# Patient Record
Sex: MALE | Race: WHITE | Employment: UNEMPLOYED | URBAN - METROPOLITAN AREA
[De-identification: names, ages, dates, MRNs, and addresses within clinical notes are randomized per-mention and may not be internally consistent; named-entity substitution may affect disease eponyms.]

---

## 2017-02-07 ENCOUNTER — OFFICE VISIT (OUTPATIENT)
Dept: HEMATOLOGY | Age: 57
End: 2017-02-07

## 2017-02-07 ENCOUNTER — HOSPITAL ENCOUNTER (OUTPATIENT)
Dept: ULTRASOUND IMAGING | Age: 57
Discharge: HOME | End: 2017-02-07
Attending: PHYSICIAN ASSISTANT
Payer: MEDICAID

## 2017-02-07 VITALS
SYSTOLIC BLOOD PRESSURE: 104 MMHG | HEIGHT: 70 IN | BODY MASS INDEX: 36.73 KG/M2 | HEART RATE: 68 BPM | OXYGEN SATURATION: 94 % | RESPIRATION RATE: 19 BRPM | WEIGHT: 256.6 LBS | TEMPERATURE: 97.2 F | DIASTOLIC BLOOD PRESSURE: 58 MMHG

## 2017-02-07 DIAGNOSIS — K74.69 OTHER CIRRHOSIS OF LIVER (HCC): ICD-10-CM

## 2017-02-07 DIAGNOSIS — K74.69 OTHER CIRRHOSIS OF LIVER (HCC): Primary | ICD-10-CM

## 2017-02-07 PROCEDURE — 76700 US EXAM ABDOM COMPLETE: CPT

## 2017-02-07 NOTE — PROGRESS NOTES
[address], MD, FACP, [facility]     April ADDIE Redd PA-C Laymon Evangelist, MD, MD Aydee Bagley NP Ebony Rape, NP [facility]     [address] [facility], [address] [address]     [phone]     FAX: [address] [place] [address], [address] [place] [address], [address]     [phone]     FAX: [phone]       Patient Care Team:  Margaretmary Saint, MD as PCP - General (Internal Medicine)  Rolan Marcos MD (Neurology)  Ron Allred MD (Gastroenterology)      Problem List  Date Reviewed: 11/11/2016          Codes Class Noted    Cirrhosis [facility]) ICD-10-CM: K74.60  ICD-9-CM: 571.5  2/1/2016        Essential hypertension ICD-10-CM: I10  ICD-9-CM: 401.9  11/10/2015        Migraine ICD-10-CM: I35.001  ICD-9-CM: 346.90  11/10/2015    Overview Signed 11/10/2015  9:24 AM by Margaretmary Saint, MD Dr cletus aralu               Chronic back pain ICD-10-CM: M54.9, G89.29  ICD-9-CM: 724.5, 338.29  11/10/2015    Overview Signed 11/10/2015  9:24 AM by Margaretmary Saint, MD Dr cletus aralu               Brain aneurysm ICD-10-CM: I67.1  ICD-9-CM: 437.3  11/10/2015    Overview Signed 11/10/2015  9:29 AM by Margaretmary Saint, MD     Mri 6/2015 do siu              Gastroesophageal reflux disease without esophagitis ICD-10-CM: K21.9  ICD-9-CM: 530.81  11/10/2015        Anxiety and depression ICD-10-CM: F41.9, F32.9  ICD-9-CM: 300.00, 311  11/10/2015        Chronic hepatitis C without hepatic coma [facility]) ICD-10-CM: B18.2  ICD-9-CM: 070.54  11/10/2015    Overview Signed 11/10/2015  9:34 AM by Margaretmary Saint, MD     States from tatoo, not treated, liver bx at [facility], 5 years ago.               Obesity ICD-10-CM: E66.9  ICD-9-CM: 278.00  11/10/2015              Francisca Mims. 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 The active problem list, all pertinent past medical history, medications, and laboratory findings related to the liver disorder were reviewed with the patient. The patient is a 61 y.o.  male who was noted to have abnormalities in liver chemistries and subsequently tested positive for chronic HCV in about 2006. Risk factors for acquiring HCV are IV drug use tattoos in 1970s. He notes several friends that got tattoos from the same person have HCV. There was no history of acute icteric hepatitis at the time of these risk factors. The patient was referred to [facility] in 2006. A liver biopsy was performed in 2006. The results are not available. The patient belives this demonstrated mild fibrosis. His past fibroscan in this office demonstrated a score of EkPa 63.9. Suggested fibrosis level is F4. Amy Paige presents to the office for follow-up of chronic hepatitis C therapy. He completed a full 12 weeks of Harvoni as of almost 6 months ago and presents for determination of sustained response. He states that he feels well and has no new or concerning complaints at this time. The patient has no other new complaints but continues to note fatigue, and generalized joint pain. He has had a lessening of pain in the right side over the liver. The patient has not experienced problems concentrating, swelling of the abdomen, swelling of the lower extremities, hematemesis, hematochezia. The patient moderate limitations in functional activities which can be attributed to other medical problems that are not related to the liver disease.     ALLERGIES  Allergies   Allergen Reactions    Pcn [Penicillins] Swelling       MEDICATIONS  Current Outpatient Prescriptions   Medication Sig    hydroCHLOROthiazide (HYDRODIURIL) 25 mg tablet TAKE ONE TABLET BY MOUTH DAILY    irbesartan (AVAPRO) 150 mg tablet TAKE ONE TABLET BY MOUTH EVERY NIGHT AT BEDTIME    predniSONE (DELTASONE) 10 mg tablet Take 10 mg by mouth daily.  Omeprazole delayed release (PRILOSEC D/R) 20 mg tablet Take 1 Tab by mouth daily.  diazepam (VALIUM) 10 mg tablet Take 10 mg by mouth two (2) times daily as needed.  oxyCODONE-acetaminophen (PERCOCET)  mg per tablet Take 1 Tab by mouth every eight (8) hours as needed for Pain.  morphine CR (MS CONTIN) 60 mg CR tablet Take 60 mg by mouth two (2) times daily as needed.  OTHER Uses an inhaler as needed.  carisoprodol (SOMA) 350 mg tablet Take 350 mg by mouth daily as needed.  AFLURIA [id number], PF, syrg injection      No current facility-administered medications for this visit. SYSTEM REVIEW NOT RELATED TO LIVER DISEASE OR REVIEWED ABOVE:  Constitution systems: Negative for fever, chills, weight gain, weight loss. Eyes: Negative for visual changes. ENT: Negative for sore throat, painful swallowing. Respiratory: Negative for cough, hemoptysis, SOB. Cardiology: Negative for chest pain, palpitations. GI:  Negative for constipation or diarrhea. : Negative for urinary frequency, dysuria, hematuria, nocturia. Skin: Negative for rash. Hematology: Negative for easy bruising, blood clots. Musculo-skeletal: Back pain, foot pain. Neurologic: Severe headaches. Cerebral AVM being evaluated. Psychology: Negative for anxiety, depression. FAMILY HISTORY:  The father  of cancer. The mother  of cancer. There is no family history of liver disease. SOCIAL HISTORY:  The patient is . The patient has 5 children, and 6 grandchildren and 3 great grandchildren. The patient currently smokes <1/2 pack of tobacco daily. The patient has previously consumed alcohol in excess. The patient has been abstinent from alcohol since  when he found out he had HCV.     The patient has not had IVDU exposure since the late 1970's. The patient used to work as drywall installation. The patient is currently receiving disability. PHYSICAL EXAMINATION:  Visit Vitals    /58 (BP 1 Location: Left arm, BP Patient Position: Sitting)    Pulse 68    Temp 97.2 °F (36.2 °C) (Oral)    Resp 19    Ht 5' 10\" (1.778 m)    Wt 256 lb 9.6 oz (116.4 kg)    SpO2 94%    BMI 36.82 kg/m2     General: No acute distress. Eyes: Sclera anicteric. ENT: No oral lesions. Thyroid normal.  Nodes: No adenopathy. Skin: No spider angiomata. No jaundice. No palmar erythema. Respiratory: Lungs clear to auscultation. Cardiovascular: Regular heart rate. No murmurs. No JVD. Abdomen: Obese abdomen. Ventral hernia. Soft non-tender. Liver size normal to percussion/palpation. Spleen not palpable. No obvious ascites. Extremities: No edema. No muscle wasting. No gross arthritic changes. Neurologic: Alert and oriented. Cranial nerves grossly intact. No asterixis.     LABORATORY STUDIES:  [facility] [place] of [address] [place] [address] & Units 10/5/2016 8/4/2016 5/4/2016   WBC 3.4 - 10.8 x10E3/uL 6.5 5.5 10.5   HGB 12.6 - 17.7 g/dL 15.5 15.5 17.4    - 379 x10E3/uL 104 (L) 133 (L) 126 (L)   INR 0.8 - 1.2 1.0 1.1 1.1   AST 0 - 40 IU/L 34 38 54 (H)   ALT 0 - 44 IU/L 35 30 91 (H)   Alk Phos 39 - 117 IU/L 129 (H) 97 83   Bili, Total 0.0 - 1.2 mg/dL 0.5 1.4 (H) 1.0   Bili, Direct 0.00 - 0.40 mg/dL 0.24 0.55 (H) 0.34   Albumin 3.5 - 5.5 g/dL 4.0 3.8 4.3   BUN 6 - 24 mg/dL 13 12 17   Creat 0.76 - 1.27 mg/dL 0.81 0.65 (L) 0.77   Na 134 - 144 mmol/L 141 138 141   K 3.5 - 5.2 mmol/L 4.0 3.2 (L) 3.8   Cl 97 - 108 mmol/L 98 91 (L) 97   CO2 18 - 29 mmol/L 26 31 (H) 28   Glucose 65 - 99 mg/dL 115 (H) 178 (H) 107 (H)     Cancer Screening Latest Ref Rng & Units 10/5/2016 8/4/2016 5/4/2016   AFP, Serum 0.0 - 8.0 ng/mL 6.1 6.5 10.7 (H)   AFP-L3% 0.0 - 9.9 % 7.0 6.6 6.4     Virology Latest Ref Rng & Units 10/5/2016 8/4/2016 5/4/2016   HCV RNA, SARAH, QL Negative Negative Negative Positive (A)   Additional lab values drawn at today's office visit are pending at the time of documentation. SEROLOGIES:  11/2015. HCV genotype 1A. Serologies Latest Ref Rng 11/27/2015 11/10/2015   Hep A Ab, Total Negative Positive (A)    Hep B Surface Ag Negative Negative    Hep B Core Ab, Total Negative Negative    Hep B Surface AB QL  Non Reactive    Hep C Genotype   1a   HCV RT-PCR, Quant  [id number]    Ferritin 30 - 400 ng/mL 440 (H)    Iron % Saturation 15 - 55 % 37    11/2015. Hep B Surface Ab is negative. LIVER HISTOLOGY:  2/2016. FibroScan performed at [address]. EkPa was 63.9. Suggested fibrosis level is F4. ENDOSCOPIC PROCEDURES:  4/2016. EGD performed by Dr. Petra Jacobs. No esophageal varices. No gastric varices. Mild portal gastropathy. Repeat in 4/2018. RADIOLOGY:  2/2016. Ultrasound of liver. Echogenic consistent with cirrhosis. No liver mass lesions. No dilated bile ducts. No ascites. 8/2016. Ultrasound of liver. Echogenic consistent with cirrhosis. No liver mass lesions. No dilated bile ducts. No ascites. 2/2017. Ultrasound of liver. Pending at the time of documentation. OTHER TESTING:  Not available or performed    ASSESSMENT AND PLAN:  Chronic hepatitis C, genotype 1a, in the setting of cirrhosis. Alfonso Augustine. tolerated medication for treatment of HCV well and completed a full course of 12 weeks Harvoni as of almost 6 months ago. I have reviewed with him our plan to document his viral status at this time. If he remains HCV negative, he will have satisfied conditions for meeting a sustained response. He is in agreement with this plan. He understands that he will continue to need regular screening for complications of cirrhosis. Lab values today for monitoring purposes will include basic metabolic panel, hepatic function panel, CBC, AFP, INR, and HCV RNA.      Alfonso Augustine. has cirrhosis secondary to HCV. He has been screened for complications of cirrhosis and is currently up to date. Patient has had recent EGD to evaluate for esophageal varices in 4/2016. There was mild portal hypertensive gastropathy and no varices. This will be repeated in 4/2018. US of the liver was done earlier today and I will follow-up on these findings as soon as available. This will be repeated every 6 months. The patient was directed to continue all current medications at the current dosages. There are no contraindications for the patient to take any medications that are necessary for treatment of other medical issues. The patient was counseled regarding alcohol consumption. He has been a nondrinker for the past 11 years. Vaccination for viral hepatitis B is recommended since the patient has no serologic evidence of previous exposure or vaccination with immunity. Vaccination for viral hepatitis A is not needed. The patient has serologic evidence of prior exposure or vaccination with immunity. All of the above issues were discussed with the patient. All questions were answered. The patient expressed a clear understanding of the above. [address] [address] in 3-4 months.     Hilda Murillo PA-C  [facility] [place] [address], [address] [address]  [phone]

## 2017-02-08 LAB
AFP L3 MFR SERPL: 7.1 % (ref 0–9.9)
AFP SERPL-MCNC: 4.3 NG/ML (ref 0–8)
ALBUMIN SERPL-MCNC: 3.9 G/DL (ref 3.5–5.5)
ALP SERPL-CCNC: 93 IU/L (ref 39–117)
ALT SERPL-CCNC: 23 IU/L (ref 0–44)
AST SERPL-CCNC: 23 IU/L (ref 0–40)
BILIRUB DIRECT SERPL-MCNC: 0.15 MG/DL (ref 0–0.4)
BILIRUB SERPL-MCNC: 0.4 MG/DL (ref 0–1.2)
BUN SERPL-MCNC: 6 MG/DL (ref 6–24)
BUN/CREAT SERPL: 10 (ref 9–20)
CALCIUM SERPL-MCNC: 8.9 MG/DL (ref 8.7–10.2)
CHLORIDE SERPL-SCNC: 101 MMOL/L (ref 96–106)
CO2 SERPL-SCNC: 29 MMOL/L (ref 18–29)
CREAT SERPL-MCNC: 0.62 MG/DL (ref 0.76–1.27)
ERYTHROCYTE [DISTWIDTH] IN BLOOD BY AUTOMATED COUNT: 13.6 % (ref 12.3–15.4)
GLUCOSE SERPL-MCNC: 91 MG/DL (ref 65–99)
HCT VFR BLD AUTO: 41.8 % (ref 37.5–51)
HGB BLD-MCNC: 14.3 G/DL (ref 12.6–17.7)
INR PPP: 1.1 (ref 0.8–1.2)
MCH RBC QN AUTO: 30.3 PG (ref 26.6–33)
MCHC RBC AUTO-ENTMCNC: 34.2 G/DL (ref 31.5–35.7)
MCV RBC AUTO: 89 FL (ref 79–97)
PLATELET # BLD AUTO: 106 X10E3/UL (ref 150–379)
POTASSIUM SERPL-SCNC: 4.1 MMOL/L (ref 3.5–5.2)
PROTHROMBIN TIME: 11.1 SEC (ref 9.1–12)
RBC # BLD AUTO: 4.72 X10E6/UL (ref 4.14–5.8)
SODIUM SERPL-SCNC: 143 MMOL/L (ref 134–144)
WBC # BLD AUTO: 6.3 X10E3/UL (ref 3.4–10.8)

## 2017-02-09 LAB — HCV RNA SERPL QL NAA+PROBE: NEGATIVE

## 2017-02-10 NOTE — PROGRESS NOTES
Pt notified of continued negative HCV RNA indicating he has achieved a sustained viral response to therapy. Follow-up as scheduled.

## 2017-02-10 NOTE — PROGRESS NOTES
Pt advised of gallstone. He is entirely without symptoms. Will continue to monitor and he will contact me prior to next appointment if symptoms change.

## 2017-03-13 RX ORDER — HYDROCHLOROTHIAZIDE 25 MG/1
TABLET ORAL
Qty: 90 TAB | Refills: 1 | Status: SHIPPED | OUTPATIENT
Start: 2017-03-13 | End: 2017-05-10 | Stop reason: SDUPTHER

## 2017-03-14 ENCOUNTER — OFFICE VISIT (OUTPATIENT)
Dept: NEUROLOGY | Age: 57
End: 2017-03-14

## 2017-03-14 ENCOUNTER — TELEPHONE (OUTPATIENT)
Dept: INTERNAL MEDICINE CLINIC | Age: 57
End: 2017-03-14

## 2017-03-14 VITALS
SYSTOLIC BLOOD PRESSURE: 106 MMHG | DIASTOLIC BLOOD PRESSURE: 62 MMHG | BODY MASS INDEX: 37.08 KG/M2 | WEIGHT: 259 LBS | OXYGEN SATURATION: 96 % | HEIGHT: 70 IN | HEART RATE: 67 BPM

## 2017-03-14 DIAGNOSIS — R06.83 SNORING: ICD-10-CM

## 2017-03-14 DIAGNOSIS — G43.719 INTRACTABLE CHRONIC MIGRAINE WITHOUT AURA AND WITHOUT STATUS MIGRAINOSUS: Primary | ICD-10-CM

## 2017-03-14 DIAGNOSIS — G44.40 MEDICATION OVERUSE HEADACHE: ICD-10-CM

## 2017-03-14 DIAGNOSIS — Z72.0 TOBACCO USE: ICD-10-CM
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
## 2017-03-14 NOTE — PATIENT INSTRUCTIONS

## 2017-03-14 NOTE — TELEPHONE ENCOUNTER
Did you get a fax from neurology in [facility] ask? It should have come over and discussed a medication. Please call Stevo Robert about this.
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
## 2017-03-16 ENCOUNTER — TELEPHONE (OUTPATIENT)
Dept: INTERNAL MEDICINE CLINIC | Age: 57
End: 2017-03-16

## 2017-03-16 NOTE — TELEPHONE ENCOUNTER
Jolena Ganser, MD Harl Carrel, FREDY        Caller: Unspecified (2 days ago,  3:12 PM)                     I got the message from neurology     the patient is currently on high dose narcotics, these are something that I DO NOT  Prescribe at all, I did email Dr Daysi Stacy to let him know that I will not be taking over prescribing these medications.      Please be sure the patient is aware that I will not be prescribing these medications.  You can give him pain clinic information if needed

## 2017-03-17 NOTE — TELEPHONE ENCOUNTER
Called, spoke to pt. Two pt identifiers confirmed. Pt informed Dr. Rommel Lazaro will NOT give MS Contin. Pt neuro switching over to [facility] and waiting to be accredited. Pt advised to keep checking with that neuro and when he ready to be seen. Pt advised that if he does not get into neuro soon, then Pain clinic info will be provided. Pt verbalized understanding of information discussed w/ no further questions at this time.

## 2017-04-11 ENCOUNTER — OFFICE VISIT (OUTPATIENT)
Dept: NEUROLOGY | Age: 57
End: 2017-04-11

## 2017-04-11 VITALS
HEART RATE: 74 BPM | BODY MASS INDEX: 37.62 KG/M2 | DIASTOLIC BLOOD PRESSURE: 88 MMHG | HEIGHT: 70 IN | TEMPERATURE: 97.1 F | WEIGHT: 262.8 LBS | OXYGEN SATURATION: 95 % | RESPIRATION RATE: 14 BRPM | SYSTOLIC BLOOD PRESSURE: 144 MMHG

## 2017-04-11 DIAGNOSIS — M47.819 VERTEBRAL ARTHROPATHY: ICD-10-CM

## 2017-04-11 DIAGNOSIS — M62.838 MUSCLE SPASM: ICD-10-CM

## 2017-04-11 DIAGNOSIS — R20.2 PARESTHESIA: ICD-10-CM

## 2017-04-11 DIAGNOSIS — M54.12 CERVICAL RADICULOPATHY: ICD-10-CM

## 2017-04-11 DIAGNOSIS — M79.2 NEURITIS: ICD-10-CM

## 2017-04-11 DIAGNOSIS — G62.9 POLYNEUROPATHY: Primary | ICD-10-CM

## 2017-04-11 DIAGNOSIS — G56.22 ULNAR NEUROPATHY OF LEFT UPPER EXTREMITY: ICD-10-CM

## 2017-04-11 RX ORDER — DIAZEPAM 10 MG/1
10 TABLET ORAL
Qty: 60 TAB | Refills: 2 | Status: SHIPPED | OUTPATIENT
Start: 2017-04-11 | End: 2017-08-15 | Stop reason: SDUPTHER

## 2017-04-11 RX ORDER — MORPHINE SULFATE 60 MG/1
60 TABLET, FILM COATED, EXTENDED RELEASE ORAL
Qty: 60 TAB | Refills: 0 | Status: SHIPPED | OUTPATIENT
Start: 2017-04-11 | End: 2017-06-12 | Stop reason: SDUPTHER

## 2017-04-11 RX ORDER — PREDNISONE 10 MG/1
10 TABLET ORAL DAILY
Qty: 30 TAB | Refills: 0 | Status: SHIPPED | OUTPATIENT
Start: 2017-04-11 | End: 2017-08-15 | Stop reason: SDUPTHER

## 2017-04-11 RX ORDER — OXYCODONE AND ACETAMINOPHEN 10; 325 MG/1; MG/1
1 TABLET ORAL
Qty: 60 TAB | Refills: 0 | Status: SHIPPED | OUTPATIENT
Start: 2017-04-11 | End: 2017-04-17

## 2017-04-11 RX ORDER — MORPHINE SULFATE 60 MG/1
60 TABLET, FILM COATED, EXTENDED RELEASE ORAL EVERY 12 HOURS
Qty: 60 TAB | Refills: 0 | Status: SHIPPED | OUTPATIENT
Start: 2017-05-11 | End: 2017-04-17

## 2017-04-11 RX ORDER — OXYCODONE AND ACETAMINOPHEN 10; 325 MG/1; MG/1
1 TABLET ORAL
Qty: 60 TAB | Refills: 0 | Status: SHIPPED | OUTPATIENT
Start: 2017-05-11 | End: 2017-06-12 | Stop reason: SDUPTHER

## 2017-04-11 NOTE — PROGRESS NOTES
Neurology Progress Note    NAME:  Junior Velasco :   1960   MRN:   [id number]     Date/Time:  2017  Subjective:      Junior Velasco is a 61 y.o. male here today for  Follow up . Continues to have pain. Choi Gissell and hit left elbow. Have been having pain and tingling left fore arm. Left last two fingers numb. Review of Systems:  Per HPI - Otherwise 12 point ROS was negative     []Unable to obtain  ROS due to  []mental status change  []sedated   []intubated    Medications reviewed:  Current Outpatient Prescriptions   Medication Sig Dispense Refill    oxyCODONE-acetaminophen (PERCOCET)  mg per tablet Take 1 Tab by mouth every eight (8) hours as needed for Pain. Max Daily Amount: 3 Tabs. 60 Tab 0    diazePAM (VALIUM) 10 mg tablet Take 1 Tab by mouth two (2) times daily as needed. Max Daily Amount: 20 mg. 60 Tab 2    predniSONE (DELTASONE) 10 mg tablet Take 1 Tab by mouth daily. TAKES VERY RARELY. ON AS NEEDED 30 Tab 0    morphine CR (MS CONTIN) 60 mg CR tablet Take 1 Tab by mouth two (2) times daily as needed. Max Daily Amount: 120 mg. 60 Tab 0    [START ON 2017] oxyCODONE-acetaminophen (PERCOCET 10)  mg per tablet Take 1 Tab by mouth every eight (8) hours as needed for Pain. Max Daily Amount: 3 Tabs. 60 Tab 0    [START ON 2017] morphine CR (MS CONTIN) 60 mg CR tablet Take 1 Tab by mouth every twelve (12) hours. Max Daily Amount: 120 mg. 60 Tab 0    hydroCHLOROthiazide (HYDRODIURIL) 25 mg tablet TAKE ONE TABLET BY MOUTH DAILY 90 Tab 1    irbesartan (AVAPRO) 150 mg tablet TAKE ONE TABLET BY MOUTH EVERY NIGHT AT BEDTIME 90 Tab 0    OTHER Uses an inhaler as needed.  Omeprazole delayed release (PRILOSEC D/R) 20 mg tablet Take 1 Tab by mouth daily.  90 Tab 1        Objective:   Vitals:  Vitals:    17 1039   BP: 144/88   Pulse: 74   Resp: 14   Temp: 97.1 °F (36.2 °C)   TempSrc: Oral   SpO2: 95%   Weight: 262 lb 12.8 oz (119.2 kg)   Height: 5' 10\" (1.778 m)   PainSc: 3   PainLoc: Generalized       PHYSICAL EXAM:  General:    Alert, cooperative, no distress, appears stated age. Head:   Normocephalic, without obvious abnormality, atraumatic. Eyes:   Conjunctivae/corneas clear. PERRLA  Nose:  Nares normal. No drainage or sinus tenderness. Throat:    Lips, mucosa, and tongue normal.  No Thrush  Neck:  Supple, symmetrical,  no adenopathy, thyroid: non tender    no carotid bruit and no JVD. Back:    Symmetric,  No CVA tenderness. Lungs:   Clear to auscultation bilaterally. No Wheezing or Rhonchi. No rales. Chest wall:  No tenderness or deformity. No Accessory muscle use. Heart:   Regular rate and rhythm,  no murmur, rub or gallop. Abdomen:   Soft, non-tender. Not distended. Bowel sounds normal. No masses  Extremities: Extremities normal, atraumatic, No cyanosis. No edema. No clubbing  Skin:     Texture, turgor normal. No rashes or lesions. Not Jaundiced  Lymph nodes: Cervical, supraclavicular normal.  Psych:  Good insight. Not depressed. Not anxious or agitated. NEUROLOGICAL EXAM:  Appearance: The patient is well developed, well nourished, provides a coherent history and is in no acute distress. Mental Status: Oriented to time, place and person. Mood and affect appropriate. Cranial Nerves:   Intact visual fields. Fundi are benign. JOSIE, EOM's full, no nystagmus, no ptosis. Facial sensation is normal. Corneal reflexes are intact. Facial movement is symmetric. Hearing is normal bilaterally. Palate is midline with normal sternocleidomastoid and trapezius muscles are normal. Tongue is midline. Motor:  5-/5 strength in upper and lower proximal and distal muscles. Normal bulk and tone. No fasciculations. Reflexes:   Deep tendon reflexes 2+/4 and symmetrical.   Sensory:   Decreased sensation to touch, pinprick and vibration. Gait:  Normal gait. Tremor:   Tremor noted. Cerebellar:  No cerebellar signs present.    Neurovascular:  Normal heart sounds and regular rhythm, peripheral pulses intact, and no carotid bruits. Lab Data Reviewed:    Office Visit on 02/07/2017   Component Date Value Ref Range Status    AFP, serum 02/07/2017 4.3  0.0 - 8.0 ng/mL Final    Comment: AFP and AFP-L3% measured [facility] liquid phase binding  methodology. Results for this test should not be used as absolute evidence of  presence or absence of malignant disease without confirmation of  the diagnosis by another medically established diagnostic product  or procedure. Values obtained with different assay methods or  kits cannot be used interchangeably.  AFP-L3%, Serum 02/07/2017 7.1  0.0 - 9.9 % Final    WBC 02/07/2017 6.3  3.4 - 10.8 x10E3/uL Final    RBC 02/07/2017 4.72  4.14 - 5.80 x10E6/uL Final    HGB 02/07/2017 14.3  12.6 - 17.7 g/dL Final    HCT 02/07/2017 41.8  37.5 - 51.0 % Final    MCV 02/07/2017 89  79 - 97 fL Final    MCH 02/07/2017 30.3  26.6 - 33.0 pg Final    MCHC 02/07/2017 34.2  31.5 - 35.7 g/dL Final    RDW 02/07/2017 13.6  12.3 - 15.4 % Final    PLATELET 04/20/6781 737* 150 - 379 x10E3/uL Final    Albumin 02/07/2017 3.9  3.5 - 5.5 g/dL Final    Bilirubin, total 02/07/2017 0.4  0.0 - 1.2 mg/dL Final    Bilirubin, direct 02/07/2017 0.15  0.00 - 0.40 mg/dL Final                  **Please note reference interval change**    Alk.  phosphatase 02/07/2017 93  39 - 117 IU/L Final    AST (SGOT) 02/07/2017 23  0 - 40 IU/L Final    ALT (SGPT) 02/07/2017 23  0 - 44 IU/L Final    Glucose 02/07/2017 91  65 - 99 mg/dL Final    BUN 02/07/2017 6  6 - 24 mg/dL Final    Creatinine 02/07/2017 0.62* 0.76 - 1.27 mg/dL Final    GFR est non-AA 02/07/2017 110  >59 mL/min/1.73 Final    GFR est AA 02/07/2017 127  >59 mL/min/1.73 Final    BUN/Creatinine ratio 02/07/2017 10  9 - 20 Final    Sodium 02/07/2017 143  134 - 144 mmol/L Final    Potassium 02/07/2017 4.1  3.5 - 5.2 mmol/L Final    Chloride 02/07/2017 101  96 - 106 mmol/L Final    CO2 02/07/2017 29  18 - 29 mmol/L Final    Calcium 02/07/2017 8.9  8.7 - 10.2 mg/dL Final    INR 02/07/2017 1.1  0.8 - 1.2 Final    Comment: Reference interval is for non-anticoagulated patients. Suggested INR therapeutic range for Vitamin K  antagonist therapy:     Standard Dose (moderate intensity                    therapeutic range):       2.0 - 3.0     Higher intensity therapeutic range       2.5 - 3.5      Prothrombin time 02/07/2017 11.1  9.1 - 12.0 sec Final    HCV RNA by SARAH, QL 02/07/2017 Negative  Negative Final    Negative: HCV RNA Not Detected       CT Results (recent):  No results found for this or any previous visit. MRI Results (recent):    Results from [place] encounter on 06/01/15   MRI BRAIN W WO CONT   Narrative **Final Report**      ICD Codes / Adm. Diagnosis: 434.90  784.0 / Unspecified cerebral artery oc    Headache(784.0)  Examination:  MR BRAIN W AND WO CON  - [id number] - Jun 1 2015 12:09PM  Accession No:  [id number]  Reason:  cva, intractable ha, dizziness      REPORT:  INDICATION: cva, intractable ha, dizziness 434.9, 784.0, 780.4    COMPARISON: None    EXAM: Sagittal T1-weighted spin-echo, axial FLAIR, coronal and axial   T2-weighted fast spin-echo, axial diffusion weighted echo planar, axial   T1-weighted spin-echo, axial gradient echo, and post IV contrast-enhanced   triplanar T1-weighted spin-echo MR images of the brain are obtained. A total   of 10 cc intravenous Gadavist was administered for the study. FINDINGS: Intra-axial morphology, signal and enhancement are normal. There   is no extra-axial collection, mass or enhancement abnormality demonstrated. Assessment of the vascular flow voids at the base the brain shows either   tortuosity or a 4 mm aneurysm of the right M1 segment. Sella, optic chiasm,   posterior fossa, orbits and paranasal sinuses are normal.         IMPRESSION: Aneurysm versus tortuosity of right M1 segment.  Followup with MR   arteriography of the [place] is recommended. Otherwise, normal   brain MRI. 23X           Signing/Reading Doctor: SOLANGE Wagner ([id number])    Approved: SOLANGE Wagner ([id number])  Jun 1 2015  2:16PM                                    IR Results (recent):  No results found for this or any previous visit. VAS/US Results (recent):  No results found for this or any previous visit. Assesment  Patient Active Problem List   Diagnosis Code    Essential hypertension I10    Migraine G43.909    Chronic back pain M54.9, G89.29    Brain aneurysm I67.1    Gastroesophageal reflux disease without esophagitis K21.9    Anxiety and depression F41.9, F32.9    Chronic hepatitis C without hepatic coma (HCC) B18.2    Obesity E66.9    Cirrhosis ([address] K74.60      ___________________________________________________  PLAN:      ICD-10-CM ICD-9-CM    1. Polyneuropathy G62.9 356.9    2. Paresthesia R20.2 782.0    3. Muscle spasm M62.838 728.85    4. Cervical radiculopathy M54.12 723.4    5. Vertebral arthropathy M95.8 721.90    6. Ulnar neuropathy of left upper extremity G56.22 354.2    7. Neuritis M79.2 729.2      Follow-up Disposition:  Return in about 2 months (around 6/11/2017).            ___________________________________________________    Total time spent with patient:  []15   []25   []35   [] __ minutes    Care Plan discussed with:    []Patient   []Family    []Care Manager   []Consultant/Specialist :    ___________________________________________________    Attending Physician: Deny Collier MD

## 2017-04-11 NOTE — MR AVS SNAPSHOT
Visit Information Date & Time Provider Department Dept. Phone Encounter #  
 4/11/2017 10:00 AM [facility] [phone] [id number] Follow-up Instructions Return in about 2 months (around 6/11/2017). Your Appointments 5/4/2017  9:00 AM  
Follow Up with DOM Patterson [address] ([address]) Appt Note: 3 month Follow up [address] [facility] [place] [phone] [facility]ly [facility] [address]  
[phone]  
  
   
 [address] [facility] [place] [address] [facility] [address]  
  
    
 5/10/2017 12:00 PM  
ROUTINE CARE with Edilia Ospina, [address] [place] [address],[address]) Appt Note: 6 mon f/u  
 [facility] [address] [id number]  
[address] [place] [address] Upcoming Health Maintenance Date Due Pneumococcal 19-64 Medium Risk (1 of 1 - PPSV23) 5/26/1978 DTaP/Tdap/Td series (1 - Tdap) 5/26/1980 COLONOSCOPY 10/19/2021 Allergies as of 4/11/2017  Review Complete On: 4/11/2017 By: Juliaette Nyhan, MD  
  
 Severity Noted Reaction Type Reactions Pcn [Penicillins] Medium 11/27/2015    Swelling Current Immunizations  Never Reviewed No immunizations on file. Not reviewed this visit You Were Diagnosed With   
  
 Codes Comments Polyneuropathy    -  Primary ICD-10-CM: G62.9 ICD-9-CM: 356.9 Paresthesia     ICD-10-CM: R20.2 ICD-9-CM: 782.0 Muscle spasm     ICD-10-CM: S16.361 ICD-9-CM: 728.85 Cervical radiculopathy     ICD-10-CM: M54.12 
ICD-9-CM: 723.4 Vertebral arthropathy     ICD-10-CM: M95.8 ICD-9-CM: 721.90 Ulnar neuropathy of left upper extremity     ICD-10-CM: G56.22 
ICD-9-CM: 354.2 Neuritis     ICD-10-CM: M79.2 ICD-9-CM: 729.2 Vitals BP Pulse Temp Resp Height(growth percentile) Weight(growth percentile) 144/88 (BP 1 Location: Right arm, BP Patient Position: Sitting) 74 97.1 °F (36.2 °C) (Oral) 14 5' 10\" (1.778 m) 262 lb 12.8 oz (119.2 kg) SpO2 BMI Smoking Status 95% 37.71 kg/m2 Current Every Day Smoker BMI and BSA Data Body Mass Index Body Surface Area  
 37.71 kg/m 2 2.43 m 2 Preferred Pharmacy Pharmacy Name Phone [id number] [id number] [facility] [phone] Your Updated Medication List  
  
   
This list is accurate as of: 4/11/17 11:09 AM.  Always use your most recent med list.  
  
  
  
  
 diazePAM 10 mg tablet Commonly known as:  VALIUM Take 1 Tab by mouth two (2) times daily as needed. Max Daily Amount: 20 mg.  
  
 hydroCHLOROthiazide 25 mg tablet Commonly known as:  HYDRODIURIL  
TAKE ONE TABLET BY MOUTH DAILY  
  
 irbesartan 150 mg tablet Commonly known as:  AVAPRO TAKE ONE TABLET BY MOUTH EVERY NIGHT AT BEDTIME  
  
 * morphine CR 60 mg CR tablet Commonly known as:  MS CONTIN Take 1 Tab by mouth two (2) times daily as needed. Max Daily Amount: 120 mg.  
  
 * morphine CR 60 mg CR tablet Commonly known as:  MS CONTIN Take 1 Tab by mouth every twelve (12) hours. Max Daily Amount: 120 mg.  
Start taking on:  5/11/2017 Omeprazole delayed release 20 mg tablet Commonly known as:  PRILOSEC D/R Take 1 Tab by mouth daily. OTHER Uses an inhaler as needed. * oxyCODONE-acetaminophen  mg per tablet Commonly known as:  PERCOCET Take 1 Tab by mouth every eight (8) hours as needed for Pain. Max Daily Amount: 3 Tabs. * oxyCODONE-acetaminophen  mg per tablet Commonly known as:  PERCOCET 10 Take 1 Tab by mouth every eight (8) hours as needed for Pain. Max Daily Amount: 3 Tabs. Start taking on:  5/11/2017  
  
 predniSONE 10 mg tablet Commonly known as:  Sonam Sake Take 1 Tab by mouth daily. TAKES VERY RARELY.  ON AS NEEDED  
  
 * Notice: This list has 4 medication(s) that are the same as other medications prescribed for you. Read the directions carefully, and ask your doctor or other care provider to review them with you. Prescriptions Printed Refills  
 oxyCODONE-acetaminophen (PERCOCET)  mg per tablet 0 Sig: Take 1 Tab by mouth every eight (8) hours as needed for Pain. Max Daily Amount: 3 Tabs. Class: Print Route: Oral  
 diazePAM (VALIUM) 10 mg tablet 2 Sig: Take 1 Tab by mouth two (2) times daily as needed. Max Daily Amount: 20 mg.  
 Class: Print Route: Oral  
 morphine CR (MS CONTIN) 60 mg CR tablet 0 Sig: Take 1 Tab by mouth two (2) times daily as needed. Max Daily Amount: 120 mg.  
 Class: Print Route: Oral  
 oxyCODONE-acetaminophen (PERCOCET 10)  mg per tablet 0 Starting on: 5/11/2017 Sig: Take 1 Tab by mouth every eight (8) hours as needed for Pain. Max Daily Amount: 3 Tabs. Class: Print Route: Oral  
 morphine CR (MS CONTIN) 60 mg CR tablet 0 Starting on: 5/11/2017 Sig: Take 1 Tab by mouth every twelve (12) hours. Max Daily Amount: 120 mg.  
 Class: Print Route: Oral  
  
Prescriptions Sent to Pharmacy Refills  
 predniSONE (DELTASONE) 10 mg tablet 0 Sig: Take 1 Tab by mouth daily. TAKES VERY RARELY. ON AS NEEDED Class: Normal  
 Pharmacy: [address] #: [phone] Route: Oral  
  
Follow-up Instructions Return in about 2 months (around 6/11/2017). Introducing [facility]! [employer] introduces [employer] patient portal. Now you can access parts of your medical record, email your doctor's office, and request medication refills online. 1. In your internet browser, go to [email or web address] 2. Click on the First Time User? Click Here link in the Sign In box. You will see the New Member Sign Up page. 3. Enter your [employer] Access Code exactly as it appears below. You will not need to use this code after youve completed the sign-up process. If you do not sign up before the expiration date, you must request a new code. · [employer] Access Code: [email or web address] Expires: 6/12/2017 12:24 PM 
 
4. Enter the last four digits of your Social Security Number (xxxx) and Date of Birth (mm/dd/yyyy) as indicated and click Submit. You will be taken to the next sign-up page. 5. Create a [employer] ID. This will be your [employer] login ID and cannot be changed, so think of one that is secure and easy to remember. 6. Create a [employer] password. You can change your password at any time. 7. Enter your Password Reset Question and Answer. This can be used at a later time if you forget your password. 8. Enter your e-mail address. You will receive e-mail notification when new information is available in [address] 9. Click Sign Up. You can now view and download portions of your medical record. 10. Click the Download Summary menu link to download a portable copy of your medical information. If you have questions, please visit the Frequently Asked Questions section of the [employer] website. Remember, [employer] is NOT to be used for urgent needs. For medical emergencies, dial 911. Now available from your iPhone and Android! Please provide this summary of care documentation to your next provider. Your primary care clinician is listed as Vivian Alonso. If you have any questions after today's visit, please call [phone].

## 2017-04-18 ENCOUNTER — ANESTHESIA (OUTPATIENT)
Dept: ENDOSCOPY | Age: 57
End: 2017-04-18
Payer: MEDICAID

## 2017-04-18 ENCOUNTER — ANESTHESIA EVENT (OUTPATIENT)
Dept: ENDOSCOPY | Age: 57
End: 2017-04-18
Payer: MEDICAID

## 2017-04-18 PROCEDURE — 74011250636 HC RX REV CODE- 250/636

## 2017-04-18 PROCEDURE — 74011000250 HC RX REV CODE- 250

## 2017-04-18 RX ORDER — LIDOCAINE HYDROCHLORIDE 20 MG/ML
INJECTION, SOLUTION EPIDURAL; INFILTRATION; INTRACAUDAL; PERINEURAL AS NEEDED
Status: DISCONTINUED | OUTPATIENT
Start: 2017-04-18 | End: 2017-04-18 | Stop reason: HOSPADM

## 2017-04-18 RX ORDER — PROPOFOL 10 MG/ML
INJECTION, EMULSION INTRAVENOUS AS NEEDED
Status: DISCONTINUED | OUTPATIENT
Start: 2017-04-18 | End: 2017-04-18 | Stop reason: HOSPADM

## 2017-04-18 RX ADMIN — PROPOFOL 200 MG: 10 INJECTION, EMULSION INTRAVENOUS at 08:15

## 2017-04-18 RX ADMIN — PROPOFOL 100 MG: 10 INJECTION, EMULSION INTRAVENOUS at 08:24

## 2017-04-18 RX ADMIN — LIDOCAINE HYDROCHLORIDE 60 MG: 20 INJECTION, SOLUTION EPIDURAL; INFILTRATION; INTRACAUDAL; PERINEURAL at 08:15

## 2017-04-18 NOTE — ANESTHESIA PREPROCEDURE EVALUATION
Anesthetic History   No history of anesthetic complications            Review of Systems / Medical History  Patient summary reviewed, nursing notes reviewed and pertinent labs reviewed    Pulmonary          Smoker (20 pk yr)  Asthma        Neuro/Psych         Headaches (Migraine) and psychiatric history (anxiety and dpression)     Cardiovascular    Hypertension              Exercise tolerance: >4 METS  Comments:  EKG: SR   GI/Hepatic/Renal     GERD  Hepatitis (treated 2016): type C    Liver disease (cirrhosis)    Comments: Hx of polyps Endo/Other        Obesity, morbid obesity and arthritis     Other Findings   Comments: Hearing loss in left ear  Hx of sciatica/back pain    Hx of brain aneurysm           Physical Exam    Airway  Mallampati: III  TM Distance: 4 - 6 cm  Neck ROM: normal range of motion   Mouth opening: Diminished (comment)     Cardiovascular    Rhythm: regular  Rate: normal         Dental    Dentition: Edentulous     Pulmonary  Breath sounds clear to auscultation               Abdominal  GI exam deferred       Other Findings            Anesthetic Plan    ASA: 3  Anesthesia type: total IV anesthesia and general          Induction: Intravenous  Anesthetic plan and risks discussed with: Patient

## 2017-04-18 NOTE — ANESTHESIA POSTPROCEDURE EVALUATION
Post-Anesthesia Evaluation and Assessment    Patient: Clarence Quigley. MRN: [id number]  SSN: [id number]    YOB: 1960  Age: 61 y.o. Sex: male       Cardiovascular Function/Vital Signs  Visit Vitals    /64    Pulse 70    Temp 36.6 °C (97.8 °F)    Resp 16    Ht 5' 10\" (1.778 m)    Wt 119.9 kg (264 lb 5 oz)    SpO2 93%    BMI 37.92 kg/m2       Patient is status post total IV anesthesia, general anesthesia for Procedure(s):  COLONOSCOPY  COLON BIOPSY. Nausea/Vomiting: None    Postoperative hydration reviewed and adequate. Pain:  Pain Scale 1: Numeric (0 - 10) (04/18/17 0850)  Pain Intensity 1: 0 (04/18/17 0850)   Managed    Neurological Status: At baseline    Mental Status and Level of Consciousness: Arousable    Pulmonary Status:   O2 Device: Room air (04/18/17 0850)   Adequate oxygenation and airway patent    Complications related to anesthesia: None    Post-anesthesia assessment completed.  No concerns    Signed By: Chrissy Bermeo MD     April 18, 2017

## 2017-05-04 ENCOUNTER — OFFICE VISIT (OUTPATIENT)
Dept: HEMATOLOGY | Age: 57
End: 2017-05-04

## 2017-05-04 VITALS
DIASTOLIC BLOOD PRESSURE: 81 MMHG | OXYGEN SATURATION: 94 % | SYSTOLIC BLOOD PRESSURE: 154 MMHG | WEIGHT: 260.8 LBS | TEMPERATURE: 99 F | BODY MASS INDEX: 37.42 KG/M2 | HEART RATE: 85 BPM

## 2017-05-04 DIAGNOSIS — K74.69 OTHER CIRRHOSIS OF LIVER (HCC): Primary | ICD-10-CM

## 2017-05-04 NOTE — PROGRESS NOTES
[address] [facility] MD Johnathan, FACP, [facility]     April Genaro Fernandes, NP     Vince Aguilar, PAColtonC      Farrel Nageotte, MD, Kate Pickard MD     [address] [place] [address], ADDIE Fraser, ADDIE Andre [facility]     [address] [facility], [address] [address]     [phone]     FAX: [address], [address] [facility], [address] [phone]     FAX: [phone]       Patient Care Team:  Gilmar Owusu MD as PCP - General (Internal Medicine)  Victor Manuel Sanchez MD (Neurology)  Suresh Olsen MD (Gastroenterology)      Problem List  Date Reviewed: 4/13/2017          Codes Class Noted    Cirrhosis [facility]) ICD-10-CM: K74.60  ICD-9-CM: 571.5  2/1/2016        Essential hypertension ICD-10-CM: I10  ICD-9-CM: 401.9  11/10/2015        Migraine ICD-10-CM: S69.999  ICD-9-CM: 346.90  11/10/2015    Overview Signed 11/10/2015  9:24 AM by MD Dr damaris Crowe               Chronic back pain ICD-10-CM: M54.9, G89.29  ICD-9-CM: 724.5, 338.29  11/10/2015    Overview Signed 11/10/2015  9:24 AM by MD Dr damaris Crowe               Brain aneurysm ICD-10-CM: I67.1  ICD-9-CM: 437.3  11/10/2015    Overview Signed 11/10/2015  9:29 AM by Gilmar Owusu MD     Mri 6/2015 do siu              Gastroesophageal reflux disease without esophagitis ICD-10-CM: K21.9  ICD-9-CM: 530.81  11/10/2015        Anxiety and depression ICD-10-CM: F41.9, F32.9  ICD-9-CM: 300.00, 311  11/10/2015        Chronic hepatitis C without hepatic coma [facility]) ICD-10-CM: B18.2  ICD-9-CM: 070.54  11/10/2015    Overview Signed 11/10/2015  9:34 AM by Gilmar Owusu MD     States from tatoo, not treated, liver bx at [facility], 5 years ago.               Obesity ICD-10-CM: E66.9  ICD-9-CM: 278.00  11/10/2015              Radha Hilliard. returns to the [address] for management of cirrhosis secondary to chronic HCV, he recently completed a course of medical therapy and has been shown previously to have achieved a sustained viral response to therapy. The active problem list, all pertinent past medical history, medications, and laboratory findings related to the liver disorder were reviewed with the patient. The patient is a 61 y.o.  male who was noted to have abnormalities in liver chemistries and subsequently tested positive for chronic HCV in about 2006. Risk factors for acquiring HCV are IV drug use tattoos in 1970s. He notes several friends that got tattoos from the same person have HCV. There was no history of acute icteric hepatitis at the time of these risk factors. The patient was referred to [facility] in 2006. A liver biopsy was performed in 2006. The results are not available. The patient belives this demonstrated mild fibrosis. His past fibroscan in this office demonstrated a score of EkPa 63.9. Suggested fibrosis level is F4. Clarence Zamudio presents to the office for follow-up of chronic hepatitis C therapy. He completed a full 12 weeks of Odessa Betters as of ~9 months ago and has been shown at his last offive visit to have achieved a sustained response. The patient has no other new complaints but continues to note fatigue, and generalized joint pain. He has occasional shortness of breath and reports that he had expectations of improving energy by this time post-treatment. He is somewhat frustrated that his fatigue and arthritic complaints persist.  The patient has not experienced problems concentrating, swelling of the abdomen, swelling of the lower extremities, hematemesis, hematochezia. The patient moderate limitations in functional activities which can be attributed to other medical problems that are not related to the liver disease.     ALLERGIES  Allergies   Allergen Reactions    Pcn [Penicillins] Swelling       MEDICATIONS  Current Outpatient Prescriptions   Medication Sig    diazePAM (VALIUM) 10 mg tablet Take 1 Tab by mouth two (2) times daily as needed. Max Daily Amount: 20 mg.  predniSONE (DELTASONE) 10 mg tablet Take 1 Tab by mouth daily. TAKES VERY RARELY. ON AS NEEDED    morphine CR (MS CONTIN) 60 mg CR tablet Take 1 Tab by mouth two (2) times daily as needed. Max Daily Amount: 120 mg.    [START ON 2017] oxyCODONE-acetaminophen (PERCOCET 10)  mg per tablet Take 1 Tab by mouth every eight (8) hours as needed for Pain. Max Daily Amount: 3 Tabs.  hydroCHLOROthiazide (HYDRODIURIL) 25 mg tablet TAKE ONE TABLET BY MOUTH DAILY    irbesartan (AVAPRO) 150 mg tablet TAKE ONE TABLET BY MOUTH EVERY NIGHT AT BEDTIME    OTHER Uses an inhaler as needed.  Omeprazole delayed release (PRILOSEC D/R) 20 mg tablet Take 1 Tab by mouth daily. No current facility-administered medications for this visit. SYSTEM REVIEW NOT RELATED TO LIVER DISEASE OR REVIEWED ABOVE:  Constitution systems: Negative for fever, chills, weight gain, weight loss. Eyes: Negative for visual changes. ENT: Negative for sore throat, painful swallowing. Respiratory: Negative for cough, hemoptysis, SOB. Cardiology: Negative for chest pain, palpitations. GI:  Negative for constipation or diarrhea. : Negative for urinary frequency, dysuria, hematuria, nocturia. Skin: Negative for rash. Hematology: Negative for easy bruising, blood clots. Musculo-skeletal: Back pain, foot pain. Neurologic: Severe headaches. Cerebral AVM being evaluated. Psychology: Negative for anxiety, depression. FAMILY HISTORY:  The father  of cancer. The mother  of cancer. There is no family history of liver disease. SOCIAL HISTORY:  The patient is . The patient has 5 children, and 6 grandchildren and 3 great grandchildren. The patient currently smokes <1/2 pack of tobacco daily.     The patient has previously consumed alcohol in excess. The patient has been abstinent from alcohol since 2006 when he found out he had HCV. The patient has not had IVDU exposure since the late 1970's. The patient used to work as drywall installation. The patient is currently receiving disability. PHYSICAL EXAMINATION:  Visit Vitals    /81    Pulse 85    Temp 99 °F (37.2 °C) (Tympanic)    Wt 260 lb 12.8 oz (118.3 kg)    SpO2 94%    BMI 37.42 kg/m2     General: No acute distress. Eyes: Sclera anicteric. ENT: No oral lesions. Thyroid normal.  Nodes: No adenopathy. Skin: No spider angiomata. No jaundice. No palmar erythema. Respiratory: Lungs clear to auscultation. Cardiovascular: Regular heart rate. No murmurs. No JVD. Abdomen: Obese abdomen. Ventral hernia, reducible. Soft non-tender. Liver size normal to percussion/palpation. Spleen not palpable. No obvious ascites. Extremities: No edema. No muscle wasting. No gross arthritic changes. Neurologic: Alert and oriented. Cranial nerves grossly intact. No asterixis.     LABORATORY STUDIES:  [facility] of [address] [place] [address] & Units 2/7/2017 10/5/2016 8/4/2016   WBC 3.4 - 10.8 x10E3/uL 6.3 6.5 5.5   HGB 12.6 - 17.7 g/dL 14.3 15.5 15.5    - 379 x10E3/uL 106 (L) 104 (L) 133 (L)   INR 0.8 - 1.2 1.1 1.0 1.1   AST 0 - 40 IU/L 23 34 38   ALT 0 - 44 IU/L 23 35 30   Alk Phos 39 - 117 IU/L 93 129 (H) 97   Bili, Total 0.0 - 1.2 mg/dL 0.4 0.5 1.4 (H)   Bili, Direct 0.00 - 0.40 mg/dL 0.15 0.24 0.55 (H)   Albumin 3.5 - 5.5 g/dL 3.9 4.0 3.8   BUN 6 - 24 mg/dL 6 13 12   Creat 0.76 - 1.27 mg/dL 0.62 (L) 0.81 0.65 (L)   Na 134 - 144 mmol/L 143 141 138   K 3.5 - 5.2 mmol/L 4.1 4.0 3.2 (L)   Cl 96 - 106 mmol/L 101 98 91 (L)   CO2 18 - 29 mmol/L 29 26 31 (H)   Glucose 65 - 99 mg/dL 91 115 (H) 178 (H)     Cancer Screening Latest Ref Rng & Units 2/7/2017 10/5/2016 8/4/2016   AFP, Serum 0.0 - 8.0 ng/mL 4.3 6.1 6.5   AFP-L3% 0.0 - 9.9 % 7.1 7.0 6.6     Virology Latest Ref Rng & Units 2/7/2017 10/5/2016   HCV RNA, SARAH, QL Negative Negative Negative   Additional lab values drawn at today's office visit are pending at the time of documentation. SEROLOGIES:  11/2015. HCV genotype 1A. Serologies Latest Ref Rng 11/27/2015 11/10/2015   Hep A Ab, Total Negative Positive (A)    Hep B Surface Ag Negative Negative    Hep B Core Ab, Total Negative Negative    Hep B Surface AB QL  Non Reactive    Hep C Genotype   1a   HCV RT-PCR, Quant  [id number]    Ferritin 30 - 400 ng/mL 440 (H)    Iron % Saturation 15 - 55 % 37    11/2015. Hep B Surface Ab is negative. LIVER HISTOLOGY:  2/2016. FibroScan performed at [address]. EkPa was 63.9. Suggested fibrosis level is F4. ENDOSCOPIC PROCEDURES:  4/2016. EGD performed by Dr. Ellie Stover. No esophageal varices. No gastric varices. Mild portal gastropathy. Repeat in 4/2018.  4/2017. Colonoscopy performed by Dr. Aime Stoner. Hyperplastic polyp. Repeat in 5 years. RADIOLOGY:  2/2016. Ultrasound of liver. Echogenic consistent with cirrhosis. No liver mass lesions. No dilated bile ducts. No ascites. 8/2016. Ultrasound of liver. Echogenic consistent with cirrhosis. No liver mass lesions. No dilated bile ducts. No ascites. 2/2017. Ultrasound of liver. Echogenic consistent with cirrhosis. No liver mass lesions. No dilated bile ducts. No ascites. Portal vein thrombosis, not previously noted. OTHER TESTING:  Not available or performed    ASSESSMENT AND PLAN:  Chronic hepatitis C, genotype 1a, in the setting of cirrhosis. Mike Graham. tolerated medication for treatment of HCV well and completed a full course of 12 weeks Harvoni as of almost 9 months ago. He is now considered a sustained responder to therapy and HCV is cured. We will only check the viral status on an intermittent basis at this point. He understands that he will continue to need regular screening for complications of cirrhosis.   He is in agreement with this plan. Lab values today for monitoring purposes will include basic metabolic panel, hepatic function panel, CBC, AFP, INR. Rayo Ceballos has cirrhosis secondary to HCV. He has been screened for complications of cirrhosis and is currently up to date. Patient has had recent EGD to evaluate for esophageal varices in 4/2016. There was mild portal hypertensive gastropathy and no varices. This will be repeated in 4/2018. US of the liver was performed at the time of the last office visit in 2/2017, showing no new mass/lesion of the liver. He was shown to have a large gallstone, but remains without symptoms of biliary disease or obstruction. There was new evidence on the last scan of portal vein thrombosis. I have drawn AFP value and will consider MRI or CT scan at this time to rule out infiltrative liver tumor to account for this new finding. I will inform patient of additional work-up as indicated. Large gallstone. Patient denies evidence of pain or irritation. We have discussed indications for surgery or other additional evaluation based on symptoms. No indication for need of surgical referral at this time. The patient was directed to continue all current medications at the current dosages. There are no contraindications for the patient to take any medications that are necessary for treatment of other medical issues. The patient was counseled regarding alcohol consumption. He has been a nondrinker for the past 11 years. I have encouraged his nonsmoking as he is having ongoing fatigue and shortness of breath. Vaccination for viral hepatitis B is recommended since the patient has no serologic evidence of previous exposure or vaccination with immunity. Vaccination for viral hepatitis A is not needed. The patient has serologic evidence of prior exposure or vaccination with immunity. All of the above issues were discussed with the patient.   All questions were answered. The patient expressed a clear understanding of the above. 1901 Merged with [facility] in 3-4 months, with possible repeat imaging study in the meantime.     Stuart Meek PA-C  [address], [address] [address]  [phone]

## 2017-05-04 NOTE — MR AVS SNAPSHOT
Visit Information Date & Time Provider Department Dept. Phone Encounter #  
 5/4/2017  9:00 AM [facility] [id number] Follow-up Instructions Return in about 3 months (around 8/4/2017) for Kenneth Shade. Your Appointments 5/10/2017 12:00 PM  
ROUTINE CARE with Ester Chilel, [address]) Appt Note: 6 mon f/u  
 [facility] [address] [facility]  
[phone]  
  
   
 [address] [id number]  
  
    
 6/12/2017 10:40 AM  
Follow Up with MD Basim Tapia [facility]) Appt Note: 2mos follow up Susanna [address] [facility] Upcoming Health Maintenance Date Due Pneumococcal 19-64 Medium Risk (1 of 1 - PPSV23) 5/26/1978 DTaP/Tdap/Td series (1 - Tdap) 5/26/1980 INFLUENZA AGE 9 TO ADULT 8/1/2017 COLONOSCOPY 4/18/2022 Allergies as of 5/4/2017  Review Complete On: 5/4/2017 By: Renny Rodriguez Severity Noted Reaction Type Reactions Pcn [Penicillins] Medium 11/27/2015    Swelling Current Immunizations  Never Reviewed No immunizations on file. Not reviewed this visit You Were Diagnosed With   
  
 Codes Comments Other cirrhosis of liver ([facility] [address]    -  Primary ICD-10-CM: S51.34 ICD-9-CM: 571.5 Vitals BP Pulse Temp Weight(growth percentile) SpO2 BMI  
 154/81 85 99 °F (37.2 °C) (Tympanic) 260 lb 12.8 oz (118.3 kg) 94% 37.42 kg/m2 Smoking Status Current Every Day Smoker BMI and BSA Data Body Mass Index Body Surface Area  
 37.42 kg/m 2 2.42 m 2 Preferred Pharmacy Pharmacy Name Phone  [id number] [address] AT [facility] [phone] Your Updated Medication List  
  
   
This list is accurate as of: 5/4/17  9:46 AM.  Always use your most recent med list.  
  
  
  
  
 diazePAM 10 mg tablet Commonly known as:  VALIUM Take 1 Tab by mouth two (2) times daily as needed. Max Daily Amount: 20 mg.  
  
 hydroCHLOROthiazide 25 mg tablet Commonly known as:  HYDRODIURIL  
TAKE ONE TABLET BY MOUTH DAILY  
  
 irbesartan 150 mg tablet Commonly known as:  AVAPRO TAKE ONE TABLET BY MOUTH EVERY NIGHT AT BEDTIME  
  
 morphine CR 60 mg CR tablet Commonly known as:  MS CONTIN Take 1 Tab by mouth two (2) times daily as needed. Max Daily Amount: 120 mg. Omeprazole delayed release 20 mg tablet Commonly known as:  PRILOSEC D/R Take 1 Tab by mouth daily. OTHER Uses an inhaler as needed. oxyCODONE-acetaminophen  mg per tablet Commonly known as:  PERCOCET 10 Take 1 Tab by mouth every eight (8) hours as needed for Pain. Max Daily Amount: 3 Tabs. Start taking on:  5/11/2017  
  
 predniSONE 10 mg tablet Commonly known as:  Puja Lee Take 1 Tab by mouth daily. TAKES VERY RARELY. ON AS NEEDED We Performed the Following AFP WITH AFP-L3% [[id number] Custom] CBC W/O DIFF [58938 CPT(R)] HEPATIC FUNCTION PANEL (6) [[id number] Custom] METABOLIC PANEL, BASIC [24628 CPT(R)] PROTHROMBIN TIME + INR [77789 CPT(R)] Follow-up Instructions Return in about 3 months (around 8/4/2017) for Robson Ward. To-Do List   
 08/04/2017 Imaging:  US ABD COMP Referral Information Referral ID Referred By Referred To  
  
 [id number] Mortimer Cancel B Not Available Visits Status Start Date End Date 1 New Request 5/4/17 5/4/18 If your referral has a status of pending review or denied, additional information will be sent to support the outcome of this decision. Introducing [facility] & HEALTH SERVICES! Abel Lind introduces [employer] patient portal. Now you can access parts of your medical record, email your doctor's office, and request medication refills online. 1. In your internet browser, go to [email or web address] 2. Click on the First Time User? Click Here link in the Sign In box. You will see the New Member Sign Up page. 3. Enter your [employer] Access Code exactly as it appears below. You will not need to use this code after youve completed the sign-up process. If you do not sign up before the expiration date, you must request a new code. · [employer] Access Code: [email or web address] Expires: 6/12/2017 12:24 PM 
 
4. Enter the last four digits of your Social Security Number (xxxx) and Date of Birth (mm/dd/yyyy) as indicated and click Submit. You will be taken to the next sign-up page. 5. Create a [employer] ID. This will be your [employer] login ID and cannot be changed, so think of one that is secure and easy to remember. 6. Create a [employer] password. You can change your password at any time. 7. Enter your Password Reset Question and Answer. This can be used at a later time if you forget your password. 8. Enter your e-mail address. You will receive e-mail notification when new information is available in [address] 9. Click Sign Up. You can now view and download portions of your medical record. 10. Click the Download Summary menu link to download a portable copy of your medical information. If you have questions, please visit the Frequently Asked Questions section of the [employer] website. Remember, [employer] is NOT to be used for urgent needs. For medical emergencies, dial 911. Now available from your iPhone and Android! Please provide this summary of care documentation to your next provider. Your primary care clinician is listed as Tera Davis. If you have any questions after today's visit, please call [phone].

## 2017-05-05 LAB
AFP L3 MFR SERPL: 7.2 % (ref 0–9.9)
AFP SERPL-MCNC: 6.1 NG/ML (ref 0–8)
ALBUMIN SERPL-MCNC: 4.4 G/DL (ref 3.5–5.5)
ALP SERPL-CCNC: 100 IU/L (ref 39–117)
ALT SERPL-CCNC: 30 IU/L (ref 0–44)
AST SERPL-CCNC: 25 IU/L (ref 0–40)
BILIRUB DIRECT SERPL-MCNC: 0.18 MG/DL (ref 0–0.4)
BILIRUB SERPL-MCNC: 0.5 MG/DL (ref 0–1.2)
BUN SERPL-MCNC: 13 MG/DL (ref 6–24)
BUN/CREAT SERPL: 25 (ref 9–20)
CALCIUM SERPL-MCNC: 9.5 MG/DL (ref 8.7–10.2)
CHLORIDE SERPL-SCNC: 99 MMOL/L (ref 96–106)
CO2 SERPL-SCNC: 28 MMOL/L (ref 18–29)
CREAT SERPL-MCNC: 0.52 MG/DL (ref 0.76–1.27)
ERYTHROCYTE [DISTWIDTH] IN BLOOD BY AUTOMATED COUNT: 14 % (ref 12.3–15.4)
GLUCOSE SERPL-MCNC: 119 MG/DL (ref 65–99)
HCT VFR BLD AUTO: 44.5 % (ref 37.5–51)
HGB BLD-MCNC: 15.8 G/DL (ref 12.6–17.7)
INR PPP: 1.1 (ref 0.8–1.2)
MCH RBC QN AUTO: 30.6 PG (ref 26.6–33)
MCHC RBC AUTO-ENTMCNC: 35.5 G/DL (ref 31.5–35.7)
MCV RBC AUTO: 86 FL (ref 79–97)
PLATELET # BLD AUTO: 134 X10E3/UL (ref 150–379)
POTASSIUM SERPL-SCNC: 3.8 MMOL/L (ref 3.5–5.2)
PROTHROMBIN TIME: 11.1 SEC (ref 9.1–12)
RBC # BLD AUTO: 5.16 X10E6/UL (ref 4.14–5.8)
SODIUM SERPL-SCNC: 142 MMOL/L (ref 134–144)
WBC # BLD AUTO: 8 X10E3/UL (ref 3.4–10.8)

## 2017-05-10 ENCOUNTER — OFFICE VISIT (OUTPATIENT)
Dept: INTERNAL MEDICINE CLINIC | Age: 57
End: 2017-05-10

## 2017-05-10 VITALS
SYSTOLIC BLOOD PRESSURE: 134 MMHG | WEIGHT: 265 LBS | BODY MASS INDEX: 38.02 KG/M2 | HEART RATE: 77 BPM | TEMPERATURE: 97.5 F | RESPIRATION RATE: 18 BRPM | DIASTOLIC BLOOD PRESSURE: 77 MMHG | OXYGEN SATURATION: 95 %

## 2017-05-10 DIAGNOSIS — K74.69 OTHER CIRRHOSIS OF LIVER (HCC): ICD-10-CM

## 2017-05-10 DIAGNOSIS — I10 ESSENTIAL HYPERTENSION: ICD-10-CM

## 2017-05-10 DIAGNOSIS — G43.009 NONINTRACTABLE MIGRAINE, UNSPECIFIED MIGRAINE TYPE: ICD-10-CM

## 2017-05-10 DIAGNOSIS — Z23 ENCOUNTER FOR IMMUNIZATION: ICD-10-CM

## 2017-05-10 DIAGNOSIS — R23.8 PAPULE: ICD-10-CM

## 2017-05-10 DIAGNOSIS — Z00.00 PHYSICAL EXAM, ANNUAL: Primary | ICD-10-CM

## 2017-05-10 DIAGNOSIS — R73.01 IFG (IMPAIRED FASTING GLUCOSE): ICD-10-CM

## 2017-05-10 DIAGNOSIS — K21.9 GASTROESOPHAGEAL REFLUX DISEASE WITHOUT ESOPHAGITIS: ICD-10-CM

## 2017-05-10 DIAGNOSIS — B18.2 CHRONIC HEPATITIS C WITHOUT HEPATIC COMA (HCC): ICD-10-CM

## 2017-05-10 LAB — HBA1C MFR BLD HPLC: 5.2 %

## 2017-05-10 RX ORDER — HYDROCHLOROTHIAZIDE 25 MG/1
TABLET ORAL
Qty: 90 TAB | Refills: 1 | Status: SHIPPED | OUTPATIENT
Start: 2017-05-10 | End: 2018-03-18

## 2017-05-10 RX ORDER — PHENOL/SODIUM PHENOLATE
20 AEROSOL, SPRAY (ML) MUCOUS MEMBRANE DAILY
Qty: 90 TAB | Refills: 1 | Status: SHIPPED | OUTPATIENT
Start: 2017-05-10

## 2017-05-10 RX ORDER — IRBESARTAN 150 MG/1
150 TABLET ORAL
Qty: 90 TAB | Refills: 1 | Status: SHIPPED | OUTPATIENT
Start: 2017-05-10 | End: 2018-03-14

## 2017-05-10 NOTE — PROGRESS NOTES
HISTORY OF PRESENT ILLNESS  Mj Bates is a 61 y.o. male. HPI   Last here 11/11/16. Pt is here to f/u on chronic conditions    BP today is elevated- 155/89, will repeat this   Continues HCTZ 25mg and avapro 150mg daily  Reviewed ECHO 11/18/16: EF nl 55-60%   Pt has element of [facility]    Wt is stable since last visit   Pt has not been as active in his yard recently   Pt drinks sodas frequently   Discussed diet and weight loss   Advised focusing in his diet   Advised cutting out sodas and reducing portions    Reviewed last labs per hept 5/17  Liver nl, BS elevated at 119     Continues prilosec daily for reflux, works well, no breakthrough       Pt follows with Dr. Hien Curry (neuro)   Continues ms contin and percocet per neuro  Takes both of these medications BID   No changes to medications  He has f/u pending 6/12/17      Reviewed last notes from DOM James (hept) 5/04/17  ASSESSMENT AND PLAN:  Chronic hepatitis C, genotype 1a, in the setting of cirrhosis. Mj Bates tolerated medication for treatment of HCV well and completed a full course of 12 weeks Harvoni as of almost 9 months ago. He is now considered a sustained responder to therapy and HCV is cured. Mj Bates has cirrhosis secondary to HCV. He has been screened for complications of cirrhosis and is currently up to date. Patient has had recent EGD to evaluate for esophageal varices in 4/2016. There was mild portal hypertensive gastropathy and no varices. This will be repeated in 4/2018. Vaccination for viral hepatitis B is recommended since the patient has no serologic evidence of previous exposure or vaccination with immunity. Vaccination for viral hepatitis A is not needed. The patient has serologic evidence of prior exposure or vaccination with immunity. [facility] in 3-4 months, with possible repeat imaging study in the meantime.   Discussed we can start Hepatitis B vaccination series today Counseled on smoking cessation      Referred to dermatology for skin check       PREVENTIVE:  Colonoscopy: 4/18/17, Dr. Rosales Menjivar, repeat 5 years, fmhx colon cancer-father   EGD: 4/7/16 Dr. Reno Connors, repeat in 2 years  PSA: 11/16 0.2 nl  AAA screen: will need at age 71  Tdap: around 2010?    Pneumovax: given today, 5/10/2017  Awilda Cushing: not yet needed  Zostavax: not yet needed  Flu shot: declines   Hep B series: start at f/u   A1c: 11/16 5.4-nl, 5/17 5.2-nl in clinic   Eye exam: around fall 2016? He will check records  Hep C: in remission, Angelica follows   Lipids: 11/16 LDL 90       Patient Active Problem List    Diagnosis Date Noted    [facility]2016    Essential hypertension 11/10/2015    Migraine 11/10/2015    Chronic back pain 11/10/2015    Brain aneurysm 11/10/2015    Gastroesophageal reflux disease without esophagitis 11/10/2015    Anxiety and depression 11/10/2015    Chronic hepatitis C without hepatic coma ([facility] [address] 11/10/2015    Obesity 11/10/2015     Current Outpatient Prescriptions   Medication Sig Dispense Refill    diazePAM (VALIUM) 10 mg tablet Take 1 Tab by mouth two (2) times daily as needed. Max Daily Amount: 20 mg. 60 Tab 2    predniSONE (DELTASONE) 10 mg tablet Take 1 Tab by mouth daily. TAKES VERY RARELY. ON AS NEEDED 30 Tab 0    morphine CR (MS CONTIN) 60 mg CR tablet Take 1 Tab by mouth two (2) times daily as needed. Max Daily Amount: 120 mg. 60 Tab 0    [START ON 5/11/2017] oxyCODONE-acetaminophen (PERCOCET 10)  mg per tablet Take 1 Tab by mouth every eight (8) hours as needed for Pain. Max Daily Amount: 3 Tabs. 60 Tab 0    hydroCHLOROthiazide (HYDRODIURIL) 25 mg tablet TAKE ONE TABLET BY MOUTH DAILY 90 Tab 1    irbesartan (AVAPRO) 150 mg tablet TAKE ONE TABLET BY MOUTH EVERY NIGHT AT BEDTIME 90 Tab 0    Omeprazole delayed release (PRILOSEC D/R) 20 mg tablet Take 1 Tab by mouth daily. 90 Tab 1    OTHER Uses an inhaler as needed.        Past Surgical History: Procedure Laterality Date    ABDOMEN SURGERY PROC UNLISTED      hernia    COLONOSCOPY N/A 10/19/2016    COLONOSCOPY performed by Lavone Ganser, [facility] at [facility] ENDOSCOPY    COLONOSCOPY N/A 4/18/2017    COLONOSCOPY performed by Lavone Ganser, MD at [facility] ENDOSCOPY    HX COLONOSCOPY  2016    HX ENDOSCOPY  2016    HX HEENT      mastoid - surgery eardrum perforation    HX OTHER SURGICAL      colonoscopy - Mar 2016 - multiple polyps      Lab Results  Component Value Date/Time   WBC 8.0 05/04/2017 10:12 AM   HGB 15.8 05/04/2017 10:12 AM   HCT 44.5 05/04/2017 10:12 AM   PLATELET 505 80/96/4045 10:12 AM   MCV 86 05/04/2017 10:12 AM       Lab Results  Component Value Date/Time   Cholesterol, total 172 11/11/2016 01:44 PM   HDL Cholesterol 58 11/11/2016 01:44 PM   LDL, calculated 90 11/11/2016 01:44 PM   Triglyceride 118 11/11/2016 01:44 PM       Lab Results  Component Value Date/Time   GFR est  05/04/2017 10:12 AM   GFR est non- 05/04/2017 10:12 AM   Creatinine 0.52 05/04/2017 10:12 AM   BUN 13 05/04/2017 10:12 AM   Sodium 142 05/04/2017 10:12 AM   Potassium 3.8 05/04/2017 10:12 AM   Chloride 99 05/04/2017 10:12 AM   CO2 28 05/04/2017 10:12 AM         Review of Systems   Respiratory: Negative for shortness of breath. Cardiovascular: Negative for chest pain. Neurological: Positive for headaches. Physical Exam   Constitutional: He is oriented to person, place, and time. He appears well-developed and well-nourished. No distress. HENT:   Head: Normocephalic and atraumatic. Right Ear: External ear normal.   Left Ear: External ear normal.   Mouth/Throat: Oropharynx is clear and moist. No oropharyngeal exudate. Eyes: Conjunctivae and EOM are normal. Pupils are equal, round, and reactive to light. Right eye exhibits no discharge. Left eye exhibits no discharge. No scleral icterus. Neck: Normal range of motion. Neck supple.    No carotid bruits     Cardiovascular: Normal rate, regular rhythm, normal heart sounds and intact distal pulses. Exam reveals no gallop and no friction rub. No murmur heard. Pulmonary/Chest: Effort normal and breath sounds normal. No respiratory distress. He has no wheezes. He has no rales. He exhibits no tenderness. Abdominal: Soft. He exhibits mass. He exhibits no distension. There is no tenderness. There is no rebound and no guarding. Small periumbilical hernia   Musculoskeletal: Normal range of motion. He exhibits edema (mild BLE). He exhibits no tenderness or deformity. Lymphadenopathy:     He has no cervical adenopathy. Neurological: He is alert and oriented to person, place, and time. He has normal reflexes. Coordination normal.   Skin: Skin is warm and dry. No rash noted. He is not diaphoretic. No erythema. No pallor. Chronic venous stasis color changes BL  Red papule 1 cm in diameter R scalp   Psychiatric: He has a normal mood and affect. His behavior is normal.       ASSESSMENT and PLAN    ICD-10-CM ICD-9-CM    1. Physical exam, annual    Discussed need for weight loss, initial BP elevated, repeat BP okay. Labs just completed and are UTD. Reviewed with him. EKG completed in 2015. No new sx, do not need to repeat today. Eye exam completed in the last year, discussed annual eye exam. Colonoscopy just completed, good for 5 years. Pneumovax given today, will start hep B vaccination series next visit, we are out today    Z00.00 V70.0 REFERRAL TO DERMATOLOGY   2. Chronic hepatitis C without hepatic coma (HCC)    In remission post harvoni tx, has cirrhotic liver from this, chronic low platelets from this, follows with Dr. [facility]    B18.2 070.54    3. Other cirrhosis of liver ([facility] [address]    See above   K74.69 571.5    4. Essential hypertension    BP initially elevated, repeat BP improved and at goal. Continue HCTZ 25mg and avapro 150mg daily    I10 401.9    5.  Nonintractable migraine, unspecified migraine type    Follows with Dr. Bello Sparks, gives him chronic narcotics for this. Narcotics are not dispensed per this clinic. G43.009 346.10    6. Gastroesophageal reflux disease without esophagitis    Controlled with prilosec daily    K21.9 530.81    7. Papule    Concern for basal cell, refer to dermatology    R23.8 709.8 REFERRAL TO DERMATOLOGY   8. IFG (impaired fasting glucose)    a1c normal here today at 5.2 R73.01 790.21 AMB POC HEMOGLOBIN A1C             Written by Brett Sanches, as dictated by Hector Brar MD.    Current diagnosis and concerns discussed with pt at length. Understands risks and benefits or current treatment plan and medications and accepts the treatment and medication with any possible risks.   Pt asks appropriate questions which were answered.   Pt instructed to call with any concerns or problems. This note will not be viewable in [address]

## 2017-05-10 NOTE — MR AVS SNAPSHOT
Visit Information Date & Time Provider Department Dept. Phone Encounter #  
 5/10/2017 12:00 PM Kelvin Boast, [address] [id number] Follow-up Instructions Return in about 6 months (around 11/10/2017). Your Appointments 6/12/2017 10:40 AM  
Follow Up with MD Rochelle Cantu [facility]) Appt Note: 2mos follow up [place] [address] [facility]  
  
    
 8/7/2017 11:00 AM  
Follow Up with Ashley Snyder NP [facility] [place] ([facility]) Appt Note: Follow up [address] [facility] [phone] [facility] [address]  
[address] [facility] [place] [address] Upcoming Health Maintenance Date Due Pneumococcal 19-64 Medium Risk (1 of 1 - PPSV23) 5/26/1978 DTaP/Tdap/Td series (1 - Tdap) 5/26/1980 INFLUENZA AGE 9 TO ADULT 8/1/2017 COLONOSCOPY 4/18/2022 Allergies as of 5/10/2017  Review Complete On: 5/4/2017 By: DOM Rojas Severity Noted Reaction Type Reactions Pcn [Penicillins] Medium 11/27/2015    Swelling Current Immunizations  Reviewed on 5/10/2017 No immunizations on file. Reviewed by Kelvin Boast, MD on 5/10/2017 at 11:57 AM  
You Were Diagnosed With   
  
 Codes Comments Physical exam, annual    -  Primary ICD-10-CM: Z00.00 ICD-9-CM: V70.0 Chronic hepatitis C without hepatic coma (HCC)     ICD-10-CM: B18.2 ICD-9-CM: 070.54 Other cirrhosis of liver ([facility] [address]     ICD-10-CM: T00.32 ICD-9-CM: 571.5 Essential hypertension     ICD-10-CM: I10 
ICD-9-CM: 401.9 Nonintractable migraine, unspecified migraine type     ICD-10-CM: G43.009 ICD-9-CM: 346.10 Gastroesophageal reflux disease without esophagitis     ICD-10-CM: K21.9 ICD-9-CM: 530.81 Papule     ICD-10-CM: R23.8 ICD-9-CM: 709.8 Vitals BP Pulse Temp Resp Weight(growth percentile) SpO2  
 155/89 (BP 1 Location: Left arm, BP Patient Position: Sitting) 77 97.5 °F (36.4 °C) (Oral) 18 265 lb (120.2 kg) 95% BMI Smoking Status 38.02 kg/m2 Current Every Day Smoker BMI and BSA Data Body Mass Index Body Surface Area 38.02 kg/m 2 2.44 m 2 Preferred Pharmacy Pharmacy Name Phone [id number] [address] [facility] [phone] Your Updated Medication List  
  
   
This list is accurate as of: 5/10/17 12:02 PM.  Always use your most recent med list.  
  
  
  
  
 diazePAM 10 mg tablet Commonly known as:  VALIUM Take 1 Tab by mouth two (2) times daily as needed. Max Daily Amount: 20 mg.  
  
 hydroCHLOROthiazide 25 mg tablet Commonly known as:  HYDRODIURIL  
TAKE ONE TABLET BY MOUTH DAILY  
  
 irbesartan 150 mg tablet Commonly known as:  AVAPRO TAKE ONE TABLET BY MOUTH EVERY NIGHT AT BEDTIME  
  
 morphine CR 60 mg CR tablet Commonly known as:  MS CONTIN Take 1 Tab by mouth two (2) times daily as needed. Max Daily Amount: 120 mg. Omeprazole delayed release 20 mg tablet Commonly known as:  PRILOSEC D/R Take 1 Tab by mouth daily. OTHER Uses an inhaler as needed. oxyCODONE-acetaminophen  mg per tablet Commonly known as:  PERCOCET 10 Take 1 Tab by mouth every eight (8) hours as needed for Pain. Max Daily Amount: 3 Tabs. Start taking on:  5/11/2017  
  
 predniSONE 10 mg tablet Commonly known as:  Job Bassem Take 1 Tab by mouth daily. TAKES VERY RARELY. ON AS NEEDED We Performed the Following REFERRAL TO DERMATOLOGY [REF19 Custom] Comments:  
 Please evaluate patient for papule Follow-up Instructions Return in about 6 months (around 11/10/2017). To-Do List   
 08/07/2017 10:00 AM  
 Appointment with [address] [facility] [address] [facility] [place] ([phone]) General  NPO DIET RESTICTIONS Please be NPO (nothing by mouth) for 6- 8 hours prior to procedure. GENERAL INSTRUCTIONS 1. Bring any non [facility] facility films/reports pertaining to the area being studied with you on the day of appointment. 2. A written order with a valid diagnosis and Physicians signature is required for all scheduled tests. 3. Check in at registration 30 minutes before your appointment time unless you were instructed to do otherwise. Referral Information Referral ID Referred By Referred To  
  
 [id number] [facility] Josep Rodriguez [address] [facility], [address] Phone: [phone] Visits Status Start Date End Date 1 New Request 5/10/17 5/10/18 If your referral has a status of pending review or denied, additional information will be sent to support the outcome of this decision. Introducing [facility] & HEALTH SERVICES! Matthew Roque introduces [employer] patient portal. Now you can access parts of your medical record, email your doctor's office, and request medication refills online. 1. In your internet browser, go to [email or web address] 2. Click on the First Time User? Click Here link in the Sign In box. You will see the New Member Sign Up page. 3. Enter your [employer] Access Code exactly as it appears below. You will not need to use this code after youve completed the sign-up process. If you do not sign up before the expiration date, you must request a new code. · [employer] Access Code: [email or web address] Expires: 6/12/2017 12:24 PM 
 
4. Enter the last four digits of your Social Security Number (xxxx) and Date of Birth (mm/dd/yyyy) as indicated and click Submit. You will be taken to the next sign-up page. 5. Create a [employer] ID.  This will be your [employer] login ID and cannot be changed, so think of one that is secure and easy to remember. 6. Create a [employer] password. You can change your password at any time. 7. Enter your Password Reset Question and Answer. This can be used at a later time if you forget your password. 8. Enter your e-mail address. You will receive e-mail notification when new information is available in [address] 9. Click Sign Up. You can now view and download portions of your medical record. 10. Click the Download Summary menu link to download a portable copy of your medical information. If you have questions, please visit the Frequently Asked Questions section of the [employer] website. Remember, [employer] is NOT to be used for urgent needs. For medical emergencies, dial 911. Now available from your iPhone and Android! Please provide this summary of care documentation to your next provider. Your primary care clinician is listed as Beck Dukes. If you have any questions after today's visit, please call [phone].

## 2017-05-16 ENCOUNTER — TELEPHONE (OUTPATIENT)
Dept: INTERNAL MEDICINE CLINIC | Age: 57
End: 2017-05-16

## 2017-05-16 NOTE — TELEPHONE ENCOUNTER
5/16/17 @ 10:54 AM Called  PA attempted for Omeprazole DR 20 mg capsules,PA rep Zoë state no PA is needed if pharmacy run claim for Omeprazole ER 20 mg tablets,claim will go thru without need for PA. Refrence # for call,PA [id number]. [facility] pharmacy called,spoke with Al Schwartz, claim went thru,she will call patient when ready for pickup.

## 2017-06-12 ENCOUNTER — OFFICE VISIT (OUTPATIENT)
Dept: NEUROLOGY | Age: 57
End: 2017-06-12

## 2017-06-12 VITALS
HEIGHT: 70 IN | OXYGEN SATURATION: 93 % | HEART RATE: 85 BPM | SYSTOLIC BLOOD PRESSURE: 131 MMHG | RESPIRATION RATE: 20 BRPM | TEMPERATURE: 98.1 F | DIASTOLIC BLOOD PRESSURE: 91 MMHG | WEIGHT: 262.4 LBS | BODY MASS INDEX: 37.56 KG/M2

## 2017-06-12 DIAGNOSIS — I67.1 CEREBRAL ANEURYSM: ICD-10-CM

## 2017-06-12 DIAGNOSIS — G89.4 CHRONIC PAIN DISORDER: ICD-10-CM

## 2017-06-12 DIAGNOSIS — G62.9 POLYNEUROPATHY: ICD-10-CM

## 2017-06-12 DIAGNOSIS — G89.29 CHRONIC BILATERAL LOW BACK PAIN WITHOUT SCIATICA: ICD-10-CM

## 2017-06-12 DIAGNOSIS — F07.81 POST-TRAUMATIC BRAIN SYNDROME: ICD-10-CM

## 2017-06-12 DIAGNOSIS — M54.50 CHRONIC BILATERAL LOW BACK PAIN WITHOUT SCIATICA: ICD-10-CM

## 2017-06-12 DIAGNOSIS — F11.90 CHRONIC, CONTINUOUS USE OF OPIOIDS: ICD-10-CM

## 2017-06-12 DIAGNOSIS — R26.9 GAIT DISORDER: ICD-10-CM

## 2017-06-12 DIAGNOSIS — G43.909 MIGRAINE WITHOUT STATUS MIGRAINOSUS, NOT INTRACTABLE, UNSPECIFIED MIGRAINE TYPE: Primary | ICD-10-CM

## 2017-06-12 RX ORDER — OXYCODONE AND ACETAMINOPHEN 10; 325 MG/1; MG/1
1 TABLET ORAL
Qty: 60 TAB | Refills: 0 | Status: SHIPPED | OUTPATIENT
Start: 2017-06-12 | End: 2017-06-12 | Stop reason: SDUPTHER

## 2017-06-12 RX ORDER — MORPHINE SULFATE 60 MG/1
60 TABLET, FILM COATED, EXTENDED RELEASE ORAL
Qty: 60 TAB | Refills: 0 | Status: SHIPPED | OUTPATIENT
Start: 2017-07-12 | End: 2017-08-15 | Stop reason: SDUPTHER

## 2017-06-12 RX ORDER — MORPHINE SULFATE 60 MG/1
60 TABLET, FILM COATED, EXTENDED RELEASE ORAL
Qty: 60 TAB | Refills: 0 | Status: SHIPPED | OUTPATIENT
Start: 2017-06-12 | End: 2017-06-12 | Stop reason: SDUPTHER

## 2017-06-12 RX ORDER — TADALAFIL 20 MG/1
20 TABLET ORAL AS NEEDED
Qty: 9 TAB | Refills: 3 | Status: SHIPPED | OUTPATIENT
Start: 2017-06-12 | End: 2018-03-14

## 2017-06-12 RX ORDER — OXYCODONE AND ACETAMINOPHEN 10; 325 MG/1; MG/1
1 TABLET ORAL
Qty: 60 TAB | Refills: 0 | Status: SHIPPED | OUTPATIENT
Start: 2017-07-12 | End: 2017-08-15 | Stop reason: SDUPTHER

## 2017-06-12 NOTE — MR AVS SNAPSHOT
Visit Information Date & Time Provider Department Dept. Phone Encounter #  
 6/12/2017 10:40 AM Yinka Rey MD [facility] at [facility] [phone] [id number] Your Appointments 8/7/2017 11:00 AM  
Follow Up with Ashley Ace NP [facility] ([address]) Appt Note: Follow up [address] [facility] [phone] [facility] [address]  
[phone]  
  
   
 [address] [facility] [address] [facility] [address]  
  
    
 11/15/2017 12:00 PM  
ROUTINE CARE with [facility], [address] [place] [address],[address]) Appt Note: 6 month follow up  
 [facility] [address] [phone]  
[address] 235 [facility] [address] [place] [address] Upcoming Health Maintenance Date Due DTaP/Tdap/Td series (1 - Tdap) 5/26/1980 INFLUENZA AGE 9 TO ADULT 8/1/2017 COLONOSCOPY 4/18/2022 Allergies as of 6/12/2017  Review Complete On: 6/12/2017 By: [place] LPN Severity Noted Reaction Type Reactions Pcn [Penicillins] Medium 11/27/2015    Swelling Current Immunizations  Reviewed on 5/10/2017 Name Date Pneumococcal Polysaccharide (PPSV-23) 5/10/2017 Not reviewed this visit You Were Diagnosed With   
  
 Codes Comments Migraine without status migrainosus, not intractable, unspecified migraine type    -  Primary ICD-10-CM: O87.849 ICD-9-CM: 346.90 Chronic, continuous use of opioids     ICD-10-CM: F11.90 ICD-9-CM: 305.51 Chronic pain disorder     ICD-10-CM: G89.4 ICD-9-CM: 338.4 Polyneuropathy     ICD-10-CM: G62.9 ICD-9-CM: 356.9 Cerebral aneurysm     ICD-10-CM: I67.1 ICD-9-CM: 437.3 Post-traumatic brain syndrome     ICD-10-CM: F07.81 ICD-9-CM: 310.2 Gait disorder     ICD-10-CM: R26.9 ICD-9-CM: 506. 2 Chronic bilateral low back pain without sciatica     ICD-10-CM: M54.5, G89.29 ICD-9-CM: 724.2, 338.29   
 Vitals BP Pulse Temp Resp Height(growth percentile) Weight(growth percentile) (!) 131/91 (BP 1 Location: Left arm, BP Patient Position: Sitting) 85 98.1 °F (36.7 °C) (Oral) 20 5' 10\" (1.778 m) 262 lb 6.4 oz (119 kg) SpO2 BMI Smoking Status 93% 37.65 kg/m2 Current Every Day Smoker BMI and BSA Data Body Mass Index Body Surface Area  
 37.65 kg/m 2 2.42 m 2 Preferred Pharmacy Pharmacy Name Phone [facility][id number] [facility] [phone] Your Updated Medication List  
  
   
This list is accurate as of: 6/12/17 11:32 AM.  Always use your most recent med list.  
  
  
  
  
 diazePAM 10 mg tablet Commonly known as:  VALIUM Take 1 Tab by mouth two (2) times daily as needed. Max Daily Amount: 20 mg.  
  
 hydroCHLOROthiazide 25 mg tablet Commonly known as:  HYDRODIURIL  
TAKE ONE TABLET BY MOUTH DAILY  
  
 irbesartan 150 mg tablet Commonly known as:  AVAPRO Take 1 Tab by mouth nightly. morphine CR 60 mg CR tablet Commonly known as:  MS CONTIN Take 1 Tab by mouth two (2) times daily as needed. Max Daily Amount: 120 mg.  
Start taking on:  7/12/2017 Omeprazole delayed release 20 mg tablet Commonly known as:  PRILOSEC D/R Take 1 Tab by mouth daily. OTHER Uses an inhaler as needed. oxyCODONE-acetaminophen  mg per tablet Commonly known as:  PERCOCET 10 Take 1 Tab by mouth every eight (8) hours as needed for Pain. Max Daily Amount: 3 Tabs. Start taking on:  7/12/2017  
  
 predniSONE 10 mg tablet Commonly known as:  [place] Pian Take 1 Tab by mouth daily. TAKES VERY RARELY. ON AS NEEDED  
  
 tadalafil 20 mg tablet Commonly known as:  CIALIS Take 1 Tab by mouth as needed. Prescriptions Printed Refills  
 oxyCODONE-acetaminophen (PERCOCET 10)  mg per tablet 0 Starting on: 7/12/2017 Sig: Take 1 Tab by mouth every eight (8) hours as needed for Pain. Max Daily Amount: 3 Tabs. Class: Print Route: Oral  
 morphine CR (MS CONTIN) 60 mg CR tablet 0 Starting on: 7/12/2017 Sig: Take 1 Tab by mouth two (2) times daily as needed. Max Daily Amount: 120 mg.  
 Class: Print Route: Oral  
  
Prescriptions Sent to Pharmacy Refills  
 tadalafil (CIALIS) 20 mg tablet 3 Sig: Take 1 Tab by mouth as needed. Class: Normal  
 Pharmacy: [facility] [address] [address] [place], [place] [address]  #: [phone] Route: Oral  
  
We Performed the Following [address] MONITORING [[id number] Custom] REFERRAL TO PAIN MANAGEMENT [[id number] Custom] To-Do List   
 08/07/2017 10:00 AM  
  Appointment with [address] [place] [address] [address] at [facility] ([phone]) General  NPO DIET RESTICTIONS Please be NPO (nothing by mouth) for 6- 8 hours prior to procedure. GENERAL INSTRUCTIONS 1. Bring any non [facility] facility films/reports pertaining to the area being studied with you on the day of appointment. 2. A written order with a valid diagnosis and Physicians signature is required for all scheduled tests. 3. Check in at registration 30 minutes before your appointment time unless you were instructed to do otherwise. Referral Information Referral ID Referred By Referred To  
  
 [id number] Ainsley ROBERTS Not Available Visits Status Start Date End Date 1 New Request 6/12/17 6/12/18 If your referral has a status of pending review or denied, additional information will be sent to support the outcome of this decision. Introducing [facility] HE[facility]! [facility] introduces [employer] patient portal. Now you can access parts of your medical record, email your doctor's office, and request medication refills online. 1. In your internet browser, go to [email or web address] 2. Click on the First Time User? Click Here link in the Sign In box. You will see the New Member Sign Up page. 3. Enter your [email or web address] Access Code exactly as it appears below. You will not need to use this code after youve completed the sign-up process. If you do not sign up before the expiration date, you must request a new code. · [email or web address] Access Code: [email or web address] Expires: 6/12/2017 12:24 PM 
 
4. Enter the last four digits of your Social Security Number (xxxx) and Date of Birth (mm/dd/yyyy) as indicated and click Submit. You will be taken to the next sign-up page. 5. Create a [email or web address] ID. This will be your [email or web address] login ID and cannot be changed, so think of one that is secure and easy to remember. 6. Create a [employer] password. You can change your password at any time. 7. Enter your Password Reset Question and Answer. This can be used at a later time if you forget your password. 8. Enter your e-mail address. You will receive e-mail notification when new information is available in [address] 9. Click Sign Up. You can now view and download portions of your medical record. 10. Click the Download Summary menu link to download a portable copy of your medical information. If you have questions, please visit the Frequently Asked Questions section of the [email or web address] website. Remember, [email or web address] is NOT to be used for urgent needs. For medical emergencies, dial 911. Now available from your iPhone and Android! Please provide this summary of care documentation to your next provider. Your primary care clinician is listed as Timoteo Wu. If you have any questions after today's visit, please call [phone].

## 2017-06-12 NOTE — PROGRESS NOTES
Neurology Progress Note    NAME:  Coralee Hodgkin. :   1960   MRN:   [id number]     Date/Time:  2017  Subjective:      Coralee Hodgkin. is a 61 y.o. male here today for follow up. Says he has been having a lot pain. Headache nearly daily. Headache is throbbing in nature, no aggravating factor, associated with nausea  Pain is sharp, radiating , activity tend to make it worse,Says medication helps with the headache and pain. Noated that at times pain would so severe that he would stay up all night. .  Admits neck pain, back pain,, joint pain, numbNesS and tingling sensation, fatigue. Denies dysphagia, odynophagia, constipation, diarrhea, hematuria, hematochezia, LOC. Review of Systems:   Neurological ROS: positive for - dizziness, gait disturbance, headaches, numbness/tingling and weakness            Medications reviewed:  Current Outpatient Prescriptions   Medication Sig Dispense Refill    tadalafil (CIALIS) 20 mg tablet Take 1 Tab by mouth as needed. 9 Tab 3    [START ON 2017] oxyCODONE-acetaminophen (PERCOCET 10)  mg per tablet Take 1 Tab by mouth every eight (8) hours as needed for Pain. Max Daily Amount: 3 Tabs. 60 Tab 0    [START ON 2017] morphine CR (MS CONTIN) 60 mg CR tablet Take 1 Tab by mouth two (2) times daily as needed. Max Daily Amount: 120 mg. 60 Tab 0    Omeprazole delayed release (PRILOSEC D/R) 20 mg tablet Take 1 Tab by mouth daily. 90 Tab 1    hydroCHLOROthiazide (HYDRODIURIL) 25 mg tablet TAKE ONE TABLET BY MOUTH DAILY 90 Tab 1    irbesartan (AVAPRO) 150 mg tablet Take 1 Tab by mouth nightly. 90 Tab 1    diazePAM (VALIUM) 10 mg tablet Take 1 Tab by mouth two (2) times daily as needed. Max Daily Amount: 20 mg. 60 Tab 2    predniSONE (DELTASONE) 10 mg tablet Take 1 Tab by mouth daily. TAKES VERY RARELY. ON AS NEEDED 30 Tab 0    OTHER Uses an inhaler as needed.           Objective:   Vitals:  Vitals:    17 1038   BP: (!) 131/91   Pulse: 85   Resp: 20 Temp: 98.1 °F (36.7 °C)   TempSrc: Oral   SpO2: 93%   Weight: 262 lb 6.4 oz (119 kg)   Height: 5' 10\" (1.778 m)   PainSc:   4   PainLoc: Generalized               Lab Data Reviewed:  Lab Results   Component Value Date/Time    WBC 8.0 05/04/2017 10:12 AM    HCT 44.5 05/04/2017 10:12 AM    HGB 15.8 05/04/2017 10:12 AM    PLATELET 186 92/00/2101 10:12 AM       Lab Results   Component Value Date/Time    Sodium 142 05/04/2017 10:12 AM    Potassium 3.8 05/04/2017 10:12 AM    Chloride 99 05/04/2017 10:12 AM    CO2 28 05/04/2017 10:12 AM    Glucose 119 05/04/2017 10:12 AM    BUN 13 05/04/2017 10:12 AM    Creatinine 0.52 05/04/2017 10:12 AM    Calcium 9.5 05/04/2017 10:12 AM       No components found for: TROPQUANT    No results found for: YAMILETH      Lab Results   Component Value Date/Time    Hemoglobin A1c 5.4 11/11/2016 01:44 PM    Hemoglobin A1c (POC) 5.2 05/10/2017 12:26 PM        No results found for: B12LT, FOL, RBCF    No results found for: YAMILETH, [place], XBANA    Lab Results   Component Value Date/Time    Cholesterol, total 172 11/11/2016 01:44 PM    HDL Cholesterol 58 11/11/2016 01:44 PM    LDL, calculated 90 11/11/2016 01:44 PM    VLDL, calculated 24 11/11/2016 01:44 PM    Triglyceride 118 11/11/2016 01:44 PM         CT Results (recent):  No results found for this or any previous visit. MRI Results (recent):    Results from [place] encounter on 06/01/15   MRI BRAIN W WO CONT   Narrative **Final Report**      ICD Codes / Adm. Diagnosis: 434.90  784.0 / Unspecified cerebral artery oc    Headache(784.0)  Examination:  MR BRAIN W AND WO CON  - [id number] - Jun 1 2015 12:09PM  Accession No:  [id number]  Reason:  cva, intractable ha, dizziness      REPORT:  INDICATION: cva, intractable ha, dizziness 434.9, 784.0, 780.4    COMPARISON: None    EXAM: Sagittal T1-weighted spin-echo, axial FLAIR, coronal and axial   T2-weighted fast spin-echo, axial diffusion weighted echo planar, axial   T1-weighted spin-echo, axial gradient echo, and post IV contrast-enhanced   triplanar T1-weighted spin-echo MR images of the brain are obtained. A total   of 10 cc intravenous Gadavist was administered for the study. FINDINGS: Intra-axial morphology, signal and enhancement are normal. There   is no extra-axial collection, mass or enhancement abnormality demonstrated. Assessment of the vascular flow voids at the base the brain shows either   tortuosity or a 4 mm aneurysm of the right M1 segment. Sella, optic chiasm,   posterior fossa, orbits and paranasal sinuses are normal.         IMPRESSION: Aneurysm versus tortuosity of right M1 segment. Followup with MR   arteriography of the [place] [facility] [place] is recommended. Otherwise, normal   brain MRI. 23X           Signing/Reading Doctor: SOLANGE Cruz ([id number])    Approved: SOLANGE Cruz ([id number])  Jun 1 2015  2:16PM                                    IR Results (recent):  No results found for this or any previous visit. VAS/US Results (recent):  No results found for this or any previous visit. PHYSICAL EXAM:  General:    Alert, cooperative, moderate painful distress, appears stated age. Head:   Normocephalic, without obvious abnormality, atraumatic. Eyes:   Conjunctivae/corneas clear. PERRLA  Nose:  Nares normal. No drainage or sinus tenderness. Throat:    Lips, mucosa, and tongue normal.  No Thrush  Neck:  Supple, symmetrical,  no adenopathy, thyroid: non tender    no carotid bruit and no JVD. Paraspinal tenderness  Back:    Symmetric,  No CVA tenderness. Lungs:   Clear to auscultation bilaterally. No Wheezing or Rhonchi. No rales. Chest wall:  No tenderness or deformity. No Accessory muscle use. Heart:   Regular rate and rhythm,  no murmur, rub or gallop. Abdomen:   Soft, non-tender. Not distended. Bowel sounds normal. No masses  Extremities: Extremities normal, atraumatic, No cyanosis. No edema. No clubbing  Skin:     Texture, turgor normal. No rashes or lesions. Not Jaundiced  Lymph nodes: Cervical, supraclavicular normal.  Psych:  Good insight. Not depressed. Not anxious or agitated. NEUROLOGICAL EXAM:  Appearance: The patient is well developed, well nourished, provides a coherent history and is in moderate painful distress. Mental Status: Oriented to time, place and person. Mood and affect appropriate. Cranial Nerves:   Intact visual fields. Fundi are benign. JOSIE, EOM's full, no nystagmus, no ptosis. Facial sensation is normal. Corneal reflexes are intact. Facial movement is symmetric. Hearing is normal bilaterally. Palate is midline with normal sternocleidomastoid and trapezius muscles are normal. Tongue is midline. Motor:  5-/5 strength in upper and lower proximal and distal muscles. Normal bulk and tone. No fasciculations. Reflexes:   Deep tendon reflexes 2+/4 and symmetrical.   Sensory:   Dysesthesia to touch, pinprick and vibration. Gait:  Slightly unsteady gait. Tremor:   Mild tremor noted. Cerebellar:  No cerebellar signs present. Neurovascular:  Normal heart sounds and regular rhythm, peripheral pulses intact, and no carotid bruits. Assesment  1. Migraine without status migrainosus, not intractable, unspecified migraine type    - oxyCODONE-acetaminophen (PERCOCET 10)  mg per tablet; Take 1 Tab by mouth every eight (8) hours as needed for Pain. Max Daily Amount: 3 Tabs. Dispense: 60 Tab; Refill: 0  - morphine CR (MS CONTIN) 60 mg CR tablet; Take 1 Tab by mouth two (2) times daily as needed. Max Daily Amount: 120 mg. Dispense: 60 Tab; Refill: 0    2. Chronic, continuous use of opioids    - COMPLIANCE DRUG SCREEN/PRESCRIPTION MONITORING    ___________________________________________________  PLAN:Medication reviewed with patient. Notified that I would no longer prescribe narcotics and choice of weaning and referral to pain management specialist given. ICD-10-CM ICD-9-CM    1.  Migraine without status migrainosus, not intractable, unspecified migraine type G43.909 346.90 oxyCODONE-acetaminophen (PERCOCET 10)  mg per tablet      morphine CR (MS CONTIN) 60 mg CR tablet      DISCONTINUED: oxyCODONE-acetaminophen (PERCOCET 10)  mg per tablet      DISCONTINUED: morphine CR (MS CONTIN) 60 mg CR tablet   2. Chronic, continuous use of opioids F11.90 305.51 COMPLIANCE DRUG SCREEN/PRESCRIPTION MONITORING   3. Chronic pain disorder G89.4 338.4 REFERRAL TO PAIN MANAGEMENT   4. Polyneuropathy G62.9 356.9    5. Cerebral aneurysm I67.1 437.3    6. Post-traumatic brain syndrome F07.81 310.2    7. Gait disorder R26.9 781.2    8.  Chronic bilateral low back pain without sciatica M54.5 724.2 REFERRAL TO PAIN MANAGEMENT    G89.29 338.29      Follow-up Disposition: Not on File         ___________________________________________________    Total time spent with patient:  []15   []25   []35   [] __ minutes    Care Plan discussed with:    [x]Patient   []Family    []Care Manager   []Consultant/Specialist :    ___________________________________________________    Attending Physician: Juliaette Nyhan, MD

## 2017-06-12 NOTE — PROGRESS NOTES
Chief Complaint   Patient presents with    Migraine    Medication Refill     1. Have you been to the ER, urgent care clinic since your last visit? Hospitalized since your last visit? No    2. Have you seen or consulted any other health care providers outside of the [address] [place] [address] since your last visit? Include any pap smears or colon screening.  No

## 2017-06-21 LAB — DRUGS UR: NORMAL

## 2017-08-07 ENCOUNTER — HOSPITAL ENCOUNTER (OUTPATIENT)
Dept: ULTRASOUND IMAGING | Age: 57
Discharge: HOME | End: 2017-08-07
Attending: PHYSICIAN ASSISTANT
Payer: MEDICAID

## 2017-08-07 ENCOUNTER — OFFICE VISIT (OUTPATIENT)
Dept: HEMATOLOGY | Age: 57
End: 2017-08-07

## 2017-08-07 VITALS
DIASTOLIC BLOOD PRESSURE: 89 MMHG | HEART RATE: 77 BPM | OXYGEN SATURATION: 94 % | TEMPERATURE: 97.9 F | SYSTOLIC BLOOD PRESSURE: 133 MMHG | BODY MASS INDEX: 37.54 KG/M2 | WEIGHT: 261.6 LBS

## 2017-08-07 DIAGNOSIS — B18.2 CHRONIC HEPATITIS C WITHOUT HEPATIC COMA (HCC): Primary | ICD-10-CM

## 2017-08-07 DIAGNOSIS — K76.6 PORTAL HYPERTENSION (HCC): ICD-10-CM

## 2017-08-07 DIAGNOSIS — K74.69 OTHER CIRRHOSIS OF LIVER (HCC): ICD-10-CM

## 2017-08-07 PROCEDURE — 76700 US EXAM ABDOM COMPLETE: CPT

## 2017-08-07 NOTE — MR AVS SNAPSHOT
Visit Information Date & Time Provider Department Dept. Phone Encounter #  
 8/7/2017 11:00 AM [facility] NP [facility] [address] [place] [address] [id number] Follow-up Instructions Return in about 6 months (around 2/7/2018) for Fibroscan with DOM Addison. Your Appointments 8/15/2017  8:40 AM  
Follow Up with Yinka Rodriguez MD  
[facility]) Appt Note: 2 months; f/u, appt rscd from 8/14 as MD out of office, new appt mailed 7/26/17 tls; F/U  
 [address] [facility] [address] [facility]  
  
    
 11/15/2017 12:00 PM  
ROUTINE CARE with Sonu Ny, [address] [place] [address],[address] [facility]) Appt Note: 6 month follow up  
 [address] [facility] [address] [id number]  
[address] Upcoming Health Maintenance Date Due DTaP/Tdap/Td series (1 - Tdap) 5/26/1980 INFLUENZA AGE 9 TO ADULT 8/1/2017 COLONOSCOPY 4/18/2022 Allergies as of 8/7/2017  Review Complete On: 8/7/2017 By: Ashley Limon NP Severity Noted Reaction Type Reactions Pcn [Penicillins] Medium 11/27/2015    Swelling Current Immunizations  Reviewed on 5/10/2017 Name Date Pneumococcal Polysaccharide (PPSV-23) 5/10/2017 Not reviewed this visit You Were Diagnosed With   
  
 Codes Comments Chronic hepatitis C without hepatic coma (HCC)    -  Primary ICD-10-CM: B18.2 ICD-9-CM: 070.54 Other cirrhosis of liver ([facility] [address]     ICD-10-CM: J40.60 ICD-9-CM: 571.5 Portal hypertension (HCC)     ICD-10-CM: K76.6 ICD-9-CM: 572.3 Vitals BP Pulse Temp Weight(growth percentile) SpO2 BMI  
 133/89 77 97.9 °F (36.6 °C) (Tympanic) 261 lb 9.6 oz (118.7 kg) 94% 37.54 kg/m2 Smoking Status Current Every Day Smoker BMI and BSA Data Body Mass Index Body Surface Area  
 37.54 kg/m 2 2.42 m 2 Preferred Pharmacy Pharmacy Name Phone [id number] [phone] [facility] [phone] Your Updated Medication List  
  
   
This list is accurate as of: 8/7/17 11:07 AM.  Always use your most recent med list.  
  
  
  
  
 diazePAM 10 mg tablet Commonly known as:  VALIUM Take 1 Tab by mouth two (2) times daily as needed. Max Daily Amount: 20 mg.  
  
 hydroCHLOROthiazide 25 mg tablet Commonly known as:  HYDRODIURIL  
TAKE ONE TABLET BY MOUTH DAILY  
  
 irbesartan 150 mg tablet Commonly known as:  AVAPRO Take 1 Tab by mouth nightly. morphine CR 60 mg CR tablet Commonly known as:  MS CONTIN Take 1 Tab by mouth two (2) times daily as needed. Max Daily Amount: 120 mg. Omeprazole delayed release 20 mg tablet Commonly known as:  PRILOSEC D/R Take 1 Tab by mouth daily. OTHER Uses an inhaler as needed. oxyCODONE-acetaminophen  mg per tablet Commonly known as:  PERCOCET 10 Take 1 Tab by mouth every eight (8) hours as needed for Pain. Max Daily Amount: 3 Tabs. predniSONE 10 mg tablet Commonly known as:  Alexa Smoker Take 1 Tab by mouth daily. TAKES VERY RARELY. ON AS NEEDED  
  
 tadalafil 20 mg tablet Commonly known as:  CIALIS Take 1 Tab by mouth as needed. We Performed the Following CBC W/O DIFF [87838 CPT(R)] HCV RNA BY SARAH QL,RFLX TO QT [14547 CPT(R)] METABOLIC PANEL, COMPREHENSIVE [89779 CPT(R)] Follow-up Instructions Return in about 6 months (around 2/7/2018) for Fibroscan with DOM Chinchilla. To-Do List   
 02/07/2018 Imaging:  US ABD LTD Referral Information Referral ID Referred By Referred To  
  
 [id number] Emily Lange Not Available Visits Status Start Date End Date 1 New Request 8/7/17 8/7/18 If your referral has a status of pending review or denied, additional information will be sent to support the outcome of this decision. Patient Instructions FIBROSCAN PATIENT INFORMATION What is Fibroscan:? 
 
Fibroscan is an ultrasound device that measures liver stiffness by sending a pulse of vibrations through the liver. This translated into an immediate result that can help your healthcare team determine the level of damage to the liver as well as monitor the condition of various liver diseases over time. Fibroscan is helpful in the evaluation of the following conditions: 
 
Chronic Hepatitis C Chronic Hepatitis B Fatty Liver Disease Alcohol Liver Disease Chronic Cholestatic Liver Diseases What happens During the Scan? Patients receiving this exam lie flat on an examination table and raise the right arm above the head. The skin over the right lower rib cage is exposed and the examiner locates the correct area to be scanned. The prove of the scanner is placed directly on the patient and triggered to start. This fells like a gentle flick against the skin and should not be uncomfortable. At least ten (10) readings are taken and the average is calculated to score the amount of liver stiffness or scar tissue. The exam should take 10-20 minutes. What do I need to do to prepare for the scan? Please do not eat or drink anything 2-4 hours  Before your Fibroscan. You should continue taking any prescribed medication and can take small sips of water or clear fluid to do so,  But avoid drinking large amounts of fluid. Please dress comfortably in clothes that will allow for easy access to the right side of the abdomen. Women are discouraged from wearing a dress on the day of the exam. 
 
Are there any special precautions?  
 
Patients who are pregnant or have an implantable device (for example, pacemaker or defibrillator) should not have this exam performed. Patients with a significant amount of fat tissue in the area the probe is pressed may be unable to have test performed. Introducing [facility]! Daquan Valadez introduces [employer] patient portal. Now you can access parts of your medical record, email your doctor's office, and request medication refills online. 1. In your internet browser, go to [email or web address]. [employer] 2. Click on the First Time User? Click Here link in the Sign In box. You will see the New Member Sign Up page. 3. Enter your [employer] Access Code exactly as it appears below. You will not need to use this code after youve completed the sign-up process. If you do not sign up before the expiration date, you must request a new code. · [employer] Access Code: [email or web address] Expires: 9/10/2017  6:57 PM 
 
4. Enter the last four digits of your Social Security Number (xxxx) and Date of Birth (mm/dd/yyyy) as indicated and click Submit. You will be taken to the next sign-up page. 5. Create a [employer] ID. This will be your [employer] login ID and cannot be changed, so think of one that is secure and easy to remember. 6. Create a [employer] password. You can change your password at any time. 7. Enter your Password Reset Question and Answer. This can be used at a later time if you forget your password. 8. Enter your e-mail address. You will receive e-mail notification when new information is available in [address] 9. Click Sign Up. You can now view and download portions of your medical record. 10. Click the Download Summary menu link to download a portable copy of your medical information. If you have questions, please visit the Frequently Asked Questions section of the [employer] website. Remember, [employer] is NOT to be used for urgent needs. For medical emergencies, dial 911. Now available from your iPhone and Android! Please provide this summary of care documentation to your next provider. Your primary care clinician is listed as Berenice Sykes. If you have any questions after today's visit, please call [phone].

## 2017-08-08 ENCOUNTER — TELEPHONE (OUTPATIENT)
Dept: INTERNAL MEDICINE CLINIC | Age: 57
End: 2017-08-08

## 2017-08-08 DIAGNOSIS — M79.672 PAIN IN BOTH FEET: Primary | ICD-10-CM

## 2017-08-08 DIAGNOSIS — M79.671 PAIN IN BOTH FEET: Primary | ICD-10-CM

## 2017-08-08 LAB
ALBUMIN SERPL-MCNC: 4.5 G/DL (ref 3.5–5.5)
ALBUMIN/GLOB SERPL: 1.4 {RATIO} (ref 1.2–2.2)
ALP SERPL-CCNC: 105 IU/L (ref 39–117)
ALT SERPL-CCNC: 32 IU/L (ref 0–44)
AST SERPL-CCNC: 30 IU/L (ref 0–40)
BILIRUB SERPL-MCNC: 1.2 MG/DL (ref 0–1.2)
BUN SERPL-MCNC: 10 MG/DL (ref 6–24)
BUN/CREAT SERPL: 14 (ref 9–20)
CALCIUM SERPL-MCNC: 9.5 MG/DL (ref 8.7–10.2)
CHLORIDE SERPL-SCNC: 97 MMOL/L (ref 96–106)
CO2 SERPL-SCNC: 25 MMOL/L (ref 18–29)
CREAT SERPL-MCNC: 0.73 MG/DL (ref 0.76–1.27)
ERYTHROCYTE [DISTWIDTH] IN BLOOD BY AUTOMATED COUNT: 13.4 % (ref 12.3–15.4)
GLOBULIN SER CALC-MCNC: 3.3 G/DL (ref 1.5–4.5)
GLUCOSE SERPL-MCNC: 94 MG/DL (ref 65–99)
HCT VFR BLD AUTO: 46.4 % (ref 37.5–51)
HGB BLD-MCNC: 16.1 G/DL (ref 12.6–17.7)
MCH RBC QN AUTO: 30.1 PG (ref 26.6–33)
MCHC RBC AUTO-ENTMCNC: 34.7 G/DL (ref 31.5–35.7)
MCV RBC AUTO: 87 FL (ref 79–97)
PLATELET # BLD AUTO: 101 X10E3/UL (ref 150–379)
POTASSIUM SERPL-SCNC: 3.8 MMOL/L (ref 3.5–5.2)
PROT SERPL-MCNC: 7.8 G/DL (ref 6–8.5)
RBC # BLD AUTO: 5.35 X10E6/UL (ref 4.14–5.8)
SODIUM SERPL-SCNC: 141 MMOL/L (ref 134–144)
WBC # BLD AUTO: 7.5 X10E3/UL (ref 3.4–10.8)

## 2017-08-08 NOTE — TELEPHONE ENCOUNTER
----- Message from Remberto Bill sent at 8/7/2017  9:49 PM EDT -----  Regarding: Dr. Bee Barclay,    Pt states he needs a referral to see Dr. Emely Engel or Dr. Yifan Ash at the [facility], Ph. [phone]. Pt states he's in a lot of pain and can barely walk and need to see someone about his foot. Best contact number is [phone] or [phone].     Copied/pasted from [facility]

## 2017-08-08 NOTE — PROGRESS NOTES
[address] Dud[address] MD, [place], [facility], [place]       ADDIE Davis PA-C Smiley Duhamel, NP Haig Mantel, NP        at [address]     [address] [place] [address], [address] [facility]     [address] [facility]  [facility]     [phone]     FAX: [phone]    at [facility], [address] [place] [address], [address]     [phone]     FAX: [phone]     Patient Care Team:  Ophelia Vazquez MD as PCP - General (Internal Medicine)  Germain Zendejas MD (Neurology)  Sydnie Mohamud MD (Gastroenterology)    Patient Active Problem List   Diagnosis Code    Essential hypertension I10    Migraine G43.909    Chronic back pain M54.9, G89.29    Brain aneurysm I67.1    Gastroesophageal reflux disease without esophagitis K21.9    Anxiety and depression F41.9, F32.9    Chronic hepatitis C without hepatic coma ([facility] [address] B18.2    Obesity E66.9    Cirrhosis ([facility] [address] K74.60       Janiya Calderon returns to the [address] for management of 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 The active problem list, all pertinent past medical history, medications, and laboratory findings related to the liver disorder were reviewed with the patient. The patient is a 61 y.o.  male who was noted to have abnormalities in liver chemistries and subsequently tested positive for chronic HCV in about 2006. Risk factors for acquiring HCV are IV drug use tattoos in 1970s. He notes several friends that got tattoos from the same person have HCV. The patient was referred to [facility] in 2006. A liver biopsy was performed in 2006. The results are not available. The patient believes this demonstrated mild fibrosis. His past fibroscan in this office demonstrated a score of EkPa 63.9, suggesting fibrosis level is F4. Janiya Calderon presents to the office for follow-up of chronic hepatitis C therapy.  He completed a full 12 weeks of Harvoni (5/2016-8/2016). He is a sustained virologic responder to this treatment. He continues regular follow up to monitor his advanced liver disease. The patient has no other new complaints but continues to note fatigue and generalized joint pain. He is somewhat frustrated that his fatigue and arthritic complaints persist post HCV treatment. The patient has not experienced problems concentrating, swelling of the abdomen, swelling of the lower extremities, hematemesis, hematochezia. The patient moderate limitations in functional activities which can be attributed to other medical problems that are not related to the liver disease. ALLERGIES  Allergies   Allergen Reactions    Pcn [Penicillins] Swelling     MEDICATIONS  Current Outpatient Prescriptions   Medication Sig    tadalafil (CIALIS) 20 mg tablet Take 1 Tab by mouth as needed.  oxyCODONE-acetaminophen (PERCOCET 10)  mg per tablet Take 1 Tab by mouth every eight (8) hours as needed for Pain. Max Daily Amount: 3 Tabs.  morphine CR (MS CONTIN) 60 mg CR tablet Take 1 Tab by mouth two (2) times daily as needed. Max Daily Amount: 120 mg.    Omeprazole delayed release (PRILOSEC D/R) 20 mg tablet Take 1 Tab by mouth daily.  hydroCHLOROthiazide (HYDRODIURIL) 25 mg tablet TAKE ONE TABLET BY MOUTH DAILY    irbesartan (AVAPRO) 150 mg tablet Take 1 Tab by mouth nightly.  diazePAM (VALIUM) 10 mg tablet Take 1 Tab by mouth two (2) times daily as needed. Max Daily Amount: 20 mg.  predniSONE (DELTASONE) 10 mg tablet Take 1 Tab by mouth daily. TAKES VERY RARELY. ON AS NEEDED    OTHER Uses an inhaler as needed. No current facility-administered medications for this visit. SYSTEM REVIEW NOT RELATED TO LIVER DISEASE OR REVIEWED ABOVE:  Constitution systems: Negative for fever, chills, weight gain, weight loss. Eyes: Negative for visual changes. ENT: Negative for sore throat, painful swallowing.    Respiratory: Negative for cough, hemoptysis, SOB. Cardiology: Negative for chest pain, palpitations. GI:  Negative for constipation or diarrhea. : Negative for urinary frequency, dysuria, hematuria, nocturia. Skin: Negative for rash. Hematology: Negative for easy bruising, blood clots. Musculo-skeletal: Back pain, foot pain. Neurologic: Severe headaches. Cerebral AVM being evaluated. Psychology: Negative for anxiety, depression. FAMILY HISTORY:  The father  of cancer. The mother  of cancer. There is no family history of liver disease. SOCIAL HISTORY:  The patient is . The patient has 5 children, 6 grandchildren and 3 great grandchildren. The patient currently smokes <1/2 pack of tobacco daily. The patient has previously consumed alcohol in excess. The patient has been abstinent from alcohol since , when he found out he had HCV. The patient has not had IVDU exposure since the late . The patient used to work in [employer]. The patient is currently receiving disability. PHYSICAL EXAMINATION:  Visit Vitals    /89    Pulse 77    Temp 97.9 °F (36.6 °C) (Tympanic)    Wt 261 lb 9.6 oz (118.7 kg)    SpO2 94%    BMI 37.54 kg/m2     General: No acute distress. Eyes: Sclera anicteric. ENT: No oral lesions. Thyroid normal.  Nodes: No adenopathy. Skin: No spider angiomata. No jaundice. No palmar erythema. Respiratory: Lungs clear to auscultation. Cardiovascular: Regular heart rate. No murmurs. No JVD. Abdomen: Obese abdomen. Ventral hernia, reducible. Soft non-tender. Liver size normal to percussion/palpation. Spleen not palpable. No obvious ascites. Extremities: No edema. No muscle wasting. No gross arthritic changes. Neurologic: Alert and oriented. Cranial nerves grossly intact. No asterixis.     LABORATORY STUDIES:  [facility]iver [place] of [address] [place] [address] & Units 2017   WBC 3.4 - 10.8 x10E3/uL 7.5 8.0 6. 3   HGB 12.6 - 17.7 g/dL 16.1 15.8 14.3    - 379 x10E3/uL 101 (L) 134 (L) 106 (L)   INR 0.8 - 1.2  1.1 1.1   AST 0 - 40 IU/L 30 25 23   ALT 0 - 44 IU/L 32 30 23   Alk Phos 39 - 117 IU/L 105 100 93   Bili, Total 0.0 - 1.2 mg/dL 1.2 0.5 0.4   Bili, Direct 0.00 - 0.40 mg/dL  0.18 0.15   Albumin 3.5 - 5.5 g/dL 4.5 4.4 3.9   BUN 6 - 24 mg/dL 10 13 6   Creat 0.76 - 1.27 mg/dL 0.73 (L) 0.52 (L) 0.62 (L)   Na 134 - 144 mmol/L 141 142 143   K 3.5 - 5.2 mmol/L 3.8 3.8 4.1   Cl 96 - 106 mmol/L 97 99 101   CO2 18 - 29 mmol/L 25 28 29   Glucose 65 - 99 mg/dL 94 119 (H) 91   Virology Latest Ref Rng & Units   2/7/2017   HCV RNA, SARAH, QL Negative   Negative     A CMP, CBC and HCV RNA were ordered today. Will review results when available. Cancer Screening Latest Ref Rng & Units 2/7/2017 10/5/2016 8/4/2016   AFP, Serum 0.0 - 8.0 ng/mL 4.3 6.1 6.5   AFP-L3% 0.0 - 9.9 % 7.1 7.0 6.6     Virology Latest Ref Rng & Units 2/7/2017 10/5/2016   HCV RNA, SARAH, QL Negative Negative Negative   Additional lab values drawn at today's office visit are pending at the time of documentation. SEROLOGIES:  11/2015. HCV genotype 1A. Serologies Latest Ref Rng 11/27/2015 11/10/2015   Hep A Ab, Total Negative Positive (A)    Hep B Surface Ag Negative Negative    Hep B Core Ab, Total Negative Negative    Hep B Surface AB QL  Non Reactive    Hep C Genotype   1a   HCV RT-PCR, Quant  [id number]    Ferritin 30 - 400 ng/mL 440 (H)    Iron % Saturation 15 - 55 % 37      11/2015. Hep B Surface Ab is negative. LIVER HISTOLOGY:  2/2016. FibroScan performed at [address]. EkPa was 63.9. Suggested fibrosis level is F4. ENDOSCOPIC PROCEDURES:  4/2016. EGD performed by Dr. Akhil Castillo. No esophageal varices. No gastric varices. Mild portal gastropathy. Repeat in 4/2018.  4/2017. Colonoscopy performed by Dr. Matt Franklin. Hyperplastic polyp. Repeat in 5 years. RADIOLOGY:  2/2016. Ultrasound of liver.  Echogenic consistent with cirrhosis. No liver mass lesions. No dilated bile ducts. No ascites. 8/2016. Ultrasound of liver. Echogenic consistent with cirrhosis. No liver mass lesions. No dilated bile ducts. No ascites. 2/2017. Ultrasound of liver. Echogenic consistent with cirrhosis. No liver mass lesions. No dilated bile ducts. No ascites. Portal vein thrombosis, not previously noted. 8/2017. Ultrasound of liver. Echogenic consistent with cirrhosis. No liver mass lesions. No dilated bile ducts. No ascites. No sonographic evidence for the portal vein thrombosis or collateral vessels in the johnson hepatis demonstrated on the previous examination. OTHER TESTING:  Not available or performed    ASSESSMENT AND PLAN:  Chronic hepatitis C, genotype 1a, in the setting of cirrhosis. HCV is cured with 12 weeks of Harvoni in August 2016. HCV RNA was rechecked today to confirm this. Gene Galindo. has cirrhosis secondary to HCV. He has been screened for complications of cirrhosis and is currently up to date. Patient has had recent EGD to evaluate for esophageal varices in 4/2016. There was mild portal hypertensive gastropathy and no varices. This will be repeated in 4/2018. US of the liver was performed at the time of the last office visit in 2/2017, showing no new mass/lesion of the liver. He was shown to have a large gallstone, but remains without symptoms of biliary disease or obstruction. There was new evidence on the last scan (February 2017) of portal vein thrombosis. This was not seen on today's imaging study. Large gallstone. Patient denies evidence of pain or irritation. We have discussed indications for surgery or other additional evaluation based on symptoms. No indication for need of surgical referral at this time. The patient was directed to continue all current medications at the current dosages.  There are no contraindications for the patient to take any medications that are necessary for treatment of other medical issues. The patient was counseled regarding alcohol consumption. He has been a nondrinker for the past 11 years. Vaccination for viral hepatitis B is recommended since the patient has no serologic evidence of previous exposure or vaccination with immunity. Vaccination for viral hepatitis A is not needed. The patient has serologic evidence of prior exposure or vaccination with immunity. All of the above issues were discussed with the patient. All questions were answered. The patient expressed a clear understanding of the above. Follow-up [address] in 6 months with imaging.     Gail Gerardo [place]  [address] [address]  Ph: [phone]  Fax: [phone]

## 2017-08-08 NOTE — TELEPHONE ENCOUNTER
Pt has appt 08/16/17 with Dr. Sharad Barger. Please fax referral to [phone]. Best contact number [phone].        Message received & copied from [facility]

## 2017-08-08 NOTE — TELEPHONE ENCOUNTER
Called and spoke with patient, he has an appointment with Dr. Bernie Ruff on 8/18 and requires a referral requisition placed in the system and faxed over to Dr. Zeenat Enriquez office.

## 2017-08-09 LAB — HCV RNA SERPL QL NAA+PROBE: NEGATIVE

## 2017-08-15 ENCOUNTER — OFFICE VISIT (OUTPATIENT)
Dept: NEUROLOGY | Age: 57
End: 2017-08-15

## 2017-08-15 VITALS
DIASTOLIC BLOOD PRESSURE: 92 MMHG | BODY MASS INDEX: 37.94 KG/M2 | HEART RATE: 92 BPM | OXYGEN SATURATION: 97 % | RESPIRATION RATE: 20 BRPM | TEMPERATURE: 98.5 F | HEIGHT: 70 IN | WEIGHT: 265 LBS | SYSTOLIC BLOOD PRESSURE: 140 MMHG

## 2017-08-15 DIAGNOSIS — G62.9 POLYNEUROPATHY: ICD-10-CM

## 2017-08-15 DIAGNOSIS — M47.22 OSTEOARTHRITIS OF SPINE WITH RADICULOPATHY, CERVICAL REGION: ICD-10-CM

## 2017-08-15 DIAGNOSIS — R26.9 GAIT DISORDER: ICD-10-CM

## 2017-08-15 DIAGNOSIS — G44.321 INTRACTABLE CHRONIC POST-TRAUMATIC HEADACHE: ICD-10-CM

## 2017-08-15 DIAGNOSIS — I67.1 CEREBRAL ANEURYSM: ICD-10-CM

## 2017-08-15 DIAGNOSIS — G89.4 CHRONIC PAIN SYNDROME: ICD-10-CM

## 2017-08-15 DIAGNOSIS — F11.90 CHRONIC, CONTINUOUS USE OF OPIOIDS: Primary | ICD-10-CM

## 2017-08-15 DIAGNOSIS — G43.909 MIGRAINE WITHOUT STATUS MIGRAINOSUS, NOT INTRACTABLE, UNSPECIFIED MIGRAINE TYPE: ICD-10-CM

## 2017-08-15 DIAGNOSIS — M79.7 FIBROMYALGIA: ICD-10-CM

## 2017-08-15 DIAGNOSIS — F07.81 POST-TRAUMATIC BRAIN SYNDROME: ICD-10-CM

## 2017-08-15 DIAGNOSIS — M47.819 VERTEBRAL ARTHROPATHY: ICD-10-CM

## 2017-08-15 RX ORDER — DULOXETIN HYDROCHLORIDE 30 MG/1
30 CAPSULE, DELAYED RELEASE ORAL DAILY
Qty: 30 CAP | Refills: 2 | Status: SHIPPED | OUTPATIENT
Start: 2017-08-15 | End: 2018-03-14

## 2017-08-15 RX ORDER — OXYCODONE AND ACETAMINOPHEN 10; 325 MG/1; MG/1
1 TABLET ORAL
Qty: 60 TAB | Refills: 0 | Status: SHIPPED | OUTPATIENT
Start: 2017-09-15 | End: 2017-11-15 | Stop reason: SDUPTHER

## 2017-08-15 RX ORDER — MORPHINE SULFATE 60 MG/1
60 TABLET, FILM COATED, EXTENDED RELEASE ORAL
Qty: 60 TAB | Refills: 0 | Status: SHIPPED | OUTPATIENT
Start: 2017-08-15 | End: 2017-08-15 | Stop reason: SDUPTHER

## 2017-08-15 RX ORDER — PREDNISONE 10 MG/1
10 TABLET ORAL DAILY
Qty: 30 TAB | Refills: 3 | Status: SHIPPED | OUTPATIENT
Start: 2017-08-15 | End: 2017-11-15 | Stop reason: SDUPTHER

## 2017-08-15 RX ORDER — MORPHINE SULFATE 60 MG/1
60 TABLET, FILM COATED, EXTENDED RELEASE ORAL
Qty: 60 TAB | Refills: 0 | Status: SHIPPED | OUTPATIENT
Start: 2017-09-15 | End: 2017-11-15 | Stop reason: SDUPTHER

## 2017-08-15 RX ORDER — OXYCODONE AND ACETAMINOPHEN 10; 325 MG/1; MG/1
1 TABLET ORAL
Qty: 60 TAB | Refills: 0 | Status: SHIPPED | OUTPATIENT
Start: 2017-08-15 | End: 2017-08-15 | Stop reason: SDUPTHER

## 2017-08-15 RX ORDER — DIAZEPAM 10 MG/1
10 TABLET ORAL
Qty: 60 TAB | Refills: 2 | Status: SHIPPED | OUTPATIENT
Start: 2017-08-15 | End: 2017-11-15 | Stop reason: SDUPTHER

## 2017-08-15 NOTE — MR AVS SNAPSHOT
Visit Information Date & Time Provider Department Dept. Phone Encounter #  
 8/15/2017  8:40 AM MD Cash [facility] at [facility] [phone] [id number] Follow-up Instructions Return in about 3 months (around 11/15/2017). Your Appointments 11/15/2017 12:00 PM  
ROUTINE CARE with Madelaine Chaudhry, [address] [place] [address],[address]) Appt Note: 6 month follow up  
 [address] [facility]  
[phone]  
  
   
 [address] [facility] [address] [id number]  
  
    
 2/7/2018 11:30 AM  
Follow Up with DOM [facility] ([address]) Appt Note: Fibroscan/Follow up; Follow up; Follow up [address] [facility] [phone] [facility] [id number]  
[address] [facility] [address] Upcoming Health Maintenance Date Due DTaP/Tdap/Td series (1 - Tdap) 5/26/1980 INFLUENZA AGE 9 TO ADULT 8/1/2017 COLONOSCOPY 4/18/2022 Allergies as of 8/15/2017  Review Complete On: 8/15/2017 By: Arpita Garcia MD  
  
 Severity Noted Reaction Type Reactions Pcn [Penicillins] Medium 11/27/2015    Swelling Current Immunizations  Reviewed on 5/10/2017 Name Date Pneumococcal Polysaccharide (PPSV-23) 5/10/2017 Not reviewed this visit You Were Diagnosed With   
  
 Codes Comments Chronic, continuous use of opioids    -  Primary ICD-10-CM: F11.90 ICD-9-CM: 305.51 Migraine without status migrainosus, not intractable, unspecified migraine type     ICD-10-CM: G43.909 ICD-9-CM: 346.90 Cerebral aneurysm     ICD-10-CM: I67.1 ICD-9-CM: 437.3 Polyneuropathy ([address]     ICD-10-CM: G62.9 ICD-9-CM: 356.9 Osteoarthritis of spine with radiculopathy, cervical region     ICD-10-CM: M47.22 
ICD-9-CM: 721.0 Vertebral arthropathy     ICD-10-CM: M95.8 ICD-9-CM: 721.90   
 Intractable chronic post-traumatic headache     ICD-10-CM: U69.838 ICD-9-CM: 339.22 Gait disorder     ICD-10-CM: R26.9 ICD-9-CM: 553. 2 Chronic pain syndrome     ICD-10-CM: G89.4 ICD-9-CM: 338.4 Fibromyalgia     ICD-10-CM: M79.7 ICD-9-CM: 729.1 Post-traumatic brain syndrome     ICD-10-CM: F07.81 ICD-9-CM: 310.2 Vitals BP Pulse Temp Resp Height(growth percentile) Weight(growth percentile) (!) 140/92 (BP 1 Location: Left arm, BP Patient Position: Sitting) 92 98.5 °F (36.9 °C) (Oral) 20 5' 10\" (1.778 m) 265 lb (120.2 kg) SpO2 BMI Smoking Status 97% 38.02 kg/m2 Current Every Day Smoker Vitals History BMI and BSA Data Body Mass Index Body Surface Area 38.02 kg/m 2 2.44 m 2 Preferred Pharmacy Pharmacy Name Phone [id number] [phone] [facility] [phone] Your Updated Medication List  
  
   
This list is accurate as of: 8/15/17  9:15 AM.  Always use your most recent med list.  
  
  
  
  
 diazePAM 10 mg tablet Commonly known as:  VALIUM Take 1 Tab by mouth two (2) times daily as needed. Max Daily Amount: 20 mg. DULoxetine 30 mg capsule Commonly known as:  CYMBALTA Take 1 Cap by mouth daily. hydroCHLOROthiazide 25 mg tablet Commonly known as:  HYDRODIURIL  
TAKE ONE TABLET BY MOUTH DAILY  
  
 irbesartan 150 mg tablet Commonly known as:  AVAPRO Take 1 Tab by mouth nightly. morphine CR 60 mg CR tablet Commonly known as:  MS CONTIN Take 1 Tab by mouth two (2) times daily as needed. Max Daily Amount: 120 mg.  
Start taking on:  9/15/2017 Omeprazole delayed release 20 mg tablet Commonly known as:  PRILOSEC D/R Take 1 Tab by mouth daily. OTHER Uses an inhaler as needed. oxyCODONE-acetaminophen  mg per tablet Commonly known as:  PERCOCET 10  
 Take 1 Tab by mouth every eight (8) hours as needed for Pain. Max Daily Amount: 3 Tabs. Start taking on:  9/15/2017  
  
 predniSONE 10 mg tablet Commonly known as:  Rosanne Reymundo Take 1 Tab by mouth daily. tadalafil 20 mg tablet Commonly known as:  CIALIS Take 1 Tab by mouth as needed. Prescriptions Printed Refills  
 diazePAM (VALIUM) 10 mg tablet 2 Sig: Take 1 Tab by mouth two (2) times daily as needed. Max Daily Amount: 20 mg.  
 Class: Print Route: Oral  
 morphine CR (MS CONTIN) 60 mg CR tablet 0 Starting on: 9/15/2017 Sig: Take 1 Tab by mouth two (2) times daily as needed. Max Daily Amount: 120 mg.  
 Class: Print Route: Oral  
 oxyCODONE-acetaminophen (PERCOCET 10)  mg per tablet 0 Starting on: 9/15/2017 Sig: Take 1 Tab by mouth every eight (8) hours as needed for Pain. Max Daily Amount: 3 Tabs. Class: Print Route: Oral  
  
Prescriptions Sent to Pharmacy Refills  
 predniSONE (DELTASONE) 10 mg tablet 3 Sig: Take 1 Tab by mouth daily. Class: Normal  
 Pharmacy: [address] Ph #: [phone] Route: Oral  
 DULoxetine (CYMBALTA) 30 mg capsule 2 Sig: Take 1 Cap by mouth daily. Class: Normal  
 Pharmacy: [address] Ph #: [phone] Route: Oral  
  
We Performed the Following [address] MONITORING [[id number] Custom] Follow-up Instructions Return in about 3 months (around 11/15/2017). To-Do List   
 02/07/2018 9:00 AM  
  Appointment with [address] [place] [address] at [facility] ([phone]) General  NPO DIET RESTICTIONS Please be NPO (nothing by mouth) for 6- 8 hours prior to procedure. GENERAL INSTRUCTIONS 1.  Bring any non [facility] facility films/reports pertaining to the area being studied with you on the day of appointment. 2. A written order with a valid diagnosis and Physicians signature is required for all scheduled tests. 3. Check in at registration 30 minutes before your appointment time unless you were instructed to do otherwise. Introducing [facility] SERVICES! Myrna Kern introduces [employer] patient portal. Now you can access parts of your medical record, email your doctor's office, and request medication refills online. 1. In your internet browser, go to [email or web address] 2. Click on the First Time User? Click Here link in the Sign In box. You will see the New Member Sign Up page. 3. Enter your [employer] Access Code exactly as it appears below. You will not need to use this code after youve completed the sign-up process. If you do not sign up before the expiration date, you must request a new code. · [employer] Access Code: [email or web address] Expires: 9/10/2017  6:57 PM 
 
4. Enter the last four digits of your Social Security Number (xxxx) and Date of Birth (mm/dd/yyyy) as indicated and click Submit. You will be taken to the next sign-up page. 5. Create a [employer] ID. This will be your [employer] login ID and cannot be changed, so think of one that is secure and easy to remember. 6. Create a [employer] password. You can change your password at any time. 7. Enter your Password Reset Question and Answer. This can be used at a later time if you forget your password. 8. Enter your e-mail address. You will receive e-mail notification when new information is available in [address] 9. Click Sign Up. You can now view and download portions of your medical record. 10. Click the Download Summary menu link to download a portable copy of your medical information. If you have questions, please visit the Frequently Asked Questions section of the [employer] website. Remember, [employer] is NOT to be used for urgent needs. For medical emergencies, dial 911. Now available from your iPhone and Android! Please provide this summary of care documentation to your next provider. Your primary care clinician is listed as Augustina Samson. If you have any questions after today's visit, please call [phone].

## 2017-08-15 NOTE — PROGRESS NOTES
Chief Complaint   Patient presents with    Migraine    Medication Refill     1. Have you been to the ER, urgent care clinic since your last visit? Hospitalized since your last visit? No    2. Have you seen or consulted any other health care providers outside of the [address] [place] [address] since your last visit? Include any pap smears or colon screening. No       Pill count not performed, patient did not bring medications      Pill count not verified with patient  Patient reports taking medication    UDS obtained and sent  Pain contract on file   made available for Dr. Doralee Duverney to review  Script given for Morphine CR 60 mg, Percocet 10/325 mg, and Valium 10 mg

## 2017-08-15 NOTE — PROGRESS NOTES
Neurology Progress Note    NAME:  Sathya Puga :   1960   MRN:   [id number]     Date/Time:  8/15/2017  Subjective:      Sathya Puga is a 61 y.o. male here today for  follow up  Pain is barometric ,worse with weather, currently in a lot of pain,all over the body  Neck pain is sharp and radiate down to the arms and aggravated by activity, medication helps some. Back pain pain is also sharp and radiate down the legs and make the legs very weak  Headaches is throbbing in nature and sometimes sharp pain run through the head. Admits joint and muscle pains,numbness and tingling sensation  Denies dysphagia, odynophagia,constipation,hematochezia,dysuria, hematuria. Review of Systems:   Neurological ROS: positive for - confusion, dizziness, gait disturbance, headaches, impaired coordination/balance, numbness/tingling, tremors and weakness  Constitutional: positive for sweats, fatigue and weight loss  Eyes: positive for redness  Ears, nose, mouth, throat, and face: positive for hearing loss, tinnitus, earaches, snoring and hoarseness  Respiratory: positive for cough, dyspnea on exertion or emphysema  Cardiovascular: positive for dyspnea, fatigue  Gastrointestinal: positive for dyspepsia, reflux symptoms, nausea and vomiting        Medications reviewed:  Current Outpatient Prescriptions   Medication Sig Dispense Refill    diazePAM (VALIUM) 10 mg tablet Take 1 Tab by mouth two (2) times daily as needed. Max Daily Amount: 20 mg. 60 Tab 2    predniSONE (DELTASONE) 10 mg tablet Take 1 Tab by mouth daily. 30 Tab 3    DULoxetine (CYMBALTA) 30 mg capsule Take 1 Cap by mouth daily. 30 Cap 2    [START ON 9/15/2017] morphine CR (MS CONTIN) 60 mg CR tablet Take 1 Tab by mouth two (2) times daily as needed. Max Daily Amount: 120 mg. 60 Tab 0    [START ON 9/15/2017] oxyCODONE-acetaminophen (PERCOCET 10)  mg per tablet Take 1 Tab by mouth every eight (8) hours as needed for Pain.  Max Daily Amount: 3 Tabs. 60 Tab 0    tadalafil (CIALIS) 20 mg tablet Take 1 Tab by mouth as needed. 9 Tab 3    Omeprazole delayed release (PRILOSEC D/R) 20 mg tablet Take 1 Tab by mouth daily. 90 Tab 1    hydroCHLOROthiazide (HYDRODIURIL) 25 mg tablet TAKE ONE TABLET BY MOUTH DAILY 90 Tab 1    irbesartan (AVAPRO) 150 mg tablet Take 1 Tab by mouth nightly. 90 Tab 1    OTHER Uses an inhaler as needed. Objective:   Vitals:  Vitals:    08/15/17 0839   BP: (!) 140/92   Pulse: 92   Resp: 20   Temp: 98.5 °F (36.9 °C)   TempSrc: Oral   SpO2: 97%   Weight: 265 lb (120.2 kg)   Height: 5' 10\" (1.778 m)   PainSc:   6   PainLoc: Generalized               Lab Data Reviewed:  Lab Results   Component Value Date/Time    WBC 7.5 08/07/2017 11:12 AM    HCT 46.4 08/07/2017 11:12 AM    HGB 16.1 08/07/2017 11:12 AM    PLATELET 484 59/70/9455 11:12 AM       Lab Results   Component Value Date/Time    Sodium 141 08/07/2017 11:12 AM    Potassium 3.8 08/07/2017 11:12 AM    Chloride 97 08/07/2017 11:12 AM    CO2 25 08/07/2017 11:12 AM    Glucose 94 08/07/2017 11:12 AM    BUN 10 08/07/2017 11:12 AM    Creatinine 0.73 08/07/2017 11:12 AM    Calcium 9.5 08/07/2017 11:12 AM       No components found for: TROPQUANT    No results found for: YAMILETH      Lab Results   Component Value Date/Time    Hemoglobin A1c 5.4 11/11/2016 01:44 PM    Hemoglobin A1c (POC) 5.2 05/10/2017 12:26 PM        No results found for: B12LT, FOL, RBCF    No results found for: YAMILETH, PAT Castellano    Lab Results   Component Value Date/Time    Cholesterol, total 172 11/11/2016 01:44 PM    HDL Cholesterol 58 11/11/2016 01:44 PM    LDL, calculated 90 11/11/2016 01:44 PM    VLDL, calculated 24 11/11/2016 01:44 PM    Triglyceride 118 11/11/2016 01:44 PM         CT Results (recent):  No results found for this or any previous visit.     MRI Results (recent):    Results from [place] encounter on 06/01/15   MRI BRAIN W WO CONT   Narrative **Final Report**      ICD Codes / Adm.Diagnosis: 434.90  784.0 / Unspecified cerebral artery oc    Headache(784.0)  Examination:  MR BRAIN W AND WO CON  - [id number] - Jun 1 2015 12:09PM  Accession No:  [id number]  Reason:  cva, intractable ha, dizziness      REPORT:  INDICATION: cva, intractable ha, dizziness 434.9, 784.0, 780.4    COMPARISON: None    EXAM: Sagittal T1-weighted spin-echo, axial FLAIR, coronal and axial   T2-weighted fast spin-echo, axial diffusion weighted echo planar, axial   T1-weighted spin-echo, axial gradient echo, and post IV contrast-enhanced   triplanar T1-weighted spin-echo MR images of the brain are obtained. A total   of 10 cc intravenous Gadavist was administered for the study. FINDINGS: Intra-axial morphology, signal and enhancement are normal. There   is no extra-axial collection, mass or enhancement abnormality demonstrated. Assessment of the vascular flow voids at the base the brain shows either   tortuosity or a 4 mm aneurysm of the right M1 segment. Sella, optic chiasm,   posterior fossa, orbits and paranasal sinuses are normal.         IMPRESSION: Aneurysm versus tortuosity of right M1 segment. Followup with MR   arteriography of the [place] is recommended. Otherwise, normal   brain MRI. 23X           Signing/Reading Doctor: SOLANGE Darby ([id number])    Approved: SOLANGE Darby ([id number])  Jun 1 2015  2:16PM                                    IR Results (recent):  No results found for this or any previous visit. VAS/US Results (recent):  No results found for this or any previous visit. PHYSICAL EXAM:  General:    Alert, cooperative, painful distress, appears stated age. Head:   Normocephalic, without obvious abnormality, atraumatic. Eyes:   Conjunctivae/corneas clear. PERRLA  Nose:  Nares normal. No drainage or sinus tenderness. Throat:    Lips, mucosa, and tongue normal.  No Thrush  Neck:  Supple, symmetrical,  no adenopathy, thyroid: non tender    no carotid bruit and no JVD.   Back: Symmetric,  Diffuse tenderness. Lungs:   Clear to auscultation bilaterally. No Wheezing or Rhonchi. No rales. Chest wall:  No tenderness or deformity. No Accessory muscle use. Heart:   Regular rate and rhythm,  no murmur, rub or gallop. Abdomen:   Soft, non-tender. Not distended. Bowel sounds normal. No masses  Extremities: Extremities normal, atraumatic, No cyanosis. No edema. No clubbing  Skin:     Texture, turgor normal. No rashes or lesions. Not Jaundiced  Lymph nodes: Cervical, supraclavicular normal.  Psych:  Good insight. Not depressed. Not anxious or agitated. NEUROLOGICAL EXAM:  Appearance: The patient is well developed, well nourished, provides a coherent history and is in painful distress. Mental Status: Oriented to time, place and person. Mood and affect appropriate. Cranial Nerves:   Intact visual fields. Fundi are benign. JOSIE, EOM's full, no nystagmus, no ptosis. Facial sensation is normal. Corneal reflexes are intact. Facial movement is symmetric. Hearing is normal bilaterally. Palate is midline with normal sternocleidomastoid and trapezius muscles are normal. Tongue is midline. Motor:  5-/5 strength in upper and lower proximal and distal muscles. Normal bulk and tone. No fasciculations. Reflexes:   Deep tendon reflexes 2+/4 and symmetrical.   Sensory:   Hyperanalgesia to touch, pinprick and vibration. Gait:  Unsteady gait. Tremor:   Mild tremor noted. Cerebellar:  No cerebellar signs present. Neurovascular:  Normal heart sounds and regular rhythm, peripheral pulses intact, and no carotid bruits. Assesment  1. Migraine without status migrainosus, not intractable, unspecified migraine type    - COMPLIANCE DRUG SCREEN/PRESCRIPTION MONITORING  - oxyCODONE-acetaminophen (PERCOCET 10)  mg per tablet; Take 1 Tab by mouth every eight (8) hours as needed for Pain. Max Daily Amount: 3 Tabs. Dispense: 60 Tab;  Refill: 0  - morphine CR (MS CONTIN) 60 mg CR tablet; Take 1 Tab by mouth two (2) times daily as needed. Max Daily Amount: 120 mg. Dispense: 60 Tab; Refill: 0  - diazePAM (VALIUM) 10 mg tablet; Take 1 Tab by mouth two (2) times daily as needed. Max Daily Amount: 20 mg. Dispense: 60 Tab; Refill: 2  - predniSONE (DELTASONE) 10 mg tablet; Take 1 Tab by mouth daily. TAKES VERY RARELY. ON AS NEEDED  Dispense: 30 Tab; Refill: 0    2. Chronic, continuous use of opioids    - COMPLIANCE DRUG SCREEN/PRESCRIPTION MONITORING    ___________________________________________________  PLAN:      ICD-10-CM ICD-9-CM    1. Chronic, continuous use of opioids F11.90 305.51 COMPLIANCE DRUG SCREEN/PRESCRIPTION MONITORING   2. Migraine without status migrainosus, not intractable, unspecified migraine type G43.909 346.90 COMPLIANCE DRUG SCREEN/PRESCRIPTION MONITORING      diazePAM (VALIUM) 10 mg tablet      predniSONE (DELTASONE) 10 mg tablet      morphine CR (MS CONTIN) 60 mg CR tablet      oxyCODONE-acetaminophen (PERCOCET 10)  mg per tablet      DISCONTINUED: oxyCODONE-acetaminophen (PERCOCET 10)  mg per tablet      DISCONTINUED: morphine CR (MS CONTIN) 60 mg CR tablet   3. Cerebral aneurysm I67.1 437.3    4. Polyneuropathy (HCC) G62.9 356.9    5. Osteoarthritis of spine with radiculopathy, cervical region M47.22 721.0    6. Vertebral arthropathy M95.8 721.90    7. Intractable chronic post-traumatic headache G44.321 339.22    8. Gait disorder R26.9 781.2    9. Chronic pain syndrome G89.4 338.4    10. Fibromyalgia M79.7 729.1    11. Post-traumatic brain syndrome F07.81 310.2      Follow-up Disposition:  Return in about 3 months (around 11/15/2017).          ___________________________________________________    Total time spent with patient:  []15   []25   []35   [] __ minutes    Care Plan discussed with:    []Patient   []Family    []Care Manager   []Consultant/Specialist :    ___________________________________________________    Attending Physician: Dianna Allen MD

## 2017-08-23 LAB — DRUGS UR: NORMAL

## 2017-09-20 ENCOUNTER — TELEPHONE (OUTPATIENT)
Dept: NEUROLOGY | Age: 57
End: 2017-09-20

## 2017-09-20 NOTE — TELEPHONE ENCOUNTER
Patient needs last 3 office visits faxed attn:  Caitlin, Fax:  [phone]. Still looking for a pain management,  Send imaging with it also. Breana Madrigal will like a call once completed.   She is on the HIPAA form

## 2017-09-22 ENCOUNTER — TELEPHONE (OUTPATIENT)
Dept: NEUROLOGY | Age: 57
End: 2017-09-22

## 2017-09-22 NOTE — TELEPHONE ENCOUNTER
Jerome Double calling.   Told her you had left a vm for her to call, she gave another number for nurse to call her back at

## 2017-09-22 NOTE — TELEPHONE ENCOUNTER
Patient called Dr. Jillian Estes office regarding pain management referral.  Need notes and referral sent to Dr. Tyshawn Poole, fax:  [phone], include imaging , demographics, and 6 months of ov's if possible.   Girlfriend would like a call once done so she can follow up with

## 2017-09-22 NOTE — TELEPHONE ENCOUNTER
Patient's emergency contact  Ning given the number to Dr. Pmaela Girard.  Ning verbalized understanding

## 2017-09-25 NOTE — TELEPHONE ENCOUNTER
Called patient, ID verified times 2. Patient made aware Dr. Kieran Choudhary does not accept patient's insurance. Patient made aware to contact insurance company to see what providers can see him for pain management. Patient verbalized understanding.

## 2017-11-15 ENCOUNTER — OFFICE VISIT (OUTPATIENT)
Dept: NEUROLOGY | Age: 57
End: 2017-11-15

## 2017-11-15 VITALS
HEART RATE: 79 BPM | BODY MASS INDEX: 38.28 KG/M2 | DIASTOLIC BLOOD PRESSURE: 95 MMHG | WEIGHT: 267.4 LBS | RESPIRATION RATE: 18 BRPM | HEIGHT: 70 IN | OXYGEN SATURATION: 97 % | SYSTOLIC BLOOD PRESSURE: 144 MMHG | TEMPERATURE: 97.8 F

## 2017-11-15 DIAGNOSIS — M54.9 OTHER CHRONIC BACK PAIN: ICD-10-CM

## 2017-11-15 DIAGNOSIS — G43.909 MIGRAINE WITHOUT STATUS MIGRAINOSUS, NOT INTRACTABLE, UNSPECIFIED MIGRAINE TYPE: Primary | ICD-10-CM

## 2017-11-15 DIAGNOSIS — Z79.891 LONG TERM PRESCRIPTION OPIATE USE: ICD-10-CM

## 2017-11-15 DIAGNOSIS — G89.29 OTHER CHRONIC BACK PAIN: ICD-10-CM

## 2017-11-15 DIAGNOSIS — G89.4 CHRONIC PAIN SYNDROME: ICD-10-CM

## 2017-11-15 DIAGNOSIS — I67.1 BRAIN ANEURYSM: ICD-10-CM

## 2017-11-15 RX ORDER — DIAZEPAM 10 MG/1
10 TABLET ORAL
Qty: 60 TAB | Refills: 2 | Status: SHIPPED | OUTPATIENT
Start: 2017-11-15 | End: 2018-04-12 | Stop reason: SDUPTHER

## 2017-11-15 RX ORDER — OXYCODONE AND ACETAMINOPHEN 10; 325 MG/1; MG/1
1 TABLET ORAL EVERY 12 HOURS
Qty: 60 TAB | Refills: 0 | Status: SHIPPED | OUTPATIENT
Start: 2017-12-15 | End: 2018-02-15 | Stop reason: SDUPTHER

## 2017-11-15 RX ORDER — OXYCODONE AND ACETAMINOPHEN 10; 325 MG/1; MG/1
1 TABLET ORAL
Qty: 60 TAB | Refills: 0 | Status: SHIPPED | OUTPATIENT
Start: 2017-11-15 | End: 2017-11-15 | Stop reason: SDUPTHER

## 2017-11-15 RX ORDER — DICLOFENAC SODIUM 75 MG/1
TABLET, DELAYED RELEASE ORAL DAILY
COMMUNITY
Start: 2017-09-13 | End: 2018-02-15 | Stop reason: SDUPTHER

## 2017-11-15 RX ORDER — ALBUTEROL SULFATE 90 UG/1
2-3 AEROSOL, METERED RESPIRATORY (INHALATION)
Status: ON HOLD | COMMUNITY
Start: 2017-10-17 | End: 2018-03-17

## 2017-11-15 RX ORDER — MORPHINE SULFATE 60 MG/1
60 TABLET, FILM COATED, EXTENDED RELEASE ORAL
Qty: 60 TAB | Refills: 0 | Status: SHIPPED | OUTPATIENT
Start: 2017-12-15 | End: 2018-02-15 | Stop reason: SDUPTHER

## 2017-11-15 RX ORDER — PREDNISONE 10 MG/1
10 TABLET ORAL DAILY
Qty: 30 TAB | Refills: 3 | Status: SHIPPED | OUTPATIENT
Start: 2017-11-15 | End: 2018-03-14 | Stop reason: DRUGHIGH

## 2017-11-15 RX ORDER — MORPHINE SULFATE 60 MG/1
60 TABLET, FILM COATED, EXTENDED RELEASE ORAL
Qty: 60 TAB | Refills: 0 | Status: SHIPPED | OUTPATIENT
Start: 2017-11-15 | End: 2017-11-15 | Stop reason: SDUPTHER

## 2017-11-15 NOTE — MR AVS SNAPSHOT
Visit Information Date & Time Provider Department Dept. Phone Encounter #  
 11/15/2017 10:40 AM Yinka Valle MD [facility] at [facility] [phone] [id number] Follow-up Instructions Return in about 3 months (around 2/15/2018). Your Appointments 11/15/2017 12:00 PM  
ROUTINE CARE with Kang Patino, [address] [place] [address],[address] [place] [address] Road) Appt Note: 6 month follow up  
 Lis Subramanian [address] [facility]  
[phone]  
  
   
 Lis Subramanian [address] [facility] [address] [address]  
  
    
 2/7/2018 11:30 AM  
Follow Up with Hazle Seip, [place] [address] ([address]) Appt Note: Fibroscan/Follow up; Follow up; Follow up [address] [facility] [phone] [place] [id number]  
[address] [facility] [address] [address] Upcoming Health Maintenance Date Due DTaP/Tdap/Td series (1 - Tdap) 5/26/1980 Influenza Age 5 to Adult 8/1/2017 COLONOSCOPY 4/18/2022 Allergies as of 11/15/2017  Review Complete On: 11/15/2017 By: Crystal Salmon MD  
  
 Severity Noted Reaction Type Reactions Pcn [Penicillins] Medium 11/27/2015    Swelling Cymbalta [Duloxetine]  11/15/2017    Other (comments) Blood in urine Current Immunizations  Reviewed on 5/10/2017 Name Date Pneumococcal Polysaccharide (PPSV-23) 5/10/2017 Not reviewed this visit You Were Diagnosed With   
  
 Codes Comments Migraine without status migrainosus, not intractable, unspecified migraine type    -  Primary ICD-10-CM: F29.955 ICD-9-CM: 346.90 Other chronic back pain     ICD-10-CM: M54.9, G89.29 ICD-9-CM: 724.5, 338.29 Long term prescription opiate use     ICD-10-CM: J03.651 ICD-9-CM: V58.69 Chronic pain syndrome     ICD-10-CM: G89.4 ICD-9-CM: 338.4 Brain aneurysm     ICD-10-CM: I67.1 ICD-9-CM: 437.3 Vitals BP Pulse Temp Resp Height(growth percentile) (!) 144/95 (BP 1 Location: Left arm, BP Patient Position: Sitting) 79 97.8 °F (36.6 °C) (Temporal) 18 5' 10\" (1.778 m) Weight(growth percentile) SpO2 BMI Smoking Status 267 lb 6.4 oz (121.3 kg) 97% 38.37 kg/m2 Current Every Day Smoker BMI and BSA Data Body Mass Index Body Surface Area  
 38.37 kg/m 2 2.45 m 2 Preferred Pharmacy Pharmacy Name Phone [facility]/PHARMACY #[id number] [phone] [facility] [phone] Your Updated Medication List  
  
   
This list is accurate as of: 11/15/17 11:02 AM.  Always use your most recent med list.  
  
  
  
  
 diazePAM 10 mg tablet Commonly known as:  VALIUM Take 1 Tab by mouth two (2) times daily as needed. Max Daily Amount: 20 mg.  
  
 diclofenac EC 75 mg EC tablet Commonly known as:  VOLTAREN  
daily. DULoxetine 30 mg capsule Commonly known as:  CYMBALTA Take 1 Cap by mouth daily. hydroCHLOROthiazide 25 mg tablet Commonly known as:  HYDRODIURIL  
TAKE ONE TABLET BY MOUTH DAILY  
  
 irbesartan 150 mg tablet Commonly known as:  AVAPRO Take 1 Tab by mouth nightly. morphine CR 60 mg CR tablet Commonly known as:  MS CONTIN Take 1 Tab by mouth two (2) times daily as needed. Max Daily Amount: 120 mg.  
Start taking on:  12/15/2017 Omeprazole delayed release 20 mg tablet Commonly known as:  PRILOSEC D/R Take 1 Tab by mouth daily. OTHER Uses an inhaler as needed. oxyCODONE-acetaminophen  mg per tablet Commonly known as:  PERCOCET 10 Take 1 Tab by mouth every twelve (12) hours. Max Daily Amount: 2 Tabs. Start taking on:  12/15/2017  
  
 predniSONE 10 mg tablet Commonly known as:  Dorene Roers Take 1 Tab by mouth daily. tadalafil 20 mg tablet Commonly known as:  CIALIS Take 1 Tab by mouth as needed. VENTOLIN HFA 90 mcg/actuation inhaler Generic drug:  albuterol Prescriptions Printed Refills  
 diazePAM (VALIUM) 10 mg tablet 2 Sig: Take 1 Tab by mouth two (2) times daily as needed. Max Daily Amount: 20 mg.  
 Class: Print Route: Oral  
 oxyCODONE-acetaminophen (PERCOCET 10)  mg per tablet 0 Starting on: 12/15/2017 Sig: Take 1 Tab by mouth every twelve (12) hours. Max Daily Amount: 2 Tabs. Class: Print Route: Oral  
 morphine CR (MS CONTIN) 60 mg CR tablet 0 Starting on: 12/15/2017 Sig: Take 1 Tab by mouth two (2) times daily as needed. Max Daily Amount: 120 mg.  
 Class: Print Route: Oral  
  
Prescriptions Sent to Pharmacy Refills  
 predniSONE (DELTASONE) 10 mg tablet 3 Sig: Take 1 Tab by mouth daily. Class: Normal  
 Pharmacy: [address] #: [phone] Route: Oral  
  
We Performed the Following [facility] MONITORING [[id number] Custom] Follow-up Instructions Return in about 3 months (around 2/15/2018). To-Do List   
 02/07/2018 9:00 AM  
  Appointment with [address] [facility] [address] at [facility] ([phone]) General  NPO DIET RESTICTIONS Please be NPO (nothing by mouth) for 6- 8 hours prior to procedure. GENERAL INSTRUCTIONS 1. Bring any non [facility] facility films/reports pertaining to the area being studied with you on the day of appointment. 2. A written order with a valid diagnosis and Physicians signature is required for all scheduled tests. 3. Check in at registration 30 minutes before your appointment time unless you were instructed to do otherwise. Introducing [facility]! Mary Mendez introduces [employer] patient portal. Now you can access parts of your medical record, email your doctor's office, and request medication refills online. 1. In your internet browser, go to [email or web address] "[email or web address]/P[employer] 2. Click on the First Time User? Click Here link in the Sign In box. You will see the New Member Sign Up page. 3. Enter your Treatful Access Code exactly as it appears below. You will not need to use this code after youve completed the sign-up process. If you do not sign up before the expiration date, you must request a new code. · [employer] Access Code: [email or web address] Expires: 2/13/2018 10:35 AM 
 
4. Enter the last four digits of your Social Security Number (xxxx) and Date of Birth (mm/dd/yyyy) as indicated and click Submit. You will be taken to the next sign-up page. 5. Create a Treatful ID. This will be your Treatful login ID and cannot be changed, so think of one that is secure and easy to remember. 6. Create a Treatful password. You can change your password at any time. 7. Enter your Password Reset Question and Answer. This can be used at a later time if you forget your password. 8. Enter your e-mail address. You will receive e-mail notification when new information is available in [address] 9. Click Sign Up. You can now view and download portions of your medical record. 10. Click the Download Summary menu link to download a portable copy of your medical information. If you have questions, please visit the Frequently Asked Questions section of the Treatful website. Remember, Treatful is NOT to be used for urgent needs. For medical emergencies, dial 911. Now available from your iPhone and Android! Please provide this summary of care documentation to your next provider. Your primary care clinician is listed as Goran Paul. If you have any questions after today's visit, please call [phone].

## 2017-11-15 NOTE — PROGRESS NOTES
Chief Complaint   Patient presents with    Migraine    Medication Refill     1. Have you been to the ER, urgent care clinic since your last visit? Hospitalized since your last visit? no    2. Have you seen or consulted any other health care providers outside of the [address] [place] [address] since your last visit? Include any pap smears or colon screening.  Dr. Pacheco Thapa    Pill count not performed patient did not bring medications       Pill count verified with patient  Patient reports taking medication    UDS obtained and sent   Pain contract on file  [facility] made available   Script given for

## 2017-11-15 NOTE — PROGRESS NOTES
Neurology Progress Note    NAME:  Azeb Tijerina. :   1960   MRN:   [id number]     Date/Time:  11/15/2017  Subjective:      Azeb Elliott is a 61 y.o. male here today for follow up for headache and pain. Pain sharp in nature radiate to the arms and aggravated by activity, patient rated pain 9 on a scale of 1-10  Headaches persistent , throbbing in nature associated with dizziness, nausea, and occasional vomiting. He noted that he experiences Spasm mostly in the in the back ,this is is sometimes diffused , activity also worsens the symptoms. Patient said that he has been looking for pain management specialist but has not been able to find one. Review of Systems - General ROS: positive for  - fatigue, night sweats and sleep disturbance  Psychological ROS: positive for - anxiety, depression and sleep disturbances  Ophthalmic ROS: positive for - blurry vision, decreased vision and photophobia  ENT ROS: positive for - headaches, vertigo and visual changes  Allergy and Immunology ROS: negative  Hematological and Lymphatic ROS: negative  Endocrine ROS: negative  Respiratory ROS: no cough, shortness of breath, or wheezing  Cardiovascular ROS: no chest pain or dyspnea on exertion  Gastrointestinal ROS: no abdominal pain, change in bowel habits, or black or bloody stools  Genito-Urinary ROS: no dysuria, trouble voiding, or hematuria  Musculoskeletal ROS: positive for - gait disturbance, joint pain, joint stiffness, joint swelling, muscle pain and muscular weakness  Neurological ROS: positive for - dizziness, headaches, impaired coordination/balance, numbness/tingling, tremors, visual changes and weakness  Dermatological ROS: negative  Medications reviewed:  Current Outpatient Prescriptions   Medication Sig Dispense Refill    VENTOLIN HFA 90 mcg/actuation inhaler       diclofenac EC (VOLTAREN) 75 mg EC tablet daily.       diazePAM (VALIUM) 10 mg tablet Take 1 Tab by mouth two (2) times daily as needed. Max Daily Amount: 20 mg. 60 Tab 2    predniSONE (DELTASONE) 10 mg tablet Take 1 Tab by mouth daily. 30 Tab 3    morphine CR (MS CONTIN) 60 mg CR tablet Take 1 Tab by mouth two (2) times daily as needed. Max Daily Amount: 120 mg. 60 Tab 0    oxyCODONE-acetaminophen (PERCOCET 10)  mg per tablet Take 1 Tab by mouth every eight (8) hours as needed for Pain. Max Daily Amount: 3 Tabs. 60 Tab 0    tadalafil (CIALIS) 20 mg tablet Take 1 Tab by mouth as needed. 9 Tab 3    Omeprazole delayed release (PRILOSEC D/R) 20 mg tablet Take 1 Tab by mouth daily. 90 Tab 1    hydroCHLOROthiazide (HYDRODIURIL) 25 mg tablet TAKE ONE TABLET BY MOUTH DAILY 90 Tab 1    irbesartan (AVAPRO) 150 mg tablet Take 1 Tab by mouth nightly. 90 Tab 1    DULoxetine (CYMBALTA) 30 mg capsule Take 1 Cap by mouth daily. 30 Cap 2    OTHER Uses an inhaler as needed.           Objective:   Vitals:  Vitals:    11/15/17 1044   BP: (!) 144/95   Pulse: 79   Resp: 18   Temp: 97.8 °F (36.6 °C)   TempSrc: Temporal   SpO2: 97%   Weight: 267 lb 6.4 oz (121.3 kg)   Height: 5' 10\" (1.778 m)   PainSc:   6   PainLoc: Generalized               Lab Data Reviewed:  Lab Results   Component Value Date/Time    WBC 7.5 08/07/2017 11:12 AM    HCT 46.4 08/07/2017 11:12 AM    HGB 16.1 08/07/2017 11:12 AM    PLATELET 459 54/19/7208 11:12 AM       Lab Results   Component Value Date/Time    Sodium 141 08/07/2017 11:12 AM    Potassium 3.8 08/07/2017 11:12 AM    Chloride 97 08/07/2017 11:12 AM    CO2 25 08/07/2017 11:12 AM    Glucose 94 08/07/2017 11:12 AM    BUN 10 08/07/2017 11:12 AM    Creatinine 0.73 08/07/2017 11:12 AM    Calcium 9.5 08/07/2017 11:12 AM       No components found for: TROPQUANT    No results found for: YAMILETH      Lab Results   Component Value Date/Time    Hemoglobin A1c 5.4 11/11/2016 01:44 PM    Hemoglobin A1c (POC) 5.2 05/10/2017 12:26 PM        No results found for: B12LT, FOL, RBCF    No results found for: Jovanni CARSON    Lab Results   Component Value Date/Time    Cholesterol, total 172 11/11/2016 01:44 PM    HDL Cholesterol 58 11/11/2016 01:44 PM    LDL, calculated 90 11/11/2016 01:44 PM    VLDL, calculated 24 11/11/2016 01:44 PM    Triglyceride 118 11/11/2016 01:44 PM         CT Results (recent):  No results found for this or any previous visit. MRI Results (recent):    Results from [place] encounter on 06/01/15   MRI BRAIN W WO CONT   Narrative **Final Report**      ICD Codes / Adm. Diagnosis: 434.90  784.0 / Unspecified cerebral artery oc    Headache(784.0)  Examination:  MR BRAIN W AND WO CON  - [id number] - Jun 1 2015 12:09PM  Accession No:  [id number]  Reason:  cva, intractable ha, dizziness      REPORT:  INDICATION: cva, intractable ha, dizziness 434.9, 784.0, 780.4    COMPARISON: None    EXAM: Sagittal T1-weighted spin-echo, axial FLAIR, coronal and axial   T2-weighted fast spin-echo, axial diffusion weighted echo planar, axial   T1-weighted spin-echo, axial gradient echo, and post IV contrast-enhanced   triplanar T1-weighted spin-echo MR images of the brain are obtained. A total   of 10 cc intravenous Gadavist was administered for the study. FINDINGS: Intra-axial morphology, signal and enhancement are normal. There   is no extra-axial collection, mass or enhancement abnormality demonstrated. Assessment of the vascular flow voids at the base the brain shows either   tortuosity or a 4 mm aneurysm of the right M1 segment. Sella, optic chiasm,   posterior fossa, orbits and paranasal sinuses are normal.         IMPRESSION: Aneurysm versus tortuosity of right M1 segment. Followup with MR   arteriography of the [place] is recommended. Otherwise, normal   brain MRI. 23X           Signing/Reading Doctor: SOLANGE Bernard ([id number])    Approved: SOLANGE Bernard ([id number])  Jun 1 2015  2:16PM                                    IR Results (recent):  No results found for this or any previous visit.     VAS/US Results (recent):  No results found for this or any previous visit. PHYSICAL EXAM:  General:    Alert, cooperative, no distress, appears stated age. Head:   Normocephalic, without obvious abnormality, atraumatic. Eyes:   Conjunctivae/corneas clear. PERRLA  Nose:  Nares normal. No drainage or sinus tenderness. Throat:    Lips, mucosa, and tongue normal.  No Thrush  Neck:  Supple, symmetrical,  no adenopathy, thyroid: non tender    no carotid bruit and no JVD. Paraspinal tenderness  Back:    Symmetric,  Diffuse tenderness. Lungs:   Clear to auscultation bilaterally. No Wheezing or Rhonchi. No rales. Chest wall:  No tenderness or deformity. No Accessory muscle use. Heart:   Regular rate and rhythm,  no murmur, rub or gallop. Abdomen:   Soft, non-tender. Not distended. Bowel sounds normal. No masses  Extremities: Extremities normal, atraumatic, No cyanosis. No edema. No clubbing  Skin:     Texture, turgor normal. No rashes or lesions. Not Jaundiced  Lymph nodes: Cervical, supraclavicular normal.  Psych:  Good insight. Not depressed. Not anxious or agitated. NEUROLOGICAL EXAM:  Appearance: The patient is well developed, well nourished, provides a coherent history and is in no acute distress. Mental Status: Oriented to time, place and person. Mood and affect appropriate. Cranial Nerves:   Intact visual fields. Fundi are benign. JOSIE, EOM's full, no nystagmus, no ptosis. Facial sensation is normal. Corneal reflexes are intact. Facial movement is symmetric. Hearing is normal bilaterally. Palate is midline with normal sternocleidomastoid and trapezius muscles are normal. Tongue is midline. Motor:  5-/5 strength in upper and lower proximal and distal muscles. Normal bulk and tone. No fasciculations. Reflexes:   Deep tendon reflexes 2+/4 and symmetrical.   Sensory:   Dysesthesia to touch, pinprick and vibration. Gait:  Unsteady  gait. Tremor:   Mild tremor noted.    Cerebellar:  No cerebellar signs present. Neurovascular:  Normal heart sounds and regular rhythm, peripheral pulses intact, and no carotid bruits. Assesment  1. Migraine without status migrainosus, not intractable, unspecified migraine type    - COMPLIANCE DRUG SCREEN/PRESCRIPTION MONITORING  - oxyCODONE-acetaminophen (PERCOCET 10)  mg per tablet; Take 1 Tab by mouth every eight (8) hours as needed for Pain. Max Daily Amount: 3 Tabs. Dispense: 60 Tab; Refill: 0  - morphine CR (MS CONTIN) 60 mg CR tablet; Take 1 Tab by mouth two (2) times daily as needed. Max Daily Amount: 120 mg. Dispense: 60 Tab; Refill: 0  - predniSONE (DELTASONE) 10 mg tablet; Take 1 Tab by mouth daily. Dispense: 30 Tab; Refill: 3  - diazePAM (VALIUM) 10 mg tablet; Take 1 Tab by mouth two (2) times daily as needed. Max Daily Amount: 20 mg. Dispense: 60 Tab; Refill: 2    2. Other chronic back pain   Continue management  - COMPLIANCE DRUG SCREEN/PRESCRIPTION MONITORING    3. Long term prescription opiate use  - COMPLIANCE DRUG SCREEN/PRESCRIPTION MONITORING    4. Chronic pain syndrome  Needs a pain management specialist  - COMPLIANCE DRUG SCREEN/PRESCRIPTION MONITORING    5. Brain aneurysm    Stable  ___________________________________________________  PLAN:Medication reviewed with patient      ICD-10-CM ICD-9-CM    1. Migraine without status migrainosus, not intractable, unspecified migraine type G43.909 346.90 COMPLIANCE DRUG SCREEN/PRESCRIPTION MONITORING      oxyCODONE-acetaminophen (PERCOCET 10)  mg per tablet      morphine CR (MS CONTIN) 60 mg CR tablet      predniSONE (DELTASONE) 10 mg tablet      diazePAM (VALIUM) 10 mg tablet   2. Other chronic back pain M54.9 724.5 COMPLIANCE DRUG SCREEN/PRESCRIPTION MONITORING    G89.29 338.29    3. Long term prescription opiate use Z79.891 V58.69 COMPLIANCE DRUG SCREEN/PRESCRIPTION MONITORING   4. Chronic pain syndrome G89.4 338.4 COMPLIANCE DRUG SCREEN/PRESCRIPTION MONITORING   5.  Brain aneurysm I67.1 437.3      Follow-up Disposition:  Return in about 3 months (around 2/15/2018).            ___________________________________________________    Total time spent with patient:  []15   []25   []35   [] __ minutes    Care Plan discussed with:    [x]Patient   []Family    []Care Manager   []Consultant/Specialist :    ___________________________________________________    Attending Physician: Lan Escobar MD

## 2017-11-22 LAB — DRUGS UR: NORMAL

## 2018-02-05 ENCOUNTER — TELEPHONE (OUTPATIENT)
Dept: HEMATOLOGY | Age: 58
End: 2018-02-05

## 2018-02-15 ENCOUNTER — OFFICE VISIT (OUTPATIENT)
Dept: NEUROLOGY | Age: 58
End: 2018-02-15

## 2018-02-15 VITALS
OXYGEN SATURATION: 96 % | HEART RATE: 87 BPM | TEMPERATURE: 98.2 F | WEIGHT: 256.2 LBS | SYSTOLIC BLOOD PRESSURE: 150 MMHG | HEIGHT: 70 IN | BODY MASS INDEX: 36.68 KG/M2 | RESPIRATION RATE: 19 BRPM | DIASTOLIC BLOOD PRESSURE: 100 MMHG

## 2018-02-15 DIAGNOSIS — G43.909 MIGRAINE WITHOUT STATUS MIGRAINOSUS, NOT INTRACTABLE, UNSPECIFIED MIGRAINE TYPE: ICD-10-CM

## 2018-02-15 DIAGNOSIS — G89.4 CHRONIC PAIN SYNDROME: Primary | ICD-10-CM

## 2018-02-15 DIAGNOSIS — Z79.891 USE OF OPIATES FOR THERAPEUTIC PURPOSES: ICD-10-CM

## 2018-02-15 DIAGNOSIS — M79.18 MYOFASCIAL MUSCLE PAIN: ICD-10-CM

## 2018-02-15 DIAGNOSIS — F07.81 POST-TRAUMATIC BRAIN SYNDROME: ICD-10-CM

## 2018-02-15 RX ORDER — DICLOFENAC SODIUM 75 MG/1
75 TABLET, DELAYED RELEASE ORAL DAILY
Qty: 30 TAB | Refills: 3 | Status: SHIPPED | OUTPATIENT
Start: 2018-02-15 | End: 2018-03-14

## 2018-02-15 RX ORDER — MORPHINE SULFATE 60 MG/1
60 TABLET, FILM COATED, EXTENDED RELEASE ORAL
Qty: 60 TAB | Refills: 0 | Status: SHIPPED | OUTPATIENT
Start: 2018-02-15 | End: 2018-02-15 | Stop reason: SDUPTHER

## 2018-02-15 RX ORDER — OXYCODONE AND ACETAMINOPHEN 10; 325 MG/1; MG/1
1 TABLET ORAL EVERY 12 HOURS
Qty: 60 TAB | Refills: 0 | Status: SHIPPED | OUTPATIENT
Start: 2018-03-15 | End: 2018-03-14 | Stop reason: DRUGHIGH

## 2018-02-15 RX ORDER — OXYCODONE AND ACETAMINOPHEN 10; 325 MG/1; MG/1
1 TABLET ORAL EVERY 12 HOURS
Qty: 60 TAB | Refills: 0 | Status: SHIPPED | OUTPATIENT
Start: 2018-02-15 | End: 2018-02-15 | Stop reason: SDUPTHER

## 2018-02-15 RX ORDER — MORPHINE SULFATE 60 MG/1
60 TABLET, FILM COATED, EXTENDED RELEASE ORAL
Qty: 60 TAB | Refills: 0 | Status: SHIPPED | OUTPATIENT
Start: 2018-02-15 | End: 2018-03-14 | Stop reason: DRUGHIGH

## 2018-02-15 NOTE — MR AVS SNAPSHOT
[address] [place] [address] 
[phone] Patient: Hussein Zepeda. MRN: [id number] GYE:[birth year] Visit Information Date & Time Provider Department Dept. Phone Encounter #  
 2/15/2018 10:40 AM [facility] [phone] [id number] Follow-up Instructions Return in about 4 months (around 6/15/2018). Upcoming Health Maintenance Date Due DTaP/Tdap/Td series (1 - Tdap) 5/26/1980 Influenza Age 5 to Adult 8/1/2017 COLONOSCOPY 4/18/2022 Allergies as of 2/15/2018  Review Complete On: 2/15/2018 By: Jairon Almonte LPN Severity Noted Reaction Type Reactions Pcn [Penicillins] Medium 11/27/2015    Swelling Cymbalta [Duloxetine]  11/15/2017    Other (comments) Blood in urine Current Immunizations  Reviewed on 5/10/2017 Name Date Pneumococcal Polysaccharide (PPSV-23) 5/10/2017 Not reviewed this visit You Were Diagnosed With   
  
 Codes Comments Chronic pain syndrome    -  Primary ICD-10-CM: G89.4 ICD-9-CM: 338.4 Migraine without status migrainosus, not intractable, unspecified migraine type     ICD-10-CM: G43.909 ICD-9-CM: 346.90 Use of opiates for therapeutic purposes     ICD-10-CM: Z79.891 ICD-9-CM: V58.69 Post-traumatic brain syndrome     ICD-10-CM: F07.81 ICD-9-CM: 310.2 Myofascial muscle pain     ICD-10-CM: M79.1 ICD-9-CM: 729.1 Vitals BP Pulse Temp Resp Height(growth percentile) Weight(growth percentile) (!) 150/100 (BP 1 Location: Left arm, BP Patient Position: Sitting) 87 98.2 °F (36.8 °C) (Oral) 19 5' 10\" (1.778 m) 256 lb 3.2 oz (116.2 kg) SpO2 BMI Smoking Status 96% 36.76 kg/m2 Current Every Day Smoker BMI and BSA Data Body Mass Index Body Surface Area  
 36.76 kg/m 2 2.4 m 2 Preferred Pharmacy Pharmacy Name Phone [facility]/PHARMACY #[id number] [phone] [facility] [phone] Your Updated Medication List  
  
   
This list is accurate as of: 2/15/18 11:00 AM.  Always use your most recent med list.  
  
  
  
  
 diazePAM 10 mg tablet Commonly known as:  VALIUM Take 1 Tab by mouth two (2) times daily as needed. Max Daily Amount: 20 mg.  
  
 diclofenac EC 75 mg EC tablet Commonly known as:  VOLTAREN Take 1 Tab by mouth daily. Take with food DULoxetine 30 mg capsule Commonly known as:  CYMBALTA Take 1 Cap by mouth daily. hydroCHLOROthiazide 25 mg tablet Commonly known as:  HYDRODIURIL  
TAKE ONE TABLET BY MOUTH DAILY  
  
 irbesartan 150 mg tablet Commonly known as:  AVAPRO Take 1 Tab by mouth nightly. morphine CR 60 mg CR tablet Commonly known as:  MS CONTIN Take 1 Tab by mouth two (2) times daily as needed. Max Daily Amount: 120 mg. Omeprazole delayed release 20 mg tablet Commonly known as:  PRILOSEC D/R Take 1 Tab by mouth daily. OTHER Uses an inhaler as needed. oxyCODONE-acetaminophen  mg per tablet Commonly known as:  PERCOCET 10 Take 1 Tab by mouth every twelve (12) hours. Max Daily Amount: 2 Tabs. Start taking on:  3/15/2018  
  
 predniSONE 10 mg tablet Commonly known as:  Raynette Mara Take 1 Tab by mouth daily. tadalafil 20 mg tablet Commonly known as:  CIALIS Take 1 Tab by mouth as needed. VENTOLIN HFA 90 mcg/actuation inhaler Generic drug:  albuterol  
as needed. Prescriptions Printed Refills  
 oxyCODONE-acetaminophen (PERCOCET 10)  mg per tablet 0 Starting on: 3/15/2018 Sig: Take 1 Tab by mouth every twelve (12) hours. Max Daily Amount: 2 Tabs. Class: Print Route: Oral  
 morphine CR (MS CONTIN) 60 mg CR tablet 0 Sig: Take 1 Tab by mouth two (2) times daily as needed. Max Daily Amount: 120 mg.  
 Class: Print  Route: Oral  
  
 Prescriptions Sent to Pharmacy Refills  
 diclofenac EC (VOLTAREN) 75 mg EC tablet 3 Sig: Take 1 Tab by mouth daily. Take with food Class: Normal  
 Pharmacy: [place] [address], [address]  #: [phone] Route: Oral  
  
We Performed the Following [facility] MONITORING [[id number] Custom] Follow-up Instructions Return in about 4 months (around 6/15/2018). Introducing [facility] & HEALTH SERVICES! Armando Glasgow introduces [employer] patient portal. Now you can access parts of your medical record, email your doctor's office, and request medication refills online. 1. In your internet browser, go to [email or web address] 2. Click on the First Time User? Click Here link in the Sign In box. You will see the New Member Sign Up page. 3. Enter your [employer] Access Code exactly as it appears below. You will not need to use this code after youve completed the sign-up process. If you do not sign up before the expiration date, you must request a new code. · [employer] Access Code: [email or web address] Expires: 5/16/2018 10:40 AM 
 
4. Enter the last four digits of your Social Security Number (xxxx) and Date of Birth (mm/dd/yyyy) as indicated and click Submit. You will be taken to the next sign-up page. 5. Create a [employer] ID. This will be your [employer] login ID and cannot be changed, so think of one that is secure and easy to remember. 6. Create a [employer] password. You can change your password at any time. 7. Enter your Password Reset Question and Answer. This can be used at a later time if you forget your password. 8. Enter your e-mail address. You will receive e-mail notification when new information is available in [address] 9. Click Sign Up. You can now view and download portions of your medical record.  
10. Click the Download Summary menu link to download a portable copy of your medical information. If you have questions, please visit the Frequently Asked Questions section of the [employer] website. Remember, [employer]) is NOT to be used for urgent needs. For medical emergencies, dial 911. Now available from your iPhone and Android! Please provide this summary of care documentation to your next provider. Your primary care clinician is listed as Oliverio Zaragoza. If you have any questions after today's visit, please call [phone].

## 2018-02-15 NOTE — PROGRESS NOTES
Neurology Progress Note    NAME:  Nano Caecres :   1960   MRN:   [id number]     Date/Time:  2/15/2018  Subjective:      Nano Caceres is a 61 y.o. male here today for  Follow up for TBI, Headache, pain. Says pain is persistent, sharp in nature ,made worse by activity, neck pain goes down the arms,back pain goes down the legs, on a scale of 1-10, patient rated the pain to be about 6, pain wakes patient up at night at times, medication helps some, patient still looking for pain management. He is also experiencing the spasm of the the muscles mostly of the back, spasm is  Aggravated by activity. Headache waxes and wanes, throbbing in nature mostly frontal , sharp pain when it is coming from the back of the head, headache is aggravated by stress, pain, associated with dizziness,blurry vision, ,medication helps some. He noted that he experiences numbness and tingling sensation of the extremities.   Review of Systems - General ROS: positive for  - fatigue, night sweats and sleep disturbance  Psychological ROS: positive for - anxiety and sleep disturbances  Ophthalmic ROS: positive for - blurry vision, decreased vision and photophobia  ENT ROS: positive for - headaches, tinnitus and visual changes  Allergy and Immunology ROS: negative  Hematological and Lymphatic ROS: negative  Endocrine ROS: negative  Respiratory ROS: no cough, shortness of breath, or wheezing  Cardiovascular ROS: no chest pain or dyspnea on exertion  Gastrointestinal ROS: no abdominal pain, change in bowel habits, or black or bloody stools  Genito-Urinary ROS: no dysuria, trouble voiding, or hematuria  Musculoskeletal ROS: positive for - gait disturbance, joint pain, joint stiffness, muscle pain and muscular weakness  Neurological ROS: positive for - dizziness, headaches, impaired coordination/balance, numbness/tingling, tremors, visual changes and weakness  Dermatological ROS: negative      Medications reviewed:  Current Outpatient Prescriptions   Medication Sig Dispense Refill    VENTOLIN HFA 90 mcg/actuation inhaler as needed.  diclofenac EC (VOLTAREN) 75 mg EC tablet daily.  diazePAM (VALIUM) 10 mg tablet Take 1 Tab by mouth two (2) times daily as needed. Max Daily Amount: 20 mg. 60 Tab 2    oxyCODONE-acetaminophen (PERCOCET 10)  mg per tablet Take 1 Tab by mouth every twelve (12) hours. Max Daily Amount: 2 Tabs. 60 Tab 0    morphine CR (MS CONTIN) 60 mg CR tablet Take 1 Tab by mouth two (2) times daily as needed. Max Daily Amount: 120 mg. 60 Tab 0    tadalafil (CIALIS) 20 mg tablet Take 1 Tab by mouth as needed. 9 Tab 3    Omeprazole delayed release (PRILOSEC D/R) 20 mg tablet Take 1 Tab by mouth daily. 90 Tab 1    hydroCHLOROthiazide (HYDRODIURIL) 25 mg tablet TAKE ONE TABLET BY MOUTH DAILY 90 Tab 1    predniSONE (DELTASONE) 10 mg tablet Take 1 Tab by mouth daily. 30 Tab 3    DULoxetine (CYMBALTA) 30 mg capsule Take 1 Cap by mouth daily. 30 Cap 2    irbesartan (AVAPRO) 150 mg tablet Take 1 Tab by mouth nightly. 90 Tab 1    OTHER Uses an inhaler as needed.           Objective:   Vitals:  Vitals:    02/15/18 1041   BP: (!) 150/100   Pulse: 87   Resp: 19   Temp: 98.2 °F (36.8 °C)   TempSrc: Oral   SpO2: 96%   Weight: 256 lb 3.2 oz (116.2 kg)   Height: 5' 10\" (1.778 m)   PainSc:   6   PainLoc: Generalized       Lab Data Reviewed:  Lab Results   Component Value Date/Time    WBC 7.5 08/07/2017 11:12 AM    HCT 46.4 08/07/2017 11:12 AM    HGB 16.1 08/07/2017 11:12 AM    PLATELET 010 (L) 69/73/1705 11:12 AM       Lab Results   Component Value Date/Time    Sodium 141 08/07/2017 11:12 AM    Potassium 3.8 08/07/2017 11:12 AM    Chloride 97 08/07/2017 11:12 AM    CO2 25 08/07/2017 11:12 AM    Glucose 94 08/07/2017 11:12 AM    BUN 10 08/07/2017 11:12 AM    Creatinine 0.73 (L) 08/07/2017 11:12 AM    Calcium 9.5 08/07/2017 11:12 AM       No components found for: TROPQUANT    No results found for: YAMILETH      Lab Results Component Value Date/Time    Hemoglobin A1c 5.4 11/11/2016 01:44 PM    Hemoglobin A1c (POC) 5.2 05/10/2017 12:26 PM        No results found for: B12LT, FOL, RBCF    No results found for: Christina CARSON, PAT    Lab Results   Component Value Date/Time    Cholesterol, total 172 11/11/2016 01:44 PM    HDL Cholesterol 58 11/11/2016 01:44 PM    LDL, calculated 90 11/11/2016 01:44 PM    VLDL, calculated 24 11/11/2016 01:44 PM    Triglyceride 118 11/11/2016 01:44 PM         CT Results (recent):  No results found for this or any previous visit. MRI Results (recent):    Results from [place] encounter on 06/01/15   MRI BRAIN W WO CONT   Narrative **Final Report**      ICD Codes / Adm. Diagnosis: 434.90  784.0 / Unspecified cerebral artery oc    Headache(784.0)  Examination:  MR BRAIN W AND WO CON  - [id number] - Jun 1 2015 12:09PM  Accession No:  [id number]  Reason:  cva, intractable ha, dizziness      REPORT:  INDICATION: cva, intractable ha, dizziness 434.9, 784.0, 780.4    COMPARISON: None    EXAM: Sagittal T1-weighted spin-echo, axial FLAIR, coronal and axial   T2-weighted fast spin-echo, axial diffusion weighted echo planar, axial   T1-weighted spin-echo, axial gradient echo, and post IV contrast-enhanced   triplanar T1-weighted spin-echo MR images of the brain are obtained. A total   of 10 cc intravenous Gadavist was administered for the study. FINDINGS: Intra-axial morphology, signal and enhancement are normal. There   is no extra-axial collection, mass or enhancement abnormality demonstrated. Assessment of the vascular flow voids at the base the brain shows either   tortuosity or a 4 mm aneurysm of the right M1 segment. Sella, optic chiasm,   posterior fossa, orbits and paranasal sinuses are normal.         IMPRESSION: Aneurysm versus tortuosity of right M1 segment. Followup with MR   arteriography of the [place] [facility] [place] is recommended. Otherwise, normal   brain MRI.     23X Signing/Reading Doctor: Avani Jamil. Jayson Chávez ([id number])    Approved: SOLANGE Chávez ([id number])  Jun 1 2015  2:16PM                                    IR Results (recent):  No results found for this or any previous visit. VAS/US Results (recent):  No results found for this or any previous visit. PHYSICAL EXAM:  General:    Alert, cooperative, no distress, appears stated age. Head:   Normocephalic, without obvious abnormality, atraumatic. Eyes:   Conjunctivae/corneas clear. PERRLA  Nose:  Nares normal. No drainage or sinus tenderness. Throat:    Lips, mucosa, and tongue normal.  No Thrush  Neck:  Supple, symmetrical,  no adenopathy, thyroid: non tender    no carotid bruit and no JVD. Paraspinal tenerness  Back:    Symmetric,  Diffuse tenderness. Lungs:   Clear to auscultation bilaterally. No Wheezing or Rhonchi. No rales. Chest wall:  No tenderness or deformity. No Accessory muscle use. Heart:   Regular rate and rhythm,  no murmur, rub or gallop. Abdomen:   Soft, non-tender. Not distended. Bowel sounds normal. No masses  Extremities: Extremities normal, atraumatic, No cyanosis. No edema. No clubbing  Skin:     Texture, turgor normal. No rashes or lesions. Not Jaundiced  Lymph nodes: Cervical, supraclavicular normal.  Psych:  Good insight. Not depressed. Not anxious or agitated. NEUROLOGICAL EXAM:  Appearance: The patient is well developed, well nourished, provides a coherent history and is in no acute distress. Mental Status: Oriented to time, place and person. Mood and affect appropriate. Cranial Nerves:   Intact visual fields. Fundi are benign. JOSIE, EOM's full, no nystagmus, no ptosis. Facial sensation is normal. Corneal reflexes are intact. Facial movement is symmetric. Hearing is normal bilaterally. Palate is midline with normal sternocleidomastoid and trapezius muscles are normal. Tongue is midline. Motor:  5/5 strength in upper and lower proximal and distal muscles.  Normal bulk and tone. No fasciculations. Reflexes:   Deep tendon reflexes 2+/4 and symmetrical.   Sensory:   Dysesthesia to touch, pinprick and vibration. Gait:  Normal gait. Tremor:   Mild tremor noted. Cerebellar:  No cerebellar signs present. Neurovascular:  Normal heart sounds and regular rhythm, peripheral pulses intact, and no carotid bruits. Assesment  1. Migraine without status migrainosus, not intractable, unspecified migraine type    - COMPLIANCE DRUG SCREEN/PRESCRIPTION MONITORING  - oxyCODONE-acetaminophen (PERCOCET 10)  mg per tablet; Take 1 Tab by mouth every twelve (12) hours. Max Daily Amount: 2 Tabs. Dispense: 60 Tab; Refill: 0  - morphine CR (MS CONTIN) 60 mg CR tablet; Take 1 Tab by mouth two (2) times daily as needed. Max Daily Amount: 120 mg. Dispense: 60 Tab; Refill: 0    2. Chronic pain syndrome    - COMPLIANCE DRUG SCREEN/PRESCRIPTION MONITORING    3. Use of opiates for therapeutic purposes    - COMPLIANCE DRUG SCREEN/PRESCRIPTION MONITORING    4. Post-traumatic brain syndrome    Continue management  5. Myofascial muscle pain  Physical therapy    ___________________________________________________  PLAN:Medication reviewed with patient      ICD-10-CM ICD-9-CM    1. Chronic pain syndrome G89.4 338.4 COMPLIANCE DRUG SCREEN/PRESCRIPTION MONITORING   2. Migraine without status migrainosus, not intractable, unspecified migraine type G43.909 346.90 COMPLIANCE DRUG SCREEN/PRESCRIPTION MONITORING      oxyCODONE-acetaminophen (PERCOCET 10)  mg per tablet      morphine CR (MS CONTIN) 60 mg CR tablet   3. Use of opiates for therapeutic purposes Z79.891 V58.69 COMPLIANCE DRUG SCREEN/PRESCRIPTION MONITORING   4. Post-traumatic brain syndrome F07.81 310.2    5. Myofascial muscle pain M79.1 729.1      Follow-up Disposition:  Return in about 4 months (around 6/15/2018).          ___________________________________________________    Total time spent with patient:  []15   []25   []35 [] __ minutes    Care Plan discussed with:    []Patient   []Family    []Care Manager   []Consultant/Specialist :    ___________________________________________________    Attending Physician: Keo Quezada MD

## 2018-02-15 NOTE — PROGRESS NOTES
Chief Complaint   Patient presents with    Migraine    Medication Refill     1. Have you been to the ER, urgent care clinic since your last visit? Hospitalized since your last visit? no    2. Have you seen or consulted any other health care providers outside of the [address] [place] [address] since your last visit? Include any pap smears or colon screening. No    Patient stated he cannot find a pain management that accepts his insurance.     Pill count not performed patient did not bring medications        Pill count not verified with patient  Patient reports taking medication     UDS obtained and sent   Pain contract on file   made available for  Dr. Farrar Orn given for Morphine and Percocet

## 2018-02-21 LAB — DRUGS UR: NORMAL

## 2018-03-14 ENCOUNTER — APPOINTMENT (OUTPATIENT)
Dept: ULTRASOUND IMAGING | Age: 58
DRG: 133 | End: 2018-03-14
Attending: INTERNAL MEDICINE
Payer: MEDICAID

## 2018-03-14 ENCOUNTER — APPOINTMENT (OUTPATIENT)
Dept: GENERAL RADIOLOGY | Age: 58
DRG: 133 | End: 2018-03-14
Attending: EMERGENCY MEDICINE
Payer: MEDICAID

## 2018-03-14 ENCOUNTER — APPOINTMENT (OUTPATIENT)
Dept: CT IMAGING | Age: 58
DRG: 133 | End: 2018-03-14
Attending: INTERNAL MEDICINE
Payer: MEDICAID

## 2018-03-14 ENCOUNTER — HOSPITAL ENCOUNTER (INPATIENT)
Age: 58
LOS: 4 days | Discharge: HOME | DRG: 133 | End: 2018-03-18
Attending: EMERGENCY MEDICINE | Admitting: INTERNAL MEDICINE
Payer: MEDICAID

## 2018-03-14 ENCOUNTER — APPOINTMENT (OUTPATIENT)
Dept: CT IMAGING | Age: 58
DRG: 133 | End: 2018-03-14
Attending: EMERGENCY MEDICINE
Payer: MEDICAID

## 2018-03-14 DIAGNOSIS — E87.6 HYPOKALEMIA: ICD-10-CM

## 2018-03-14 DIAGNOSIS — K76.82 HEPATIC ENCEPHALOPATHY: ICD-10-CM

## 2018-03-14 DIAGNOSIS — R60.9 EDEMA, UNSPECIFIED TYPE: ICD-10-CM

## 2018-03-14 DIAGNOSIS — J96.01 ACUTE RESPIRATORY FAILURE WITH HYPOXIA (HCC): Primary | ICD-10-CM

## 2018-03-14 DIAGNOSIS — K74.69 OTHER CIRRHOSIS OF LIVER (HCC): ICD-10-CM

## 2018-03-14 DIAGNOSIS — I67.1 BRAIN ANEURYSM: ICD-10-CM

## 2018-03-14 PROBLEM — J96.91 RESPIRATORY FAILURE WITH HYPOXIA (HCC): Status: ACTIVE | Noted: 2018-03-14

## 2018-03-14 LAB
ALBUMIN SERPL-MCNC: 3.7 G/DL (ref 3.5–5)
ALBUMIN/GLOB SERPL: 1 {RATIO} (ref 1.1–2.2)
ALP SERPL-CCNC: 96 U/L (ref 45–117)
ALT SERPL-CCNC: 38 U/L (ref 12–78)
AMMONIA PLAS-SCNC: 34 UMOL/L
AMPHET UR QL SCN: NEGATIVE
ANION GAP SERPL CALC-SCNC: 6 MMOL/L (ref 5–15)
APPEARANCE UR: ABNORMAL
ARTERIAL PATENCY WRIST A: ABNORMAL
AST SERPL-CCNC: 32 U/L (ref 15–37)
BACTERIA URNS QL MICRO: NEGATIVE /HPF
BARBITURATES UR QL SCN: NEGATIVE
BASE EXCESS BLDA CALC-SCNC: 4.5 MMOL/L
BASOPHILS # BLD: 0 K/UL (ref 0–0.1)
BASOPHILS NFR BLD: 0 % (ref 0–1)
BDY SITE: ABNORMAL
BENZODIAZ UR QL: POSITIVE
BILIRUB SERPL-MCNC: 1.2 MG/DL (ref 0.2–1)
BILIRUB UR QL CFM: POSITIVE
BNP SERPL-MCNC: 269 PG/ML (ref 0–125)
BREATHS.SPONTANEOUS ON VENT: 16
BUN SERPL-MCNC: 12 MG/DL (ref 6–20)
BUN/CREAT SERPL: 14 (ref 12–20)
CALCIUM SERPL-MCNC: 8 MG/DL (ref 8.5–10.1)
CANNABINOIDS UR QL SCN: NEGATIVE
CHLORIDE SERPL-SCNC: 98 MMOL/L (ref 97–108)
CO2 SERPL-SCNC: 32 MMOL/L (ref 21–32)
COCAINE UR QL SCN: NEGATIVE
COLOR UR: ABNORMAL
CREAT SERPL-MCNC: 0.83 MG/DL (ref 0.7–1.3)
DEPRECATED S PYO AG THROAT QL EIA: NEGATIVE
DIFFERENTIAL METHOD BLD: ABNORMAL
DRUG SCRN COMMENT,DRGCM: ABNORMAL
EOSINOPHIL # BLD: 0 K/UL (ref 0–0.4)
EOSINOPHIL NFR BLD: 0 % (ref 0–7)
EPITH CASTS URNS QL MICRO: ABNORMAL /LPF
ERYTHROCYTE [DISTWIDTH] IN BLOOD BY AUTOMATED COUNT: 14.3 % (ref 11.5–14.5)
ETHANOL SERPL-MCNC: <10 MG/DL
GAS FLOW.O2 O2 DELIVERY SYS: 2 L/MIN
GLOBULIN SER CALC-MCNC: 3.8 G/DL (ref 2–4)
GLUCOSE SERPL-MCNC: 112 MG/DL (ref 65–100)
GLUCOSE UR STRIP.AUTO-MCNC: NEGATIVE MG/DL
HCO3 BLDA-SCNC: 30 MMOL/L (ref 22–26)
HCT VFR BLD AUTO: 44.1 % (ref 36.6–50.3)
HGB BLD-MCNC: 15 G/DL (ref 12.1–17)
HGB UR QL STRIP: ABNORMAL
IMM GRANULOCYTES # BLD: 0 K/UL (ref 0–0.04)
IMM GRANULOCYTES NFR BLD AUTO: 0 % (ref 0–0.5)
KETONES UR QL STRIP.AUTO: NEGATIVE MG/DL
LEUKOCYTE ESTERASE UR QL STRIP.AUTO: NEGATIVE
LYMPHOCYTES # BLD: 1.3 K/UL (ref 0.8–3.5)
LYMPHOCYTES NFR BLD: 22 % (ref 12–49)
MCH RBC QN AUTO: 29.7 PG (ref 26–34)
MCHC RBC AUTO-ENTMCNC: 34 G/DL (ref 30–36.5)
MCV RBC AUTO: 87.3 FL (ref 80–99)
METHADONE UR QL: NEGATIVE
MONOCYTES # BLD: 0.7 K/UL (ref 0–1)
MONOCYTES NFR BLD: 12 % (ref 5–13)
MUCOUS THREADS URNS QL MICRO: ABNORMAL /LPF
NEUTS SEG # BLD: 4.1 K/UL (ref 1.8–8)
NEUTS SEG NFR BLD: 66 % (ref 32–75)
NITRITE UR QL STRIP.AUTO: NEGATIVE
NRBC # BLD: 0 K/UL (ref 0–0.01)
NRBC BLD-RTO: 0 PER 100 WBC
OPIATES UR QL: POSITIVE
PCO2 BLDA: 46 MMHG (ref 35–45)
PCP UR QL: NEGATIVE
PH BLDA: 7.43 [PH] (ref 7.35–7.45)
PH UR STRIP: 5.5 [PH] (ref 5–8)
PLATELET # BLD AUTO: 80 K/UL (ref 150–400)
PMV BLD AUTO: 10.3 FL (ref 8.9–12.9)
PO2 BLDA: 64 MMHG (ref 80–100)
POTASSIUM SERPL-SCNC: 2.9 MMOL/L (ref 3.5–5.1)
PROT SERPL-MCNC: 7.5 G/DL (ref 6.4–8.2)
PROT UR STRIP-MCNC: 30 MG/DL
RBC # BLD AUTO: 5.05 M/UL (ref 4.1–5.7)
RBC #/AREA URNS HPF: ABNORMAL /HPF (ref 0–5)
SAO2 % BLD: 93 % (ref 92–97)
SAO2% DEVICE SAO2% SENSOR NAME: ABNORMAL
SODIUM SERPL-SCNC: 136 MMOL/L (ref 136–145)
SP GR UR REFRACTOMETRY: 1.03 (ref 1–1.03)
SPECIMEN SITE: ABNORMAL
TROPONIN I SERPL-MCNC: <0.04 NG/ML
UA: UC IF INDICATED,UAUC: ABNORMAL
UROBILINOGEN UR QL STRIP.AUTO: 4 EU/DL (ref 0.2–1)
WBC # BLD AUTO: 6.1 K/UL (ref 4.1–11.1)
WBC URNS QL MICRO: ABNORMAL /HPF (ref 0–4)

## 2018-03-14 PROCEDURE — 80307 DRUG TEST PRSMV CHEM ANLYZR: CPT | Performed by: EMERGENCY MEDICINE

## 2018-03-14 PROCEDURE — 77010033678 HC OXYGEN DAILY

## 2018-03-14 PROCEDURE — 65270000015 HC RM PRIVATE ONCOLOGY

## 2018-03-14 PROCEDURE — 80053 COMPREHEN METABOLIC PANEL: CPT | Performed by: EMERGENCY MEDICINE

## 2018-03-14 PROCEDURE — 71275 CT ANGIOGRAPHY CHEST: CPT

## 2018-03-14 PROCEDURE — 82140 ASSAY OF AMMONIA: CPT | Performed by: EMERGENCY MEDICINE

## 2018-03-14 PROCEDURE — 70450 CT HEAD/BRAIN W/O DYE: CPT

## 2018-03-14 PROCEDURE — 85025 COMPLETE CBC W/AUTO DIFF WBC: CPT | Performed by: EMERGENCY MEDICINE

## 2018-03-14 PROCEDURE — 84484 ASSAY OF TROPONIN QUANT: CPT | Performed by: EMERGENCY MEDICINE

## 2018-03-14 PROCEDURE — 71045 X-RAY EXAM CHEST 1 VIEW: CPT

## 2018-03-14 PROCEDURE — 94761 N-INVAS EAR/PLS OXIMETRY MLT: CPT

## 2018-03-14 PROCEDURE — 99285 EMERGENCY DEPT VISIT HI MDM: CPT

## 2018-03-14 PROCEDURE — 87070 CULTURE OTHR SPECIMN AEROBIC: CPT | Performed by: EMERGENCY MEDICINE

## 2018-03-14 PROCEDURE — 74011250636 HC RX REV CODE- 250/636: Performed by: EMERGENCY MEDICINE

## 2018-03-14 PROCEDURE — 74011000258 HC RX REV CODE- 258: Performed by: EMERGENCY MEDICINE

## 2018-03-14 PROCEDURE — 76705 ECHO EXAM OF ABDOMEN: CPT

## 2018-03-14 PROCEDURE — 96374 THER/PROPH/DIAG INJ IV PUSH: CPT

## 2018-03-14 PROCEDURE — 36415 COLL VENOUS BLD VENIPUNCTURE: CPT | Performed by: EMERGENCY MEDICINE

## 2018-03-14 PROCEDURE — 82803 BLOOD GASES ANY COMBINATION: CPT | Performed by: EMERGENCY MEDICINE

## 2018-03-14 PROCEDURE — 74011250636 HC RX REV CODE- 250/636: Performed by: INTERNAL MEDICINE

## 2018-03-14 PROCEDURE — 74011250637 HC RX REV CODE- 250/637: Performed by: EMERGENCY MEDICINE

## 2018-03-14 PROCEDURE — 83880 ASSAY OF NATRIURETIC PEPTIDE: CPT | Performed by: EMERGENCY MEDICINE

## 2018-03-14 PROCEDURE — 81001 URINALYSIS AUTO W/SCOPE: CPT | Performed by: EMERGENCY MEDICINE

## 2018-03-14 PROCEDURE — 74011250637 HC RX REV CODE- 250/637: Performed by: INTERNAL MEDICINE

## 2018-03-14 PROCEDURE — 36600 WITHDRAWAL OF ARTERIAL BLOOD: CPT | Performed by: EMERGENCY MEDICINE

## 2018-03-14 PROCEDURE — 74011636320 HC RX REV CODE- 636/320: Performed by: HOSPITALIST

## 2018-03-14 PROCEDURE — 87880 STREP A ASSAY W/OPTIC: CPT | Performed by: EMERGENCY MEDICINE

## 2018-03-14 RX ORDER — OXYCODONE AND ACETAMINOPHEN 10; 325 MG/1; MG/1
1 TABLET ORAL
COMMUNITY
End: 2018-04-11 | Stop reason: SDUPTHER

## 2018-03-14 RX ORDER — IPRATROPIUM BROMIDE AND ALBUTEROL SULFATE 2.5; .5 MG/3ML; MG/3ML
3 SOLUTION RESPIRATORY (INHALATION)
Status: DISCONTINUED | OUTPATIENT
Start: 2018-03-14 | End: 2018-03-18 | Stop reason: HOSPADM

## 2018-03-14 RX ORDER — SODIUM CHLORIDE 0.9 % (FLUSH) 0.9 %
5-10 SYRINGE (ML) INJECTION EVERY 8 HOURS
Status: DISCONTINUED | OUTPATIENT
Start: 2018-03-14 | End: 2018-03-18 | Stop reason: HOSPADM

## 2018-03-14 RX ORDER — IPRATROPIUM BROMIDE AND ALBUTEROL SULFATE 2.5; .5 MG/3ML; MG/3ML
3 SOLUTION RESPIRATORY (INHALATION)
Status: DISCONTINUED | OUTPATIENT
Start: 2018-03-14 | End: 2018-03-15

## 2018-03-14 RX ORDER — POTASSIUM CHLORIDE 14.9 MG/ML
10 INJECTION INTRAVENOUS ONCE
Status: DISCONTINUED | OUTPATIENT
Start: 2018-03-14 | End: 2018-03-14

## 2018-03-14 RX ORDER — SODIUM CHLORIDE 9 MG/ML
50 INJECTION, SOLUTION INTRAVENOUS
Status: DISPENSED | OUTPATIENT
Start: 2018-03-14 | End: 2018-03-15

## 2018-03-14 RX ORDER — FUROSEMIDE 10 MG/ML
40 INJECTION INTRAMUSCULAR; INTRAVENOUS
Status: COMPLETED | OUTPATIENT
Start: 2018-03-14 | End: 2018-03-14

## 2018-03-14 RX ORDER — ENOXAPARIN SODIUM 100 MG/ML
40 INJECTION SUBCUTANEOUS EVERY 24 HOURS
Status: DISCONTINUED | OUTPATIENT
Start: 2018-03-14 | End: 2018-03-18 | Stop reason: HOSPADM

## 2018-03-14 RX ORDER — ACETAMINOPHEN 325 MG/1
650 TABLET ORAL
Status: DISCONTINUED | OUTPATIENT
Start: 2018-03-14 | End: 2018-03-18 | Stop reason: HOSPADM

## 2018-03-14 RX ORDER — MORPHINE SULFATE 60 MG/1
30-60 CAPSULE, EXTENDED RELEASE ORAL
COMMUNITY
End: 2018-04-11 | Stop reason: SDUPTHER

## 2018-03-14 RX ORDER — IBUPROFEN 200 MG
1 TABLET ORAL DAILY
Status: DISCONTINUED | OUTPATIENT
Start: 2018-03-15 | End: 2018-03-15

## 2018-03-14 RX ORDER — POTASSIUM CHLORIDE 750 MG/1
40 TABLET, FILM COATED, EXTENDED RELEASE ORAL
Status: COMPLETED | OUTPATIENT
Start: 2018-03-14 | End: 2018-03-14

## 2018-03-14 RX ORDER — SODIUM CHLORIDE 0.9 % (FLUSH) 0.9 %
5-10 SYRINGE (ML) INJECTION AS NEEDED
Status: DISCONTINUED | OUTPATIENT
Start: 2018-03-14 | End: 2018-03-18 | Stop reason: HOSPADM

## 2018-03-14 RX ORDER — PREDNISONE 10 MG/1
10 TABLET ORAL
COMMUNITY
End: 2019-11-07 | Stop reason: ALTCHOICE

## 2018-03-14 RX ORDER — SODIUM CHLORIDE 0.9 % (FLUSH) 0.9 %
10 SYRINGE (ML) INJECTION
Status: DISPENSED | OUTPATIENT
Start: 2018-03-14 | End: 2018-03-15

## 2018-03-14 RX ORDER — POTASSIUM CHLORIDE 20 MEQ/1
40 TABLET, EXTENDED RELEASE ORAL 2 TIMES DAILY
Status: DISPENSED | OUTPATIENT
Start: 2018-03-14 | End: 2018-03-15

## 2018-03-14 RX ADMIN — Medication 10 ML: at 19:17

## 2018-03-14 RX ADMIN — IOPAMIDOL 100 ML: 755 INJECTION, SOLUTION INTRAVENOUS at 10:00

## 2018-03-14 RX ADMIN — Medication 10 ML: at 21:52

## 2018-03-14 RX ADMIN — FUROSEMIDE 40 MG: 10 INJECTION, SOLUTION INTRAMUSCULAR; INTRAVENOUS at 14:56

## 2018-03-14 RX ADMIN — SODIUM CHLORIDE: 900 INJECTION, SOLUTION INTRAVENOUS at 17:39

## 2018-03-14 RX ADMIN — POTASSIUM CHLORIDE 40 MEQ: 750 TABLET, EXTENDED RELEASE ORAL at 14:55

## 2018-03-14 NOTE — PROGRESS NOTES
Problem: Falls - Risk of  Goal: *Absence of Falls  Document George Fall Risk and appropriate interventions in the flowsheet.    Outcome: Progressing Towards Goal  Fall Risk Interventions:            Medication Interventions: Bed/chair exit alarm, Evaluate medications/consider consulting pharmacy, Patient to call before getting OOB

## 2018-03-14 NOTE — ED NOTES
TRANSFER - OUT REPORT:    Verbal report given to RN(name) on [facility].  being transferred to Oncology(unit) for routine progression of care       Report consisted of patients Situation, Background, Assessment and   Recommendations(SBAR). Information from the following report(s) SBAR, Kardex, ED Summary, Procedure Summary and Intake/Output was reviewed with the receiving nurse. Lines:   Peripheral IV 03/14/18 Left Arm (Active)   Site Assessment Clean, dry, & intact 3/14/2018  1:26 PM   Phlebitis Assessment 0 3/14/2018  1:26 PM   Infiltration Assessment 0 3/14/2018  1:26 PM   Dressing Status Clean, dry, & intact 3/14/2018  1:26 PM   Hub Color/Line Status Pink 3/14/2018  1:26 PM        Opportunity for questions and clarification was provided. Patient transported with:  tech    Pt is in CT and then will be taken to the floor.

## 2018-03-14 NOTE — PROGRESS NOTES
TRANSFER - IN REPORT:    Verbal report received from Abbie Ruiz rn(name) on [facility].  being received from   ED(unit) for routine progression of care      Report consisted of patients Situation, Background, Assessment and   Recommendations(SBAR). Information from the following report(s) SBAR, Procedure Summary, Intake/Output, MAR and Recent Results was reviewed with the receiving nurse. Opportunity for questions and clarification was provided. Assessment completed upon patients arrival to unit and care assumed.

## 2018-03-14 NOTE — ED PROVIDER NOTES
EMERGENCY DEPARTMENT HISTORY AND PHYSICAL EXAM      Date: 3/14/2018  Patient Name: Hussein Zepeda. History of Presenting Illness     Chief Complaint   Patient presents with    Sore Throat     x 2 days    Altered mental status     family member states pt has been \"off\" and \"talking out of his head\" for a few days. History Provided By: Patient and Patient's Wife    HPI: Hussein Zepeda., 61 y.o. male with PMHx significant for HTN, GERD, arthritis, asthma, migraine, aneurysm, hepatitis, presents ambulatory to the ED with cc of altered mental status x 2 days. Wife reports the pt has been \"talking out of his head\" and \"out of it. \" She denies any changes to his medication, but notes the pt had only taken Nyquil once yesterday. She reports associated slurred speech and the pt had fallen asleep yesterday, to which he was hard to arouse. She also notes the pt has been discussing yesterday as if it were today. Pt is currently only complaining of a sore throat x 2 days and a headache. Upon walking into the room, the pt has sunglasses on and he states he has it is due to photophobia s/p multiple prior eye surgeries. Upon palpating the pt's abdomen, he states he has stage 4 cirrhosis and has chronic abdominal pain, to which his wife reports an associated swelling. She reports a h/o an aneurysm and hernia repair. He notes he sees a GI specialist at [facility], Dr. Magda Kocher and states he has an US scheduled. Wife denies any fevers. PCP: Swapna Butcher MD    History is limited secondary to a mental status change.     Current Facility-Administered Medications   Medication Dose Route Frequency Provider Last Rate Last Dose    potassium chloride SR (KLOR-CON 10) tablet 40 mEq  40 mEq Oral NOW Janki Khan MD        furosemide (LASIX) injection 40 mg  40 mg IntraVENous NOW Janki Khan MD        potassium chloride 10 mEq in 0.9% sodium chloride 100 mL IVPB   IntraVENous Jhoana Garrett MD Current Outpatient Prescriptions   Medication Sig Dispense Refill    diclofenac EC (VOLTAREN) 75 mg EC tablet Take 1 Tab by mouth daily. Take with food 30 Tab 3    [START ON 3/15/2018] oxyCODONE-acetaminophen (PERCOCET 10)  mg per tablet Take 1 Tab by mouth every twelve (12) hours. Max Daily Amount: 2 Tabs. 60 Tab 0    morphine CR (MS CONTIN) 60 mg CR tablet Take 1 Tab by mouth two (2) times daily as needed. Max Daily Amount: 120 mg. 60 Tab 0    VENTOLIN HFA 90 mcg/actuation inhaler as needed.  predniSONE (DELTASONE) 10 mg tablet Take 1 Tab by mouth daily. 30 Tab 3    diazePAM (VALIUM) 10 mg tablet Take 1 Tab by mouth two (2) times daily as needed. Max Daily Amount: 20 mg. 60 Tab 2    DULoxetine (CYMBALTA) 30 mg capsule Take 1 Cap by mouth daily. 30 Cap 2    tadalafil (CIALIS) 20 mg tablet Take 1 Tab by mouth as needed. 9 Tab 3    Omeprazole delayed release (PRILOSEC D/R) 20 mg tablet Take 1 Tab by mouth daily. 90 Tab 1    hydroCHLOROthiazide (HYDRODIURIL) 25 mg tablet TAKE ONE TABLET BY MOUTH DAILY 90 Tab 1    irbesartan (AVAPRO) 150 mg tablet Take 1 Tab by mouth nightly. 90 Tab 1    OTHER Uses an inhaler as needed.          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    Ill-defined condition     heat stroke years ago    Liver disease     Hep C - \"cured\"    Migraine     Psychiatric disorder     axiety and depression       Past Surgical History:  Past Surgical History:   Procedure Laterality Date    ABDOMEN SURGERY PROC UNLISTED      hernia    COLONOSCOPY N/A 10/19/2016    COLONOSCOPY performed by Linda Bah MD [facility] at [facility] END[facility]    COLONOSCOPY N/A 4/18/2017    COLONOSCOPY performed by Earnest Abdullahi MD at [facility] ENDOSCOPY    HX COLONOSCOPY  2016    HX ENDOSCOPY  2016    HX HEENT      mastoid - surgery eardrum perforation    HX OTHER SURGICAL      colonoscopy - Mar 2016 - multiple polyps       Family History:  Family History   Problem Relation Age of Onset    Cancer Mother      lung cancer    Hypertension Mother     Cancer Father      metastatic, colon cancer    Hypertension Father     Hypertension Sister        Social History:  Social History   Substance Use Topics    Smoking status: Current Every Day Smoker     Packs/day: 0.50     Years: 40.00    Smokeless tobacco: Never Used      Comment: 4-5 cigarettes per day    Alcohol use No       Allergies: Allergies   Allergen Reactions    Pcn [Penicillins] Swelling    Cymbalta [Duloxetine] Other (comments)     Blood in urine         Review of Systems   Review of Systems   Unable to perform ROS: Mental status change       Physical Exam   Physical Exam   Constitutional: He is oriented to person, place, and time. He appears well-developed and well-nourished. Appears slightly drowsy   HENT:   Head: Normocephalic and atraumatic. No evidence of RPA or PTA, posterior pharynx slightly erythematous and slightly edematous but uvula is midline with no swelling   Eyes: Conjunctivae and EOM are normal. No scleral icterus. Pt has sunglasses on at baseline   Neck: Normal range of motion. Neck supple. Cardiovascular: Normal rate and regular rhythm. Pulmonary/Chest: Effort normal and breath sounds normal. No respiratory distress. Abdominal: Soft. He exhibits distension. There is tenderness. Diffusely tender, hepatomegaly   Musculoskeletal: Normal range of motion. 1+ pitting edema   Neurological: He is alert and oriented to person, place, and time.    4/5 strength in all extremities, slurred speech, oriented to person, place, time, birthday   Skin: Skin is warm and dry. Psychiatric: He has a normal mood and affect. Nursing note and vitals reviewed. Diagnostic Study Results     Labs -  Recent Results (from the past 12 hour(s))   BLOOD GAS, ARTERIAL    Collection Time: 03/14/18  1:11 PM   Result Value Ref Range    pH 7.43 7.35 - 7.45      PCO2 46 (H) 35.0 - 45.0 mmHg    PO2 64 (L) 80 - 100 mmHg    O2 SAT 93 92 - 97 %    BICARBONATE 30 (H) 22 - 26 mmol/L    BASE EXCESS 4.5 mmol/L    O2 METHOD NASAL O2      O2 FLOW RATE 2.00 L/min    SPONTANEOUS RATE 16.0      Sample source ARTERIAL      SITE RIGHT BRACHIAL      KASEY'S TEST N/A     CBC WITH AUTOMATED DIFF    Collection Time: 03/14/18  1:26 PM   Result Value Ref Range    WBC 6.1 4.1 - 11.1 K/uL    RBC 5.05 4. 10 - 5.70 M/uL    HGB 15.0 12.1 - 17.0 g/dL    HCT 44.1 36.6 - 50.3 %    MCV 87.3 80.0 - 99.0 FL    MCH 29.7 26.0 - 34.0 PG    MCHC 34.0 30.0 - 36.5 g/dL    RDW 14.3 11.5 - 14.5 %    PLATELET 80 (L) 056 - 400 K/uL    MPV 10.3 8.9 - 12.9 FL    NRBC 0.0 0  WBC    ABSOLUTE NRBC 0.00 0.00 - 0.01 K/uL    NEUTROPHILS 66 32 - 75 %    LYMPHOCYTES 22 12 - 49 %    MONOCYTES 12 5 - 13 %    EOSINOPHILS 0 0 - 7 %    BASOPHILS 0 0 - 1 %    IMMATURE GRANULOCYTES 0 0.0 - 0.5 %    ABS. NEUTROPHILS 4.1 1.8 - 8.0 K/UL    ABS. LYMPHOCYTES 1.3 0.8 - 3.5 K/UL    ABS. MONOCYTES 0.7 0.0 - 1.0 K/UL    ABS. EOSINOPHILS 0.0 0.0 - 0.4 K/UL    ABS. BASOPHILS 0.0 0.0 - 0.1 K/UL    ABS. IMM.  GRANS. 0.0 0.00 - 0.04 K/UL    DF AUTOMATED     METABOLIC PANEL, COMPREHENSIVE    Collection Time: 03/14/18  1:26 PM   Result Value Ref Range    Sodium 136 136 - 145 mmol/L    Potassium 2.9 (L) 3.5 - 5.1 mmol/L    Chloride 98 97 - 108 mmol/L    CO2 32 21 - 32 mmol/L    Anion gap 6 5 - 15 mmol/L    Glucose 112 (H) 65 - 100 mg/dL    BUN 12 6 - 20 MG/DL    Creatinine 0.83 0.70 - 1.30 MG/DL    BUN/Creatinine ratio 14 12 - 20      GFR est AA >60 >60 ml/min/1.73m2    GFR est non-AA >60 >60 ml/min/1.73m2    Calcium 8.0 (L) 8.5 - 10.1 MG/DL Bilirubin, total 1.2 (H) 0.2 - 1.0 MG/DL    ALT (SGPT) 38 12 - 78 U/L    AST (SGOT) 32 15 - 37 U/L    Alk. phosphatase 96 45 - 117 U/L    Protein, total 7.5 6.4 - 8.2 g/dL    Albumin 3.7 3.5 - 5.0 g/dL    Globulin 3.8 2.0 - 4.0 g/dL    A-G Ratio 1.0 (L) 1.1 - 2.2     AMMONIA    Collection Time: 03/14/18  1:26 PM   Result Value Ref Range    Ammonia 34 (H) <32 UMOL/L   TROPONIN I    Collection Time: 03/14/18  1:26 PM   Result Value Ref Range    Troponin-I, Qt. <0.04 <0.05 ng/mL   NT-PRO BNP    Collection Time: 03/14/18  1:26 PM   Result Value Ref Range    NT pro- (H) 0 - 125 PG/ML   ETHYL ALCOHOL    Collection Time: 03/14/18  1:26 PM   Result Value Ref Range    ALCOHOL(ETHYL),SERUM <10 <10 MG/DL   STREP AG SCREEN, GROUP A    Collection Time: 03/14/18  1:59 PM   Result Value Ref Range    Group A Strep Ag ID NEGATIVE  NEG     URINALYSIS W/ REFLEX CULTURE    Collection Time: 03/14/18  2:27 PM   Result Value Ref Range    Color ORANGE      Appearance CLOUDY (A) CLEAR      Specific gravity 1.028 1.003 - 1.030      pH (UA) 5.5 5.0 - 8.0      Protein 30 (A) NEG mg/dL    Glucose NEGATIVE  NEG mg/dL    Ketone NEGATIVE  NEG mg/dL    Blood LARGE (A) NEG      Urobilinogen 4.0 (H) 0.2 - 1.0 EU/dL    Nitrites NEGATIVE  NEG      Leukocyte Esterase NEGATIVE  NEG      WBC PENDING /hpf    RBC PENDING /hpf    Epithelial cells PENDING /lpf    Bacteria PENDING /hpf    UA:UC IF INDICATED PENDING    BILIRUBIN, CONFIRM    Collection Time: 03/14/18  2:27 PM   Result Value Ref Range    Bilirubin UA, confirm POSITIVE (A) NEG         Radiologic Studies -   CT Results  (Last 48 hours)               03/14/18 1346  CT HEAD WO CONT Final result    Impression:  IMPRESSION: No acute abnormality               Narrative:  EXAM:  CT HEAD WO CONT       INDICATION:   Confusion/delirium, altered LOC, unexplained       COMPARISON: None. CONTRAST:  None. TECHNIQUE: Unenhanced CT of the head was performed using 5 mm images.  Brain and   bone windows were generated. CT dose reduction was achieved through use of a   standardized protocol tailored for this examination and automatic exposure   control for dose modulation. FINDINGS:   The ventricles and sulci are normal in size, shape and configuration and   midline. There is no significant white matter disease. There is no intracranial   hemorrhage, extra-axial collection, mass, mass effect or midline shift. The   basilar cisterns are open. No acute infarct is identified. The bone windows   demonstrate no abnormalities. The visualized portions of the paranasal sinuses   and mastoid air cells are clear. CXR Results  (Last 48 hours)               03/14/18 1413  XR CHEST PORT Final result    Impression:  IMPRESSION:    1. No evidence of an acute cardiopulmonary process. 2.  Unchanged mild interstitial prominence which can be seen with mild   interstitial edema. Narrative:  EXAM:  XR CHEST PORT       INDICATION: Chest Pain       COMPARISON: Chest x-ray 11/11/2016. TECHNIQUE: Single frontal view of the chest.       FINDINGS: No lobar consolidation, pleural effusion, or pneumothorax. Unchanged   mild interstitial prominence. Normal cardiomediastinal silhouette. No acute or   aggressive osseous lesion. Medical Decision Making   I am the first provider for this patient. I reviewed the vital signs, available nursing notes, past medical history, past surgical history, family history and social history. Vital Signs-Reviewed the patient's vital signs.   Patient Vitals for the past 12 hrs:   Temp Pulse Resp BP SpO2   03/14/18 1400 - - - 125/65 92 %   03/14/18 1300 - - - 120/67 91 %   03/14/18 1259 98.8 °F (37.1 °C) 81 18 120/67 (!) 86 %     Pulse Oximetry Analysis - 89% on RA    Cardiac Monitor:   Rate: 81 bpm  Rhythm: Normal Sinus Rhythm     Records Reviewed: Nursing Notes and Old Medical Records    Provider Notes (Medical Decision Making):   Patient presenting with altered mental status. DDx: medication toxicity, infection, anemia, electrolyte/metabolic anomoly, hypercapnea, stroke/bleed/mass, dehydration, illicit drug intoxication, hepatic encephalopathy. Will obtain labwork, UA, EKG and imaging. ED Course:   Initial assessment performed. The patients presenting problems have been discussed, and they are in agreement with the care plan formulated and outlined with them. I have encouraged them to ask questions as they arise throughout their visit. CONSULT NOTE:   2:52 PM  Galo Montes M.D spoke with Dr. Jane Queen,   Specialty: Hospitalist  Discussed pt's hx, disposition, and available diagnostic and imaging results. Reviewed care plans. Consultant will evaluate pt for admission. Written by Jessy uKhn, ED Scribe, as dictated by Galo Montes M.D. Critical Care Time:   0    Disposition:  PLAN:  1. Admit    ADMIT NOTE:  2:47 PM  Patient is being admitted to the hospital by Dr. Jane Queen. The results of their tests and reasons for their admission have been discussed with them and/or available family. They convey agreement and understanding for the need to be admitted and for their admission diagnosis. Consultation has been made with the inpatient physician specialist for hospitalization. Diagnosis     Clinical Impression:   1. Acute respiratory failure with hypoxia ([address]    2. Hepatic encephalopathy (HCC)    3. Edema, unspecified type    4. Other cirrhosis of liver ([address]    5. Hypokalemia        Attestations: This note is prepared by Jessy Kuhn, acting as Scribe for Galo Montes M.D. Galo Montes M.D: The scribe's documentation has been prepared under my direction and personally reviewed by me in its entirety. I confirm that the note above accurately reflects all work, treatment, procedures, and medical decision making performed by me.

## 2018-03-14 NOTE — PROGRESS NOTES
Pharmacy Clarification of Prior to Admission Medication Regimen     The patient was interviewed regarding clarification of the prior to admission medication regimen. Wife was present in room and obtained permission from patient to discuss drug regimen with visitor(s) present. Patient was questioned regarding use of any other inhalers, topical products, over the counter medications, herbal medications, vitamin products or ophthalmic/nasal/otic medication use. Information Obtained From: Patient, RX Query    Pertinent Pharmacy Findings: NONE    PTA medication list was corrected to the following:     Prior to Admission Medications   Prescriptions Last Dose Informant Patient Reported? Taking? DM/P-EPHED/ACETAMINOPH/DOXYLAM (NYQUIL PO) 3/13/2018 at 1700 Self Yes Yes   Sig: Take 20 mL by mouth nightly as needed (congestion). Morphine (HUYEN) 60 mg ER capsule 3/14/2018 at 0400 Self Yes Yes   Sig: Take 30-60 mg by mouth two (2) times daily as needed for Pain. Omeprazole delayed release (PRILOSEC D/R) 20 mg tablet 3/13/2018 at Unknown time Self No Yes   Sig: Take 1 Tab by mouth daily. VENTOLIN HFA 90 mcg/actuation inhaler 3/14/2018 at Unknown time Self Yes Yes   Sig: Take 2-3 Puffs by inhalation every four (4) hours as needed for Wheezing or Shortness of Breath. diazePAM (VALIUM) 10 mg tablet 3/13/2018 at Unknown time Self No Yes   Sig: Take 1 Tab by mouth two (2) times daily as needed. Max Daily Amount: 20 mg.   hydroCHLOROthiazide (HYDRODIURIL) 25 mg tablet 3/14/2018 at Unknown time Self No Yes   Sig: TAKE ONE TABLET BY MOUTH DAILY   oxyCODONE-acetaminophen (PERCOCET)  mg per tablet 3/14/2018 at 0900 Self Yes Yes   Sig: Take 1 Tab by mouth every twelve (12) hours as needed for Pain. predniSONE (DELTASONE) 10 mg tablet 3/12/2018 at Unknown time Self Yes Yes   Sig: Take 10 mg by mouth daily as needed for Pain.       Facility-Administered Medications: None          Thank you,  Jerman Shows, CPhT  Medication History Pharmacy Technician

## 2018-03-14 NOTE — IP AVS SNAPSHOT
[address] [facility] 
[phone] Patient: Grisel Lopez. MRN: [id number] [facility]:[birth year] A check quinton indicates which time of day the medication should be taken. My Medications START taking these medications Instructions Each Dose to Equal  
 Morning Noon Evening Bedtime  
 fluticasone-vilanterol 100-25 mcg/dose inhaler Commonly known as:  BREO ELLIPTA Your last dose was: Your next dose is: Take 1 Puff by inhalation daily. 1 Puff  
    
   
   
   
  
 furosemide 40 mg tablet Commonly known as:  LASIX Your last dose was: Your next dose is: Take 40 mg po daily  
     
   
   
   
  
 lactulose 10 gram/15 mL solution Commonly known as:  Evelyn Joseph Your last dose was: Your next dose is: Take 45 mL by mouth daily for 30 days. 30 g  
    
   
   
   
  
 levoFLOXacin 750 mg tablet Commonly known as:  Jayro Michael Your last dose was: Your next dose is: Take 1 Tab by mouth every twenty-four (24) hours. 750 mg  
    
   
   
   
  
 potassium chloride 20 mEq tablet Commonly known as:  K-DUR, KLOR-CON Your last dose was: Your next dose is: Take 1 Tab by mouth two (2) times a day for 30 days. 20 mEq CHANGE how you take these medications Instructions Each Dose to Equal  
 Morning Noon Evening Bedtime Morphine 60 mg ER capsule Commonly known as:  HUYEN What changed:  Another medication with the same name was removed. Continue taking this medication, and follow the directions you see here. Your last dose was: Your next dose is: Take 30-60 mg by mouth two (2) times daily as needed for Pain. 30-60 mg PERCOCET  mg per tablet Generic drug:  oxyCODONE-acetaminophen What changed:  Another medication with the same name was removed. Continue taking this medication, and follow the directions you see here. Your last dose was: Your next dose is: Take 1 Tab by mouth every twelve (12) hours as needed for Pain. 1 Tab * predniSONE 10 mg tablet Commonly known as:  Ophelia Nelson What changed:  Another medication with the same name was changed. Make sure you understand how and when to take each. Your last dose was: Your next dose is: Take 10 mg by mouth daily as needed for Pain. 10 mg  
    
   
   
   
  
 * predniSONE 10 mg tablet Commonly known as:  Ophelia [facility] What changed:   
- how much to take 
- how to take this - when to take this 
- additional instructions Your last dose was: Your next dose is: Take  2 Tab daily x 3 days then 1 Tab daily x 3 days * Notice: This list has 2 medication(s) that are the same as other medications prescribed for you. Read the directions carefully, and ask your doctor or other care provider to review them with you. CONTINUE taking these medications Instructions Each Dose to Equal  
 Morning Noon Evening Bedtime  
 diazePAM 10 mg tablet Commonly known as:  VALIUM Your last dose was: Your next dose is: Take 1 Tab by mouth two (2) times daily as needed. Max Daily Amount: 20 mg.  
 10 mg Omeprazole delayed release 20 mg tablet Commonly known as:  PRILOSEC D/R Your last dose was: Your next dose is: Take 1 Tab by mouth daily. 20 mg VENTOLIN HFA 90 mcg/actuation inhaler Generic drug:  albuterol Your last dose was: Your next dose is: Take 2-3 Puffs by inhalation every four (4) hours as needed for Wheezing or Shortness of Breath. 2-3 Puff STOP taking these medications   
 hydroCHLOROthiazide 25 mg tablet Commonly known as:  HYDRODIURIL  
   
  
 NYQUIL PO Where to Get Your Medications Information on where to get these meds will be given to you by the nurse or doctor. ! Ask your nurse or doctor about these medications  
  fluticasone-vilanterol 100-25 mcg/dose inhaler  
 furosemide 40 mg tablet  
 lactulose 10 gram/15 mL solution  
 levoFLOXacin 750 mg tablet  
 potassium chloride 20 mEq tablet  
 predniSONE 10 mg tablet VENTOLIN HFA 90 mcg/actuation inhaler

## 2018-03-14 NOTE — IP AVS SNAPSHOT
[address] [facility] 
[phone] Patient: Efrain Ortiz. MRN: [id number] OCB:[id number] About your hospitalization You were admitted on:  March 14, 2018 You last received care in the:  [address] You were discharged on:  March 18, 2018 Why you were hospitalized Your primary diagnosis was:  Not on File Your diagnoses also included:  Respiratory Failure With Hypoxia (Hcc) Follow-up Information Follow up With Details Comments Contact Info Rainer Lloyd MD In 1 week  1350 [facility] 
[phone] Rafaela Townsend MD  as scheduled  [address] 
[place] [address] [place] [address] 
[phone] Pao Hays MD In 2 weeks 2-3 weeks  200 [facility] [address] [place] [address] 
[phone] Verenice Olmedo MD  lung doctor as needed  5724 [facility] [address] [facility] 
[phone] [employer]  oxygen at 4 liters nc 1800 [facility] [address] [address] [facility] [address] 
[phone] Your Scheduled Appointments Wednesday April 11, 2018  8:40 AM EDT Follow Up with Yinka Wilcox MD  
763 [facility] [place] [address] [place] [address]  
[phone] Discharge Orders None A check quinton indicates which time of day the medication should be taken. My Medications START taking these medications Instructions Each Dose to Equal  
 Morning Noon Evening Bedtime  
 fluticasone-vilanterol 100-25 mcg/dose inhaler Commonly known as:  BREO ELLIPTA Your last dose was: Your next dose is: Take 1 Puff by inhalation daily. 1 Puff  
    
   
   
   
  
 furosemide 40 mg tablet Commonly known as:  LASIX Your last dose was: Your next dose is: Take 40 mg po daily  
     
   
   
   
  
 lactulose 10 gram/15 mL solution Commonly known as:  Georgiana Brianmoi Your last dose was: Your next dose is: Take 45 mL by mouth daily for 30 days. 30 g  
    
   
   
   
  
 levoFLOXacin 750 mg tablet Commonly known as:  Floyd Mahajan Your last dose was: Your next dose is: Take 1 Tab by mouth every twenty-four (24) hours. 750 mg  
    
   
   
   
  
 potassium chloride 20 mEq tablet Commonly known as:  K-DUR, KLOR-CON Your last dose was: Your next dose is: Take 1 Tab by mouth two (2) times a day for 30 days. 20 mEq CHANGE how you take these medications Instructions Each Dose to Equal  
 Morning Noon Evening Bedtime Morphine 60 mg ER capsule Commonly known as:  HUYEN What changed:  Another medication with the same name was removed. Continue taking this medication, and follow the directions you see here. Your last dose was: Your next dose is: Take 30-60 mg by mouth two (2) times daily as needed for Pain. 30-60 mg PERCOCET  mg per tablet Generic drug:  oxyCODONE-acetaminophen What changed:  Another medication with the same name was removed. Continue taking this medication, and follow the directions you see here. Your last dose was: Your next dose is: Take 1 Tab by mouth every twelve (12) hours as needed for Pain. 1 Tab * predniSONE 10 mg tablet Commonly known as:  Juan Anaya What changed:  Another medication with the same name was changed. Make sure you understand how and when to take each. Your last dose was: Your next dose is: Take 10 mg by mouth daily as needed for Pain. 10 mg  
    
   
   
   
  
 * predniSONE 10 mg tablet Commonly known as:  Juan Anaya What changed:   
- how much to take - how to take this - when to take this 
- additional instructions Your last dose was: Your next dose is: Take  2 Tab daily x 3 days then 1 Tab daily x 3 days * Notice: This list has 2 medication(s) that are the same as other medications prescribed for you. Read the directions carefully, and ask your doctor or other care provider to review them with you. CONTINUE taking these medications Instructions Each Dose to Equal  
 Morning Noon Evening Bedtime  
 diazePAM 10 mg tablet Commonly known as:  VALIUM Your last dose was: Your next dose is: Take 1 Tab by mouth two (2) times daily as needed. Max Daily Amount: 20 mg.  
 10 mg Omeprazole delayed release 20 mg tablet Commonly known as:  PRILOSEC D/R Your last dose was: Your next dose is: Take 1 Tab by mouth daily. 20 mg VENTOLIN HFA 90 mcg/actuation inhaler Generic drug:  albuterol Your last dose was: Your next dose is: Take 2-3 Puffs by inhalation every four (4) hours as needed for Wheezing or Shortness of Breath. 2-3 Puff STOP taking these medications   
 hydroCHLOROthiazide 25 mg tablet Commonly known as:  HYDRODIURIL  
   
  
 NYQUIL PO Where to Get Your Medications Information on where to get these meds will be given to you by the nurse or doctor. ! Ask your nurse or doctor about these medications  
  fluticasone-vilanterol 100-25 mcg/dose inhaler  
 furosemide 40 mg tablet  
 lactulose 10 gram/15 mL solution  
 levoFLOXacin 750 mg tablet  
 potassium chloride 20 mEq tablet  
 predniSONE 10 mg tablet VENTOLIN HFA 90 mcg/actuation inhaler Discharge Instructions Patient Discharge Instructions Jerman Foster / [id number] : 1960 Admitted 3/14/2018 Discharged: 3/17/2018 DISCHARGE DIAGNOSIS:  
 
COPD / acute bronchitis - you will need lung test done ( discuss with PCP) Lung nodule right side - you will need chest CT scan repeated in 6 months ( discuss with PCP) 
   
Liver cirrhosis  - follow with Dr Maria Alejandra Zheng  
                            - new medications: lasix ( fluid pill) ; lactulose to keep ammonia down  
                            - take potassium daily when taking lasix  
   
Traumatic brain injury - follow with Dr Andreina Lord - MRI of the brain was abnormal 2015 , you will need new MRA test done outpatient ( discuss with Dr Andreina Lord)  
 
 
  Take Home Medications You are being discharge on antibiotic Levaquin for treatment of bronchitis What you should know about antibiotics: Antibiotics are medicines that help people fight infections caused by bacteria. They work by killing bacteria that are in the body. Antibiotics can cause side effects, such as nausea, vomiting. Nausea is a common side effect of many antibiotics. It is not an allergic reaction. If you are a woman and you get a yeast infection after taking an antibiotic, that does not mean you are allergic to it. Yeast infections are a common side effect of antibiotics. One of the most important side effect to watch while taking antibiotic or after you just finished taking it is diarrhea. This type of diarrhea may be caused by an infection with bacteria called C. difficile. C. difficile normally lives in the intestines. When people are on antibiotics, the C. difficile in their intestines can overgrow and cause infection. If you develop any side effect especially diarrhea while taking antibiotics it is very important to contact your doctor. We recommend taking probiotics while taking antibiotics. Probiotics can be bought over the counter. Allergies to antibiotics are common.  You can develop an allergy to an antibiotic, even if you have not had a problem with it before. Symptoms of antibiotic allergy can be mild and include a flat rash and itching. They can also be more serious and include:  
   -----  Hives - are raised, red patches of skin that are usually very itchy  
   -----  Lip or tongue swelling 
   ------ Trouble swallowing or breathing Serious allergy symptoms can start right after you start taking an antibiotic if you are very sensitive. Less serious symptoms, on the other hand, often start a day or more later. If you think you are allergic to antibiotics tell your doctor. General drug facts If you have a very bad allergy, wear an allergy ID at all times. It is important that you take the medication exactly as they are prescribed. Keep your medication in the bottles provided by the pharmacist. 
Keep a list of all your drugs (prescription, natural products, vitamins, OTC) with you. Give this list to your doctor. Do not take other medications without consulting your doctor. Do not share your drugs with others and do not take anyone else's drugs. Keep all drugs out of the reach of children and pets. Most drugs may be thrown away in household trash after mixing with coffee grounds or dara litter and sealing in a plastic bag. Keep a list Call your doctor for help with any side effects. If in the [place] you may also call the FDA at 4-808-FDA-2570 Talk with the doctor before starting any new drug, including OTC, natural products, or vitamins. What to do at [facility] 1. Recommended diet: 2 g sodium 2. Recommended activity: Activity as tolerated 3. If you experience any of the following symptoms then please call your primary care physician or return to the emergency room if you cannot get hold of your doctor: fever/chills/confusion/worsening dyspnea 4. Lab work: with PCP in 1 week to follow potassium and kidney function since on new fluid pill 5. Stop smocking 6. Wear oxygen 4 L continuous 7. Bring these papers with you to your follow up appointments. The papers will help your doctors be sure to continue the care plan from the hospital. 
 
 
Follow-up with:  
PCP: Gabriela Phoenix MD 
Follow-up Information Follow up With Details Comments Contact Info Gabriela Phoenix MD In 1 week  1350 [facility] 
[phone] Keisha Small MD  as scheduled  [address] 
[place] [address] [place] [address] 
[phone] Wesley Hood MD In 2 weeks 2-3 weeks  200 [facility] [address] [place] [address] 
[phone] Please call for your own appointment Information obtained by : 
I understand that if any problems occur once I am at home I am to contact my physician. I understand and acknowledge receipt of the instructions indicated above. Physician's or R.N.'s Signature                                                                  Date/Time Patient or Representative Signature                                                          Date/Time Introducing [facility]! Ainsley Martin introduces [employer] patient portal. Now you can access parts of your medical record, email your doctor's office, and request medication refills online. 1. In your internet browser, go to [email or web address] 2. Click on the First Time User? Click Here link in the Sign In box. You will see the New Member Sign Up page. 3. Enter your [employer] Access Code exactly as it appears below. You will not need to use this code after youve completed the sign-up process.  If you do not sign up before the expiration date, you must request a new code. 
 
· [employer] Access Code: [email or web address] Expires: 5/16/2018 11:40 AM 
 
4. Enter the last four digits of your Social Security Number (xxxx) and Date of Birth (mm/dd/yyyy) as indicated and click Submit. You will be taken to the next sign-up page. 5. Create a [employer] ID. This will be your [employer] login ID and cannot be changed, so think of one that is secure and easy to remember. 6. Create a [employer] password. You can change your password at any time. 7. Enter your Password Reset Question and Answer. This can be used at a later time if you forget your password. 8. Enter your e-mail address. You will receive e-mail notification when new information is available in [address] 9. Click Sign Up. You can now view and download portions of your medical record. 10. Click the Download Summary menu link to download a portable copy of your medical information. If you have questions, please visit the Frequently Asked Questions section of the [employer] website. Remember, [employer] is NOT to be used for urgent needs. For medical emergencies, dial 911. Now available from your iPhone and Android! Unresulted Labs-Please follow up with your PCP about these lab tests Order Current Status CULTURE, BLOOD, PAIRED Preliminary result Providers Seen During Your Hospitalization Provider Specialty Primary office phone Praveena Vazquez MD Emergency Medicine [phone] Vadim Tan MD Internal Medicine [phone] Carolyne Carlin DO Internal Medicine [phone] Your Primary Care Physician (PCP) Primary Care Physician Office Phone Office Fax Lewis Leventhal [phone] You are allergic to the following Allergen Reactions Pcn (Penicillins) Swelling Cymbalta (Duloxetine) Other (comments) Blood in urine Recent Documentation Weight BMI Smoking Status  
  
  
  
 110.2 kg 34.86 kg/m2 Current Every Day Smoker Emergency Contacts Name Discharge Info Relation Home Work Mobile Ning Cagle DISCHARGE CAREGIVER [3] Friend [5] [phone] Ning Cagle  Other Relative [6] [phone] Patient Belongings The following personal items are in your possession at time of discharge: 
  Dental Appliances: At home (dentures)  Visual Aid: Glasses, With patient      Home Medications: None   Jewelry: None  Clothing: At bedside, Footwear, Pants, Shirt, Socks, Undergarments    Other Valuables: At bedside, Cell Phone, [employer] Items Sent to Safe: none Please provide this summary of care documentation to your next provider. Signatures-by signing, you are acknowledging that this After Visit Summary has been reviewed with you and you have received a copy. Patient Signature:  ____________________________________________________________ Date:  ____________________________________________________________  
  
Yelitza Schmitz Provider Signature:  ____________________________________________________________ Date:  ____________________________________________________________
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
## 2018-03-15 ENCOUNTER — APPOINTMENT (OUTPATIENT)
Dept: ULTRASOUND IMAGING | Age: 58
DRG: 133 | End: 2018-03-15
Attending: HOSPITALIST
Payer: MEDICAID

## 2018-03-15 ENCOUNTER — APPOINTMENT (OUTPATIENT)
Dept: CT IMAGING | Age: 58
DRG: 133 | End: 2018-03-15
Attending: SPECIALIST
Payer: MEDICAID

## 2018-03-15 LAB
ANION GAP SERPL CALC-SCNC: 7 MMOL/L (ref 5–15)
BUN SERPL-MCNC: 15 MG/DL (ref 6–20)
BUN/CREAT SERPL: 16 (ref 12–20)
CALCIUM SERPL-MCNC: 8.1 MG/DL (ref 8.5–10.1)
CHLORIDE SERPL-SCNC: 95 MMOL/L (ref 97–108)
CO2 SERPL-SCNC: 32 MMOL/L (ref 21–32)
CREAT SERPL-MCNC: 0.94 MG/DL (ref 0.7–1.3)
GLUCOSE SERPL-MCNC: 149 MG/DL (ref 65–100)
MAGNESIUM SERPL-MCNC: 1.9 MG/DL (ref 1.6–2.4)
PHOSPHATE SERPL-MCNC: 0.7 MG/DL (ref 2.6–4.7)
POTASSIUM SERPL-SCNC: 2.8 MMOL/L (ref 3.5–5.1)
SODIUM SERPL-SCNC: 134 MMOL/L (ref 136–145)

## 2018-03-15 PROCEDURE — 97161 PT EVAL LOW COMPLEX 20 MIN: CPT

## 2018-03-15 PROCEDURE — 94640 AIRWAY INHALATION TREATMENT: CPT

## 2018-03-15 PROCEDURE — G8978 MOBILITY CURRENT STATUS: HCPCS

## 2018-03-15 PROCEDURE — 74011250637 HC RX REV CODE- 250/637: Performed by: INTERNAL MEDICINE

## 2018-03-15 PROCEDURE — 49083 ABD PARACENTESIS W/IMAGING: CPT

## 2018-03-15 PROCEDURE — 87040 BLOOD CULTURE FOR BACTERIA: CPT | Performed by: HOSPITALIST

## 2018-03-15 PROCEDURE — 83735 ASSAY OF MAGNESIUM: CPT | Performed by: HOSPITALIST

## 2018-03-15 PROCEDURE — 74011000250 HC RX REV CODE- 250: Performed by: INTERNAL MEDICINE

## 2018-03-15 PROCEDURE — 80048 BASIC METABOLIC PNL TOTAL CA: CPT | Performed by: HOSPITALIST

## 2018-03-15 PROCEDURE — 74011250637 HC RX REV CODE- 250/637: Performed by: HOSPITALIST

## 2018-03-15 PROCEDURE — 65270000015 HC RM PRIVATE ONCOLOGY

## 2018-03-15 PROCEDURE — 74011636637 HC RX REV CODE- 636/637: Performed by: HOSPITALIST

## 2018-03-15 PROCEDURE — G8979 MOBILITY GOAL STATUS: HCPCS

## 2018-03-15 PROCEDURE — 74011000250 HC RX REV CODE- 250: Performed by: HOSPITALIST

## 2018-03-15 PROCEDURE — 77010033678 HC OXYGEN DAILY

## 2018-03-15 PROCEDURE — 74011250636 HC RX REV CODE- 250/636: Performed by: HOSPITALIST

## 2018-03-15 PROCEDURE — 84100 ASSAY OF PHOSPHORUS: CPT | Performed by: HOSPITALIST

## 2018-03-15 PROCEDURE — 77030029684 HC NEB SM VOL KT MONA -A

## 2018-03-15 PROCEDURE — 36415 COLL VENOUS BLD VENIPUNCTURE: CPT | Performed by: HOSPITALIST

## 2018-03-15 PROCEDURE — 97530 THERAPEUTIC ACTIVITIES: CPT

## 2018-03-15 RX ORDER — OXYCODONE HYDROCHLORIDE 5 MG/1
10 TABLET ORAL
Status: DISCONTINUED | OUTPATIENT
Start: 2018-03-15 | End: 2018-03-18 | Stop reason: HOSPADM

## 2018-03-15 RX ORDER — SODIUM CHLORIDE 0.9 % (FLUSH) 0.9 %
10 SYRINGE (ML) INJECTION
Status: ACTIVE | OUTPATIENT
Start: 2018-03-15 | End: 2018-03-16

## 2018-03-15 RX ORDER — IPRATROPIUM BROMIDE AND ALBUTEROL SULFATE 2.5; .5 MG/3ML; MG/3ML
3 SOLUTION RESPIRATORY (INHALATION)
Status: DISCONTINUED | OUTPATIENT
Start: 2018-03-15 | End: 2018-03-17

## 2018-03-15 RX ORDER — IBUPROFEN 400 MG/1
400 TABLET ORAL
Status: DISCONTINUED | OUTPATIENT
Start: 2018-03-15 | End: 2018-03-18 | Stop reason: HOSPADM

## 2018-03-15 RX ORDER — POTASSIUM CHLORIDE 14.9 MG/ML
10 INJECTION INTRAVENOUS
Status: DISCONTINUED | OUTPATIENT
Start: 2018-03-15 | End: 2018-03-15

## 2018-03-15 RX ORDER — SPIRONOLACTONE 25 MG/1
50 TABLET ORAL DAILY
Status: DISCONTINUED | OUTPATIENT
Start: 2018-03-15 | End: 2018-03-16

## 2018-03-15 RX ORDER — NALOXONE HYDROCHLORIDE 0.4 MG/ML
0.1 INJECTION, SOLUTION INTRAMUSCULAR; INTRAVENOUS; SUBCUTANEOUS
Status: DISCONTINUED | OUTPATIENT
Start: 2018-03-15 | End: 2018-03-18 | Stop reason: HOSPADM

## 2018-03-15 RX ORDER — ALBUMIN HUMAN 250 G/1000ML
25 SOLUTION INTRAVENOUS ONCE
Status: DISCONTINUED | OUTPATIENT
Start: 2018-03-15 | End: 2018-03-15

## 2018-03-15 RX ORDER — FUROSEMIDE 10 MG/ML
40 INJECTION INTRAMUSCULAR; INTRAVENOUS DAILY
Status: DISCONTINUED | OUTPATIENT
Start: 2018-03-15 | End: 2018-03-18

## 2018-03-15 RX ORDER — IPRATROPIUM BROMIDE AND ALBUTEROL SULFATE 2.5; .5 MG/3ML; MG/3ML
3 SOLUTION RESPIRATORY (INHALATION)
Status: DISCONTINUED | OUTPATIENT
Start: 2018-03-15 | End: 2018-03-15

## 2018-03-15 RX ORDER — LEVOFLOXACIN 750 MG/1
750 TABLET ORAL EVERY 24 HOURS
Status: DISCONTINUED | OUTPATIENT
Start: 2018-03-15 | End: 2018-03-18 | Stop reason: HOSPADM

## 2018-03-15 RX ORDER — SPIRONOLACTONE 25 MG/1
50 TABLET ORAL DAILY
Status: DISCONTINUED | OUTPATIENT
Start: 2018-03-15 | End: 2018-03-15

## 2018-03-15 RX ORDER — FLUTICASONE FUROATE AND VILANTEROL 100; 25 UG/1; UG/1
1 POWDER RESPIRATORY (INHALATION) DAILY
Status: DISCONTINUED | OUTPATIENT
Start: 2018-03-15 | End: 2018-03-18 | Stop reason: HOSPADM

## 2018-03-15 RX ORDER — BARIUM SULFATE 20 MG/ML
900 SUSPENSION ORAL
Status: ACTIVE | OUTPATIENT
Start: 2018-03-15 | End: 2018-03-16

## 2018-03-15 RX ORDER — PREDNISONE 20 MG/1
40 TABLET ORAL
Status: DISCONTINUED | OUTPATIENT
Start: 2018-03-15 | End: 2018-03-17

## 2018-03-15 RX ORDER — SODIUM CHLORIDE 9 MG/ML
50 INJECTION, SOLUTION INTRAVENOUS
Status: DISPENSED | OUTPATIENT
Start: 2018-03-15 | End: 2018-03-16

## 2018-03-15 RX ORDER — IBUPROFEN 200 MG
1 TABLET ORAL DAILY
Status: DISCONTINUED | OUTPATIENT
Start: 2018-03-15 | End: 2018-03-18 | Stop reason: HOSPADM

## 2018-03-15 RX ADMIN — IPRATROPIUM BROMIDE AND ALBUTEROL SULFATE 3 ML: .5; 3 SOLUTION RESPIRATORY (INHALATION) at 15:19

## 2018-03-15 RX ADMIN — ACETAMINOPHEN 650 MG: 325 TABLET ORAL at 18:06

## 2018-03-15 RX ADMIN — Medication 10 ML: at 03:51

## 2018-03-15 RX ADMIN — Medication 5 ML: at 22:30

## 2018-03-15 RX ADMIN — FUROSEMIDE 40 MG: 10 INJECTION, SOLUTION INTRAMUSCULAR; INTRAVENOUS at 12:01

## 2018-03-15 RX ADMIN — POTASSIUM CHLORIDE 40 MEQ: 20 TABLET, EXTENDED RELEASE ORAL at 08:59

## 2018-03-15 RX ADMIN — OXYCODONE HYDROCHLORIDE 10 MG: 5 TABLET ORAL at 12:02

## 2018-03-15 RX ADMIN — FLUTICASONE FUROATE AND VILANTEROL TRIFENATATE 1 PUFF: 100; 25 POWDER RESPIRATORY (INHALATION) at 12:03

## 2018-03-15 RX ADMIN — LACTULOSE 30 G: 10 SOLUTION ORAL at 16:00

## 2018-03-15 RX ADMIN — OXYCODONE HYDROCHLORIDE 10 MG: 5 TABLET ORAL at 18:12

## 2018-03-15 RX ADMIN — IPRATROPIUM BROMIDE AND ALBUTEROL SULFATE 3 ML: .5; 3 SOLUTION RESPIRATORY (INHALATION) at 00:21

## 2018-03-15 RX ADMIN — LACTULOSE 30 G: 10 SOLUTION ORAL at 22:29

## 2018-03-15 RX ADMIN — IPRATROPIUM BROMIDE AND ALBUTEROL SULFATE 3 ML: .5; 3 SOLUTION RESPIRATORY (INHALATION) at 21:30

## 2018-03-15 RX ADMIN — PREDNISONE 40 MG: 20 TABLET ORAL at 12:01

## 2018-03-15 RX ADMIN — POTASSIUM PHOSPHATE, MONOBASIC AND POTASSIUM PHOSPHATE, DIBASIC: 224; 236 INJECTION, SOLUTION INTRAVENOUS at 22:29

## 2018-03-15 RX ADMIN — Medication 5 ML: at 14:00

## 2018-03-15 RX ADMIN — IPRATROPIUM BROMIDE AND ALBUTEROL SULFATE 3 ML: .5; 3 SOLUTION RESPIRATORY (INHALATION) at 11:34

## 2018-03-15 RX ADMIN — IPRATROPIUM BROMIDE AND ALBUTEROL SULFATE 3 ML: .5; 3 SOLUTION RESPIRATORY (INHALATION) at 07:47

## 2018-03-15 NOTE — PROGRESS NOTES
0700: Report received from MIGUEL [address], ED SUMMARY, MAR, RECENT RESULTS were discussed. Parish Gonsales RN    7290: Paged Dr. Duran Neither in regards to no fluid being able to be drawn off in paracentesis. 1500: Notified Dr. Duran Neither of no fluid removed during paracentesis. New orders to hold medications and notify DR. LOGAN. Page out to DR. LOGAN to notify

## 2018-03-15 NOTE — PROGRESS NOTES
Problem: Mobility Impaired (Adult and Pediatric)  Goal: *Acute Goals and Plan of Care (Insert Text)  Physical Therapy Goals  Initiated 3/15/2018  1. Patient will move from supine to sit and sit to supine , scoot up and down and roll side to side in bed with independence within 7 day(s). 2.  Patient will transfer from bed to chair and chair to bed with modified independence using the least restrictive device within 7 day(s). 3.  Patient will perform sit to stand with independence within 7 day(s). 4.  Patient will ambulate with independence for 656 feet with the least restrictive device within 7 day(s). 5.  Patient will ascend/descend 12 stairs with single handrail(s) with independence within 7 day(s). physical Therapy EVALUATION  Patient: Zetta Jeans. (58 y.o. male)  Date: 3/15/2018  Primary Diagnosis: Respiratory failure with hypoxia [facility])        Precautions:  Fall    ASSESSMENT :  Based on the objective data described below, the patient presents with significant fall history and impaired balance, decreased safety awareness and activity tolerance impacting functional mobility. Patient I PTA with use of 1-2 SPC depending on chronic pain ('everywhere') levels with frequent use of + fall risk medications including ms-contin, oxycodone, and diazepam. 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 He is not on home O2 and smokes 1+ PPD. Received in supine, on 3L NCO2, with supportive yet anxious and hypervigilant spouse present. Patient had just received pain medication from RN noting significant abdominal pain (visible ascites). Patient very loquacious, appearing confused (spouse confirmed this) but oriented x4 to questioning. Noted drop in SPO2 to 87% with speech which was improved on 4L. He then mobilized with S, using no device, with extended step lengths B and minimal ankle movement which he reports at baseline. With gait, patient stable.  Upon return to room and functional testing, patient unable to perform romberg with narrow JOE nor EC with significant sway then LOB. Patient unable to perform >1 sit<>stand 2/2 knee pain thus unable to complete 5x sit<>stand test. His TUG is below fall risk norm, but above age-matched scoring. Patient's eyes with dysconjugate gaze, reporting since childhood, with increased saccades during testing. Patient's SPO2 stable on 4L NCO2 during session. While patient may be near baseline, his significant fall history and use of psychotropic medication alongside history of TBI poses concerns ahead for maintenance of current function. He would do well with further testing (Flako BARTON) and potentially follow up in the OP PT setting to address high level stability deficits. This may also help with chronic pain levels given benefit of routine exercise for such. Patient will benefit from skilled intervention to address the above impairments. Patients rehabilitation potential is considered to be Fair  Factors which may influence rehabilitation potential include:   []         None noted  [x]         Mental ability/status  [x]         Medical condition  []         Home/family situation and support systems  [x]         Safety awareness  [x]         Pain tolerance/management  []         Other:      PLAN :  Recommendations and Planned Interventions:  [x]           Bed Mobility Training             []    Neuromuscular Re-Education  [x]           Transfer Training                   []    Orthotic/Prosthetic Training  [x]           Gait Training                         []    Modalities  [x]           Therapeutic Exercises           []    Edema Management/Control  [x]           Therapeutic Activities            [x]    Patient and Family Training/Education  []           Other (comment):    Frequency/Duration: Patient will be followed by physical therapy  3 times a week to address goals.   Discharge Recommendations: Outpatient  Further Equipment Recommendations for Discharge: none     SUBJECTIVE:   Patient stated I like being in this chair for my back. 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    Ill-defined condition     heat stroke years ago    Liver disease     Hep C - \"cured\"    Migraine     Psychiatric disorder     axiety and depression     Past Surgical History:   Procedure Laterality Date    ABDOMEN SURGERY PROC UNLISTED      hernia    COLONOSCOPY N/A 10/19/2016    COL[facility] END[facility]COPY    COLONOSCOPY N/A 4/18/2017    COLONOSCOPY [facility] at [facility] ENDOSCOPY    HX COLONOSCOPY  2016    HX ENDOSCOPY  2016    HX HEENT      mastoid - surgery eardrum perforation    HX OTHER SURGICAL      colonoscopy - Mar 2016 - multiple polyps     Prior Level of Function/Home Situation: Independent community ambulator with use of SPC vs 2 SPC 'depending on the pain that day' while residing with spouse. Both patient and spouse smoke 1+ PPD cigarettes. No home O2 use. Reports 10-12 falls in the past year. Is on disability 2/2 chronic pain, past head injuries (reports multiple blows to the head). Self manages his pillbox to include MS-contin and oxycodone which he reports infrequent use of 2/2 pain 'from toes to my head'. He reports use of diazepam 'every few days or so' to A with sleep. He does not like to take his prednisone nor inhaler and isn't sure why he has them. He reports not being on a transplant list for his liver cirrhosis.    Personal factors and/or comorbidities impacting plan of care:     Home Situation  Home Environment: Private residence  # Steps to Enter:  (ladder; plans to build steps )  One/Two Story Residence: Two story  # of Interior Steps: 12  Interior Rails: Both  Lift Chair Available: No  Living Alone: No  Support Systems: Family member(s), Spouse/Significant Other/Partner  Patient Expects to be Discharged to[de-identified] Private residence  Current DME Used/Available at Home: Cane, straight  Tub or Shower Type: Tub/Shower combination    EXAMINATION/PRESENTATION/DECISION MAKING:   Critical Behavior:  Neurologic State: Alert, Confused  Orientation Level: Oriented X4  Cognition: Impulsive  Safety/Judgement: Decreased awareness of need for assistance  Hearing: Auditory  Auditory Impairment: None    Range Of Motion:  AROM: Within functional limits                       Strength:    Strength: Generally decreased, functional                    Tone & Sensation:                  Sensation: Impaired (ulnar pattern L hand 2/2 history of fall )               Coordination:     Vision:   Diplopia: No  Corrective Lenses: Reading glasses (wears sunglasses with any light 2/2 work history )  Functional Mobility:  Bed Mobility:  Rolling: Supervision  Supine to Sit: Supervision     Scooting: Supervision  Transfers:  Sit to Stand: Supervision  Stand to Sit: Supervision                       Balance:   Sitting: Intact; Without support  Standing: Impaired; Without support  Standing - Static: Good (fair with romberg)  Standing - Dynamic : Good  Ambulation/Gait Training:  Distance (ft): 140 Feet (ft)  Assistive Device: Gait belt  Ambulation - Level of Assistance: Supervision     Gait Description (WDL): Exceptions to WDL  Gait Abnormalities: Decreased step clearance; Altered arm swing        Base of Support: Widened     Speed/Martine: Pace decreased (<100 feet/min)  Step Length: Right lengthened;Left lengthened  Swing Pattern: Right asymmetrical (minimal R ankle movement)               Reports excessive step lengths and R ankle 'stiffness' at baseline   Functional Measure:  Five times sit to stand:    Five Times Sit to Stand: 0 Seconds (unable 2/2 knee pain )         Normative averages:  Clients younger than 61years old  £  10 seconds = Normal   Clients older than 61years old £ 14.2 seconds = Normal   Change of ³ 2.3 seconds shows a significant clinical improvement    Armando GREENE. Reference values for the five repetition sit to stand test: a descriptive metaanalysis of data from elders. Percept Mot Skills 2006; 103(1):215-222. Romberg:    Romberg: Fail (significant sway with eyes closed)         This test assesses the ability of the vestibular apparatus in the inner ear to help maintain standing balance. Test is positive if the patient sways or falls while their eyes are closed. (Reference - Physical Examination & Health Assessment (4th edition) written by Candida Peterson)   Timed up and go:    Timed Get Up And Go Test: 12     Timed Up and Go and G-code impairment scale:  Percentage of Impairment CH    0%   CI    1-19% CJ    20-39% CK    40-59% CL    60-79% CM    80-99% CN     100%   Timed   Score 0-56 10 11-12 13-14 15-16 17-18 19 20       < than 10 seconds=Normal  Greater then 13.5 seconds (in elderly)=Increased fall risk   Fabian JORDAN, Andrew Cruz. Predicting the probability for falls in community dwelling older adults using the Timed Up and Go Test. Phys Ther. 2000;80:896-903. G codes: In compliance with CMSs Claims Based Outcome Reporting, the following G-code set was chosen for this patient based on their primary functional limitation being treated: The outcome measure chosen to determine the severity of the functional limitation was the TUG with a score of 12 SECONDS which was correlated with the impairment scale.     ? Mobility - Walking and Moving Around:     - CURRENT STATUS: CI - 1%-19% impaired, limited or restricted    - GOAL STATUS: CH - 0% impaired, limited or restricted    - D/C STATUS:  ---------------To be determined---------------       Pain:  Pain Scale 1: Numeric (0 - 10)  Pain Intensity 1: 7  Pain Location 1: Back;Head  Pain Orientation 1: Anterior  Pain Description 1: Aching  Pain Intervention(s) 1: Medication (see MAR)  Activity Tolerance:   Required 4L NCO2 at rest and activity     Please refer to the flowsheet for vital signs taken during this treatment. After treatment:   [x]         Patient left in no apparent distress sitting up in chair  []         Patient left in no apparent distress in bed  [x]         Call bell left within reach  [x]         Nursing notified  []         Caregiver present  []         Bed alarm activated    COMMUNICATION/EDUCATION:   The patients plan of care was discussed with: Registered Nurse. [x]         Fall prevention education was provided and the patient/caregiver indicated understanding. [x]         Patient/family have participated as able in goal setting and plan of care. [x]         Patient/family agree to work toward stated goals and plan of care. []         Patient understands intent and goals of therapy, but is neutral about his/her participation. []         Patient is unable to participate in goal setting and plan of care.     Thank you for this referral.  Greta Gomez, PT, DPT, CEEAA      Time Calculation: 49 mins

## 2018-03-15 NOTE — PROGRESS NOTES
Hospitalist Progress Note    NAME: Eli Kilgore. :  1960   MRN:  [id number]       Assessment / Plan:  Acute hypoxic respiratory failure POA in settings of likely COPD exacerbation due to acute bronchitis  -Sx: dyspnea / thick sputum cough/ on 2 L / wheezing   +/- worsening abdominal girth contributing   - start steroids: PO prednisone  -start AB for bronchitis: LVQ ( allergic to PCN)   -aggressive scheduled jet nebs  -start breo  -O2 to keep jenise >90%, wean as tolerated, assess for home O2 on DC  -CTA: no PE/edema/pneumonia   Solitary pulmonary nodule warranting follow-up CT in 6-12 months (7 mm R lung)      Liver cirrhosis stage IV due to hepatitis C ( per pt) with worsening ascites   Thrombocytopenia   -will attempt US guided paracentesis with Dx studies   -start diuretics: lasix +/- spironolactone   -start lactulose with AMS thou ammonia level not that high   -monitor plt closely   -will ask primary GI to help with management   - primary GI: Dr Trang Blake / Dr Guille Verdin PA      Metabolic encephalopathy POA  -fluctuating per wife   - cont O2 to keep sats > 90%  -trial of lactulose   -avoid oversedation     Hypokalemia  -replete as needed     TBI / Migraine HA / chronic pain   -followed with Dr Ros Petersen neurology - who managing his pain meds   -PTA: percocet 10/325 prn + Ms Contin which he is also taking prn --> will continue with oxy alone prn for now  -avoid oversedation  -consulting palliative care to help with pain management      Tobacco abuse, nicotine patch            Code Status: Full  Surrogate Decision Maker: Wife  DVT Prophylaxis: Lovenox ok unless plt < 50K       Body mass index is 38.74 kg/(m^2).   Baseline: lives with wife; ambulating with cane   Recommended Disposition: Home w/Family 2-3 days pending clinical response      Subjective:     Chief Complaint / Reason for Physician Visit: following respiratory failure / copd/ ascites   Per wife MS fluctuating, better but occasionally still talking out of his head   Admits to dyspnea   Admits to worsening abdominal girth/LE edema/ worsening abdominal pain     Discussed with RN events overnight. Review of Systems:  Symptom Y/N Comments  Symptom Y/N Comments   Fever/Chills n   Chest Pain n    Poor Appetite    Edema     Cough y   Abdominal Pain y Chronic but worse    Sputum y Thick,mild,yellow/green  Joint Pain     SOB/PARKS y   Pruritis/Rash     Nausea/vomit n   Tolerating PT/OT     Diarrhea n   Tolerating Diet     Constipation n   Other       Could NOT obtain due to:      Objective:     VITALS:   Last 24hrs VS reviewed since prior progress note. Most recent are:  Patient Vitals for the past 24 hrs:   Temp Pulse Resp BP SpO2   03/15/18 0800 98.2 °F (36.8 °C) 86 18 112/69 100 %   03/15/18 0747 - - - - 91 %   03/15/18 0352 - - 14 - -   03/15/18 0023 - - - - 94 %   03/15/18 0003 98.1 °F (36.7 °C) 84 18 134/71 90 %   03/14/18 2342 - 78 16 - -   03/14/18 1832 99.6 °F (37.6 °C) 88 16 145/85 92 %   03/14/18 1600 - 80 - 149/64 95 %   03/14/18 1530 - - - 147/68 94 %   03/14/18 1500 - - - 136/86 94 %   03/14/18 1400 - - - 125/65 92 %   03/14/18 1300 - - - 120/67 91 %   03/14/18 1259 98.8 °F (37.1 °C) 81 18 120/67 (!) 86 %       Intake/Output Summary (Last 24 hours) at 03/15/18 0952  Last data filed at 03/14/18 2342   Gross per 24 hour   Intake              500 ml   Output              300 ml   Net              200 ml        PHYSICAL EXAM:  General: WD, WN. Drowsy, slow response, cooperative, no acute distress    EENT:  EOMI. Anicteric sclerae. MMM  Resp:  Diminished BS bases bilaterally, + scattered wheezing, no rales. No accessory muscle use  CV:  Regular  rhythm,  ++ edema  GI:  Soft, + distended with ascites, + gen tender.  +Bowel sounds  Neurologic:  Alert and oriented X 3, normal speech, slow response but appropriate, not focal   Psych:   Good insight. Not anxious nor agitated  Skin:  No rashes.   No jaundice    Reviewed most current lab test results and cultures  YES  Reviewed most current radiology test results   YES  Review and summation of old records today    NO  Reviewed patient's current orders and MAR    YES  PMH/SH reviewed - no change compared to H&P  ________________________________________________________________________  Care Plan discussed with:    Comments   Patient y    Family  y Wife bedside    RN y    Care Manager y    Consultant                       y Multidiciplinary team rounds were held today with , nursing, pharmacist and clinical coordinator. Patient's plan of care was discussed; medications were reviewed and discharge planning was addressed. ________________________________________________________________________  Total NON critical care TIME:  50   Minutes    Total CRITICAL CARE TIME Spent:   Minutes non procedure based      Comments   >50% of visit spent in counseling and coordination of care     ________________________________________________________________________  Orlando Carcamo MD     Procedures: see electronic medical records for all procedures/Xrays and details which were not copied into this note but were reviewed prior to creation of Plan. LABS:  I reviewed today's most current labs and imaging studies.   Pertinent labs include:  Recent Labs      03/14/18   1326   WBC  6.1   HGB  15.0   HCT  44.1   PLT  80*     Recent Labs      03/14/18   1326   NA  136   K  2.9*   CL  98   CO2  32   GLU  112*   BUN  12   CREA  0.83   CA  8.0*   ALB  3.7   TBILI  1.2*   SGOT  32   ALT  38       Signed: Orlando Carcamo MD

## 2018-03-15 NOTE — PROGRESS NOTES
Arrived into the xray recovery area for a paracentesis. No fluid to drain off for the paracentesis per U/S. Report called to Nico Scott RN at ext. [phone].

## 2018-03-15 NOTE — PROGRESS NOTES
Oncology Interdisciplinary rounds were held today to discuss patient plan of care and outcomes. The following members were present: Nursing, Case Management, Pharmacy and Dietary.     Actual Length of Stay: 1         Expected Length of Stay: 3 days                Plan for Day        Mobility        Plan for Stay      Plan for Way   Continue plan Up ad jonathan Continue current plan  Monitor lab work Home 3/18

## 2018-03-15 NOTE — CONSULTS
Gastroenterology Consultation Note  Dr. Lynette Enriquez    NAME: Francesca Proctor. : 1960 MRN: [id number]   PCP: Leroy Andrade MD   Hepatologist: Dr. Martín Hurtado  Date/Time:  3/15/2018 12:29 PM  Subjective:   REASON FOR CONSULT:      Chris Soto is a 61 y.o.  male who I was asked to see for cirrhosis. Patient is followed by Dr. Martín Hurtado at [facility] for HCV Cirrhosis s/p successful Harvoni treatment in  with SVR now admitted with confusion with increased abd girth and SOB. He takes HCTZ but not restricting salt in his diet and smokes cigarettes. No ETOH however. He has been noticing worsening SOB along with his increasing abdominal girth. He was seen with his wife present. Also has been more lethargic lately but his O2 sats 89% on RA and he has been taking prn ER Morphine and Percocet for headaches prescribed by his neurologist.  He has never had a paracentesis. + Pain in Upper abdomen but no n/v.  Last EGD was 2016 with Dr. Martín Hurtado: portal gastropathy, no varices; 2 year recall recommended. Last colonoscopy was .     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    Ill-defined condition     heat stroke years ago    Liver disease     Hep C - \"cured\"    Migraine     Psychiatric disorder     axiety and depression      Past Surgical History:   Procedure Laterality Date    ABDOMEN SURGERY PROC UNLISTED      hernia    COLONOSCOPY N/A 10/19/2016    COLONOSCOPY performed by [facility]le Sommer MD at [facility] ENDOSCOPY    COLONOSCOPY N/A 2017    COLONOSCOPY performed by Karlee Sommer MD at [facility] ENDOSCOPY    HX COLONOSCOPY    HX ENDOSCOPY  2016    HX HEENT      mastoid - surgery eardrum perforation    HX OTHER SURGICAL      colonoscopy - Mar 2016 - multiple polyps     Social History   Substance Use Topics    Smoking status: Current Every Day Smoker     Packs/day: 0.50     Years: 40.00    Smokeless tobacco: Never Used      Comment: 4-5 cigarettes per day    Alcohol use No      Family History   Problem Relation Age of Onset    Cancer Mother      lung cancer    Hypertension Mother     Cancer Father      metastatic, colon cancer    Hypertension Father     Hypertension Sister       Allergies   Allergen Reactions    Pcn [Penicillins] Swelling    Cymbalta [Duloxetine] Other (comments)     Blood in urine      Home Medications:  Prior to Admission Medications   Prescriptions Last Dose Informant Patient Reported? Taking? DM/P-EPHED/ACETAMINOPH/DOXYLAM (NYQUIL PO) 3/13/2018 at 1700 Self Yes Yes   Sig: Take 20 mL by mouth nightly as needed (congestion). Morphine (HUYEN) 60 mg ER capsule 3/14/2018 at 0400 Self Yes Yes   Sig: Take 30-60 mg by mouth two (2) times daily as needed for Pain. Omeprazole delayed release (PRILOSEC D/R) 20 mg tablet 3/13/2018 at Unknown time Self No Yes   Sig: Take 1 Tab by mouth daily. VENTOLIN HFA 90 mcg/actuation inhaler 3/14/2018 at Unknown time Self Yes Yes   Sig: Take 2-3 Puffs by inhalation every four (4) hours as needed for Wheezing or Shortness of Breath. diazePAM (VALIUM) 10 mg tablet 3/13/2018 at Unknown time Self No Yes   Sig: Take 1 Tab by mouth two (2) times daily as needed. Max Daily Amount: 20 mg.   hydroCHLOROthiazide (HYDRODIURIL) 25 mg tablet 3/14/2018 at Unknown time Self No Yes   Sig: TAKE ONE TABLET BY MOUTH DAILY   oxyCODONE-acetaminophen (PERCOCET)  mg per tablet 3/14/2018 at 0900 Self Yes Yes   Sig: Take 1 Tab by mouth every twelve (12) hours as needed for Pain.    predniSONE (DELTASONE) 10 mg tablet 3/12/2018 at Unknown time Self Yes Yes   Sig: Take 10 mg by mouth daily as needed for Pain.       Facility-Administered Medications: None     Hospital medications:  Current Facility-Administered Medications   Medication Dose Route Frequency    nicotine (NICODERM CQ) 21 mg/24 hr patch 1 Patch  1 Patch TransDERmal DAILY    fluticasone-vilanterol (BREO ELLIPTA) 100mcg-25mcg/puff  1 Puff Inhalation DAILY    furosemide (LASIX) injection 40 mg  40 mg IntraVENous DAILY    lactulose (CHRONULAC) solution 30 g  30 g Oral TID    albuterol-ipratropium (DUO-NEB) 2.5 MG-0.5 MG/3 ML  3 mL Nebulization Q4H RT    predniSONE (DELTASONE) tablet 40 mg  40 mg Oral DAILY WITH BREAKFAST    levoFLOXacin (LEVAQUIN) tablet 750 mg  750 mg Oral Q24H    oxyCODONE IR (ROXICODONE) tablet 10 mg  10 mg Oral Q6H PRN    naloxone (NARCAN) injection 0.1 mg  0.1 mg IntraVENous EVERY 2 MINUTES AS NEEDED    albumin human 25% (BUMINATE) solution 25 g  25 g IntraVENous ONCE    sodium chloride (NS) flush 5-10 mL  5-10 mL IntraVENous Q8H    sodium chloride (NS) flush 5-10 mL  5-10 mL IntraVENous PRN    acetaminophen (TYLENOL) tablet 650 mg  650 mg Oral Q4H PRN    enoxaparin (LOVENOX) injection 40 mg  40 mg SubCUTAneous Q24H    albuterol-ipratropium (DUO-NEB) 2.5 MG-0.5 MG/3 ML  3 mL Nebulization Q4H PRN    potassium chloride (K-DUR, KLOR-CON) SR tablet 40 mEq  40 mEq Oral BID     REVIEW OF SYSTEMS:      Review of Systems -   History obtained from chart review and the patient  General ROS: positive for  - fatigue  Psychological ROS: negative for - anxiety  Hematological and Lymphatic ROS: negative for - bleeding problems or blood transfusions  Respiratory ROS: positive for - cough and shortness of breath  Cardiovascular ROS: positive for - dyspnea on exertion  Gastrointestinal ROS: see HPI  Genito-Urinary ROS: negative  Musculoskeletal ROS: negative  Neurological ROS: no TIA or stroke symptoms  Dermatological ROS: negative    Objective:   VITALS:    Visit Vitals    /69 (BP 1 Location: Left arm, BP Patient Position: At rest)    Pulse 86    Temp 98.2 °F (36.8 °C)    Resp 18    Wt 122.5 kg (270 lb)    SpO2 94%    BMI 38.74 kg/m2     Temp (24hrs), Av.7 °F (37.1 °C), Min:98.1 °F (36.7 °C), Max:99.6 °F (37.6 °C)    PHYSICAL EXAM:   General:    Alert, cooperativ obese WM, no distress, appears stated age. Head:   Normocephalic, without obvious abnormality, atraumatic. Eyes:   Conjunctivae clear, anicteric sclerae. Pupils are equal  Nose:  Nares normal. No drainage or sinus tenderness. Throat:    Lips, mucosa, and tongue normal.  No Thrush  Neck:  Supple, symmetrical,  no adenopathy, thyroid: non tender  Back:    Symmetric,  No CVA tenderness. Lungs:   CTA bilaterally. No wheezing/rhonchi/rales. Heart:   Regular rate and rhythm,  no murmur, rub or gallop. Abdomen:   Obese, soft, distended, + BS, mild LUQ tenderness with splenomegaly. Rectal:  deferred  Extremities: No cyanosis. No edema. No clubbing; multiple skin tattoos UE b/l  Skin:     Texture, turgor normal. No rashes/lesions/jaundice  Lymph: Cervical, supraclavicular normal.  Psych:  Good insight. Not depressed. Not anxious or agitated. Neurologic: EOMs intact. No facial asymmetry. No aphasia or slurred speech normal strength, A/O X 3. LAB DATA REVIEWED:    Lab Results   Component Value Date/Time    WBC 6.1 2018 01:26 PM    HGB 15.0 2018 01:26 PM    HCT 44.1 2018 01:26 PM    PLATELET 80 (L)  01:26 PM    MCV 87.3 2018 01:26 PM     Lab Results   Component Value Date/Time    ALT (SGPT) 38 2018 01:26 PM    AST (SGOT) 32 2018 01:26 PM    Alk.  phosphatase 96 2018 01:26 PM    Bilirubin, direct 0.18 2017 10:12 AM    Bilirubin, total 1.2 (H) 2018 01:26 PM     Lab Results   Component Value Date/Time    Sodium 136 2018 01:26 PM    Potassium 2.9 (L) 2018 01:26 PM    Chloride 98 2018 01:26 PM    CO2 32 2018 01:26 PM    Anion gap 6 2018 01:26 PM Glucose 112 (H) 03/14/2018 01:26 PM    BUN 12 03/14/2018 01:26 PM    Creatinine 0.83 03/14/2018 01:26 PM    BUN/Creatinine ratio 14 03/14/2018 01:26 PM    GFR est AA >60 03/14/2018 01:26 PM    GFR est non-AA >60 03/14/2018 01:26 PM    Calcium 8.0 (L) 03/14/2018 01:26 PM     Lab Results   Component Value Date/Time    Protein, total 7.5 03/14/2018 01:26 PM    Albumin 3.7 03/14/2018 01:26 PM       No results found for: LPSE  Lab Results   Component Value Date/Time    INR 1.1 05/04/2017 10:12 AM    INR 1.1 02/07/2017 12:00 AM    INR 1.0 10/05/2016 11:40 AM    Prothrombin time 11.1 05/04/2017 10:12 AM    Prothrombin time 11.1 02/07/2017 12:00 AM    Prothrombin time 10.8 10/05/2016 11:40 AM     IMAGING RESULTS:   [x]      I have personally reviewed the actual   []    CXR  [x]    CT  [x]     US    Impression:    Hep C Cirrhosis    Ascites    Tobacco abuse    Respiratory failure with hypoxia    Hypokalemia    Hypocalemia  Plan:  Patient admitted with COPD exacerbation likely worsened by his increased abdominal girth c/w ascites.   He is not monitoring his salt intake and will need to have a paracentesis today which may help his SOB and hypoxia.  -agree with LVP today and send ascites for diagnostic labs   -would start on Aldactone 50 mg and Lasix 40 mg BID  -lactulose  -check INR to calculate MELD score  -low salt diet and consult nutrition for education with this  -will need to f/u with Dr. Jakob Mckeon after discharge to discuss decompensation of his cirrhosis       ___________________________________________________  Care Plan discussed with:    [x]    Patient   [x]    Family   []    Nursing   []    Attending   ___________________________________________________  GI: Hiren Menendez MD

## 2018-03-15 NOTE — PROGRESS NOTES
ADULT PROTOCOL: JET AEROSOL ASSESSMENT    Patient  Seema Thomas     61 y.o.   male     3/15/2018  3:33 AM    Breath Sounds Pre Procedure: Right Breath Sounds: Diminished                               Left Breath Sounds: Diminished    Breath Sounds Post Procedure: Right Breath Sounds: Diminished                                 Left Breath Sounds: Diminished    Breathing pattern: Pre procedure Breathing Pattern: Regular          Post procedure Breathing Pattern: Regular    Heart Rate: Pre procedure Pulse: 83           Post procedure Pulse: 83    Resp Rate: Pre procedure Respirations: 18           Post procedure Respirations: 18    Peak Flow: Pre bronchodilator   n/a          Post bronchodilator   n/a    Incentive Spirometry:   n/a      n/a    Cough: Pre procedure Cough: Non-productive, Congested               Post procedure Cough: Non-productive    Sputum: Pre procedure  n/a                 Post procedure  n/a    Oxygen: O2 Device: Nasal cannula   Flow rate (L/min) 2     Changed: NO    SpO2: Pre procedure SpO2: 94 %   with oxygen              Post procedure SpO2: 94 %  with oxygen    Nebulizer Therapy: Current medications Aerosolized Medications: DuoNeb      Changed: YES    Smoking History: current smoker, has been encouraged to quit.     Problem List:   Patient Active Problem List   Diagnosis Code    Essential hypertension I10    Migraine G43.909    Chronic back pain M54.9, G89.29    Brain aneurysm I67.1    Gastroesophageal reflux disease without esophagitis K21.9    Anxiety and depression F41.8    Chronic hepatitis C without hepatic coma ([facility] [address] B18.2    Obesity E66.9    Cirrhosis ([facility] K74.60    Respiratory failure with hypoxia ([facility] J96.91       Respiratory Therapist: Rian Parks RT

## 2018-03-15 NOTE — PROGRESS NOTES
Pharmacy Automatic Renal Dosing Protocol - Antimicrobials  Indication for Antimicrobials: URI    Current Regimen of Each Antimicrobial:  3/15 Levaquin 750mg IV every 24hrs (Start Date 3/15; Day # 1 of 5)    Previous Antimicrobial Therapy:    Significant Cultures:   3/14 Throat - collected - final    Paralysis, amputations, malnutrition: None    Labs:  Recent Labs      18   1326   CREA  0.83   BUN  12   WBC  6.1     Temp (24hrs), Av.7 °F (37.1 °C), Min:98.1 °F (36.7 °C), Max:99.6 °F (37.6 °C)      Creatinine Clearance (mL/min) or Dialysis: >100      Impression/Plan:   · Will continue Levaquin 750mg IV every 24hrs as appropriate for current renal function and indication  · Antimicrobial stop date LD 3/19     Pharmacy will follow daily and adjust medications as appropriate for renal function and/or serum levels.     Thank you,  Katt Villagomez, [facility]

## 2018-03-16 ENCOUNTER — APPOINTMENT (OUTPATIENT)
Dept: CT IMAGING | Age: 58
DRG: 133 | End: 2018-03-16
Attending: HOSPITALIST
Payer: MEDICAID

## 2018-03-16 LAB
AMMONIA PLAS-SCNC: <10 UMOL/L
ANION GAP SERPL CALC-SCNC: 5 MMOL/L (ref 5–15)
BACTERIA SPEC CULT: NORMAL
BUN SERPL-MCNC: 11 MG/DL (ref 6–20)
BUN/CREAT SERPL: 16 (ref 12–20)
CALCIUM SERPL-MCNC: 8.3 MG/DL (ref 8.5–10.1)
CHLORIDE SERPL-SCNC: 94 MMOL/L (ref 97–108)
CO2 SERPL-SCNC: 37 MMOL/L (ref 21–32)
CREAT SERPL-MCNC: 0.7 MG/DL (ref 0.7–1.3)
ERYTHROCYTE [DISTWIDTH] IN BLOOD BY AUTOMATED COUNT: 13.9 % (ref 11.5–14.5)
GLUCOSE SERPL-MCNC: 105 MG/DL (ref 65–100)
HCT VFR BLD AUTO: 41.6 % (ref 36.6–50.3)
HGB BLD-MCNC: 14.1 G/DL (ref 12.1–17)
INR PPP: 1.1 (ref 0.9–1.1)
MAGNESIUM SERPL-MCNC: 1.9 MG/DL (ref 1.6–2.4)
MCH RBC QN AUTO: 29.7 PG (ref 26–34)
MCHC RBC AUTO-ENTMCNC: 33.9 G/DL (ref 30–36.5)
MCV RBC AUTO: 87.8 FL (ref 80–99)
NRBC # BLD: 0 K/UL (ref 0–0.01)
NRBC BLD-RTO: 0 PER 100 WBC
PHOSPHATE SERPL-MCNC: 1.3 MG/DL (ref 2.6–4.7)
PLATELET # BLD AUTO: 72 K/UL (ref 150–400)
PMV BLD AUTO: 11.1 FL (ref 8.9–12.9)
POTASSIUM SERPL-SCNC: 2.4 MMOL/L (ref 3.5–5.1)
PROTHROMBIN TIME: 11.1 SEC (ref 9–11.1)
RBC # BLD AUTO: 4.74 M/UL (ref 4.1–5.7)
SERVICE CMNT-IMP: NORMAL
SODIUM SERPL-SCNC: 136 MMOL/L (ref 136–145)
WBC # BLD AUTO: 7.8 K/UL (ref 4.1–11.1)

## 2018-03-16 PROCEDURE — 76937 US GUIDE VASCULAR ACCESS: CPT

## 2018-03-16 PROCEDURE — 82140 ASSAY OF AMMONIA: CPT | Performed by: HOSPITALIST

## 2018-03-16 PROCEDURE — 80048 BASIC METABOLIC PNL TOTAL CA: CPT | Performed by: HOSPITALIST

## 2018-03-16 PROCEDURE — 74011250636 HC RX REV CODE- 250/636: Performed by: INTERNAL MEDICINE

## 2018-03-16 PROCEDURE — 74011636320 HC RX REV CODE- 636/320: Performed by: HOSPITALIST

## 2018-03-16 PROCEDURE — 74011000250 HC RX REV CODE- 250: Performed by: HOSPITALIST

## 2018-03-16 PROCEDURE — 76450000000

## 2018-03-16 PROCEDURE — C1751 CATH, INF, PER/CENT/MIDLINE: HCPCS

## 2018-03-16 PROCEDURE — 74011250637 HC RX REV CODE- 250/637: Performed by: HOSPITALIST

## 2018-03-16 PROCEDURE — 65270000015 HC RM PRIVATE ONCOLOGY

## 2018-03-16 PROCEDURE — 77030018786 HC NDL GD F/USND BARD -B

## 2018-03-16 PROCEDURE — 94640 AIRWAY INHALATION TREATMENT: CPT

## 2018-03-16 PROCEDURE — 85027 COMPLETE CBC AUTOMATED: CPT | Performed by: HOSPITALIST

## 2018-03-16 PROCEDURE — 84100 ASSAY OF PHOSPHORUS: CPT | Performed by: HOSPITALIST

## 2018-03-16 PROCEDURE — 74011636637 HC RX REV CODE- 636/637: Performed by: HOSPITALIST

## 2018-03-16 PROCEDURE — 74011250636 HC RX REV CODE- 250/636: Performed by: HOSPITALIST

## 2018-03-16 PROCEDURE — 93306 TTE W/DOPPLER COMPLETE: CPT

## 2018-03-16 PROCEDURE — 85610 PROTHROMBIN TIME: CPT | Performed by: SPECIALIST

## 2018-03-16 PROCEDURE — 36415 COLL VENOUS BLD VENIPUNCTURE: CPT | Performed by: HOSPITALIST

## 2018-03-16 PROCEDURE — 82105 ALPHA-FETOPROTEIN SERUM: CPT | Performed by: SPECIALIST

## 2018-03-16 PROCEDURE — 83735 ASSAY OF MAGNESIUM: CPT | Performed by: HOSPITALIST

## 2018-03-16 PROCEDURE — 77030020847 HC STATLOK BARD -A

## 2018-03-16 PROCEDURE — 74011000255 HC RX REV CODE- 255: Performed by: HOSPITALIST

## 2018-03-16 PROCEDURE — 74177 CT ABD & PELVIS W/CONTRAST: CPT

## 2018-03-16 RX ORDER — SODIUM CHLORIDE 0.9 % (FLUSH) 0.9 %
10-40 SYRINGE (ML) INJECTION AS NEEDED
Status: DISCONTINUED | OUTPATIENT
Start: 2018-03-16 | End: 2018-03-18 | Stop reason: HOSPADM

## 2018-03-16 RX ORDER — POTASSIUM CHLORIDE 14.9 MG/ML
10 INJECTION INTRAVENOUS
Status: DISCONTINUED | OUTPATIENT
Start: 2018-03-16 | End: 2018-03-16 | Stop reason: ALTCHOICE

## 2018-03-16 RX ORDER — SODIUM CHLORIDE 9 MG/ML
50 INJECTION, SOLUTION INTRAVENOUS CONTINUOUS
Status: DISCONTINUED | OUTPATIENT
Start: 2018-03-16 | End: 2018-03-17

## 2018-03-16 RX ORDER — BARIUM SULFATE 20 MG/ML
900 SUSPENSION ORAL
Status: COMPLETED | OUTPATIENT
Start: 2018-03-16 | End: 2018-03-16

## 2018-03-16 RX ORDER — SODIUM CHLORIDE 0.9 % (FLUSH) 0.9 %
10 SYRINGE (ML) INJECTION
Status: COMPLETED | OUTPATIENT
Start: 2018-03-16 | End: 2018-03-16

## 2018-03-16 RX ORDER — MORPHINE SULFATE 2 MG/ML
2 INJECTION, SOLUTION INTRAMUSCULAR; INTRAVENOUS ONCE
Status: COMPLETED | OUTPATIENT
Start: 2018-03-16 | End: 2018-03-16

## 2018-03-16 RX ORDER — ONDANSETRON 2 MG/ML
4 INJECTION INTRAMUSCULAR; INTRAVENOUS
Status: DISCONTINUED | OUTPATIENT
Start: 2018-03-16 | End: 2018-03-18 | Stop reason: HOSPADM

## 2018-03-16 RX ORDER — SODIUM CHLORIDE 9 MG/ML
50 INJECTION, SOLUTION INTRAVENOUS
Status: COMPLETED | OUTPATIENT
Start: 2018-03-16 | End: 2018-03-16

## 2018-03-16 RX ORDER — SODIUM,POTASSIUM PHOSPHATES 280-250MG
2 POWDER IN PACKET (EA) ORAL ONCE
Status: COMPLETED | OUTPATIENT
Start: 2018-03-16 | End: 2018-03-16

## 2018-03-16 RX ORDER — SODIUM CHLORIDE 0.9 % (FLUSH) 0.9 %
10-40 SYRINGE (ML) INJECTION EVERY 8 HOURS
Status: DISCONTINUED | OUTPATIENT
Start: 2018-03-16 | End: 2018-03-18 | Stop reason: HOSPADM

## 2018-03-16 RX ORDER — POTASSIUM CHLORIDE 750 MG/1
40 TABLET, FILM COATED, EXTENDED RELEASE ORAL
Status: COMPLETED | OUTPATIENT
Start: 2018-03-16 | End: 2018-03-16

## 2018-03-16 RX ADMIN — IPRATROPIUM BROMIDE AND ALBUTEROL SULFATE 3 ML: .5; 3 SOLUTION RESPIRATORY (INHALATION) at 20:34

## 2018-03-16 RX ADMIN — FLUTICASONE FUROATE AND VILANTEROL TRIFENATATE 1 PUFF: 100; 25 POWDER RESPIRATORY (INHALATION) at 10:53

## 2018-03-16 RX ADMIN — FUROSEMIDE 40 MG: 10 INJECTION, SOLUTION INTRAMUSCULAR; INTRAVENOUS at 09:17

## 2018-03-16 RX ADMIN — POTASSIUM & SODIUM PHOSPHATES POWDER PACK 280-160-250 MG 2 PACKET: 280-160-250 PACK at 12:48

## 2018-03-16 RX ADMIN — SODIUM CHLORIDE 50 ML/HR: 900 INJECTION, SOLUTION INTRAVENOUS at 22:17

## 2018-03-16 RX ADMIN — Medication 10 ML: at 08:00

## 2018-03-16 RX ADMIN — OXYCODONE HYDROCHLORIDE 10 MG: 5 TABLET ORAL at 11:16

## 2018-03-16 RX ADMIN — IPRATROPIUM BROMIDE AND ALBUTEROL SULFATE 3 ML: .5; 3 SOLUTION RESPIRATORY (INHALATION) at 01:10

## 2018-03-16 RX ADMIN — BARIUM SULFATE 900 ML: 20 SUSPENSION ORAL at 09:28

## 2018-03-16 RX ADMIN — LACTULOSE 30 G: 10 SOLUTION ORAL at 12:32

## 2018-03-16 RX ADMIN — LEVOFLOXACIN 750 MG: 750 TABLET, FILM COATED ORAL at 12:45

## 2018-03-16 RX ADMIN — SODIUM CHLORIDE 50 ML/HR: 900 INJECTION, SOLUTION INTRAVENOUS at 08:00

## 2018-03-16 RX ADMIN — IPRATROPIUM BROMIDE AND ALBUTEROL SULFATE 3 ML: .5; 3 SOLUTION RESPIRATORY (INHALATION) at 16:39

## 2018-03-16 RX ADMIN — IOPAMIDOL 100 ML: 755 INJECTION, SOLUTION INTRAVENOUS at 08:00

## 2018-03-16 RX ADMIN — POTASSIUM PHOSPHATE, MONOBASIC AND POTASSIUM PHOSPHATE, DIBASIC: 224; 236 INJECTION, SOLUTION INTRAVENOUS at 12:40

## 2018-03-16 RX ADMIN — Medication 10 ML: at 22:28

## 2018-03-16 RX ADMIN — POTASSIUM CHLORIDE 40 MEQ: 750 TABLET, FILM COATED, EXTENDED RELEASE ORAL at 12:46

## 2018-03-16 RX ADMIN — OXYCODONE HYDROCHLORIDE 10 MG: 5 TABLET ORAL at 02:26

## 2018-03-16 RX ADMIN — Medication 10 ML: at 12:53

## 2018-03-16 RX ADMIN — IPRATROPIUM BROMIDE AND ALBUTEROL SULFATE 3 ML: .5; 3 SOLUTION RESPIRATORY (INHALATION) at 12:27

## 2018-03-16 RX ADMIN — IPRATROPIUM BROMIDE AND ALBUTEROL SULFATE 3 ML: .5; 3 SOLUTION RESPIRATORY (INHALATION) at 09:11

## 2018-03-16 RX ADMIN — MORPHINE SULFATE 2 MG: 2 INJECTION, SOLUTION INTRAMUSCULAR; INTRAVENOUS at 14:12

## 2018-03-16 RX ADMIN — SODIUM CHLORIDE 30 MEQ: 900 INJECTION, SOLUTION INTRAVENOUS at 22:15

## 2018-03-16 NOTE — PROGRESS NOTES
62 yo male w/ Hx of COPD and Liver Cirrhosis Stage IV    Reason for Admission:        Shortness of breath - \"belly swelling up    RRAT Score:     15   Ask Me Three:         What is my main problem? COPD and cirrhosis    What is it that I need to do? Quit smoking and go to doctor when told  Why is it important for me to do this? \"to keep living\"  Do you have any concerns for discharge? No.  Patient has Medicaid and has no trouble getting medications. He drives to all of his appointments (\"I go so I can out-live the doctors), but has family that can take him if needed. Patient lives alone in 2 story home with wood stove heat. He can sleep upstairs or down. Ex-wife checks on him daily and nephew helps with the house and farm. Patient states he continues to [facility] the farm\". Plan for utilizing home health:     \"it would be alright\"  Likelihood of readmission? Moderate to high - due to multiple medical problems - COPD and Liver Cirrhosis Stage IV. Difficult to determine if patient is aware of all needs due to possible metabolic encephalopthy - he does appear to be oriented x 3 at time of interview. Care Management Interventions  PCP Verified by CM: Yes  Transition of Care Consult (CM Consult):  Other (Initial CM Assessment - Moderate risk )  MyChart Signup: No  Discharge Durable Medical Equipment: No (Has a cane)  Physical Therapy Consult: Yes  Occupational Therapy Consult: Yes  Speech Therapy Consult: No  Current Support Network: Lives Alone, Own Home, Family Lives Nearby (Ex-wife checks on him daily)  Confirm Follow Up Transport: Self  Plan discussed with Pt/Family/Caregiver: Yes (Patient )    Nicole Glasgow RN, BSN, [address] [place] [address] Oncology  [phone]

## 2018-03-16 NOTE — INTERDISCIPLINARY ROUNDS
Oncology Interdisciplinary rounds were held today to discuss patient plan of care and outcomes. The following members were present: Nursing, Case Management, Pharmacy and Dietary.     Actual Length of Stay: 2    DRG GLOS: 3.7    Expected Length of Stay: 3d 16h                Plan for Day        Mobility        Plan for Stay      Plan for Way   CT scan   Monitor for fever Up ad jonathan  Continue current [facility] 3/19

## 2018-03-16 NOTE — PROGRESS NOTES
MIDLINE Insertion Procedure  Note:   Procedure explained to  pt  along with risks and benefits. Procedure teaching completed. Pre procedure assessment done. Maximum sterile barrier precautions observed throughout procedure. Lidocaine 1 %  3.0   ml sc injected to site prior to access the vein . Cannulated   brachial    vein using ultrasound guidance. Inserted  4   Fr. Single   lumen midline to   left    arm. Blood return verified and  flushed with 20ml normal saline  to  port. Sterile dressing applied with biopatch, statLock and occlusive dressing as per protocol. Curos cap applied to port. Patient tolerated procedure well with minimal blood loss. Reason for access : Reliable IV Access / limited vascular access      Complications related to insertion : None   This midline to be removed on or before   4/14/2018  See nursing message  for midline reminders  Inserted by : Deanna Syed RN. LILLIANA. ROGELIO. PICC Nurse  Assisted by : Luis E Arreguin RN PICC Nurse  Total Length :  12  cm   External Length :  0     cm   Arm circumference :    30    cm  Catheter occupies   12   % of vein. Type of Midline:  Bard Power Midline  Ref#:  [id number]  Lot#:    [id number]  Expiration Date:    2019-06-30    Deanna TIJERINA. ROGELIO. PICC nurse.  Vascular Access Team

## 2018-03-16 NOTE — PROGRESS NOTES
Dr. Seb Galvan paged to alert about refusal of lovenox and lactulose, but did not receive a return phone call.

## 2018-03-16 NOTE — PROGRESS NOTES
Gastroenterology Daily Progress Note (Dr. Ish Reed)   [facility] [facility] [address] [place] Date: 3/14/2018       Subjective:       Patient did not have any ascites on U/S  He is c/o steroids \"are horrible for me. \"  No n/v.    Current Facility-Administered Medications   Medication Dose Route Frequency    nicotine (NICODERM CQ) 21 mg/24 hr patch 1 Patch  1 Patch TransDERmal DAILY    fluticasone-vilanterol (BREO ELLIPTA) 100mcg-25mcg/puff  1 Puff Inhalation DAILY    furosemide (LASIX) injection 40 mg  40 mg IntraVENous DAILY    lactulose (CHRONULAC) solution 30 g  30 g Oral TID    albuterol-ipratropium (DUO-NEB) 2.5 MG-0.5 MG/3 ML  3 mL Nebulization Q4H RT    predniSONE (DELTASONE) tablet 40 mg  40 mg Oral DAILY WITH BREAKFAST    levoFLOXacin (LEVAQUIN) tablet 750 mg  750 mg Oral Q24H    oxyCODONE IR (ROXICODONE) tablet 10 mg  10 mg Oral Q6H PRN    naloxone (NARCAN) injection 0.1 mg  0.1 mg IntraVENous EVERY 2 MINUTES AS NEEDED    ibuprofen (MOTRIN) tablet 400 mg  400 mg Oral Q6H PRN    0.9% sodium chloride infusion  50 mL/hr IntraVENous RAD ONCE    iopamidol (ISOVUE-370) 76 % injection 100 mL  100 mL IntraVENous RAD ONCE    sodium chloride (NS) flush 10 mL  10 mL IntraVENous RAD ONCE    barium sulfate (READICAT) 2.1 % (w/v), 2.0 % (w/w) oral suspension 900 mL  900 mL Oral RAD ONCE    sodium chloride (NS) flush 5-10 mL  5-10 mL IntraVENous Q8H    sodium chloride (NS) flush 5-10 mL  5-10 mL IntraVENous PRN    acetaminophen (TYLENOL) tablet 650 mg  650 mg Oral Q4H PRN    enoxaparin (LOVENOX) injection 40 mg  40 mg SubCUTAneous Q24H    albuterol-ipratropium (DUO-NEB) 2.5 MG-0.5 MG/3 ML  3 mL Nebulization Q4H PRN        Objective:     Visit Vitals    /71 (BP 1 Location: Left arm, BP Patient Position: At rest)    Pulse 90    Temp 97.4 °F (36.3 °C)    Resp 18    Wt 109.6 kg (241 lb 9.6 oz)    SpO2 97%    BMI 34.67 kg/m2   Blood pressure 129/71, pulse 90, temperature 97.4 °F (36.3 °C), resp. rate 18, weight 109.6 kg (241 lb 9.6 oz), SpO2 97 %.     03/14 0701 - 03/15 1900  In: 5240 [P.O.:1220]  Out: 1800 [Urine:1800]    Intake/Output Summary (Last 24 hours) at 03/16/18 0648  Last data filed at 03/15/18 1527   Gross per 24 hour   Intake              720 ml   Output             1500 ml   Net             -780 ml     Physical Exam:     General: awake, plethoric WM in NAD  Chest:  CTA, No rhonchi, rales or rubs. Heart: S1, S2, RRR  GI: Soft, obese, mild distention, mild epigastric tenderness, + BS  Extremities: no edema  CNS: CN II-XII normal.    Labs:       Recent Results (from the past 24 hour(s))   METABOLIC PANEL, BASIC    Collection Time: 03/15/18  5:55 PM   Result Value Ref Range    Sodium 134 (L) 136 - 145 mmol/L    Potassium 2.8 (L) 3.5 - 5.1 mmol/L    Chloride 95 (L) 97 - 108 mmol/L    CO2 32 21 - 32 mmol/L    Anion gap 7 5 - 15 mmol/L    Glucose 149 (H) 65 - 100 mg/dL    BUN 15 6 - 20 MG/DL    Creatinine 0.94 0.70 - 1.30 MG/DL    BUN/Creatinine ratio 16 12 - 20      GFR est AA >60 >60 ml/min/1.73m2    GFR est non-AA >60 >60 ml/min/1.73m2    Calcium 8.1 (L) 8.5 - 10.1 MG/DL   MAGNESIUM    Collection Time: 03/15/18  5:55 PM   Result Value Ref Range    Magnesium 1.9 1.6 - 2.4 mg/dL   PHOSPHORUS    Collection Time: 03/15/18  5:55 PM   Result Value Ref Range    Phosphorus 0.7 (LL) 2.6 - 4.7 MG/DL     Recent Labs      03/15/18   1755  03/14/18   1326   NA  134*  136   K  2.8*  2.9*   CL  95*  98   CO2  32  32   BUN  15  12   CREA  0.94  0.83   GLU  149*  112*   CA  8.1*  8.0*   MG  1.9   --    PHOS  0.7*   --      Recent Labs      03/14/18   1326   SGOT  32   AP  96   TP  7.5   ALB  3.7   GLOB  3.8     Recent Labs      03/14/18   1311   PH  7.43   PCO2  46*   PO2  64*       Impression:      Hep C Cirrhosis    Tobacco abuse    Respiratory failure with hypoxia    Hypokalemia    Hypocalemia         Plan:  Patient with abdominal bloating but no ascites on U/S.   He is having abdominal pain and poorly localized physical exam.  Majority of his current issues seem to be related to COPD and will therefore hold any spironolactone ad proceed with CT abd/pelvis with contrast once IV established.   If CT neg then will see again as needed and recommend pt to f/u with Dr. Pamela Aguilar after discharge: his established hepatologist.       Dre Marie MD    3/16/2018  [address], [address] [place] [address]  [phone] [id number]  [address] [place] [address] [place]: [phone]

## 2018-03-16 NOTE — PROGRESS NOTES
Nursing supervisor Shaggy Castro attempted X 2 to resite IV with so success. Will follow up with MD eric RUIZ for alternative.

## 2018-03-16 NOTE — PROGRESS NOTES
RN contacted Dr. Paulo Falcon regarding CT scan and morning med administration, RN was instructed to hold PO meds as patient has barium he needs to drink. RN also advised MD that pt had no line overnight and order potassium phosphate was not administered.

## 2018-03-16 NOTE — PROGRESS NOTES
Notified by patient that IV site was hurting and swelling. Assessed IV site and found it had infiltrated. Potassium phospate infusion stopped. Attempted x 2 to resite IV with no success.

## 2018-03-16 NOTE — PROGRESS NOTES
During bedside report, patient was eating dinner, 85% completed. Notified CT and told to reschedule scan for AM due to late meal per protocol.

## 2018-03-16 NOTE — PROGRESS NOTES
Oncology Nursing Communication Tool  4:20 PM  3/16/2018     Bedside and Verbal shift change report given to Jake Syed RN (incoming nurse) by Edward Barreto (outgoing nurse) on Lyndall Query. . Report included the following information SBAR, Kardex, Intake/Output, MAR, Accordion and Recent Results. Shift Summary: Pt had midline placed, CT performed. Patient c/o headache, physician ordered morphine and neuro consult. Physician requested oxygen testing by RN , RN placed order under nursing miscellaneous as off-going RN was not able to complete before end of shift. Oncoming RN aware that this needs to be completed. Patient was up to bathroom and per pt voided 4 times during shift. Patient had critical potassium result. Oncoming RN aware of potassium order and to recheck potassium at 2000 and to call on call physician with abnormal results per Dr. Mansoor Braxton. At shift change pt was off floor having 2D Echo. Issues for physician to address: headache pain       Oncology Shift Note   Admission Date 3/14/2018   Admission Diagnosis Respiratory failure with hypoxia ([facility] [address]   Code Status Full Code   Consults IP CONSULT TO PALLIATIVE CARE - PROVIDER  IP CONSULT TO GASTROENTEROLOGY  IP CONSULT TO NEUROLOGY      Cardiac Monitoring [] Yes [x] No      Purposeful Hourly Rounding [x] Yes    George Score Total Score: 2   George score 3 or > [] Bed Alarm [] Avasys [] 1:1 sitter [] Patient refused (Place signed refusal form in chart)      Pain Managed [x] Yes [] No    Key Pain Meds             Morphine (HUYEN) 60 mg ER capsule  (Taking) Take 30-60 mg by mouth two (2) times daily as needed for Pain. oxyCODONE-acetaminophen (PERCOCET)  mg per tablet  (Taking) Take 1 Tab by mouth every twelve (12) hours as needed for Pain. Influenza Vaccine Received Flu Vaccine for Current Season (usually Sept-March): No    Patient/Guardian Refused (Notify MD): Yes      Oxygen needs? [] Room air Oxygen @  []1L    []2L    []3L   [x]4L    []5L   []6L     Use home O2? [] Yes [] No  Perform O2 challenge test using  smartphrase (.oxygenchallenge)      Last bowel movement Last Bowel Movement Date: 03/16/18  bowel movement      Urinary Catheter             LDAs                                    Readmission Risk Assessment Tool Score Medium Risk            15       Total Score        3 Has Seen PCP in Last 6 Months (Yes=3, No=0)    2 . Living with Significant Other. Assisted Living. LTAC. SNF. or   Rehab    4 Pt.  Coverage (Medicare=5 , Medicaid, or Self-Pay=4)    6 Charlson Comorbidity Score (Age + Comorbid Conditions)        Criteria that do not apply:    Patient Length of Stay (>5 days = 3)    IP Visits Last 12 Months (1-3=4, 4=9, >4=11)       Expected Length of Stay 3d 16h   Actual Length of Stay [address]
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
## 2018-03-16 NOTE — PROGRESS NOTES
Patient has been c/o nausea. RN contacted physician and Emmalene Bryon was ordered. When RN offered zofran to patient the patient now reports \"my head hurts worse than my stomach and I do not want the zofran\". Patient reports history of migraines and verbalizes \"I want to see my neurologist\". RN contacted physician to inform of headache and pt request to see neurology. RN was advised orders would follow. RN offered pt ginger ale to help with nausea, pt accepted. RN will continue to monitor.

## 2018-03-16 NOTE — PROGRESS NOTES
Attempted to see patient for OT evaluation, but patient off floor for echo. Will follow back tomorrow or Monday for OT evaluation.
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
## 2018-03-16 NOTE — PROGRESS NOTES
Hospitalist Progress Note    NAME: Edna Campos.    :  1960   MRN:  [id number]       Assessment / Plan:  Acute hypoxic respiratory failure POA in settings of likely COPD exacerbation due to acute bronchitis  -Sx: dyspnea / thick sputum cough/ on 2 L / wheezing - on admission  requiring O2 - on 4 L now   Reports only minimal improvement since admission   -will check echo to further investigate his dyspnea   - cont PO prednisone / empiric AB LVQ day   Scheduled jet nebs  -started trial of breo  -O2 to keep jenise >90%, wean as tolerated, assess for home O2 on DC  -CTA: no PE/edema/pneumonia   Solitary pulmonary nodule warranting follow-up CT in 6-12 months (7 mm R lung)      Liver cirrhosis stage IV due to hepatitis C ( per pt) with worsening ascites   Thrombocytopenia   -no ascites by US   -started on diuretics: lasix IV, diuresing well  Watch BP/orthostatics closely    -started on lactulose for AMS thou ammonia level not that high   -monitor plt closely   -GI help appreciated : CT today   - primary GI: Dr Woodard Members / Dr Lacey FERNANDES      Metabolic encephalopathy POA  - appears more awake/aler and communicative this am. Wife not at bedside   - cont O2 to keep sats > 90%  -trial of lactulose   -avoid oversedation     Hypokalemia / hypophosphatemia   -supplemented yesterday but cannot have complete runs as ordered due to loosing IV access  -will follow blood work today and supplement as ordered  -d/w rn to get new blood work now   -replete as needed     TBI / Migraine HA / chronic pain   -followed with Dr Durga Chau neurology - who managing his pain meds   -PTA: percocet 10/325 prn + Ms Contin which he is also taking prn --> will continue with oxy alone prn for now  -avoid oversedation  -consulting palliative care to help with pain management      Tobacco abuse, nicotine patch            Code Status: Full  Surrogate Decision Maker: Wife  DVT Prophylaxis: Lovenox ok unless plt < 50K       Body mass index is 34.67 kg/(m^2). Baseline: lives with wife; ambulating with cane   Recommended Disposition: Home w/Family 2-3 days pending clinical response    Midline 3/16 , poor IV access      Subjective:     Chief Complaint / Reason for Physician Visit: following respiratory failure / copd/ ascites   Pt reports some improvement since admission   Lungs sound better today   Diuresed well   Had fever 101 x 1 yesterday and none since that time     Discussed with RN events overnight. Review of Systems:  Symptom Y/N Comments  Symptom Y/N Comments   Fever/Chills n   Chest Pain n    Poor Appetite    Edema     Cough y   Abdominal Pain y Chronic but worse    Sputum y Thick,mild,yellow/green  Joint Pain     SOB/PARKS y   Pruritis/Rash     Nausea/vomit n   Tolerating PT/OT     Diarrhea n   Tolerating Diet     Constipation n   Other       Could NOT obtain due to:      Objective:     VITALS:   Last 24hrs VS reviewed since prior progress note. Most recent are:  Patient Vitals for the past 24 hrs:   Temp Pulse Resp BP SpO2   03/16/18 0917 - 88 - 135/80 -   03/16/18 0912 - - - - 94 %   03/16/18 0730 98.6 °F (37 °C) 79 18 127/77 97 %   03/16/18 0400 97.4 °F (36.3 °C) 90 18 129/71 97 %   03/16/18 0110 - - - - 92 %   03/15/18 2130 - - - - 95 %   03/15/18 2030 98.4 °F (36.9 °C) 85 18 120/63 95 %   03/15/18 1700 (!) 101.8 °F (38.8 °C) (!) 106 18 126/61 94 %   03/15/18 1529 - - - - 90 %   03/15/18 1144 - - - - 94 %   03/15/18 1134 - - - - 90 %       Intake/Output Summary (Last 24 hours) at 03/16/18 1049  Last data filed at 03/15/18 1527   Gross per 24 hour   Intake              480 ml   Output             1500 ml   Net            -1020 ml        PHYSICAL EXAM:  General: WD, WN. Awake/alert. Better responsive, cooperative, no acute distress    EENT:  EOMI. Anicteric sclerae. MMM  Resp:  Diminished BS bases bilaterally, no wheezing, no rales.   No accessory muscle use  CV:  Regular  rhythm,  ++ edema  GI:  Soft, appears distended, + gen tender.  +Bowel sounds  Neurologic:  Alert and oriented X 3, normal speech, not focal   Psych:   Good insight. Not anxious nor agitated  Skin:  No rashes. No jaundice    Reviewed most current lab test results and cultures  YES  Reviewed most current radiology test results   YES  Review and summation of old records today    NO  Reviewed patient's current orders and MAR    YES  PMH/SH reviewed - no change compared to H&P  ________________________________________________________________________  Care Plan discussed with:    Comments   Patient y    Family      RN y    Care Manager     Consultant  y GI                      Multidiciplinary team rounds were held today with , nursing, pharmacist and clinical coordinator. Patient's plan of care was discussed; medications were reviewed and discharge planning was addressed. ________________________________________________________________________  Total NON critical care TIME:  35  Minutes    Total CRITICAL CARE TIME Spent:   Minutes non procedure based      Comments   >50% of visit spent in counseling and coordination of care     ________________________________________________________________________  Tree Nava MD     Procedures: see electronic medical records for all procedures/Xrays and details which were not copied into this note but were reviewed prior to creation of Plan. LABS:  I reviewed today's most current labs and imaging studies.   Pertinent labs include:  Recent Labs      03/14/18   1326   WBC  6.1   HGB  15.0   HCT  44.1   PLT  80*     Recent Labs      03/15/18   1755  03/14/18   1326   NA  134*  136   K  2.8*  2.9*   CL  95*  98   CO2  32  32   GLU  149*  112*   BUN  15  12   CREA  0.94  0.83   CA  8.1*  8.0*   MG  1.9   --    PHOS  0.7*   --    ALB   --   3.7   TBILI   --   1.2*   SGOT   --   32   ALT   --   38       Signed: Tere Nava MD

## 2018-03-17 LAB
AFP-TM SERPL-MCNC: 3.6 NG/ML (ref 0–8.3)
ANION GAP SERPL CALC-SCNC: 5 MMOL/L (ref 5–15)
BUN SERPL-MCNC: 13 MG/DL (ref 6–20)
BUN/CREAT SERPL: 17 (ref 12–20)
CALCIUM SERPL-MCNC: 7.7 MG/DL (ref 8.5–10.1)
CHLORIDE SERPL-SCNC: 97 MMOL/L (ref 97–108)
CO2 SERPL-SCNC: 36 MMOL/L (ref 21–32)
CREAT SERPL-MCNC: 0.75 MG/DL (ref 0.7–1.3)
GLUCOSE SERPL-MCNC: 109 MG/DL (ref 65–100)
MAGNESIUM SERPL-MCNC: 2.2 MG/DL (ref 1.6–2.4)
PHOSPHATE SERPL-MCNC: 2.8 MG/DL (ref 2.6–4.7)
POTASSIUM SERPL-SCNC: 2.9 MMOL/L (ref 3.5–5.1)
SODIUM SERPL-SCNC: 138 MMOL/L (ref 136–145)

## 2018-03-17 PROCEDURE — 74011000250 HC RX REV CODE- 250: Performed by: HOSPITALIST

## 2018-03-17 PROCEDURE — 74011636637 HC RX REV CODE- 636/637: Performed by: HOSPITALIST

## 2018-03-17 PROCEDURE — 84100 ASSAY OF PHOSPHORUS: CPT | Performed by: HOSPITALIST

## 2018-03-17 PROCEDURE — 74011250637 HC RX REV CODE- 250/637: Performed by: HOSPITALIST

## 2018-03-17 PROCEDURE — 65270000015 HC RM PRIVATE ONCOLOGY

## 2018-03-17 PROCEDURE — 36415 COLL VENOUS BLD VENIPUNCTURE: CPT | Performed by: HOSPITALIST

## 2018-03-17 PROCEDURE — 94640 AIRWAY INHALATION TREATMENT: CPT

## 2018-03-17 PROCEDURE — 74011250637 HC RX REV CODE- 250/637: Performed by: INTERNAL MEDICINE

## 2018-03-17 PROCEDURE — 74011250636 HC RX REV CODE- 250/636: Performed by: HOSPITALIST

## 2018-03-17 PROCEDURE — 83735 ASSAY OF MAGNESIUM: CPT | Performed by: HOSPITALIST

## 2018-03-17 PROCEDURE — 77010033678 HC OXYGEN DAILY

## 2018-03-17 PROCEDURE — 80048 BASIC METABOLIC PNL TOTAL CA: CPT | Performed by: HOSPITALIST

## 2018-03-17 RX ORDER — MORPHINE SULFATE 30 MG/1
30 TABLET, FILM COATED, EXTENDED RELEASE ORAL
Status: DISCONTINUED | OUTPATIENT
Start: 2018-03-17 | End: 2018-03-18 | Stop reason: HOSPADM

## 2018-03-17 RX ORDER — ALBUTEROL SULFATE 90 UG/1
2-3 AEROSOL, METERED RESPIRATORY (INHALATION)
Qty: 1 INHALER | Refills: 0 | Status: SHIPPED | OUTPATIENT
Start: 2018-03-17

## 2018-03-17 RX ORDER — PREDNISONE 20 MG/1
20 TABLET ORAL
Status: DISCONTINUED | OUTPATIENT
Start: 2018-03-18 | End: 2018-03-18 | Stop reason: HOSPADM

## 2018-03-17 RX ORDER — LEVOFLOXACIN 750 MG/1
750 TABLET ORAL EVERY 24 HOURS
Qty: 2 TAB | Refills: 0 | Status: SHIPPED | OUTPATIENT
Start: 2018-03-17 | End: 2018-04-11 | Stop reason: ALTCHOICE

## 2018-03-17 RX ORDER — POTASSIUM CHLORIDE 20 MEQ/1
20 TABLET, EXTENDED RELEASE ORAL 2 TIMES DAILY
Qty: 60 TAB | Refills: 0 | Status: SHIPPED | OUTPATIENT
Start: 2018-03-17 | End: 2018-04-16

## 2018-03-17 RX ORDER — IPRATROPIUM BROMIDE AND ALBUTEROL SULFATE 2.5; .5 MG/3ML; MG/3ML
3 SOLUTION RESPIRATORY (INHALATION)
Status: DISCONTINUED | OUTPATIENT
Start: 2018-03-17 | End: 2018-03-18 | Stop reason: HOSPADM

## 2018-03-17 RX ORDER — PREDNISONE 10 MG/1
TABLET ORAL
Qty: 9 TAB | Refills: 0 | Status: SHIPPED | OUTPATIENT
Start: 2018-03-17 | End: 2018-04-11 | Stop reason: SDUPTHER

## 2018-03-17 RX ORDER — FLUTICASONE FUROATE AND VILANTEROL 100; 25 UG/1; UG/1
1 POWDER RESPIRATORY (INHALATION) DAILY
Qty: 1 INHALER | Refills: 0 | Status: SHIPPED | OUTPATIENT
Start: 2018-03-18

## 2018-03-17 RX ORDER — FUROSEMIDE 40 MG/1
TABLET ORAL
Qty: 30 TAB | Refills: 0 | Status: SHIPPED | OUTPATIENT
Start: 2018-03-17

## 2018-03-17 RX ORDER — POTASSIUM CHLORIDE 1.5 G/1.77G
40 POWDER, FOR SOLUTION ORAL EVERY 8 HOURS
Status: COMPLETED | OUTPATIENT
Start: 2018-03-17 | End: 2018-03-17

## 2018-03-17 RX ORDER — POTASSIUM CHLORIDE 14.9 MG/ML
10 INJECTION INTRAVENOUS
Status: DISCONTINUED | OUTPATIENT
Start: 2018-03-17 | End: 2018-03-17

## 2018-03-17 RX ADMIN — OXYCODONE HYDROCHLORIDE 10 MG: 5 TABLET ORAL at 17:01

## 2018-03-17 RX ADMIN — Medication 10 ML: at 22:27

## 2018-03-17 RX ADMIN — FUROSEMIDE 40 MG: 10 INJECTION, SOLUTION INTRAMUSCULAR; INTRAVENOUS at 09:47

## 2018-03-17 RX ADMIN — PREDNISONE 40 MG: 20 TABLET ORAL at 09:47

## 2018-03-17 RX ADMIN — Medication 10 ML: at 14:36

## 2018-03-17 RX ADMIN — POTASSIUM CHLORIDE 40 MEQ: 1.5 POWDER, FOR SOLUTION ORAL at 09:47

## 2018-03-17 RX ADMIN — MORPHINE SULFATE 30 MG: 30 TABLET, FILM COATED, EXTENDED RELEASE ORAL at 14:35

## 2018-03-17 RX ADMIN — IPRATROPIUM BROMIDE AND ALBUTEROL SULFATE 3 ML: .5; 3 SOLUTION RESPIRATORY (INHALATION) at 01:09

## 2018-03-17 RX ADMIN — LEVOFLOXACIN 750 MG: 750 TABLET, FILM COATED ORAL at 14:35

## 2018-03-17 RX ADMIN — IPRATROPIUM BROMIDE AND ALBUTEROL SULFATE 3 ML: .5; 3 SOLUTION RESPIRATORY (INHALATION) at 21:11

## 2018-03-17 RX ADMIN — SODIUM CHLORIDE 40 MEQ: 900 INJECTION, SOLUTION INTRAVENOUS at 10:25

## 2018-03-17 RX ADMIN — OXYCODONE HYDROCHLORIDE 10 MG: 5 TABLET ORAL at 10:36

## 2018-03-17 RX ADMIN — OXYCODONE HYDROCHLORIDE 10 MG: 5 TABLET ORAL at 22:27

## 2018-03-17 RX ADMIN — POTASSIUM CHLORIDE 40 MEQ: 1.5 POWDER, FOR SOLUTION ORAL at 17:02

## 2018-03-17 RX ADMIN — FLUTICASONE FUROATE AND VILANTEROL TRIFENATATE 1 PUFF: 100; 25 POWDER RESPIRATORY (INHALATION) at 10:25

## 2018-03-17 NOTE — PROGRESS NOTES
Received MD Consult indicating that pt will need Home 02, HH and new PCP - possible dc on Sunday, 3/18. Pt's info, sats and MD order for 02 was faxed to mgr Mauricio at [facility] ([phone]) to see if they are in network with pt's insur - [employer]. MD indicating also that pt wants a new PCP. Pt may also benefit from [facility] at ID.   COSTA Anton

## 2018-03-17 NOTE — PROGRESS NOTES
3/17/2018  1:42 PM  Home Oxygen Challenge    Sa02 88% on room air AT REST. Sa02 87% on room air DURING AMBULATION. Sa02 93% on 4 Liters DURING AMBULATION. Sa02 94 % on 4 Liters AT REST/AFTER AMBULATION.     Edvin Montgomery RN

## 2018-03-17 NOTE — DISCHARGE INSTRUCTIONS
Patient Discharge Instructions    Zulma Fontanez / [id number] : 1960    Admitted 3/14/2018 Discharged: 3/17/2018         DISCHARGE DIAGNOSIS:     COPD / acute bronchitis - you will need lung test done ( discuss with PCP)     Lung nodule right side - you will need chest CT scan repeated in 6 months ( discuss with PCP)      Liver cirrhosis  - follow with Dr Sheryl Sharif                               - new medications: lasix ( fluid pill) ; lactulose to keep ammonia down                               - take potassium daily when taking lasix       Traumatic brain injury - follow with Dr Edith Martinez                                         - MRI of the brain was abnormal  , you will need new MRA test done outpatient ( discuss with Dr Edith Martinez)            Take Home Medications     You are being discharge on antibiotic Levaquin for treatment of bronchitis      What you should know about antibiotics:     Antibiotics are medicines that help people fight infections caused by bacteria. They work by killing bacteria that are in the body. Antibiotics can cause side effects, such as nausea, vomiting. Nausea is a common side effect of many antibiotics. It is not an allergic reaction. If you are a woman and you get a yeast infection after taking an antibiotic, that does not mean you are allergic to it. Yeast infections are a common side effect of antibiotics. One of the most important side effect to watch while taking antibiotic or after you just finished taking it is diarrhea. This type of diarrhea may be caused by an infection with bacteria called C. difficile. C. difficile normally lives in the intestines. When people are on antibiotics, the C. difficile in their intestines can overgrow and cause infection. If you develop any side effect especially diarrhea while taking antibiotics it is very important to contact your doctor. We recommend taking probiotics while taking antibiotics.  Probiotics can be bought over the counter. Allergies to antibiotics are common. You can develop an allergy to an antibiotic, even if you have not had a problem with it before. Symptoms of antibiotic allergy can be mild and include a flat rash and itching. They can also be more serious and include:      -----  Hives - are raised, red patches of skin that are usually very itchy      -----  Lip or tongue swelling     ------ Trouble swallowing or breathing  Serious allergy symptoms can start right after you start taking an antibiotic if you are very sensitive. Less serious symptoms, on the other hand, often start a day or more later. If you think you are allergic to antibiotics tell your doctor. General drug facts     If you have a very bad allergy, wear an allergy ID at all times. It is important that you take the medication exactly as they are prescribed. Keep your medication in the bottles provided by the pharmacist.  Keep a list of all your drugs (prescription, natural products, vitamins, OTC) with you. Give this list to your doctor. Do not take other medications without consulting your doctor. Do not share your drugs with others and do not take anyone else's drugs. Keep all drugs out of the reach of children and pets. Most drugs may be thrown away in household trash after mixing with coffee grounds or dara litter and sealing in a plastic bag. Keep a list Call your doctor for help with any side effects. If in the [place] you may also call the FDA at 0-653-FDA-9752    Talk with the doctor before starting any new drug, including OTC, natural products, or vitamins. What to do at Home    1. Recommended diet: 2 g sodium     2. Recommended activity: Activity as tolerated    3. If you experience any of the following symptoms then please call your primary care physician or return to the emergency room if you cannot get hold of your doctor: fever/chills/confusion/worsening dyspnea     4.   Lab work: with PCP in 1 week to follow potassium and kidney function since on new fluid pill     5. Stop smocking    6. Wear oxygen 4 L continuous     7. Bring these papers with you to your follow up appointments. The papers will help your doctors be sure to continue the care plan from the hospital.      Follow-up with:   PCP: Nura Day MD  Follow-up Information     Follow up With Details Comments [address] MD In 1 week  [address] Amy Bergman MD  as scheduled  [address] Kelly Cortez MD In 2 weeks 2-3 weeks  [address]  [phone]             Please call for your own appointment        Information obtained by :  I understand that if any problems occur once I am at home I am to contact my physician. I understand and acknowledge receipt of the instructions indicated above.                                                                                                                                            Physician's or R.N.'s Signature                                                                  Date/Time                                                                                                                                              Patient or Representative Signature                                                          Date/Time

## 2018-03-17 NOTE — CONSULTS
DATE OF CONSULTATION: 3/17/2018    CONSULTED BY: Ernestina Pierre DO    Chief Complaint   Patient presents with    Sore Throat     x 2 days    Altered mental status     family member states pt has been \"off\" and \"talking out of his head\" for a few days. Reason for Consult  I have been asked to see the patient in neurological consultation to render advice and opinion [address]  Patient admitted with several day history of confusion. He is a chronic pain patient of Dr. Paulo Bowman, Neurology. He was given scripts for Oxycodone and MS Contin that he filled on 2/16/18. His wife states he was taking Nyquil prior to admission. He filled his scripts for MS Contin, Oxycodone and Diazapam on 2/16/17. He had an MRI scan of his head done 6/17 that showed  \"Aneurysm versus tortuosity of right M1 segment. Followup with MR   arteriography of the [place] [facility] [place] is recommended.  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    Ill-defined condition     heat stroke years ago    Liver disease     Hep C - \"cured\"    Migraine     Psychiatric disorder     axiety and depression       FH  Family History   Problem Relation Age of Onset    Cancer Mother      lung cancer    Hypertension Mother     Cancer Father      metastatic, colon cancer    Hypertension Father     Hypertension Sister        SH  Social History     Social History    Marital status:      Spouse name: N/A    Number of children: N/A    Years of education: N/A     Social History Main Topics    Smoking status: Current Every Day Smoker     Packs/day: 0.50     Years: 40.00    Smokeless tobacco: Never Used      Comment: 4-5 cigarettes per day    Alcohol use No    Drug use: No    Sexual activity: Not Asked     Other Topics Concern    None     Social History Narrative       ALLERGIES  Allergies   Allergen Reactions    Pcn [Penicillins] Swelling    Cymbalta [Duloxetine] Other (comments)     Blood in urine       PHYSICAL EXAM  EXAMINATION:   Patient Vitals for the past 24 hrs:   Temp Pulse Resp BP SpO2   03/17/18 0828 98.3 °F (36.8 °C) 83 20 130/78 93 %   03/17/18 0109 - - - - 93 %   03/16/18 2359 98.9 °F (37.2 °C) 97 20 94/64 95 %   03/16/18 2034 - - - - 95 %   03/16/18 2002 98.4 °F (36.9 °C) 82 20 115/60 92 %   03/16/18 1749 99.5 °F (37.5 °C) 92 20 140/90 94 %   03/16/18 1640 - - - - 93 %   03/16/18 1228 - - - - 93 %        General:   Physical Exam   CONSTITUTIONAL: Oriented to person, place, and time, appears well-developed and well-nourished. No distress. Wearing sun glasses. Neurological Examination:   Mental Status:  AAO x3. Speech is fluent. Follows commands, has normal fund of knowledge, attention, short term recall, comprehension and insight. Cranial Nerves: Visual fields are full. PERRL, Extraocular movements are full. Facial sensation intact V1- V3. Facial movement intact, symmetric. Hearing intact to conversation. Palate elevates symmetrically. Shoulder shrug symmetric. Tongue midline. Motor: Strength is 5/5 in all 4 ext. Normal tone. No atrophy. Sensation: Normal to light touch    Reflexes: DTRs 2+ throughout. Plantar responses downgoing.      Coordination/Cerebellar: Intact to finger-nose-finger     Gait: NT       LAB DATA REVIEWED:    Results for orders placed or performed during the hospital encounter of 03/14/18   STREP AG SCREEN, GROUP A   Result Value Ref Range    Group A Strep Ag ID NEGATIVE  NEG     CULTURE, THROAT   Result Value Ref Range    Special Requests: NO SPECIAL REQUESTS      Culture result: NORMAL RESPIRATORY ZARINA/NO BETA STREP ISOLATED     CULTURE, BLOOD, PAIRED   Result Value Ref Range    Special Requests: NO SPECIAL REQUESTS      Culture result: NO GROWTH 2 DAYS     BLOOD GAS, ARTERIAL   Result Value Ref Range    pH 7.43 7.35 - 7.45      PCO2 46 (H) 35.0 - 45.0 mmHg    PO2 64 (L) 80 - 100 mmHg    O2 SAT 93 92 - 97 %    BICARBONATE 30 (H) 22 - 26 mmol/L    BASE EXCESS 4.5 mmol/L    O2 METHOD NASAL O2      O2 FLOW RATE 2.00 L/min    SPONTANEOUS RATE 16.0      Sample source ARTERIAL      SITE RIGHT BRACHIAL      KASEY'S TEST N/A     CBC WITH AUTOMATED DIFF   Result Value Ref Range    WBC 6.1 4.1 - 11.1 K/uL    RBC 5.05 4. 10 - 5.70 M/uL    HGB 15.0 12.1 - 17.0 g/dL    HCT 44.1 36.6 - 50.3 %    MCV 87.3 80.0 - 99.0 FL    MCH 29.7 26.0 - 34.0 PG    MCHC 34.0 30.0 - 36.5 g/dL    RDW 14.3 11.5 - 14.5 %    PLATELET 80 (L) 906 - 400 K/uL    MPV 10.3 8.9 - 12.9 FL    NRBC 0.0 0  WBC    ABSOLUTE NRBC 0.00 0.00 - 0.01 K/uL    NEUTROPHILS 66 32 - 75 %    LYMPHOCYTES 22 12 - 49 %    MONOCYTES 12 5 - 13 %    EOSINOPHILS 0 0 - 7 %    BASOPHILS 0 0 - 1 %    IMMATURE GRANULOCYTES 0 0.0 - 0.5 %    ABS. NEUTROPHILS 4.1 1.8 - 8.0 K/UL    ABS. LYMPHOCYTES 1.3 0.8 - 3.5 K/UL    ABS. MONOCYTES 0.7 0.0 - 1.0 K/UL    ABS. EOSINOPHILS 0.0 0.0 - 0.4 K/UL    ABS. BASOPHILS 0.0 0.0 - 0.1 K/UL    ABS. IMM.  GRANS. 0.0 0.00 - 0.04 K/UL    DF AUTOMATED     METABOLIC PANEL, COMPREHENSIVE   Result Value Ref Range    Sodium 136 136 - 145 mmol/L    Potassium 2.9 (L) 3.5 - 5.1 mmol/L    Chloride 98 97 - 108 mmol/L    CO2 32 21 - 32 mmol/L    Anion gap 6 5 - 15 mmol/L    Glucose 112 (H) 65 - 100 mg/dL    BUN 12 6 - 20 MG/DL    Creatinine 0.83 0.70 - 1.30 MG/DL    BUN/Creatinine ratio 14 12 - 20      GFR est AA >60 >60 ml/min/1.73m2    GFR est non-AA >60 >60 ml/min/1.73m2    Calcium 8.0 (L) 8.5 - 10.1 MG/DL    Bilirubin, total 1.2 (H) 0.2 - 1.0 MG/DL    ALT (SGPT) 38 12 - 78 U/L    AST (SGOT) 32 15 - 37 U/L    Alk.  phosphatase 96 45 - 117 U/L    Protein, total 7.5 6.4 - 8.2 g/dL    Albumin 3.7 3.5 - 5.0 g/dL    Globulin 3.8 2.0 - 4.0 g/dL    A-G Ratio 1.0 (L) 1.1 - 2.2     URINALYSIS W/ REFLEX CULTURE   Result Value Ref Range    Color ORANGE      Appearance CLOUDY (A) CLEAR      Specific gravity 1.028 1.003 - 1.030      pH (UA) 5.5 5.0 - 8.0      Protein 30 (A) NEG mg/dL    Glucose NEGATIVE  NEG mg/dL    Ketone NEGATIVE  NEG mg/dL    Blood LARGE (A) NEG      Urobilinogen 4.0 (H) 0.2 - 1.0 EU/dL    Nitrites NEGATIVE  NEG      Leukocyte Esterase NEGATIVE  NEG      WBC 0-4 0 - 4 /hpf    RBC 10-20 0 - 5 /hpf    Epithelial cells FEW FEW /lpf    Bacteria NEGATIVE  NEG /hpf    UA:UC IF INDICATED CULTURE NOT INDICATED BY UA RESULT CNI      Mucus 2+ (A) NEG /lpf   AMMONIA   Result Value Ref Range    Ammonia 34 (H) <32 UMOL/L   DRUG SCREEN, URINE   Result Value Ref Range    AMPHETAMINES NEGATIVE  NEG      BARBITURATES NEGATIVE  NEG      BENZODIAZEPINES POSITIVE (A) NEG      COCAINE NEGATIVE  NEG      METHADONE NEGATIVE  NEG      OPIATES POSITIVE (A) NEG      PCP(PHENCYCLIDINE) NEGATIVE  NEG      THC (TH-CANNABINOL) NEGATIVE  NEG      Drug screen comment (NOTE)    TROPONIN I   Result Value Ref Range    Troponin-I, Qt. <0.04 <0.05 ng/mL   NT-PRO BNP   Result Value Ref Range    NT pro- (H) 0 - 125 PG/ML   ETHYL ALCOHOL   Result Value Ref Range    ALCOHOL(ETHYL),SERUM <10 <10 MG/DL   BILIRUBIN, CONFIRM   Result Value Ref Range    Bilirubin UA, confirm POSITIVE (A) NEG     METABOLIC PANEL, BASIC   Result Value Ref Range    Sodium 134 (L) 136 - 145 mmol/L    Potassium 2.8 (L) 3.5 - 5.1 mmol/L    Chloride 95 (L) 97 - 108 mmol/L    CO2 32 21 - 32 mmol/L    Anion gap 7 5 - 15 mmol/L    Glucose 149 (H) 65 - 100 mg/dL    BUN 15 6 - 20 MG/DL    Creatinine 0.94 0.70 - 1.30 MG/DL    BUN/Creatinine ratio 16 12 - 20      GFR est AA >60 >60 ml/min/1.73m2    GFR est non-AA >60 >60 ml/min/1.73m2    Calcium 8.1 (L) 8.5 - 10.1 MG/DL MAGNESIUM   Result Value Ref Range    Magnesium 1.9 1.6 - 2.4 mg/dL   PHOSPHORUS   Result Value Ref Range    Phosphorus 0.7 (LL) 2.6 - 4.7 MG/DL   METABOLIC PANEL, BASIC   Result Value Ref Range    Sodium 136 136 - 145 mmol/L    Potassium 2.4 (LL) 3.5 - 5.1 mmol/L    Chloride 94 (L) 97 - 108 mmol/L    CO2 37 (H) 21 - 32 mmol/L    Anion gap 5 5 - 15 mmol/L    Glucose 105 (H) 65 - 100 mg/dL    BUN 11 6 - 20 MG/DL    Creatinine 0.70 0.70 - 1.30 MG/DL    BUN/Creatinine ratio 16 12 - 20      GFR est AA >60 >60 ml/min/1.73m2    GFR est non-AA >60 >60 ml/min/1.73m2    Calcium 8.3 (L) 8.5 - 10.1 MG/DL   MAGNESIUM   Result Value Ref Range    Magnesium 1.9 1.6 - 2.4 mg/dL   PHOSPHORUS   Result Value Ref Range    Phosphorus 1.3 (L) 2.6 - 4.7 MG/DL   CBC W/O DIFF   Result Value Ref Range    WBC 7.8 4.1 - 11.1 K/uL    RBC 4.74 4.10 - 5.70 M/uL    HGB 14.1 12.1 - 17.0 g/dL    HCT 41.6 36.6 - 50.3 %    MCV 87.8 80.0 - 99.0 FL    MCH 29.7 26.0 - 34.0 PG    MCHC 33.9 30.0 - 36.5 g/dL    RDW 13.9 11.5 - 14.5 %    PLATELET 72 (L) 168 - 400 K/uL    MPV 11.1 8.9 - 12.9 FL    NRBC 0.0 0  WBC    ABSOLUTE NRBC 0.00 0.00 - 0.01 K/uL   PROTHROMBIN TIME + INR   Result Value Ref Range    INR 1.1 0.9 - 1.1      Prothrombin time 11.1 9.0 - 11.1 sec   AFP, TUMOR MARKER   Result Value Ref Range    AFP, Serum, Tumor Marker 3.6 0.0 - 8.3 ng/mL   AMMONIA   Result Value Ref Range    Ammonia <10 <00 UMOL/L   METABOLIC PANEL, BASIC   Result Value Ref Range    Sodium 138 136 - 145 mmol/L    Potassium 2.9 (L) 3.5 - 5.1 mmol/L    Chloride 97 97 - 108 mmol/L    CO2 36 (H) 21 - 32 mmol/L    Anion gap 5 5 - 15 mmol/L    Glucose 109 (H) 65 - 100 mg/dL    BUN 13 6 - 20 MG/DL    Creatinine 0.75 0.70 - 1.30 MG/DL    BUN/Creatinine ratio 17 12 - 20      GFR est AA >60 >60 ml/min/1.73m2    GFR est non-AA >60 >60 ml/min/1.73m2    Calcium 7.7 (L) 8.5 - 10.1 MG/DL   MAGNESIUM   Result Value Ref Range    Magnesium 2.2 1.6 - 2.4 mg/dL   PHOSPHORUS   Result Value Ref Range    Phosphorus 2.8 2.6 - 4.7 MG/DL        Imaging review:  CT HEAD 3/14/1  FINDINGS:  The ventricles and sulci are normal in size, shape and configuration and  midline. There is no significant white matter disease. There is no intracranial  hemorrhage, extra-axial collection, mass, mass effect or midline shift. The  basilar cisterns are open. No acute infarct is identified. The bone windows  demonstrate no abnormalities. The visualized portions of the paranasal sinuses  and mastoid air cells are clear.     IMPRESSION  IMPRESSION: No acute abnormality  HOME MEDS  Prior to Admission Medications   Prescriptions Last Dose Informant Patient Reported? Taking? DM/P-EPHED/ACETAMINOPH/DOXYLAM (NYQUIL PO) 3/13/2018 at 1700 Self Yes Yes   Sig: Take 20 mL by mouth nightly as needed (congestion). Morphine (HUYEN) 60 mg ER capsule 3/14/2018 at 0400 Self Yes Yes   Sig: Take 30-60 mg by mouth two (2) times daily as needed for Pain. Omeprazole delayed release (PRILOSEC D/R) 20 mg tablet 3/13/2018 at Unknown time Self No Yes   Sig: Take 1 Tab by mouth daily. VENTOLIN HFA 90 mcg/actuation inhaler 3/14/2018 at Unknown time Self Yes Yes   Sig: Take 2-3 Puffs by inhalation every four (4) hours as needed for Wheezing or Shortness of Breath. diazePAM (VALIUM) 10 mg tablet 3/13/2018 at Unknown time Self No Yes   Sig: Take 1 Tab by mouth two (2) times daily as needed. Max Daily Amount: 20 mg.   hydroCHLOROthiazide (HYDRODIURIL) 25 mg tablet 3/14/2018 at Unknown time Self No Yes   Sig: TAKE ONE TABLET BY MOUTH DAILY   oxyCODONE-acetaminophen (PERCOCET)  mg per tablet 3/14/2018 at 0900 Self Yes Yes   Sig: Take 1 Tab by mouth every twelve (12) hours as needed for Pain. predniSONE (DELTASONE) 10 mg tablet 3/12/2018 at Unknown time Self Yes Yes   Sig: Take 10 mg by mouth daily as needed for Pain.       Facility-Administered Medications: None       CURRENT MEDS  Current Facility-Administered Medications Medication Dose Route Frequency    albuterol-ipratropium (DUO-NEB) 2.5 MG-0.5 MG/3 ML  3 mL Nebulization Q6H RT    potassium chloride (KLOR-CON) packet 40 mEq  40 mEq Oral Q8H    sodium chloride (NS) flush 10-40 mL  10-40 mL IntraVENous Q8H    nicotine (NICODERM CQ) 21 mg/24 hr patch 1 Patch  1 Patch TransDERmal DAILY    fluticasone-vilanterol (BREO ELLIPTA) 100mcg-25mcg/puff  1 Puff Inhalation DAILY    furosemide (LASIX) injection 40 mg  40 mg IntraVENous DAILY    predniSONE (DELTASONE) tablet 40 mg  40 mg Oral DAILY WITH BREAKFAST    levoFLOXacin (LEVAQUIN) tablet 750 mg  750 mg Oral Q24H    sodium chloride (NS) flush 5-10 mL  5-10 mL IntraVENous Q8H    enoxaparin (LOVENOX) injection 40 mg  40 mg SubCUTAneous Q24H       IMPRESSION:  RECOMMENDATIONS:  This patients neurologic exam and Head CT are normal. I suspect he procably ran out of his opiates early and was taking other meds to compensate and became confused. He seems back to normal now. I think he can go home as long as he has enough opiates to last until he can  his new scripts from Dr. Dov Carpio office. I doubt the abnormality seen on MRI was anurysm, he can have MRA head as opt. Thank you very much for this consultation. We will follow up on the above studies and give further recommendations as indicated. Pari Aleman. Bascom Cogan MD  Neurologist    This note will not be viewable in [address]

## 2018-03-17 NOTE — PROGRESS NOTES
Oncology Nursing Communication Tool  7:59 PM  3/17/2018     Bedside and Verbal shift change report given to Vandana Canseco RN (incoming nurse) by Cabrera Correia RN (outgoing nurse) on [facility]. . Report included the following information SBAR, Kardex, Intake/Output, MAR, Accordion and Recent Results. Shift Summary: Pain controlled with MS Contin. Electrolyte repletion TBD. Issues for physician to address:  to set up home oxygen for discharge         Oncology Shift Note   Admission Date 3/14/2018   Admission Diagnosis Respiratory failure with hypoxia ([address]   Code Status Full Code   Consults IP CONSULT TO PALLIATIVE CARE - PROVIDER  IP CONSULT TO GASTROENTEROLOGY  IP CONSULT TO NEUROLOGY      Cardiac Monitoring [] Yes [] No      Purposeful Hourly Rounding [] Yes    George Score Total Score: 2   George score 3 or > [] Bed Alarm [] Avasys [] 1:1 sitter [] Patient refused (Place signed refusal form in chart)      Pain Managed [] Yes [] No    Key Pain Meds             Morphine (HUYEN) 60 mg ER capsule  (Taking) Take 30-60 mg by mouth two (2) times daily as needed for Pain. oxyCODONE-acetaminophen (PERCOCET)  mg per tablet  (Taking) Take 1 Tab by mouth every twelve (12) hours as needed for Pain. Influenza Vaccine Received Flu Vaccine for Current Season (usually Sept-March): No    Patient/Guardian Refused (Notify MD): Yes      Oxygen needs? [] Room air Oxygen @  []1L    []2L    []3L   []4L    []5L   []6L     Use home O2? [] Yes [] No  Perform O2 challenge test using  smartphrase (.oxygenchallenge)      Last bowel movement Last Bowel Movement Date: 03/16/18  bowel movement      Urinary Catheter             LDAs                                    Readmission Risk Assessment Tool Score Medium Risk            15       Total Score        3 Has Seen PCP in Last 6 Months (Yes=3, No=0)    2 . Living with Significant Other. Assisted Living. LTAC. SNF.  or Rehab    4 Pt.  Coverage (Medicare=5 , Medicaid, or Self-Pay=4)    6 Charlson Comorbidity Score (Age + Comorbid Conditions)        Criteria that do not apply:    Patient Length of Stay (>5 days = 3)    IP Visits Last 12 Months (1-3=4, 4=9, >4=11)       Expected Length of Stay 3d 16h   Actual Length of Stay 3          Carrol Donovan RN

## 2018-03-17 NOTE — PROGRESS NOTES
3/17/2018  3:17 PM  Home Oxygen Challenge    Sa02 88 % on room air AT REST. Sa02 87 % on room air DURING AMBULATION. Sa02 89 % on 2 Liters DURING AMBULATION. Sa02 89 % on 2 Liters AT REST/AFTER AMBULATION.     Sandi Denson RN

## 2018-03-17 NOTE — PROGRESS NOTES
Oncology Nursing Communication Tool  0715 AM  3/17/2018     Bedside shift change report given to Jaden Franks RN (incoming nurse) by Holli Almaguer RN (outgoing nurse) on [facility]. . Report included the following information SBAR. Shift Summary: Recheck of K was drawn late as 2nd bag of KCl not hung until 2215 because previous infusion of K was run at a slow rate per report. Pt declining dose of Lactulose as he has had multiple loose stools today. Issues for physician to address: Discharge         Oncology Shift Note   Admission Date 3/14/2018   Admission Diagnosis Respiratory failure with hypoxia ([facility] [address]   Code Status Full Code   Consults IP CONSULT TO PALLIATIVE CARE - PROVIDER  IP CONSULT TO GASTROENTEROLOGY  IP CONSULT TO NEUROLOGY      Cardiac Monitoring [] Yes [] No      Purposeful Hourly Rounding [] Yes    George Score Total Score: 2   George score 3 or > [] Bed Alarm [] Avasys [] 1:1 sitter [] Patient refused (Place signed refusal form in chart)      Pain Managed [] Yes [] No    Key Pain Meds             Morphine (HUYEN) 60 mg ER capsule  (Taking) Take 30-60 mg by mouth two (2) times daily as needed for Pain. oxyCODONE-acetaminophen (PERCOCET)  mg per tablet  (Taking) Take 1 Tab by mouth every twelve (12) hours as needed for Pain. Influenza Vaccine Received Flu Vaccine for Current Season (usually Sept-March): No    Patient/Guardian Refused (Notify MD): Yes      Oxygen needs? [] Room air Oxygen @  []1L    []2L    []3L   []4L    []5L   []6L     Use home O2? [] Yes [] No  Perform O2 challenge test using  smartphrase (.oxygenchallenge)      Last bowel movement Last Bowel Movement Date: 03/16/18  bowel movement      Urinary Catheter             LDAs                                    Readmission Risk Assessment Tool Score Medium Risk            15       Total Score        3 Has Seen PCP in Last 6 Months (Yes=3, No=0)    2 . Living with Significant Other. Assisted Living. LTAC. SNF. or   Rehab    4 Pt.  Coverage (Medicare=5 , Medicaid, or Self-Pay=4)    6 Charlson Comorbidity Score (Age + Comorbid Conditions)        Criteria that do not apply:    Patient Length of Stay (>5 days = 3)    IP Visits Last 12 Months (1-3=4, 4=9, >4=11)       Expected Length of Stay 3d 16h   Actual Length of Stay 3          Aminta Bar RN

## 2018-03-17 NOTE — PROGRESS NOTES
ADULT PROTOCOL: JET AEROSOL  REASSESSMENT    Patient  Zuri Markham.     61 y.o.   male     3/17/2018  1:37 AM    Breath Sounds Pre Procedure: Right Breath Sounds: Diminished                               Left Breath Sounds: Diminished    Breath Sounds Post Procedure: Right Breath Sounds: Diminished                                 Left Breath Sounds: Diminished    Breathing pattern: Pre procedure Breathing Pattern: Regular          Post procedure Breathing Pattern: Regular    Heart Rate: Pre procedure Pulse: 87           Post procedure Pulse: 89    Resp Rate: Pre procedure Respirations: 20           Post procedure Respirations: 20    Peak Flow: Pre bronchodilator   n/a          Post bronchodilator   n/a    Incentive Spirometry:   n/a      n/a  Cough: Pre procedure Cough: Non-productive               Post procedure Cough: Non-productive      Sputum: Pre procedure Sputum amount:  (n/a)  Sputum color/odor:  (n/a)  Sputum consistency:  (n/a)  Sputum method obtained:  (n/a)                 Post procedure  n/a    Oxygen: O2 Device: Nasal cannula   Flow rate (L/min) 4     Changed: NO    SpO2: Pre procedure SpO2: 93 %   with oxygen              Post procedure SpO2: 94 %  with oxygen    Nebulizer Therapy: Current medications Aerosolized Medications: DuoNeb      Changed: YES, he is doing much better, feel that his treatments can be spaced. .    Problem List:   Patient Active Problem List   Diagnosis Code    Essential hypertension I10    Migraine G43.909    Chronic back pain M54.9, G89.29    Brain aneurysm I67.1    Gastroesophageal reflux disease without esophagitis K21.9    Anxiety and depression F41.8    Chronic hepatitis C without hepatic coma ([facility] [address] B18.2    Obesity E66.9    Cirrhosis ([facility] [address] K74.60    Respiratory failure with hypoxia ([facility] [address] J96.91       Respiratory Therapist: RT Drake

## 2018-03-17 NOTE — PROGRESS NOTES
Hospitalist Progress Note    NAME: Bartolo Marcos. :  1960   MRN:  [id number]       Assessment / Plan:  Acute hypoxic respiratory failure POA in settings of COPD exacerbation due to acute bronchitis  -Sx: dyspnea / thick sputum cough/ on 2 L / wheezing - on admission  Clinically improved. Dyspnea resolved  -O2:  Sa02 88% on room air AT REST. Sa02 87% on room air DURING AMBULATION. Checking on 2L  Need home O2   -ECHO:EF 65% , no hypokinesis, no valvular abnormalities   -taper off prednisone   -dc on Breo and albuterol prn   -need PFTs OP with PCP   -CTA: no PE/edema/pneumonia   Solitary pulmonary nodule warranting follow-up CT in 6-12 months (7 mm R lung) --> d/w pt/wife today findings of CT.  Recommended CT in 6 months with PCP.     Liver cirrhosis stage IV due to hepatitis C ( per pt) with worsening ascites   Thrombocytopenia   -no ascites by US   -started on diuretics: lasix IV, diuresing well --> PO on DC   Stop HCTZ   BP tolerating  LE edema resolved   -assess OP for adding spironolactone as well   -lactulose daily   -plt stable   -GI help appreciated: continue to follow with Dr Quincy Sweeney   -CT: no acute findings   - primary GI: Dr Bryce Aase / Dr Elena FERNANDES      Metabolic encephalopathy POA resolved   - MS back to normal ( wife at bedside )   -was multifactorial: hypoxia / meds   -dc on daily lactulose     Hypokalemia / hypophosphatemia   -supplemented aggressively   -will need K daily on DC with lasix       -will follow blood work today and supplement as ordered  -d/w rn to get new blood work now   -replete as needed     TBI / Migraine HA / chronic pain   -followed with Dr Mar Harden neurology - who managing his pain meds   -PTA: percocet 10/325 prn for joint pain  + Ms Contin prn for headache --> neurology help appreciated , OK to continue home meds   -MRI : possible aneurysm --> see by neuro: doubt the abnormality seen on MRI was anurysm, he can have MRA head as opt.  -avoid oversedation  -consulting palliative care to help with pain management      Tobacco abuse, nicotine patch            Code Status: Full  Surrogate Decision Maker: Wife  DVT Prophylaxis: Lovenox ok unless plt < 50K       Body mass index is 34.86 kg/(m^2). Baseline: lives with wife; ambulating with cane   Recommended Disposition: Home w/Family 1-2 days ( K stable/home O2)     Midline 3/16 , poor IV access      Subjective:     Chief Complaint / Reason for Physician Visit: following respiratory failure / copd/ ascites   Pt reports feeling better since admission   Dyspnea improved   No fever   LE edema much better     Discussed with RN events overnight. Review of Systems:  Symptom Y/N Comments  Symptom Y/N Comments   Fever/Chills n   Chest Pain n    Poor Appetite    Edema     Cough y   Abdominal Pain y Chronic   Sputum y Thick,mild,yellow/green  Joint Pain     SOB/PARKS n   Pruritis/Rash     Nausea/vomit n   Tolerating PT/OT     Diarrhea n   Tolerating Diet     Constipation n   Other       Could NOT obtain due to:      Objective:     VITALS:   Last 24hrs VS reviewed since prior progress note. Most recent are:  Patient Vitals for the past 24 hrs:   Temp Pulse Resp BP SpO2   03/17/18 0828 98.3 °F (36.8 °C) 83 20 130/78 93 %   03/17/18 0109 - - - - 93 %   03/16/18 2359 98.9 °F (37.2 °C) 97 20 94/64 95 %   03/16/18 2034 - - - - 95 %   03/16/18 2002 98.4 °F (36.9 °C) 82 20 115/60 92 %   03/16/18 1749 99.5 °F (37.5 °C) 92 20 140/90 94 %   03/16/18 1640 - - - - 93 %     No intake or output data in the 24 hours ending 03/17/18 1359     PHYSICAL EXAM:  General: WD, WN. Awake/alert. Cooperative, no acute distress    EENT:  EOMI. Anicteric sclerae. MMM  Resp:  CTA  bilaterally, no wheezing, no rales.   No accessory muscle use  CV:  Regular  Rhythm, no edema  GI:  Soft, appears distended, + gen tender.  +Bowel sounds  Neurologic:  Alert and oriented X 3, normal speech, not focal   Psych:   Good insight. Not anxious nor agitated  Skin:  No rashes. No jaundice    Reviewed most current lab test results and cultures  YES  Reviewed most current radiology test results   YES  Review and summation of old records today    NO  Reviewed patient's current orders and MAR    YES  PMH/SH reviewed - no change compared to H&P  ________________________________________________________________________  Care Plan discussed with:    Comments   Patient y    Family  y Wife bedside    RN y    Care Manager     Consultant  y Neurology                      Multidiciplinary team rounds were held today with , nursing, pharmacist and clinical coordinator. Patient's plan of care was discussed; medications were reviewed and discharge planning was addressed. ________________________________________________________________________  Total NON critical care TIME:  40  Minutes    Total CRITICAL CARE TIME Spent:   Minutes non procedure based      Comments   >50% of visit spent in counseling and coordination of care y Dc coordination    ________________________________________________________________________  Elaine Dorsey MD     Procedures: see electronic medical records for all procedures/Xrays and details which were not copied into this note but were reviewed prior to creation of Plan. LABS:  I reviewed today's most current labs and imaging studies.   Pertinent labs include:  Recent Labs      03/16/18   1059   WBC  7.8   HGB  14.1   HCT  41.6   PLT  72*     Recent Labs      03/17/18   0429  03/16/18   1104  03/16/18   1059  03/15/18   1755   NA  138   --   136  134*   K  2.9*   --   2.4*  2.8*   CL  97   --   94*  95*   CO2  36*   --   37*  32   GLU  109*   --   105*  149*   BUN  13   --   11  15   CREA  0.75   --   0.70  0.94   CA  7.7*   --   8.3*  8.1*   MG  2.2   --   1.9  1.9   PHOS  2.8   --   1.3*  0.7*   INR   --   1.1   --    --        Signed: Elaine Dorsey MD

## 2018-03-17 NOTE — PROGRESS NOTES
GI PROGRESS NOTE    NAME:             Viki Ortega :              1960   MRN:              [id number]   Admit Date:     3/14/2018  Todays Date:  3/17/2018      Subjective:           Tolerating diet, no nausea or vomiting, no abdominal pain no edema    Review of Systems - Cardiovascular ROS: no chest pain or dyspnea on exertion  Gastrointestinal ROS: no abdominal pain, change in bowel habits, or black or bloody stools  Medications-reviewed     Current Facility-Administered Medications   Medication Dose Route Frequency    albuterol-ipratropium (DUO-NEB) 2.5 MG-0.5 MG/3 ML  3 mL Nebulization Q6H RT    morphine CR (MS CONTIN) tablet 30 mg  30 mg Oral Q12H PRN    [START ON 3/18/2018] lactulose (CHRONULAC) solution 30 g  30 g Oral DAILY    [START ON 3/18/2018] predniSONE (DELTASONE) tablet 20 mg  20 mg Oral DAILY WITH BREAKFAST    sodium chloride (NS) flush 10-40 mL  10-40 mL IntraVENous PRN    sodium chloride (NS) flush 10-40 mL  10-40 mL IntraVENous Q8H    ondansetron (ZOFRAN) injection 4 mg  4 mg IntraVENous Q4H PRN    nicotine (NICODERM CQ) 21 mg/24 hr patch 1 Patch  1 Patch TransDERmal DAILY    fluticasone-vilanterol (BREO ELLIPTA) 100mcg-25mcg/puff  1 Puff Inhalation DAILY    furosemide (LASIX) injection 40 mg  40 mg IntraVENous DAILY    levoFLOXacin (LEVAQUIN) tablet 750 mg  750 mg Oral Q24H    oxyCODONE IR (ROXICODONE) tablet 10 mg  10 mg Oral Q6H PRN    naloxone (NARCAN) injection 0.1 mg  0.1 mg IntraVENous EVERY 2 MINUTES AS NEEDED    ibuprofen (MOTRIN) tablet 400 mg  400 mg Oral Q6H PRN    sodium chloride (NS) flush 5-10 mL  5-10 mL IntraVENous Q8H    sodium chloride (NS) flush 5-10 mL  5-10 mL IntraVENous PRN    acetaminophen (TYLENOL) tablet 650 mg  650 mg Oral Q4H PRN    enoxaparin (LOVENOX) injection 40 mg  40 mg SubCUTAneous Q24H    albuterol-ipratropium (DUO-NEB) 2.5 MG-0.5 MG/3 ML  3 mL Nebulization Q4H PRN        Objective:   Patient Vitals for the past 8 hrs:   BP Temp Pulse Resp SpO2 Weight   03/17/18 1648 115/64 97.7 °F (36.5 °C) 70 20 94 % -   03/17/18 1339 - - - - - 110.2 kg (242 lb 15.2 oz)             EXAM:  Lungs: clear to auscultation bilaterally  Heart: regular rate and rhythm, S1, S2 normal, no murmur, click, rub or gallop  Abdomen: soft,obese, non-tender. Bowel sounds normal. No masses,  no organomegaly      Data Review     Recent Labs      03/16/18   1059   WBC  7.8   HGB  14.1   HCT  41.6   PLT  72*     Recent Labs      03/17/18   0429  03/16/18   1059   NA  138  136   K  2.9*  2.4*   CL  97  94*   CO2  36*  37*   BUN  13  11   CREA  0.75  0.70   GLU  109*  105*   PHOS  2.8  1.3*   CA  7.7*  8.3*     No results for input(s): SGOT, GPT, AP, TBIL, TP, ALB, GLOB, GGT, AML, LPSE in the last 72 hours. No lab exists for component: AMYP, HLPSE    CT- negative for                        Assessment:     Hep C Cirrhosis    Tobacco abuse    Respiratory failure with hypoxia    Hypokalemia    Hypocalemia         Active Problems:    Respiratory failure with hypoxia ([address] (3/14/2018)        Plan:   Patient stable from GI standpoint. He still has some abdominal bloating but no ascites on U/S. He is still having abdominal pain and poorly localized physical exam.  Majority of his current issues seem to be related to COPD and will therefore hold any spironolactone ad proceed with CT abd/pelvis with contrast once IV established.   If CT neg then will see again as needed and recommend pt to f/u with Dr. Deon Reynolds after discharge: his established hepatologist. Will sign off         Anurag Benedict MD

## 2018-03-18 ENCOUNTER — HOME HEALTH ADMISSION (OUTPATIENT)
Dept: HOME HEALTH SERVICES | Facility: HOME HEALTH | Age: 58
End: 2018-03-18

## 2018-03-18 VITALS
DIASTOLIC BLOOD PRESSURE: 68 MMHG | TEMPERATURE: 97.7 F | OXYGEN SATURATION: 95 % | RESPIRATION RATE: 20 BRPM | WEIGHT: 242.95 LBS | HEART RATE: 76 BPM | SYSTOLIC BLOOD PRESSURE: 130 MMHG | BODY MASS INDEX: 34.86 KG/M2

## 2018-03-18 LAB
ANION GAP SERPL CALC-SCNC: 5 MMOL/L (ref 5–15)
BUN SERPL-MCNC: 19 MG/DL (ref 6–20)
BUN/CREAT SERPL: 21 (ref 12–20)
CALCIUM SERPL-MCNC: 8.6 MG/DL (ref 8.5–10.1)
CHLORIDE SERPL-SCNC: 104 MMOL/L (ref 97–108)
CO2 SERPL-SCNC: 35 MMOL/L (ref 21–32)
CREAT SERPL-MCNC: 0.91 MG/DL (ref 0.7–1.3)
GLUCOSE SERPL-MCNC: 113 MG/DL (ref 65–100)
POTASSIUM SERPL-SCNC: 3.4 MMOL/L (ref 3.5–5.1)
SODIUM SERPL-SCNC: 144 MMOL/L (ref 136–145)

## 2018-03-18 PROCEDURE — 74011250637 HC RX REV CODE- 250/637: Performed by: INTERNAL MEDICINE

## 2018-03-18 PROCEDURE — 74011636637 HC RX REV CODE- 636/637: Performed by: HOSPITALIST

## 2018-03-18 PROCEDURE — 36415 COLL VENOUS BLD VENIPUNCTURE: CPT | Performed by: HOSPITALIST

## 2018-03-18 PROCEDURE — 74011250637 HC RX REV CODE- 250/637: Performed by: HOSPITALIST

## 2018-03-18 PROCEDURE — 74011000250 HC RX REV CODE- 250: Performed by: HOSPITALIST

## 2018-03-18 PROCEDURE — 77010033678 HC OXYGEN DAILY

## 2018-03-18 PROCEDURE — 80048 BASIC METABOLIC PNL TOTAL CA: CPT | Performed by: HOSPITALIST

## 2018-03-18 PROCEDURE — 94640 AIRWAY INHALATION TREATMENT: CPT

## 2018-03-18 RX ORDER — POTASSIUM CHLORIDE 750 MG/1
40 TABLET, FILM COATED, EXTENDED RELEASE ORAL
Status: COMPLETED | OUTPATIENT
Start: 2018-03-18 | End: 2018-03-18

## 2018-03-18 RX ORDER — FUROSEMIDE 40 MG/1
40 TABLET ORAL DAILY
Status: DISCONTINUED | OUTPATIENT
Start: 2018-03-18 | End: 2018-03-18 | Stop reason: HOSPADM

## 2018-03-18 RX ADMIN — LEVOFLOXACIN 750 MG: 750 TABLET, FILM COATED ORAL at 11:19

## 2018-03-18 RX ADMIN — IPRATROPIUM BROMIDE AND ALBUTEROL SULFATE 3 ML: .5; 3 SOLUTION RESPIRATORY (INHALATION) at 08:10

## 2018-03-18 RX ADMIN — FUROSEMIDE 40 MG: 40 TABLET ORAL at 11:19

## 2018-03-18 RX ADMIN — POTASSIUM CHLORIDE 40 MEQ: 750 TABLET, FILM COATED, EXTENDED RELEASE ORAL at 11:19

## 2018-03-18 RX ADMIN — OXYCODONE HYDROCHLORIDE 10 MG: 5 TABLET ORAL at 04:29

## 2018-03-18 RX ADMIN — PREDNISONE 20 MG: 20 TABLET ORAL at 11:19

## 2018-03-18 RX ADMIN — IPRATROPIUM BROMIDE AND ALBUTEROL SULFATE 3 ML: .5; 3 SOLUTION RESPIRATORY (INHALATION) at 02:37

## 2018-03-18 RX ADMIN — FLUTICASONE FUROATE AND VILANTEROL TRIFENATATE 1 PUFF: 100; 25 POWDER RESPIRATORY (INHALATION) at 09:00

## 2018-03-18 RX ADMIN — MORPHINE SULFATE 30 MG: 30 TABLET, FILM COATED, EXTENDED RELEASE ORAL at 03:38

## 2018-03-18 RX ADMIN — LACTULOSE 30 G: 20 SOLUTION ORAL at 11:19

## 2018-03-18 NOTE — PROGRESS NOTES
ADULT PROTOCOL: JET AEROSOL  REASSESSMENT    Patient  Terrell Wenrer.     61 y.o.   male     3/18/2018  3:12 AM    Breath Sounds Pre Procedure: Right Breath Sounds: Diminished                               Left Breath Sounds: Diminished    Breath Sounds Post Procedure: Right Breath Sounds: Diminished                                 Left Breath Sounds: Diminished    Breathing pattern: Pre procedure Breathing Pattern: Regular          Post procedure Breathing Pattern: Regular    Heart Rate: Pre procedure Pulse: 69           Post procedure Pulse: 72    Resp Rate: Pre procedure Respirations: 20           Post procedure Respirations 20    Cough: Pre procedure Cough: Non-productive               Post procedure Cough: Non-productive    Suctioned: NO    Sputum: Pre procedure Sputum amount:  (n/a)  Sputum color/odor:  (n/a)  Sputum consistency:  (n/a)  Sputum method obtained:  (n/a)                 Post procedure      Oxygen: O2 Device: Nasal cannula   4L     Changed: NO    SpO2: Pre procedure SpO2: 95 %   with oxygen              Post procedure SpO2: 96 %  with oxygen    Nebulizer Therapy: Current medications Aerosolized Medications: DuoNeb      Changed: NO    Smoking History: current smoker    Problem List:   Patient Active Problem List   Diagnosis Code    Essential hypertension I10    Migraine G43.909    Chronic back pain M54.9, G89.29    Brain aneurysm I67.1    Gastroesophageal reflux disease without esophagitis K21.9    Anxiety and depression F41.8    Chronic hepatitis C without hepatic coma (HCC) B18.2    Obesity E66.9    Cirrhosis (HCC) K74.60    Respiratory failure with hypoxia ([facility] [address] J96.91       Respiratory Therapist: Mónica Bradley RT

## 2018-03-18 NOTE — PROGRESS NOTES
CM contacted [facility] to f/u on oxygen order/referral and pt has been approved. CM provided pt with the oxygen and the  from [facility] will deliver the remaining equipment to pt's home. Order noted for change in oxygen amount from 2 liters to 4 liters. CM faxed the new order to [facility] and notified the  on call. CM discussed hospital to home visit with pt and he was in agreement. FOC form signed by pt. Order obtained and referral sent via cc to [facility]. Pt's exwife will transport him home by car and pt verbalized that she helps him at times. CM discussed with pt his f/u appointments that will need to be completed by him tomorrow since today is Sunday and the offices are closed. 12pm - [facility]  accepted pt for a hospital to home visit. Care Management Interventions  PCP Verified by CM: Yes  Mode of Transport at Discharge:  Other (see comment) (pt's exwife will transport pt home by car)  Hospital Transport Time of Discharge: Melany (CM Consult): Discharge Planning  MyChart Signup: No  Discharge Durable Medical Equipment: Yes (oxygen at 4 liters nc - [facility])  Physical Therapy Consult: Yes  Occupational Therapy Consult: Yes  Speech Therapy Consult: No  Current Support Network: Lives Alone, Own Home (pt's exwife assists pt )  Confirm Follow Up Transport: Friends (pt's Shahida Cano will transport pt home by car)  Plan discussed with Pt/Family/Caregiver: Yes  Freedom of Choice Offered: Yes  Discharge Location  Discharge Placement: [phone], [phone]

## 2018-03-18 NOTE — PROGRESS NOTES
115 Discharge instructions discussed with the patient, midline removed, prescriptions with the patient. Home oxygen in room, instructed the patient on how to use it and set it up. Patient verbalizes and demonstrated how to use it. Patient states he will see a different physician  Other than Dr. Reine Schwab but will follow up with a physician for labs. Patient also states he is more comfortable on 3 L NC v 4 LNC. Patient states he will take his discharge instructions to his follow up appointments. Patient anticipates his wife arriving at 26.     [id number] Patient refused wheelchair, ambulated with patient and wife to personal car. Belongings with patient. No signs of bleeding from midline site.

## 2018-03-18 NOTE — PROGRESS NOTES
Hospitalist Progress Note    NAME: Inez Huang. :  1960   MRN:  [id number]       Assessment / Plan:  Acute on chronic hypoxic respiratory failure POA in settings of COPD exacerbation due to acute bronchitis  -Sx: dyspnea / thick sputum cough/ on 2 L / wheezing - on admission  Clinically improved. Dyspnea resolved  -O2:  Sa02 88% on room air AT REST. Sa02 87% on room air DURING AMBULATION. Checking on 2L  Need home O2   -ECHO:EF 65% , no hypokinesis, no valvular abnormalities   -taper off prednisone   -dc on Breo and albuterol prn   -need PFTs OP with PCP   -CTA: no PE/edema/pneumonia   Solitary pulmonary nodule warranting follow-up CT in 6-12 months (7 mm R lung) --> d/w pt/wife today findings of CT.  Recommended CT in 6 months with PCP.     Liver cirrhosis stage IV due to hepatitis C ( per pt) with worsening ascites   Thrombocytopenia   -no ascites by US   -started on diuretics: lasix IV, diuresing well --> PO on DC   Stop HCTZ   BP tolerating  LE edema resolved   -assess OP for adding spironolactone as well   -lactulose daily   -plt stable   -GI help appreciated: continue to follow with Dr Young Zayas   -CT: no acute findings   - primary GI: Dr Petra Walsh / Dr Car FERNANDES      Metabolic encephalopathy POA resolved   - MS back to normal ( wife at bedside )   -was multifactorial: hypoxia / meds   -dc home on daily lactulose     Hypokalemia / hypophosphatemia   -supplemented aggressively   -will need K daily on DC with lasix     TBI / Migraine HA / chronic pain   -followed with Dr Jordan Marquez neurology - who managing his pain meds   -PTA: percocet 10/325 prn for joint pain  + Ms Contin prn for headache --> neurology help appreciated , OK to continue home meds   -MRI : possible aneurysm --> see by neuro: doubt the abnormality seen on MRI was anurysm, he can have MRA head as OP.  -consulted palliative care team for pain management but they was not able to see pt       Tobacco abuse, nicotine patch            Code Status: Full  Surrogate Decision Maker: Wife  DVT Prophylaxis: Lovenox ok unless plt < 50K       Body mass index is 34.86 kg/(m^2). Baseline: lives with wife; ambulating with cane   Recommended Disposition: Home w/Family once home O2 arrangements are done     Midline 3/16 , poor IV access , will be remove on DC      Subjective:     Chief Complaint / Reason for Physician Visit: following respiratory failure / copd/ ascites   Pt reports feeling back to normal himself     Discussed with RN events overnight. Review of Systems:  Symptom Y/N Comments  Symptom Y/N Comments   Fever/Chills n   Chest Pain n    Poor Appetite    Edema     Cough n   Abdominal Pain y Chronic   Sputum n   Joint Pain     SOB/PARKS n   Pruritis/Rash     Nausea/vomit n   Tolerating PT/OT     Diarrhea n   Tolerating Diet     Constipation n   Other       Could NOT obtain due to:      Objective:     VITALS:   Last 24hrs VS reviewed since prior progress note. Most recent are:  Patient Vitals for the past 24 hrs:   Temp Pulse Resp BP SpO2   03/18/18 0921 97.7 °F (36.5 °C) 76 20 130/68 95 %   03/18/18 0810 - - - - 97 %   03/18/18 0235 - - - - 95 %   03/17/18 2304 97.7 °F (36.5 °C) 78 20 122/63 98 %   03/17/18 2109 - - - - 97 %   03/17/18 1913 98.5 °F (36.9 °C) 69 20 126/73 97 %   03/17/18 1648 97.7 °F (36.5 °C) 70 20 115/64 94 %       Intake/Output Summary (Last 24 hours) at 03/18/18 0924  Last data filed at 03/17/18 2015   Gross per 24 hour   Intake              120 ml   Output                0 ml   Net              120 ml        PHYSICAL EXAM:  General: WD, WN. Awake/alert. Cooperative, no acute distress    EENT:  EOMI. Anicteric sclerae. MMM  Resp:  CTA  bilaterally, no wheezing, no rales.   No accessory muscle use  CV:  Regular  Rhythm, no edema  GI:  Soft, appears distended, + gen tender.  +Bowel sounds  Neurologic:  Alert and oriented X 3, normal speech, not focal   Psych:   Good insight. Not anxious nor agitated  Skin:  No rashes. No jaundice    Reviewed most current lab test results and cultures  YES  Reviewed most current radiology test results   YES  Review and summation of old records today    NO  Reviewed patient's current orders and MAR    YES  PMH/SH reviewed - no change compared to H&P  ________________________________________________________________________  Care Plan discussed with:    Comments   Patient y    Family      RN y    Care Manager y    Consultant  y Neurology                      Multidiciplinary team rounds were held today with , nursing, pharmacist and clinical coordinator. Patient's plan of care was discussed; medications were reviewed and discharge planning was addressed. ________________________________________________________________________  Total NON critical care TIME:  40  Minutes    Total CRITICAL CARE TIME Spent:   Minutes non procedure based      Comments   >50% of visit spent in counseling and coordination of care y Dc coordination    ________________________________________________________________________  Vania Roman MD     Procedures: see electronic medical records for all procedures/Xrays and details which were not copied into this note but were reviewed prior to creation of Plan. LABS:  I reviewed today's most current labs and imaging studies.   Pertinent labs include:  Recent Labs      03/16/18   1059   WBC  7.8   HGB  14.1   HCT  41.6   PLT  72*     Recent Labs      03/18/18   0330  03/17/18   0429  03/16/18   1104  03/16/18   1059  03/15/18   1755   NA  144  138   --   136  134*   K  3.4*  2.9*   --   2.4*  2.8*   CL  104  97   --   94*  95*   CO2  35*  36*   --   37*  32   GLU  113*  109*   --   105*  149*   BUN  19  13   --   11  15   CREA  0.91  0.75   --   0.70  0.94   CA  8.6  7.7*   --   8.3*  8.1*   MG   --   2.2   --   1.9  1.9   PHOS   --   2.8   --   1.3*  0.7*   INR   --    --   1.1   --    --        Signed: Vania Roman, MD

## 2018-03-20 LAB
BACTERIA SPEC CULT: NORMAL
SERVICE CMNT-IMP: NORMAL

## 2018-03-23 ENCOUNTER — HOME CARE VISIT (OUTPATIENT)
Dept: HOME HEALTH SERVICES | Facility: HOME HEALTH | Age: 58
End: 2018-03-23

## 2018-03-28 NOTE — DISCHARGE SUMMARY
Hospitalist Discharge Summary     Patient ID:  Elida Glover  [id number]  61 y.o.  1960    PCP on record: Williams Aguilar MD    Admit date: 3/14/2018  Discharge date and time: 3/27/2018      DISCHARGE DIAGNOSIS:    Acute on chronic hypoxic respiratory failure POA in settings of COPD exacerbation due to acute bronchitis  Liver cirrhosis stage IV due to hepatitis C ( per pt) with worsening ascites   Thrombocytopenia   Metabolic encephalopathy POA resolved   Hypokalemia   hypophosphatemia   TBI   Migraine HA   chronic pain   Tobacco abuse    Code Status: Full        CONSULTATIONS:  IP CONSULT TO GASTROENTEROLOGY  IP CONSULT TO NEUROLOGY    Excerpted HPI from H&P of Fan Balbuena, DO:    HISTORY OF PRESENT ILLNESS:     Aparna Dean is a 61 y.o.  male who presents with complaint of episodic confusion yesterday per wife in the setting of chronic opioid after taking other sedatives including Nyquil after experiencing a sore throat and headache, who also reports acutely worsened SOB on chronic SOB. Patient admits using a rescue inhaler 3-10x during day though he is unsure what it is prescribed for and does not recall undergoing pulmonary function tests. He admits active PPD cigarette smoking with PPD Hx of about 55 years. Pt notes he's not on any supplemental O2 in the outpatient setting and does not wear a mask at night and has never undergone formal sleep testing though he has been told he may have sleep apnea and wife reports heavy snoring during sleep. Pt denies chest pain, nausea, vomiting. He does admit chronic exertional SOB. He has known stage IV cirrhosis s/p Harvoni treatment completed about a year ago. He states he has not been drinking for about 15 years with prior heavy alcohol use.  Denies recent fevers/chills/weight loss.      Saturations of 86% on presentation to ED, improved to mid 90s on 2L NC O2.     We were asked to admit for work up and evaluation of the above problems.        ______________________________________________________________________  DISCHARGE SUMMARY/HOSPITAL COURSE:  for full details see H&P, daily progress notes, labs, consult notes. Acute on chronic hypoxic respiratory failure POA in settings of COPD exacerbation due to acute bronchitis  -Sx: dyspnea / thick sputum cough/ on 2 L / wheezing - on admission  Clinically improved. Dyspnea resolved  -O2:  Sa02 88% on room air AT REST. Sa02 87% on room air DURING AMBULATION. Checking on 2L  Need home O2   -ECHO:EF 65% , no hypokinesis, no valvular abnormalities   -taper off prednisone   -dc on Breo and albuterol prn   -need PFTs OP with PCP   -CTA: no PE/edema/pneumonia   Solitary pulmonary nodule warranting follow-up CT in 6-12 months (7 mm R lung) --> d/w pt/wife today findings of CT. Recommended CT in 6 months with PCP.       Liver cirrhosis stage IV due to hepatitis C ( per pt) with worsening ascites   Thrombocytopenia   -no ascites by US   -started on diuretics: lasix IV, diuresing well --> PO on DC   Stop HCTZ   BP tolerating  LE edema resolved   -assess OP for adding spironolactone as well   -lactulose daily   -plt stable   -GI help appreciated: continue to follow with Dr Иван Hinkle   -CT: no acute findings   - primary GI: Dr Ish Reed / Dr Robby FERNANDES       Metabolic encephalopathy POA resolved   - MS back to normal ( wife at bedside )   -was multifactorial: hypoxia / meds   -dc home on daily lactulose      Hypokalemia / hypophosphatemia   -supplemented aggressively   -will need K daily on DC with lasix      TBI / Migraine HA / chronic pain   -followed with Dr Iggy Anna neurology - who managing his pain meds   -PTA: percocet 10/325 prn for joint pain  + Ms Contin prn for headache --> neurology help appreciated , OK to continue home meds   -MRI 2015: possible aneurysm --> see by neuro: doubt the abnormality seen on MRI was anurysm, he can have MRA head as OP.  -consulted palliative care team for pain management but they was not able to see pt        Tobacco abuse, nicotine patch                Code Status: Full  Surrogate Decision Maker: Wife  DVT Prophylaxis: Lovenox ok unless plt < 50K         Body mass index is 34.86 kg/(m^2). Baseline: lives with wife; ambulating with cane   Recommended Disposition: Home w/Family      Midline 3/16 , poor IV access , removed on DC         _______________________________________________________________________  Patient seen and examined by me on discharge day. PHYSICAL EXAM:  General:                    WD, WN. Awake/alert. Cooperative, no acute distress    EENT:                                  EOMI. Anicteric sclerae. MMM  Resp:                                   CTA  bilaterally, no wheezing, no rales. No accessory muscle use  CV:                                      Regular  Rhythm, no edema  GI:                                       Soft, appears distended, + gen tender.  +Bowel sounds  Neurologic:                Alert and oriented X 3, normal speech, not focal   Psych:                       Good insight. Not anxious nor agitated  Skin:                                    No rashes. No jaundice  _______________________________________________________________________  DISCHARGE MEDICATIONS:   Discharge Medication List as of 3/18/2018 11:01 AM      START taking these medications    Details   fluticasone-vilanterol (BREO ELLIPTA) 100-25 mcg/dose inhaler Take 1 Puff by inhalation daily. , Print, Disp-1 Inhaler, R-0      lactulose (CHRONULAC) 10 gram/15 mL solution Take 45 mL by mouth daily for 30 days. , Print, Disp-1350 mL, R-0      levoFLOXacin (LEVAQUIN) 750 mg tablet Take 1 Tab by mouth every twenty-four (24) hours. , Print, Disp-2 Tab, R-0      potassium chloride (K-DUR, KLOR-CON) 20 mEq tablet Take 1 Tab by mouth two (2) times a day for 30 days. , Print, Disp-60 Tab, R-0      furosemide (LASIX) 40 mg tablet Take 40 mg po daily, Print, Disp-30 Tab, R-0 CONTINUE these medications which have CHANGED    Details   VENTOLIN HFA 90 mcg/actuation inhaler Take 2-3 Puffs by inhalation every four (4) hours as needed for Wheezing or Shortness of Breath., Print, Disp-1 Inhaler, R-0, MARLENA      !! predniSONE (DELTASONE) 10 mg tablet Take  2 Tab daily x 3 days then 1 Tab daily x 3 days, Print, Disp-9 Tab, R-0       !! - Potential duplicate medications found. Please discuss with provider. CONTINUE these medications which have NOT CHANGED    Details   Morphine (HUYEN) 60 mg ER capsule Take 30-60 mg by mouth two (2) times daily as needed for Pain., Historical Med      oxyCODONE-acetaminophen (PERCOCET)  mg per tablet Take 1 Tab by mouth every twelve (12) hours as needed for Pain., Historical Med      !! predniSONE (DELTASONE) 10 mg tablet Take 10 mg by mouth daily as needed for Pain., Historical Med      diazePAM (VALIUM) 10 mg tablet Take 1 Tab by mouth two (2) times daily as needed. Max Daily Amount: 20 mg., Print, Disp-60 Tab, R-2      Omeprazole delayed release (PRILOSEC D/R) 20 mg tablet Take 1 Tab by mouth daily. , Normal, Disp-90 Tab, R-1       !! - Potential duplicate medications found. Please discuss with provider. STOP taking these medications       DM/P-EPHED/ACETAMINOPH/DOXYLAM (NYQUIL PO) Comments:   Reason for Stopping:         morphine CR (MS CONTIN) 60 mg CR tablet Comments:   Reason for Stopping:         hydroCHLOROthiazide (HYDRODIURIL) 25 mg tablet Comments:   Reason for Stopping:               My Recommended Diet, Activity, Wound Care, and follow-up labs are listed in the patient's Discharge Insturctions which I have personally completed and reviewed.     ______________________________________________________________________    Risk of deterioration: Moderate    Condition at Discharge:  Stable  ______________________________________________________________________    Disposition  Home with family, no needs  ______________________________________________________________________    Care Plan discussed with:   Patient, Family, RN, Care Manager    ______________________________________________________________________    Code Status: Full Code  ______________________________________________________________________      Follow up with:   PCP : Sonya Bustillos MD  Follow-up Information     Follow up With Details Comments Contact Info    Sonya Bustillos MD In 1 week  [address]  [phone]      Mic Michele MD  as scheduled  [address]      Yocasta Decker MD In 2 weeks 2-3 weeks  [address] [address]  [phone]      Lorena Escalona MD  lung doctor as needed  [facility] [address]  [facility]  [address]  [facility]  [phone]      [employer]  oxygen at 4 liters nc [address] [facility] [address]    [address] [place] [address] to home visit [address]  [address]st [address] [address] [phone]              Total time in minutes spent coordinating this discharge (includes going over instructions, follow-up, prescriptions, and preparing report for sign off to her PCP) :  > 30 minutes    Signed:  Cordell Merlin, MD

## 2018-04-11 ENCOUNTER — OFFICE VISIT (OUTPATIENT)
Dept: NEUROLOGY | Age: 58
End: 2018-04-11

## 2018-04-11 VITALS
BODY MASS INDEX: 38.74 KG/M2 | HEIGHT: 70 IN | RESPIRATION RATE: 20 BRPM | SYSTOLIC BLOOD PRESSURE: 156 MMHG | TEMPERATURE: 97.8 F | HEART RATE: 87 BPM | OXYGEN SATURATION: 96 % | WEIGHT: 270.6 LBS | DIASTOLIC BLOOD PRESSURE: 92 MMHG

## 2018-04-11 DIAGNOSIS — I67.1 CEREBRAL ANEURYSM: Primary | ICD-10-CM

## 2018-04-11 DIAGNOSIS — F07.81 POST-TRAUMATIC BRAIN SYNDROME: ICD-10-CM

## 2018-04-11 DIAGNOSIS — G62.9 POLYNEUROPATHY: ICD-10-CM

## 2018-04-11 DIAGNOSIS — G89.4 CHRONIC PAIN SYNDROME: ICD-10-CM

## 2018-04-11 DIAGNOSIS — G43.019 INTRACTABLE MIGRAINE WITHOUT AURA AND WITHOUT STATUS MIGRAINOSUS: ICD-10-CM

## 2018-04-11 PROBLEM — E66.01 SEVERE OBESITY (BMI 35.0-39.9) WITH COMORBIDITY (HCC): Status: ACTIVE | Noted: 2018-04-11

## 2018-04-11 RX ORDER — DICLOFENAC SODIUM 75 MG/1
TABLET, DELAYED RELEASE ORAL 2 TIMES DAILY
COMMUNITY
Start: 2018-02-15 | End: 2018-12-14

## 2018-04-11 RX ORDER — MORPHINE SULFATE 60 MG/1
TABLET, FILM COATED, EXTENDED RELEASE ORAL
COMMUNITY
Start: 2018-02-16 | End: 2018-04-11 | Stop reason: SDUPTHER

## 2018-04-11 RX ORDER — OXYCODONE AND ACETAMINOPHEN 10; 325 MG/1; MG/1
1 TABLET ORAL
Qty: 60 TAB | Refills: 0 | Status: SHIPPED | OUTPATIENT
Start: 2018-04-11 | End: 2018-04-11 | Stop reason: SDUPTHER

## 2018-04-11 RX ORDER — LACTULOSE 10 G/15ML
SOLUTION ORAL; RECTAL
COMMUNITY
Start: 2018-03-20 | End: 2018-04-11 | Stop reason: SDUPTHER

## 2018-04-11 RX ORDER — MORPHINE SULFATE 60 MG/1
60 CAPSULE, EXTENDED RELEASE ORAL
Qty: 60 CAP | Refills: 0 | Status: SHIPPED | OUTPATIENT
Start: 2018-04-11 | End: 2018-04-11 | Stop reason: SDUPTHER

## 2018-04-11 RX ORDER — MORPHINE SULFATE 60 MG/1
60 CAPSULE, EXTENDED RELEASE ORAL
Qty: 60 CAP | Refills: 0 | Status: SHIPPED | OUTPATIENT
Start: 2018-05-11 | End: 2018-12-14

## 2018-04-11 RX ORDER — OXYCODONE AND ACETAMINOPHEN 10; 325 MG/1; MG/1
1 TABLET ORAL
Qty: 60 TAB | Refills: 0 | Status: SHIPPED | OUTPATIENT
Start: 2018-04-11 | End: 2018-05-11

## 2018-04-11 RX ORDER — HYDROCHLOROTHIAZIDE 25 MG/1
TABLET ORAL
COMMUNITY
Start: 2018-02-22 | End: 2018-04-12 | Stop reason: SDUPTHER

## 2018-04-11 NOTE — PROGRESS NOTES
Neurology Progress Note    NAME:  Jessy Hurst :   1960   MRN:   [id number]     Date/Time:  2018  Subjective:      Jessy Hurst is a 61 y.o. male here today for follow up for pain, neuropathy,,headache, brain aneurysm. Patient was recently hospitalized for SOB, diagnosed with COPD,currently on home oxygen and he still smokes. Says pain is persistent, sharp in nature, activity and movement makes it worse, pain wakes patient up at night, medication helps some. Headache is frequent about 3-4/week , throbbing in nature , mostly frontal, sharp pain comes from the back of the head, headache is associated with dizziness, nausea, phonophobia. Says he experiences numbness and tingling sensation, pain medication also help the headache. Denies loss of consciousness, dysphagia or odynophaia. He was recently told that he has low platelet, he is also experiencing swelling of the legs. I told patient that reminding him of dangers of cigarette smoking can never be over emphasized. Advised on the dangers of tobacco abuse  Advised on the benefits of discontinuation   Advised on available treatments. 10 minutes spent on counseling.    Review of Systems - General ROS: positive for  - fatigue, night sweats and sleep disturbance  Psychological ROS: positive for - anxiety, depression and sleep disturbances  Ophthalmic ROS: positive for - blurry vision, decreased vision, double vision and photophobia  ENT ROS: positive for - headaches, tinnitus, vertigo and visual changes  Allergy and Immunology ROS: negative  Hematological and Lymphatic ROS: negative  Endocrine ROS: negative  Respiratory ROS: no cough, shortness of breath, or wheezing  Cardiovascular ROS: no chest pain or dyspnea on exertion  Gastrointestinal ROS: no abdominal pain, change in bowel habits, or black or bloody stools  Genito-Urinary ROS: no dysuria, trouble voiding, or hematuria  Musculoskeletal ROS: positive for - joint pain, joint stiffness, joint swelling, muscle pain and muscular weakness  Neurological ROS: positive for - dizziness, gait disturbance, headaches, impaired coordination/balance, numbness/tingling, tremors, visual changes and weakness  Dermatological ROS: negative      Medications reviewed:  Current Outpatient Prescriptions   Medication Sig Dispense Refill    diclofenac EC (VOLTAREN) 75 mg EC tablet two (2) times a day.  VENTOLIN HFA 90 mcg/actuation inhaler Take 2-3 Puffs by inhalation every four (4) hours as needed for Wheezing or Shortness of Breath. 1 Inhaler 0    fluticasone-vilanterol (BREO ELLIPTA) 100-25 mcg/dose inhaler Take 1 Puff by inhalation daily. 1 Inhaler 0    lactulose (CHRONULAC) 10 gram/15 mL solution Take 45 mL by mouth daily for 30 days. 1350 mL 0    potassium chloride (K-DUR, KLOR-CON) 20 mEq tablet Take 1 Tab by mouth two (2) times a day for 30 days. 60 Tab 0    furosemide (LASIX) 40 mg tablet Take 40 mg po daily 30 Tab 0    Morphine (HUYEN) 60 mg ER capsule Take 30-60 mg by mouth two (2) times daily as needed for Pain.  oxyCODONE-acetaminophen (PERCOCET)  mg per tablet Take 1 Tab by mouth every twelve (12) hours as needed for Pain.  predniSONE (DELTASONE) 10 mg tablet Take 10 mg by mouth daily as needed for Pain.  diazePAM (VALIUM) 10 mg tablet Take 1 Tab by mouth two (2) times daily as needed. Max Daily Amount: 20 mg. 60 Tab 2    Omeprazole delayed release (PRILOSEC D/R) 20 mg tablet Take 1 Tab by mouth daily.  90 Tab 1    hydroCHLOROthiazide (HYDRODIURIL) 25 mg tablet           Objective:   Vitals:  Vitals:    04/11/18 0908   BP: (!) 156/92   Pulse: 87   Resp: 20   Temp: 97.8 °F (36.6 °C)   SpO2: 96%   Weight: 270 lb 9.6 oz (122.7 kg)   Height: 5' 10\" (1.778 m)   PainSc:   9   PainLoc: Generalized     Lab Data Reviewed:  Lab Results   Component Value Date/Time    WBC 7.8 03/16/2018 10:59 AM    HCT 41.6 03/16/2018 10:59 AM    HGB 14.1 03/16/2018 10:59 AM    PLATELET 72 (L) 03/16/2018 10:59 AM       Lab Results   Component Value Date/Time    Sodium 144 03/18/2018 03:30 AM    Potassium 3.4 (L) 03/18/2018 03:30 AM    Chloride 104 03/18/2018 03:30 AM    CO2 35 (H) 03/18/2018 03:30 AM    Glucose 113 (H) 03/18/2018 03:30 AM    BUN 19 03/18/2018 03:30 AM    Creatinine 0.91 03/18/2018 03:30 AM    Calcium 8.6 03/18/2018 03:30 AM       No components found for: TROPQUANT    No results found for: YAMILETH      Lab Results   Component Value Date/Time    Hemoglobin A1c 5.4 11/11/2016 01:44 PM    Hemoglobin A1c (POC) 5.2 05/10/2017 12:26 PM        No results found for: B12LT, FOL, RBCF    No results found for: YAMILETH, Rosa Elena Edward, XBANA    Lab Results   Component Value Date/Time    Cholesterol, total 172 11/11/2016 01:44 PM    HDL Cholesterol 58 11/11/2016 01:44 PM    LDL, calculated 90 11/11/2016 01:44 PM    VLDL, calculated 24 11/11/2016 01:44 PM    Triglyceride 118 11/11/2016 01:44 PM         CT Results (recent):    Results from Hospital Encounter encounter on 03/14/18   CT ABD PELV W CONT   Narrative EXAM:  CT ABD PELV W CONT    INDICATION: Abdominal pain, RLQ; Hep C cirrhosis    COMPARISON: None    CONTRAST:  100 mL of Isovue-370. TECHNIQUE:   Following the uneventful intravenous administration of contrast, thin axial  images were obtained through the abdomen and pelvis. Coronal and sagittal  reconstructions were generated. Oral contrast was administered. CT dose  reduction was achieved through use of a standardized protocol tailored for this  examination and automatic exposure control for dose modulation. Findings:    Lung bases show a small infiltrate on the right. Liver is nodular and decrease in density compatible with known cirrhosis. The  spleen is prominent in size. The gallbladder is not distended. The a pancreas  appears unremarkable. A 12 x 16 mm posterior right renal mass is not a simple  cyst. Several large calcification are seen in the left kidney collecting system.   No definite calcification seen on the right. No definite renal obstruction seen  at this time. Appendix appears normal. There is no bowel wall thickening or  obstruction. The prostate is nodular with some calcification. The bladder is  midline but unfilled. There is no free air or free fluid. There is no adenopathy  in the abdomen or pelvis. Impression impression: Cirrhosis with splenomegaly. Nonobstructing left renal calculi. Prostate nodularity. No other significant findings. MRI Results (recent):    Results from [place] encounter on 06/01/15   MRI BRAIN W WO CONT   Narrative **Final Report**      ICD Codes / Adm. Diagnosis: 434.90  784.0 / Unspecified cerebral artery oc    Headache(784.0)  Examination:  MR BRAIN W AND WO CON  - [id number] - Jun 1 2015 12:09PM  Accession No:  [id number]  Reason:  cva, intractable ha, dizziness      REPORT:  INDICATION: cva, intractable ha, dizziness 434.9, 784.0, 780.4    COMPARISON: None    EXAM: Sagittal T1-weighted spin-echo, axial FLAIR, coronal and axial   T2-weighted fast spin-echo, axial diffusion weighted echo planar, axial   T1-weighted spin-echo, axial gradient echo, and post IV contrast-enhanced   triplanar T1-weighted spin-echo MR images of the brain are obtained. A total   of 10 cc intravenous Gadavist was administered for the study. FINDINGS: Intra-axial morphology, signal and enhancement are normal. There   is no extra-axial collection, mass or enhancement abnormality demonstrated. Assessment of the vascular flow voids at the base the brain shows either   tortuosity or a 4 mm aneurysm of the right M1 segment. Sella, optic chiasm,   posterior fossa, orbits and paranasal sinuses are normal.         IMPRESSION: Aneurysm versus tortuosity of right M1 segment. Followup with MR   arteriography of the [place] is recommended. Otherwise, normal   brain MRI. 23X           Signing/Reading Doctor: SOLANGE Otto ([id number])    Approved: SOLANGE STEPHANIE Ramirez ([id number])  Jun 1 2015  2:16PM                                    IR Results (recent):  No results found for this or any previous visit. VAS/US Results (recent):  No results found for this or any previous visit. PHYSICAL EXAM:  General:    Alert, cooperative, no distress, appears stated age. Head:   Normocephalic, without obvious abnormality, atraumatic. Eyes:   Conjunctivae/corneas clear. PERRLA  Nose:  Nares normal. No drainage or sinus tenderness. Throat:    Lips, mucosa, and tongue normal.  No Thrush  Neck:  Supple, symmetrical,  no adenopathy, thyroid: non tender    no carotid bruit and no JVD. Paraspinal tenderness  Back:    Symmetric,  Diffuse tenderness. Lungs:   Clear to auscultation bilaterally. No Wheezing or Rhonchi. No rales. Chest wall:  No tenderness or deformity. No Accessory muscle use. Heart:   Regular rate and rhythm,  no murmur, rub or gallop. Abdomen:   Soft, non-tender. Not distended. Bowel sounds normal. No masses  Extremities: Extremities normal, atraumatic, No cyanosis. No edema. No clubbing  Skin:     Texture, turgor normal. No rashes or lesions. Not Jaundiced  Lymph nodes: Cervical, supraclavicular normal.  Psych:  Good insight. Not depressed. Not anxious or agitated. NEUROLOGICAL EXAM:  Appearance: The patient is well developed, well nourished, provides a coherent history and is in no acute distress. Mental Status: Oriented to time, place and person. Mood and affect appropriate. Cranial Nerves:   Intact visual fields. Fundi are benign. JOSIE, EOM's full, no nystagmus, no ptosis. Facial sensation is normal. Corneal reflexes are intact. Facial movement is symmetric. Hearing is normal bilaterally. Palate is midline with normal sternocleidomastoid and trapezius muscles are normal. Tongue is midline. Motor:  5/5 strength in upper and lower proximal and distal muscles. Normal bulk and tone. No fasciculations.    Reflexes:   Deep tendon reflexes 2+/4 and symmetrical.   Sensory:   Dysesthesia to touch, pinprick and vibration. Gait:  Normal gait. Tremor:   Mild tremor noted. Cerebellar:  No cerebellar signs present. Neurovascular:  Normal heart sounds and regular rhythm, peripheral pulses intact, and no carotid bruits. Assesment  1. Cerebral aneurysm  Stable    2. Chronic pain syndrome  Refer to pain management    3. Polyneuropathy  Continue management    4. Intractable migraine without aura and without status migrainosus  Continue management    5. Post-traumatic brain syndrome  Stable    ___________________________________________________  PLAN:Medication reviewed with patient      ICD-10-CM ICD-9-CM    1. Cerebral aneurysm I67.1 437.3    2. Chronic pain syndrome G89.4 338.4    3. Polyneuropathy G62.9 356.9    4. Intractable migraine without aura and without status migrainosus G43.019 346.11    5. Post-traumatic brain syndrome F07.81 310.2      Follow-up Disposition:  Return in about 2 months (around 6/11/2018).            ___________________________________________________    Total time spent with patient:  []15   []25   []35   [] __ minutes    Care Plan discussed with:    [x]Patient   []Family    []Care Manager   []Consultant/Specialist :    ___________________________________________________    Attending Physician: Ivonne Barrera MD

## 2018-04-11 NOTE — PROGRESS NOTES
Chief Complaint   Patient presents with    Other     Post traumatic brain syndrome     1. Have you been to the ER, urgent care clinic since your last visit? Hospitalized since your last visit? [facility] 3/14/18    2. Have you seen or consulted any other health care providers outside of the [address] [place] [address] since your last visit? Include any pap smears or colon screening. No    Patient stated he has not found pain management.     Pill count not performed patient did not bring medications      Pill count not verified with patient  Patient reports taking medication    UDS obtained 2/15/18  Pain contract on file   not  made available for  Dr. Brian Graham  review  Script given for  Morpine and Oxycodone ( April and May)

## 2018-04-11 NOTE — MR AVS SNAPSHOT
[address] [place] [address] [address] 
[phone] Patient: Royer Sweeney. MRN: [id number] D:[id number] Visit Information Date & Time Provider Department Dept. Phone Encounter #  
 4/11/2018  8:40 AM Yinka Ibarra MD [facility] at [address] [place] [address] [id number] Follow-up Instructions Return in about 2 months (around 6/11/2018). Upcoming Health Maintenance Date Due DTaP/Tdap/Td series (1 - Tdap) 5/26/1980 Influenza Age 5 to Adult 8/1/2017 COLONOSCOPY 4/18/2022 Allergies as of 4/11/2018  Review Complete On: 4/11/2018 By: Dianna Allen MD  
  
 Severity Noted Reaction Type Reactions Pcn [Penicillins] Medium 11/27/2015    Swelling Cymbalta [Duloxetine]  11/15/2017    Other (comments) Blood in urine Current Immunizations  Reviewed on 5/10/2017 Name Date Pneumococcal Polysaccharide (PPSV-23) 5/10/2017 Not reviewed this visit You Were Diagnosed With   
  
 Codes Comments Cerebral aneurysm    -  Primary ICD-10-CM: I67.1 ICD-9-CM: 898. 3 Chronic pain syndrome     ICD-10-CM: G89.4 ICD-9-CM: 338.4 Polyneuropathy     ICD-10-CM: G62.9 ICD-9-CM: 356.9 Intractable migraine without aura and without status migrainosus     ICD-10-CM: G43.019 
ICD-9-CM: 346.11 Post-traumatic brain syndrome     ICD-10-CM: F07.81 ICD-9-CM: 310.2 Vitals BP Pulse Temp Resp Height(growth percentile) Weight(growth percentile) (!) 156/92 (BP 1 Location: Left arm, BP Patient Position: Sitting) 87 97.8 °F (36.6 °C) 20 5' 10\" (1.778 m) 270 lb 9.6 oz (122.7 kg) SpO2 BMI Smoking Status 96% 38.83 kg/m2 Current Every Day Smoker BMI and BSA Data Body Mass Index Body Surface Area  
 38.83 kg/m 2 2.46 m 2 Preferred Pharmacy Pharmacy Name Phone  [facility] - [address], [address] [place], [place] [phone] Your Updated Medication List  
  
   
This list is accurate as of 4/11/18  9:37 AM.  Always use your most recent med list.  
  
  
  
  
 diazePAM 10 mg tablet Commonly known as:  VALIUM Take 1 Tab by mouth two (2) times daily as needed. Max Daily Amount: 20 mg.  
  
 diclofenac EC 75 mg EC tablet Commonly known as:  VOLTAREN  
two (2) times a day. fluticasone-vilanterol 100-25 mcg/dose inhaler Commonly known as:  BREO ELLIPTA Take 1 Puff by inhalation daily. furosemide 40 mg tablet Commonly known as:  LASIX Take 40 mg po daily  
  
 hydroCHLOROthiazide 25 mg tablet Commonly known as:  HYDRODIURIL  
  
 lactulose 10 gram/15 mL solution Commonly known as:  Sonya Moll Take 45 mL by mouth daily for 30 days. Morphine 60 mg ER capsule Commonly known as:  HUYEN Take 1 Cap by mouth two (2) times daily as needed for Pain. Max Daily Amount: 2 Caps. Start taking on:  5/11/2018  
  
 naloxone 2 mg/actuation Spry Use 1 spray intranasally into 1 nostril. Use a new Narcan nasal spray for subsequent doses and administer into alternating nostrils. May repeat every 2 to 3 minutes as needed. Omeprazole delayed release 20 mg tablet Commonly known as:  PRILOSEC D/R Take 1 Tab by mouth daily. oxyCODONE-acetaminophen  mg per tablet Commonly known as:  PERCOCET Take 1 Tab by mouth every eight (8) hours as needed for Pain for up to 30 days. Max Daily Amount: 3 Tabs. potassium chloride 20 mEq tablet Commonly known as:  K-DUR, KLOR-CON Take 1 Tab by mouth two (2) times a day for 30 days. predniSONE 10 mg tablet Commonly known as:  Zhang Res Take 10 mg by mouth daily as needed for Pain. VENTOLIN HFA 90 mcg/actuation inhaler Generic drug:  albuterol Take 2-3 Puffs by inhalation every four (4) hours as needed for Wheezing or Shortness of Breath. Prescriptions Printed Refills naloxone 2 mg/actuation spry 0 Sig: Use 1 spray intranasally into 1 nostril. Use a new Narcan nasal spray for subsequent doses and administer into alternating nostrils. May repeat every 2 to 3 minutes as needed. Class: Print  
 oxyCODONE-acetaminophen (PERCOCET)  mg per tablet 0 Sig: Take 1 Tab by mouth every eight (8) hours as needed for Pain for up to 30 days. Max Daily Amount: 3 Tabs. Class: Print Route: Oral  
 Morphine (HUYEN) 60 mg ER capsule 0 Starting on: 5/11/2018 Sig: Take 1 Cap by mouth two (2) times daily as needed for Pain. Max Daily Amount: 2 Caps. Class: Print Route: Oral  
  
Follow-up Instructions Return in about 2 months (around 6/11/2018). Introducing [facility] SERVICES! Nerissa Barahona introduces [email or web address] patient portal. Now you can access parts of your medical record, email your doctor's office, and request medication refills online. 1. In your internet browser, go to [email or web address] 2. Click on the First Time User? Click Here link in the Sign In box. You will see the New Member Sign Up page. 3. Enter your [email or web address] Access Code exactly as it appears below. You will not need to use this code after youve completed the sign-up process. If you do not sign up before the expiration date, you must request a new code. · [email or web address] Access Code: [email or web address] Expires: 5/16/2018 11:40 AM 
 
4. Enter the last four digits of your Social Security Number (xxxx) and Date of Birth (mm/dd/yyyy) as indicated and click Submit. You will be taken to the next sign-up page. 5. Create a [email or web address] ID. This will be your [email or web address] login ID and cannot be changed, so think of one that is secure and easy to remember. 6. Create a [email or web address] password. You can change your password at any time. 7. Enter your Password Reset Question and Answer. This can be used at a later time if you forget your password. 8. Enter your e-mail address. You will receive e-mail notification when new information is available in [address] 9. Click Sign Up. You can now view and download portions of your medical record. 10. Click the Download Summary menu link to download a portable copy of your medical information. If you have questions, please visit the Frequently Asked Questions section of the [employer] website. Remember, [employer] is NOT to be used for urgent needs. For medical emergencies, dial 911. Now available from your iPhone and Android! Please provide this summary of care documentation to your next provider. Your primary care clinician is listed as Janiya Regan. If you have any questions after today's visit, please call [phone].

## 2018-04-12 DIAGNOSIS — G43.909 MIGRAINE WITHOUT STATUS MIGRAINOSUS, NOT INTRACTABLE, UNSPECIFIED MIGRAINE TYPE: ICD-10-CM

## 2018-04-12 DIAGNOSIS — I10 ESSENTIAL HYPERTENSION: Primary | ICD-10-CM

## 2018-04-12 RX ORDER — HYDROCHLOROTHIAZIDE 25 MG/1
25 TABLET ORAL DAILY
Qty: 30 TAB | Refills: 2 | Status: SHIPPED | OUTPATIENT
Start: 2018-04-12

## 2018-04-12 RX ORDER — DIAZEPAM 10 MG/1
10 TABLET ORAL
Qty: 60 TAB | Refills: 2 | Status: SHIPPED | OUTPATIENT
Start: 2018-04-12 | End: 2020-06-05

## 2018-04-12 NOTE — TELEPHONE ENCOUNTER
Requested Prescriptions     Pending Prescriptions Disp Refills    diazePAM (VALIUM) 10 mg tablet 60 Tab 2     Sig: Take 1 Tab by mouth two (2) times daily as needed.  Max Daily Amount: 20 mg.    hydroCHLOROthiazide (HYDRODIURIL) 25 mg tablet

## 2018-04-13 ENCOUNTER — TELEPHONE (OUTPATIENT)
Dept: NEUROLOGY | Age: 58
End: 2018-04-13

## 2018-04-13 NOTE — TELEPHONE ENCOUNTER
Patients caregiver states that the morphine capsules was to expensive because its not covered by the pts insr; however the tablets are.  Pt would like to have to rx written for tablets

## 2018-04-18 ENCOUNTER — TELEPHONE (OUTPATIENT)
Dept: NEUROLOGY | Age: 58
End: 2018-04-18

## 2018-04-18 NOTE — TELEPHONE ENCOUNTER
Patient found a pain specialist.  Need to send records and demographics to Dr. Lenny Harry at Fax: [phone].   He is schedule to be seen tomorrow at 2:30pm

## 2018-04-19 ENCOUNTER — TELEPHONE (OUTPATIENT)
Dept: NEUROLOGY | Age: 58
End: 2018-04-19

## 2018-04-19 NOTE — TELEPHONE ENCOUNTER
Patient's return call, ID verified times 2. Patient made aware his appointment is in 2 months. Patient verbalized understanding.

## 2018-12-12 ENCOUNTER — HOSPITAL ENCOUNTER (EMERGENCY)
Age: 58
Discharge: HOME | End: 2018-12-12
Attending: EMERGENCY MEDICINE
Payer: MEDICAID

## 2018-12-12 ENCOUNTER — APPOINTMENT (OUTPATIENT)
Dept: ULTRASOUND IMAGING | Age: 58
End: 2018-12-12
Attending: EMERGENCY MEDICINE
Payer: MEDICAID

## 2018-12-12 ENCOUNTER — APPOINTMENT (OUTPATIENT)
Dept: CT IMAGING | Age: 58
End: 2018-12-12
Attending: EMERGENCY MEDICINE
Payer: MEDICAID

## 2018-12-12 VITALS
DIASTOLIC BLOOD PRESSURE: 82 MMHG | OXYGEN SATURATION: 97 % | HEIGHT: 70 IN | SYSTOLIC BLOOD PRESSURE: 152 MMHG | WEIGHT: 249.78 LBS | HEART RATE: 65 BPM | BODY MASS INDEX: 35.76 KG/M2 | TEMPERATURE: 97.7 F | RESPIRATION RATE: 10 BRPM

## 2018-12-12 DIAGNOSIS — N20.0 KIDNEY STONE: Primary | ICD-10-CM

## 2018-12-12 LAB
ALBUMIN SERPL-MCNC: 3.7 G/DL (ref 3.5–5)
ALBUMIN/GLOB SERPL: 0.9 {RATIO} (ref 1.1–2.2)
ALP SERPL-CCNC: 95 U/L (ref 45–117)
ALT SERPL-CCNC: 29 U/L (ref 12–78)
AMMONIA PLAS-SCNC: 25 UMOL/L
ANION GAP SERPL CALC-SCNC: 7 MMOL/L (ref 5–15)
APPEARANCE UR: CLEAR
AST SERPL-CCNC: 18 U/L (ref 15–37)
ATRIAL RATE: 72 BPM
BACTERIA URNS QL MICRO: NEGATIVE /HPF
BASOPHILS # BLD: 0 K/UL (ref 0–0.1)
BASOPHILS NFR BLD: 1 % (ref 0–1)
BILIRUB SERPL-MCNC: 0.8 MG/DL (ref 0.2–1)
BILIRUB UR QL: NEGATIVE
BUN SERPL-MCNC: 18 MG/DL (ref 6–20)
BUN/CREAT SERPL: 16 (ref 12–20)
CALCIUM SERPL-MCNC: 8.9 MG/DL (ref 8.5–10.1)
CALCULATED P AXIS, ECG09: 15 DEGREES
CALCULATED R AXIS, ECG10: 40 DEGREES
CALCULATED T AXIS, ECG11: 36 DEGREES
CHLORIDE SERPL-SCNC: 104 MMOL/L (ref 97–108)
CK MB CFR SERPL CALC: ABNORMAL % (ref 0–2.5)
CK MB SERPL-MCNC: <1 NG/ML (ref 5–25)
CK SERPL-CCNC: 21 U/L (ref 39–308)
CO2 SERPL-SCNC: 27 MMOL/L (ref 21–32)
COLOR UR: ABNORMAL
CREAT SERPL-MCNC: 1.15 MG/DL (ref 0.7–1.3)
DIAGNOSIS, 93000: NORMAL
DIFFERENTIAL METHOD BLD: ABNORMAL
EOSINOPHIL # BLD: 0.1 K/UL (ref 0–0.4)
EOSINOPHIL NFR BLD: 1 % (ref 0–7)
EPITH CASTS URNS QL MICRO: ABNORMAL /LPF
ERYTHROCYTE [DISTWIDTH] IN BLOOD BY AUTOMATED COUNT: 12.8 % (ref 11.5–14.5)
GLOBULIN SER CALC-MCNC: 4 G/DL (ref 2–4)
GLUCOSE SERPL-MCNC: 101 MG/DL (ref 65–100)
GLUCOSE UR STRIP.AUTO-MCNC: NEGATIVE MG/DL
HCT VFR BLD AUTO: 44.9 % (ref 36.6–50.3)
HGB BLD-MCNC: 15.5 G/DL (ref 12.1–17)
HGB UR QL STRIP: ABNORMAL
HYALINE CASTS URNS QL MICRO: ABNORMAL /LPF (ref 0–5)
IMM GRANULOCYTES # BLD: 0 K/UL (ref 0–0.04)
IMM GRANULOCYTES NFR BLD AUTO: 0 % (ref 0–0.5)
KETONES UR QL STRIP.AUTO: NEGATIVE MG/DL
LEUKOCYTE ESTERASE UR QL STRIP.AUTO: NEGATIVE
LIPASE SERPL-CCNC: 113 U/L (ref 73–393)
LYMPHOCYTES # BLD: 2.9 K/UL (ref 0.8–3.5)
LYMPHOCYTES NFR BLD: 36 % (ref 12–49)
MCH RBC QN AUTO: 29.5 PG (ref 26–34)
MCHC RBC AUTO-ENTMCNC: 34.5 G/DL (ref 30–36.5)
MCV RBC AUTO: 85.5 FL (ref 80–99)
MONOCYTES # BLD: 0.6 K/UL (ref 0–1)
MONOCYTES NFR BLD: 8 % (ref 5–13)
NEUTS SEG # BLD: 4.4 K/UL (ref 1.8–8)
NEUTS SEG NFR BLD: 54 % (ref 32–75)
NITRITE UR QL STRIP.AUTO: NEGATIVE
NRBC # BLD: 0 K/UL (ref 0–0.01)
NRBC BLD-RTO: 0 PER 100 WBC
P-R INTERVAL, ECG05: 182 MS
PH UR STRIP: 5.5 [PH] (ref 5–8)
PLATELET # BLD AUTO: 91 K/UL (ref 150–400)
PMV BLD AUTO: 9.9 FL (ref 8.9–12.9)
POTASSIUM SERPL-SCNC: 3.8 MMOL/L (ref 3.5–5.1)
PROT SERPL-MCNC: 7.7 G/DL (ref 6.4–8.2)
PROT UR STRIP-MCNC: NEGATIVE MG/DL
Q-T INTERVAL, ECG07: 378 MS
QRS DURATION, ECG06: 90 MS
QTC CALCULATION (BEZET), ECG08: 413 MS
RBC # BLD AUTO: 5.25 M/UL (ref 4.1–5.7)
RBC #/AREA URNS HPF: ABNORMAL /HPF (ref 0–5)
SODIUM SERPL-SCNC: 138 MMOL/L (ref 136–145)
SP GR UR REFRACTOMETRY: 1.02 (ref 1–1.03)
TROPONIN I SERPL-MCNC: <0.05 NG/ML
UA: UC IF INDICATED,UAUC: ABNORMAL
UROBILINOGEN UR QL STRIP.AUTO: 1 EU/DL (ref 0.2–1)
VENTRICULAR RATE, ECG03: 72 BPM
WBC # BLD AUTO: 8.1 K/UL (ref 4.1–11.1)
WBC URNS QL MICRO: ABNORMAL /HPF (ref 0–4)

## 2018-12-12 PROCEDURE — 85025 COMPLETE CBC W/AUTO DIFF WBC: CPT

## 2018-12-12 PROCEDURE — 94762 N-INVAS EAR/PLS OXIMTRY CONT: CPT

## 2018-12-12 PROCEDURE — 80053 COMPREHEN METABOLIC PANEL: CPT

## 2018-12-12 PROCEDURE — 82550 ASSAY OF CK (CPK): CPT

## 2018-12-12 PROCEDURE — 96376 TX/PRO/DX INJ SAME DRUG ADON: CPT

## 2018-12-12 PROCEDURE — 99285 EMERGENCY DEPT VISIT HI MDM: CPT

## 2018-12-12 PROCEDURE — 76870 US EXAM SCROTUM: CPT

## 2018-12-12 PROCEDURE — 81001 URINALYSIS AUTO W/SCOPE: CPT

## 2018-12-12 PROCEDURE — 36415 COLL VENOUS BLD VENIPUNCTURE: CPT

## 2018-12-12 PROCEDURE — 83690 ASSAY OF LIPASE: CPT

## 2018-12-12 PROCEDURE — 84484 ASSAY OF TROPONIN QUANT: CPT

## 2018-12-12 PROCEDURE — 74176 CT ABD & PELVIS W/O CONTRAST: CPT

## 2018-12-12 PROCEDURE — 96374 THER/PROPH/DIAG INJ IV PUSH: CPT

## 2018-12-12 PROCEDURE — 82140 ASSAY OF AMMONIA: CPT

## 2018-12-12 PROCEDURE — 93005 ELECTROCARDIOGRAM TRACING: CPT

## 2018-12-12 PROCEDURE — 74011250636 HC RX REV CODE- 250/636: Performed by: EMERGENCY MEDICINE

## 2018-12-12 RX ORDER — SODIUM CHLORIDE 0.9 % (FLUSH) 0.9 %
5-10 SYRINGE (ML) INJECTION AS NEEDED
Status: DISCONTINUED | OUTPATIENT
Start: 2018-12-12 | End: 2018-12-12 | Stop reason: HOSPADM

## 2018-12-12 RX ORDER — TAMSULOSIN HYDROCHLORIDE 0.4 MG/1
0.4 CAPSULE ORAL DAILY
Qty: 7 CAP | Refills: 0 | Status: SHIPPED | OUTPATIENT
Start: 2018-12-12 | End: 2018-12-19

## 2018-12-12 RX ORDER — FENTANYL CITRATE 50 UG/ML
25 INJECTION, SOLUTION INTRAMUSCULAR; INTRAVENOUS
Status: COMPLETED | OUTPATIENT
Start: 2018-12-12 | End: 2018-12-12

## 2018-12-12 RX ORDER — ONDANSETRON 4 MG/1
4 TABLET, ORALLY DISINTEGRATING ORAL
Qty: 10 TAB | Refills: 0 | Status: SHIPPED | OUTPATIENT
Start: 2018-12-12 | End: 2019-08-08 | Stop reason: ALTCHOICE

## 2018-12-12 RX ORDER — SODIUM CHLORIDE 0.9 % (FLUSH) 0.9 %
5-10 SYRINGE (ML) INJECTION EVERY 8 HOURS
Status: DISCONTINUED | OUTPATIENT
Start: 2018-12-12 | End: 2018-12-12 | Stop reason: HOSPADM

## 2018-12-12 RX ADMIN — FENTANYL CITRATE 25 MCG: 50 INJECTION, SOLUTION INTRAMUSCULAR; INTRAVENOUS at 08:29

## 2018-12-12 RX ADMIN — FENTANYL CITRATE 25 MCG: 50 INJECTION, SOLUTION INTRAMUSCULAR; INTRAVENOUS at 05:38

## 2018-12-12 RX ADMIN — Medication 10 ML: at 05:38

## 2018-12-12 NOTE — ED TRIAGE NOTES
Left flank pain for 3 days that wraps around to the front and down to the left testicle. Left testicular swelling reported by patient. Pain with sitting. States urine is darker. Associated with nausea.

## 2018-12-12 NOTE — DISCHARGE INSTRUCTIONS
Kidney Stone: Care Instructions  Your Care Instructions    Kidney stones are formed when salts, minerals, and other substances normally found in the urine clump together. They can be as small as grains of sand or, rarely, as large as golf balls. While the stone is traveling through the ureter, which is the tube that carries urine from the kidney to the bladder, you will probably feel pain. The pain may be mild or very severe. You may also have some blood in your urine. As soon as the stone reaches the bladder, any intense pain should go away. If a stone is too large to pass on its own, you may need a medical procedure to help you pass the stone. The doctor has checked you carefully, but problems can develop later. If you notice any problems or new symptoms, get medical treatment right away. Follow-up care is a key part of your treatment and safety. Be sure to make and go to all appointments, and call your doctor if you are having problems. It's also a good idea to know your test results and keep a list of the medicines you take. How can you care for yourself at home? · Drink plenty of fluids, enough so that your urine is light yellow or clear like water. If you have kidney, heart, or liver disease and have to limit fluids, talk with your doctor before you increase the amount of fluids you drink. · Take pain medicines exactly as directed. Call your doctor if you think you are having a problem with your medicine. ? If the doctor gave you a prescription medicine for pain, take it as prescribed. ? If you are not taking a prescription pain medicine, ask your doctor if you can take an over-the-counter medicine. Read and follow all instructions on the label. · Your doctor may ask you to strain your urine so that you can collect your kidney stone when it passes. You can use a kitchen strainer or a tea strainer to catch the stone. Store it in a plastic bag until you see your doctor again.   Preventing future kidney stones  Some changes in your diet may help prevent kidney stones. Depending on the cause of your stones, your doctor may recommend that you:  · Drink plenty of fluids, enough so that your urine is light yellow or clear like water. If you have kidney, heart, or liver disease and have to limit fluids, talk with your doctor before you increase the amount of fluids you drink. · Limit coffee, tea, and alcohol. Also avoid grapefruit juice. · Do not take more than the recommended daily dose of vitamins C and D.  · Avoid antacids such as Gaviscon, Maalox, Mylanta, or Tums. · Limit the amount of salt (sodium) in your diet. · Eat a balanced diet that is not too high in protein. · Limit foods that are high in a substance called oxalate, which can cause kidney stones. These foods include dark green vegetables, rhubarb, chocolate, wheat bran, nuts, cranberries, and beans. When should you call for help? Call your doctor now or seek immediate medical care if:    · You cannot keep down fluids.     · Your pain gets worse.     · You have a fever or chills.     · You have new or worse pain in your back just below your rib cage (the flank area).     · You have new or more blood in your urine.    Watch closely for changes in your health, and be sure to contact your doctor if:    · You do not get better as expected. Where can you learn more? Go to http://fuad-eloy.info/. Enter F843 in the search box to learn more about \"Kidney Stone: Care Instructions. \"  Current as of: March 15, 2018  Content Version: 11.8  © 9417-4902 [employer]. Care instructions adapted under license by [employer] (which disclaims liability or warranty for this information). If you have questions about a medical condition or this instruction, always ask your healthcare professional. [address] any warranty or liability for your use of this information.

## 2018-12-12 NOTE — ED NOTES
Pt medicated per MAR. Pt on monitors x 3. Family bedside. Pt. Resting comfortably in bed, denies needs at this time. Bed locked and low, call bell in reach.

## 2018-12-12 NOTE — ED NOTES
Pt reports pain 4/10 at this time. Pt. Resting comfortably in bed, denies needs at this time. Bed locked and low, call bell in reach.

## 2018-12-12 NOTE — ROUTINE PROCESS
DR Yousif Mckeon reviewed discharge instructions with the patient and spouse. The patient and spouse verbalized understanding.   Assisted to w/c and to private vehicle by ED tech

## 2018-12-12 NOTE — ED NOTES
Pt reports left flank pain and left scrotum pain/swelling since 2 days ago. Pain 10/10 at this time. Pt states he took pain medications at home including \"morphine'  which he takes for back pain. States morphine did not alleviate pain. Pt denies CP, denies SOB, Denies v/d. Pt AOX4, wife bedside. Pt on monitors x 2. Bed locked and in low position, side rails up x 2. Call bell within reach.

## 2018-12-12 NOTE — ED NOTES
Pt returns from imaging at this time. Pt. Resting comfortably in bed, denies needs at this time. Bed locked and low, call bell in reach.

## 2018-12-12 NOTE — ED NOTES
Bedside and Verbal shift change report given to Alec Barbour RN (oncoming nurse) by Zoya Gottlieb RN (offgoing nurse). Report included the following information SBAR, Kardex, ED Summary, Intake/Output, MAR and Recent Results.

## 2018-12-12 NOTE — ED PROVIDER NOTES
EMERGENCY DEPARTMENT HISTORY AND PHYSICAL EXAM          Date: 12/12/2018  Patient Name: Tyler Bragg. History of Presenting Illness     Chief Complaint   Patient presents with    Flank Pain       History Provided By: Patient    HPI: Tyler Albert is a 60 y.o. male, pmhx HTN / GERD / arthritis / anxiety / depression / hepatitis C, who presents ambulatory to the ED c/o gradually worsening aching L sided flank pain that radiates into the L side of his abdomen and L testicle throughout the day yesterday. Pt denies any hx of similar sxs kidney stones, or orchitis. He denies any recent medications for his current sxs. Pt denies any recent follow up with his PCP. He notes a hx of hepatitis C, but states he has since been cured. Pt denies any recent follow up with his hepatologist stating \"they gave me Olga Lidia Monk which made my depression and arthritis worse so I didn't go back after they cured me. \" He otherwise specifically denies any recent fever, chills, nausea, vomiting, diarrhea, CP, SOB, lightheadedness, dizziness, numbness, weakness, tingling, BLE swelling, HA, heart palpitations, urinary sxs, changes in BM, changes in PO intake, melena, hematochezia, cough, or congestion. PCP: Emre Yun MD    Social Hx: +tobacco (0.5 ppd), -EtOH, -Illicit Drugs    There are no other complaints, changes, or physical findings at this time. Current Outpatient Medications   Medication Sig Dispense Refill    tamsulosin (FLOMAX) 0.4 mg capsule Take 1 Cap by mouth daily for 7 doses. 7 Cap 0    ondansetron (ZOFRAN ODT) 4 mg disintegrating tablet Take 1 Tab by mouth every eight (8) hours as needed for Nausea. 10 Tab 0    morphine CR (MS CONTIN) 60 mg CR tablet Take 60 mg by mouth every twelve (12) hours.  diazePAM (VALIUM) 10 mg tablet Take 1 Tab by mouth two (2) times daily as needed. Max Daily Amount: 20 mg. 60 Tab 2    hydroCHLOROthiazide (HYDRODIURIL) 25 mg tablet Take 1 Tab by mouth daily.  30 Tab 2  naloxone 2 mg/actuation spry Use 1 spray intranasally into 1 nostril. Use a new Narcan nasal spray for subsequent doses and administer into alternating nostrils. May repeat every 2 to 3 minutes as needed. 4 Each 0    VENTOLIN HFA 90 mcg/actuation inhaler Take 2-3 Puffs by inhalation every four (4) hours as needed for Wheezing or Shortness of Breath. 1 Inhaler 0    fluticasone-vilanterol (BREO ELLIPTA) 100-25 mcg/dose inhaler Take 1 Puff by inhalation daily. 1 Inhaler 0    furosemide (LASIX) 40 mg tablet Take 40 mg po daily 30 Tab 0    predniSONE (DELTASONE) 10 mg tablet Take 10 mg by mouth daily as needed for Pain.  Omeprazole delayed release (PRILOSEC D/R) 20 mg tablet Take 1 Tab by mouth daily.  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    Ill-defined condition     heat stroke years ago    Liver disease     past hx Hep C--alcoholic cirrhosis    Migraine     Psychiatric disorder     axiety and depression       Past Surgical History:  Past Surgical History:   Procedure Laterality Date    ABDOMEN SURGERY PROC UNLISTED      hernia    COLONOSCOPY N/A 10/19/2016    COLONOSCOPY performed by Jeff Quinonez MD at [facility]"" ENDOSCOPY    COLONOSCOPY N/A 4/18/2017    COLONOSCOPY performed by Jeff Quinonez MD at \[facility]"" ENDOSCOPY    HX COLONOSCOPY  2016    HX ENDOSCOPY  2016    HX HEENT      mastoid - surgery eardrum perforation    HX OTHER SURGICAL      colonoscopy - Mar 2016 - multiple polyps       Family History:  Family History   Problem Relation Age of Onset   [facility] Cancer Mother         lung cancer    Hypertension Mother     Cancer Father         metastatic, colon cancer    Hypertension Father     Hypertension Sister        Social History:  Social History     Tobacco Use    Smoking status: Current Every Day Smoker     Packs/day: 0.50     Years: 40.00     Pack years: 20.00    Smokeless tobacco: Never Used    Tobacco comment: 4-5 cigarettes per day   Substance Use Topics    Alcohol use: No     Comment: stopped    Drug use: No       Allergies: Allergies   Allergen Reactions    Pcn [Penicillins] Swelling    Cymbalta [Duloxetine] Other (comments)     Blood in urine         Review of Systems   Review of Systems   Constitutional: Negative for chills and fever. HENT: Negative for congestion, ear pain, rhinorrhea and sore throat. Respiratory: Negative for cough and shortness of breath. Cardiovascular: Negative for chest pain, palpitations and leg swelling. Gastrointestinal: Positive for abdominal pain. Negative for constipation, diarrhea, nausea and vomiting. No melena  No hematochezia   Endocrine: Negative for polyuria. Genitourinary: Positive for flank pain and testicular pain. Negative for decreased urine volume, difficulty urinating, dysuria, frequency and hematuria. Neurological: Negative for dizziness, weakness, light-headedness, numbness and headaches. No tingling   All other systems reviewed and are negative. Physical Exam   Physical Exam   Nursing note and vitals reviewed.     General appearance - well nourished, well appearing, and in no distress  Eyes - pupils equal and reactive, extraocular eye movements intact  ENT - mucous membranes moist, pharynx normal without lesions  Neck - supple, no significant adenopathy; non-tender to palpation  Chest - clear to auscultation, no wheezes, rales or rhonchi; non-tender to palpation  Heart - normal rate and regular rhythm, S1 and S2 normal, no murmurs noted  Abdomen - soft, L flank and L sided abd tenderness to palpation, nondistended, no masses or organomegaly  Musculoskeletal - no joint tenderness, deformity or swelling; normal ROM  Extremities - peripheral pulses normal, no pedal edema  Skin - normal coloration and turgor, no rashes  Neurological - alert, oriented x3, normal speech, no focal findings or movement disorder noted   - no swelling of testicle or scrotum, tenderness to palpation at the superior aspect of his L scrotum  Written by Madeleine Farrar ED Scribe, as dictated by Prabhu Cullen MD    Diagnostic Study Results     Labs -     No results found for this or any previous visit (from the past 12 hour(s)). Radiologic Studies -     US SCROTUM/TESTICLES     EXAM:  US SCROTUM/TESTICLES     INDICATION: Left testicle pain.     COMPARISON:  None.     TECHNIQUE:  Real time and color Doppler ultrasound of the scrotal contents.     FINDINGS: The right testicle measures 4.3 x 2.6 x 3.4 cm. The left testicle  measures 4.7 x 3.4 x 2.4 cm. The testicles are normal in size and homogeneous in  echotexture without focal lesions. There is normal arterial flow bilaterally.     The epididymis is normal bilaterally. There is no significant hydrocele or  varicocele.     IMPRESSION  IMPRESSION:    Normal appearance of the testicles with normal flow. Medical Decision Making   I am the first provider for this patient. I reviewed the vital signs, available nursing notes, past medical history, past surgical history, family history and social history. Vital Signs-Reviewed the patient's vital signs. No data found. Records Reviewed: Nursing Notes and Old Medical Records    Provider Notes (Medical Decision Making):     DDx: UTI, kidney stone, pyelonephritis, orchitis    ED Course:   Initial assessment performed.  The patients presenting problems have been discussed, and they are in agreement with the care plan formulated and outlined with them. I have encouraged them to ask questions as they arise throughout their visit. 4:34 AM  Spent 3-5 minutes discussing the risks of smoking and the benefits of smoking cessation as well as the long term sequelae of smoking with the pt who verbalized his understanding. Reviewed strategies for success, including gradually decreasing the number of cigarettes smoked a day. Written by LIZETTE Giles, as dictated by Ashley Demarco MD     EKG interpretation: (Preliminary) 1292  Rhythm: normal sinus rhythm. Rate (approx.): 72bpm; Axis: normal; Normal VA, QRS, QTc intervals; ST/T wave: no ischemic changes;   Written by LIZETTE Giles, as dictated by Anjelica Morillo MD    PROGRESS NOTE:  4:32 AM  US technician called in at this time. Written by LIZETTE Giles, as dictated by Anjelica Morillo MD    PROGRESS NOTE:  6:46 AM  Pt and family updated on all available lab and imaging findings at this time. Will consult with urology for pending disposition. Written by LIZETTE Giles, as dictated by Ashley Demarco MD    Progress note:  8:12 AM  Pt noted to be feeling better, ready for discharge. Updated pt and/or family on all final lab and imaging findings. Will follow up as instructed with Urology at 3:00 PM today. All questions have been answered, pt voiced understanding and agreement with plan. Specific return precautions provided as well as instructions to return to the ED should sx worsen at any time. Vital signs stable for discharge. Written by LIZETTE Giles, as dictated by Ashley Demarco MD    Critical Care Time:     none      Diagnosis     Clinical Impression:   1. Kidney stone        PLAN:  1. Discharge Medication List as of 12/12/2018  8:13 AM      START taking these medications    Details   tamsulosin (FLOMAX) 0.4 mg capsule Take 1 Cap by mouth daily for 7 doses. , Print, Disp-7 Cap, R-0      ondansetron (ZOFRAN ODT) 4 mg disintegrating tablet Take 1 Tab by mouth every eight (8) hours as needed for Nausea. , Print, Disp-10 Tab, R-0         CONTINUE these medications which have NOT CHANGED    Details   morphine CR (MS CONTIN) 60 mg CR tablet Take 1 Tab by mouth every twelve (12) hours. Max Daily Amount: 120 mg., Print, Disp-60 Tab, R-0      diazePAM (VALIUM) 10 mg tablet Take 1 Tab by mouth two (2) times daily as needed. Max Daily Amount: 20 mg., Print, Disp-60 Tab, R-2      hydroCHLOROthiazide (HYDRODIURIL) 25 mg tablet Take 1 Tab by mouth daily. , Normal, Disp-30 Tab, R-2      naloxone 2 mg/actuation spry Use 1 spray intranasally into 1 nostril. Use a new Narcan nasal spray for subsequent doses and administer into alternating nostrils. May repeat every 2 to 3 minutes as needed. , Print, Disp-4 Each, R-0      diclofenac EC (VOLTAREN) 75 mg EC tablet two (2) times a day., Historical Med      Morphine (HUYEN) 60 mg ER capsule Take 1 Cap by mouth two (2) times daily as needed for Pain. Max Daily Amount: 2 Caps., Print, Disp-60 Cap, R-0      VENTOLIN HFA 90 mcg/actuation inhaler Take 2-3 Puffs by inhalation every four (4) hours as needed for Wheezing or Shortness of Breath., Print, Disp-1 Inhaler, R-0, MARLENA      fluticasone-vilanterol (BREO ELLIPTA) 100-25 mcg/dose inhaler Take 1 Puff by inhalation daily. , Print, Disp-1 Inhaler, R-0      furosemide (LASIX) 40 mg tablet Take 40 mg po daily, Print, Disp-30 Tab, R-0      predniSONE (DELTASONE) 10 mg tablet Take 10 mg by mouth daily as needed for Pain., Historical Med      Omeprazole delayed release (PRILOSEC D/R) 20 mg tablet Take 1 Tab by mouth daily. , Normal, Disp-90 Tab, R-1           2.    Follow-up Information     Follow up With Specialties Details Why Contact Info    [facility] EMERGENCY DEPT Emergency Medicine  If symptoms worsen [address] [facility] [address] [address]., MD Urology Today at 2:50 pm [address] [facility] [address]  [phone]          Return to ED if worse     Disposition:    DISCHARGE NOTE:  8:12 AM  The patient's results have been reviewed with family and/or caregiver. They verbally convey their understanding and agreement of the patient's signs, symptoms, diagnosis, treatment, and prognosis. They additionally agree to follow up as recommended in the discharge instructions or to return to the Emergency Room should the patient's condition change prior to their follow-up appointment. The family and/or caregiver verbally agrees with the care-plan and all of their questions have been answered. The discharge instructions have also been provided to the them along with educational information regarding the patient's diagnosis and a list of reasons why the patient would want to return to the ER prior to their follow-up appointment should their condition change. Written by Caity Burch, ED Scribe, as dictated by Mary Grace Miner MD.             Attestations: This note is prepared by Caity Burch, acting as Scribe for MD Ashley Aceves MD : The scribe's documentation has been prepared under my direction and personally reviewed by me in its entirety. I confirm that the note above accurately reflects all work, treatment, procedures, and medical decision making performed by me. This note will not be viewable in [address]

## 2018-12-14 ENCOUNTER — HOSPITAL ENCOUNTER (OUTPATIENT)
Dept: PREADMISSION TESTING | Age: 58
Discharge: HOME | End: 2018-12-14

## 2018-12-14 RX ORDER — MORPHINE SULFATE 60 MG/1
60 TABLET, FILM COATED, EXTENDED RELEASE ORAL EVERY 12 HOURS
COMMUNITY

## 2018-12-14 RX ORDER — SODIUM CHLORIDE, SODIUM LACTATE, POTASSIUM CHLORIDE, CALCIUM CHLORIDE 600; 310; 30; 20 MG/100ML; MG/100ML; MG/100ML; MG/100ML
25 INJECTION, SOLUTION INTRAVENOUS CONTINUOUS
Status: CANCELLED | OUTPATIENT
Start: 2018-12-18

## 2018-12-14 NOTE — PERIOP NOTES
[facility]  Preoperative Instructions        Surgery Date 12/18/18         Time of Arrival 0730  Contact # [phone] cell    1. On the day of your surgery, please report to the Surgical Services Registration Desk and sign in at your designated time. The Surgery Center is located to the right of the Emergency Room. 2. You must have someone with you to drive you home. You should not drive a car for 24 hours following surgery. Please make arrangements for a friend or family member to stay with you for the first 24 hours after your surgery. 3. Do not have anything to eat or drink (including water, gum, mints, coffee, juice) after midnight 12/17/18  . ? This may not apply to medications prescribed by your physician. ?(Please note below the special instructions with medications to take the morning of your procedure.)    4. We recommend you do not drink any alcoholic beverages for 24 hours before and after your surgery. 5. Contact your surgeons office for instructions on the following medications: non-steroidal anti-inflammatory drugs (i.e. Advil, Aleve), vitamins, and supplements. (Some surgeons will want you to stop these medications prior to surgery and others may allow you to take them)  **If you are currently taking Plavix, Coumadin, Aspirin and/or other blood-thinning agents, contact your surgeon for instructions. ** Your surgeon will partner with the physician prescribing these medications to determine if it is safe to stop or if you need to continue taking. Please do not stop taking these medications without instructions from your surgeon    6. Wear comfortable clothes. Wear glasses instead of contacts. Do not bring any money or jewelry. Please bring picture ID, insurance card, and any prearranged co-payment or hospital payment. Do not wear make-up, particularly mascara the morning of your surgery.   Do not wear nail polish, particularly if you are having foot /hand surgery. Wear your hair loose or down, no ponytails, buns, crystal pins or clips. All body piercings must be removed. Please shower with antibacterial soap for three consecutive days before and on the morning of surgery, but do not apply any lotions, powders or deodorants after the shower on the day of surgery. Please use a fresh towels after each shower. Please sleep in clean clothes and change bed linens the night before surgery. Please do not shave for 48 hours prior to surgery. Shaving of the face is acceptable. 7. You should understand that if you do not follow these instructions your surgery may be cancelled. If your physical condition changes (I.e. fever, cold or flu) please contact your surgeon as soon as possible. 8. It is important that you be on time. If a situation occurs where you may be late, please call [phone] (OR Holding Area). 9. If you have any questions and or problems, please call [phone] (Pre-admission Testing). 10. Your surgery time may be subject to change. You will receive a phone call the evening prior if your time changes. 11.  If having outpatient surgery, you must have someone to drive you here, stay with you during the duration of your stay, and to drive you home at time of discharge. 12.   In an effort to improve the efficiency, privacy, and safety for all of our Pre-op patients visitors are not allowed in the Holding area. Once you arrive and are registered your family/visitors will be asked to remain in the waiting room. The Pre-op staff will get you from the Surgical Waiting Area and will explain to you and your family/visitors that the Pre-op phase is beginning. The staff will answer any questions and provide instructions for tracking of the patient, by use of the existing tracking number and color-coded status board in the waiting room.   At this time the staff will also ask for your designated spokesperson information in the event that the physician or staff need to provide an update or obtain any pertinent information. The designated spokesperson will be notified if the physician needs to speak to family during the pre-operative phase. If at any time your family/visitors has questions or concerns they may approach the volunteer desk in the waiting area for assistance. Special Instructions:    MEDICATIONS TO TAKE THE MORNING OF SURGERY WITH A SIP OF WATER:valium,breo inhaler,lasix,hydrochlorthiazide,morphine prilosec,use ventolin as needed,zofran as needed--please bring to the hospital      I understand a pre-operative phone call will be made to verify my surgery time. In the event that I am not available, I give permission for a message to be left on my answering service and/or with another person?   yes          ___________________      __________   _________    (Signature of Patient)             (Witness)                (Date and Time)

## 2018-12-17 VITALS
OXYGEN SATURATION: 98 % | RESPIRATION RATE: 20 BRPM | DIASTOLIC BLOOD PRESSURE: 87 MMHG | TEMPERATURE: 98.1 F | SYSTOLIC BLOOD PRESSURE: 147 MMHG | HEART RATE: 66 BPM | WEIGHT: 225.97 LBS | BODY MASS INDEX: 32.35 KG/M2 | HEIGHT: 70 IN

## 2018-12-18 ENCOUNTER — HOSPITAL ENCOUNTER (OUTPATIENT)
Age: 58
Setting detail: OUTPATIENT SURGERY
Discharge: HOME | End: 2018-12-18
Attending: UROLOGY | Admitting: UROLOGY
Payer: MEDICAID

## 2018-12-18 ENCOUNTER — ANESTHESIA EVENT (OUTPATIENT)
Dept: SURGERY | Age: 58
End: 2018-12-18
Payer: MEDICAID

## 2018-12-18 ENCOUNTER — APPOINTMENT (OUTPATIENT)
Dept: GENERAL RADIOLOGY | Age: 58
End: 2018-12-18
Attending: UROLOGY
Payer: MEDICAID

## 2018-12-18 ENCOUNTER — ANESTHESIA (OUTPATIENT)
Dept: SURGERY | Age: 58
End: 2018-12-18
Payer: MEDICAID

## 2018-12-18 VITALS
HEART RATE: 67 BPM | WEIGHT: 237.44 LBS | HEIGHT: 70 IN | TEMPERATURE: 97.6 F | OXYGEN SATURATION: 94 % | BODY MASS INDEX: 33.99 KG/M2 | DIASTOLIC BLOOD PRESSURE: 69 MMHG | SYSTOLIC BLOOD PRESSURE: 130 MMHG | RESPIRATION RATE: 14 BRPM

## 2018-12-18 PROCEDURE — C2617 STENT, NON-COR, TEM W/O DEL: HCPCS | Performed by: UROLOGY

## 2018-12-18 PROCEDURE — 74011250636 HC RX REV CODE- 250/636: Performed by: ANESTHESIOLOGY

## 2018-12-18 PROCEDURE — 76010000161 HC OR TIME 1 TO 1.5 HR INTENSV-TIER 1: Performed by: UROLOGY

## 2018-12-18 PROCEDURE — 74011000250 HC RX REV CODE- 250

## 2018-12-18 PROCEDURE — C1758 CATHETER, URETERAL: HCPCS | Performed by: UROLOGY

## 2018-12-18 PROCEDURE — 76210000016 HC OR PH I REC 1 TO 1.5 HR: Performed by: UROLOGY

## 2018-12-18 PROCEDURE — 77030020782 HC GWN BAIR PAWS FLX 3M -B

## 2018-12-18 PROCEDURE — C1769 GUIDE WIRE: HCPCS | Performed by: UROLOGY

## 2018-12-18 PROCEDURE — 74011250636 HC RX REV CODE- 250/636: Performed by: UROLOGY

## 2018-12-18 PROCEDURE — 76010000149 HC OR TIME 1 TO 1.5 HR: Performed by: UROLOGY

## 2018-12-18 PROCEDURE — 77030008684 HC TU ET CUF COVD -B: Performed by: NURSE ANESTHETIST, CERTIFIED REGISTERED

## 2018-12-18 PROCEDURE — 74011250636 HC RX REV CODE- 250/636

## 2018-12-18 PROCEDURE — 77030018836 HC SOL IRR NACL ICUM -A: Performed by: UROLOGY

## 2018-12-18 PROCEDURE — 76001 XR FLUOROSCOPY OVER 60 MINUTES: CPT

## 2018-12-18 PROCEDURE — 77030018846 HC SOL IRR STRL H20 ICUM -A: Performed by: UROLOGY

## 2018-12-18 PROCEDURE — 76060000033 HC ANESTHESIA 1 TO 1.5 HR: Performed by: UROLOGY

## 2018-12-18 PROCEDURE — 77030026438 HC STYL ET INTUB CARD -A: Performed by: NURSE ANESTHETIST, CERTIFIED REGISTERED

## 2018-12-18 PROCEDURE — 77030019948 HC FBR LSR HOLM DISP OCOA -E: Performed by: UROLOGY

## 2018-12-18 PROCEDURE — 77030012961 HC IRR KT CYSTO/TUR ICUM -A: Performed by: UROLOGY

## 2018-12-18 PROCEDURE — 72190 X-RAY EXAM OF PELVIS: CPT

## 2018-12-18 PROCEDURE — 77030018832 HC SOL IRR H20 ICUM -A: Performed by: UROLOGY

## 2018-12-18 PROCEDURE — 76210000021 HC REC RM PH II 0.5 TO 1 HR: Performed by: UROLOGY

## 2018-12-18 DEVICE — URETERAL STENT
Type: IMPLANTABLE DEVICE | Site: URETER | Status: FUNCTIONAL
Brand: POLARIS™ ULTRA

## 2018-12-18 RX ORDER — LIDOCAINE HYDROCHLORIDE 10 MG/ML
0.1 INJECTION, SOLUTION EPIDURAL; INFILTRATION; INTRACAUDAL; PERINEURAL AS NEEDED
Status: DISCONTINUED | OUTPATIENT
Start: 2018-12-18 | End: 2018-12-18 | Stop reason: HOSPADM

## 2018-12-18 RX ORDER — SUCCINYLCHOLINE CHLORIDE 20 MG/ML
INJECTION INTRAMUSCULAR; INTRAVENOUS AS NEEDED
Status: DISCONTINUED | OUTPATIENT
Start: 2018-12-18 | End: 2018-12-18 | Stop reason: HOSPADM

## 2018-12-18 RX ORDER — DIPHENHYDRAMINE HYDROCHLORIDE 50 MG/ML
12.5 INJECTION, SOLUTION INTRAMUSCULAR; INTRAVENOUS
Status: DISCONTINUED | OUTPATIENT
Start: 2018-12-18 | End: 2018-12-18 | Stop reason: HOSPADM

## 2018-12-18 RX ORDER — LEVOFLOXACIN 5 MG/ML
500 INJECTION, SOLUTION INTRAVENOUS ONCE
Status: COMPLETED | OUTPATIENT
Start: 2018-12-18 | End: 2018-12-18

## 2018-12-18 RX ORDER — SODIUM CHLORIDE 0.9 % (FLUSH) 0.9 %
5-10 SYRINGE (ML) INJECTION EVERY 8 HOURS
Status: DISCONTINUED | OUTPATIENT
Start: 2018-12-18 | End: 2018-12-18 | Stop reason: HOSPADM

## 2018-12-18 RX ORDER — SODIUM CHLORIDE, SODIUM LACTATE, POTASSIUM CHLORIDE, CALCIUM CHLORIDE 600; 310; 30; 20 MG/100ML; MG/100ML; MG/100ML; MG/100ML
25 INJECTION, SOLUTION INTRAVENOUS CONTINUOUS
Status: DISCONTINUED | OUTPATIENT
Start: 2018-12-18 | End: 2018-12-18 | Stop reason: HOSPADM

## 2018-12-18 RX ORDER — ONDANSETRON 2 MG/ML
4 INJECTION INTRAMUSCULAR; INTRAVENOUS AS NEEDED
Status: DISCONTINUED | OUTPATIENT
Start: 2018-12-18 | End: 2018-12-18 | Stop reason: HOSPADM

## 2018-12-18 RX ORDER — LEVOFLOXACIN 250 MG/1
500 TABLET ORAL DAILY
Qty: 8 TAB | Refills: 1 | Status: SHIPPED | OUTPATIENT
Start: 2018-12-18 | End: 2019-08-08 | Stop reason: ALTCHOICE

## 2018-12-18 RX ORDER — FENTANYL CITRATE 50 UG/ML
25 INJECTION, SOLUTION INTRAMUSCULAR; INTRAVENOUS
Status: DISCONTINUED | OUTPATIENT
Start: 2018-12-18 | End: 2018-12-18 | Stop reason: HOSPADM

## 2018-12-18 RX ORDER — PROPOFOL 10 MG/ML
INJECTION, EMULSION INTRAVENOUS AS NEEDED
Status: DISCONTINUED | OUTPATIENT
Start: 2018-12-18 | End: 2018-12-18 | Stop reason: HOSPADM

## 2018-12-18 RX ORDER — LIDOCAINE HYDROCHLORIDE 20 MG/ML
INJECTION, SOLUTION EPIDURAL; INFILTRATION; INTRACAUDAL; PERINEURAL AS NEEDED
Status: DISCONTINUED | OUTPATIENT
Start: 2018-12-18 | End: 2018-12-18 | Stop reason: HOSPADM

## 2018-12-18 RX ORDER — MORPHINE SULFATE 10 MG/ML
2 INJECTION, SOLUTION INTRAMUSCULAR; INTRAVENOUS
Status: DISCONTINUED | OUTPATIENT
Start: 2018-12-18 | End: 2018-12-18 | Stop reason: HOSPADM

## 2018-12-18 RX ORDER — SODIUM CHLORIDE 0.9 % (FLUSH) 0.9 %
5-10 SYRINGE (ML) INJECTION AS NEEDED
Status: DISCONTINUED | OUTPATIENT
Start: 2018-12-18 | End: 2018-12-18 | Stop reason: HOSPADM

## 2018-12-18 RX ORDER — MIDAZOLAM HYDROCHLORIDE 1 MG/ML
INJECTION, SOLUTION INTRAMUSCULAR; INTRAVENOUS AS NEEDED
Status: DISCONTINUED | OUTPATIENT
Start: 2018-12-18 | End: 2018-12-18 | Stop reason: HOSPADM

## 2018-12-18 RX ORDER — FENTANYL CITRATE 50 UG/ML
INJECTION, SOLUTION INTRAMUSCULAR; INTRAVENOUS AS NEEDED
Status: DISCONTINUED | OUTPATIENT
Start: 2018-12-18 | End: 2018-12-18 | Stop reason: HOSPADM

## 2018-12-18 RX ORDER — ROCURONIUM BROMIDE 10 MG/ML
INJECTION, SOLUTION INTRAVENOUS AS NEEDED
Status: DISCONTINUED | OUTPATIENT
Start: 2018-12-18 | End: 2018-12-18 | Stop reason: HOSPADM

## 2018-12-18 RX ORDER — ONDANSETRON 2 MG/ML
INJECTION INTRAMUSCULAR; INTRAVENOUS AS NEEDED
Status: DISCONTINUED | OUTPATIENT
Start: 2018-12-18 | End: 2018-12-18 | Stop reason: HOSPADM

## 2018-12-18 RX ADMIN — FENTANYL CITRATE 50 MCG: 50 INJECTION, SOLUTION INTRAMUSCULAR; INTRAVENOUS at 09:20

## 2018-12-18 RX ADMIN — FENTANYL CITRATE 25 MCG: 50 INJECTION, SOLUTION INTRAMUSCULAR; INTRAVENOUS at 09:38

## 2018-12-18 RX ADMIN — ONDANSETRON 4 MG: 2 INJECTION INTRAMUSCULAR; INTRAVENOUS at 09:44

## 2018-12-18 RX ADMIN — SODIUM CHLORIDE, SODIUM LACTATE, POTASSIUM CHLORIDE, AND CALCIUM CHLORIDE 25 ML/HR: 600; 310; 30; 20 INJECTION, SOLUTION INTRAVENOUS at 08:17

## 2018-12-18 RX ADMIN — LEVOFLOXACIN 500 MG: 5 INJECTION, SOLUTION INTRAVENOUS at 09:25

## 2018-12-18 RX ADMIN — ROCURONIUM BROMIDE 10 MG: 10 INJECTION, SOLUTION INTRAVENOUS at 09:20

## 2018-12-18 RX ADMIN — SUCCINYLCHOLINE CHLORIDE 140 MG: 20 INJECTION INTRAMUSCULAR; INTRAVENOUS at 09:20

## 2018-12-18 RX ADMIN — FENTANYL CITRATE 25 MCG: 50 INJECTION, SOLUTION INTRAMUSCULAR; INTRAVENOUS at 10:05

## 2018-12-18 RX ADMIN — PROPOFOL 130 MG: 10 INJECTION, EMULSION INTRAVENOUS at 09:20

## 2018-12-18 RX ADMIN — PROPOFOL 50 MG: 10 INJECTION, EMULSION INTRAVENOUS at 10:05

## 2018-12-18 RX ADMIN — LIDOCAINE HYDROCHLORIDE 80 MG: 20 INJECTION, SOLUTION EPIDURAL; INFILTRATION; INTRACAUDAL; PERINEURAL at 09:20

## 2018-12-18 RX ADMIN — MIDAZOLAM HYDROCHLORIDE 2 MG: 1 INJECTION, SOLUTION INTRAMUSCULAR; INTRAVENOUS at 09:12

## 2018-12-18 NOTE — OP NOTES
[address]  OPERATIVE REPORT    Naeem Wells, [facility]  MR#: [id number]  : 1960  ACCOUNT #: [de-identified]   DATE OF SERVICE: 2018    PREOPERATIVE DIAGNOSIS:  Obstructed left ureteropelvic junction calculus with hydronephrosis and pyelonephrosis and left lower nephrolithiasis. POSTOPERATIVE DIAGNOSIS:  Obstructed left ureteropelvic junction calculus with hydronephrosis and pyelonephrosis and left lower nephrolithiasis. TERTIARY DIAGNOSIS:  BPH with large median bar. PROCEDURE PERFORMED:  Cystoscopy with attempted left ureteroscopy and placement of left ureteral stent. SURGEON:  Sharon Sierra MD    ASSISTANT:  staff     ANESTHESIA:  General.    COMPLICATIONS:  None. SPECIMENS REMOVED:  None. ESTIMATED BLOOD LOSS:  None. IMPLANTS:  26 cm 5-Sinhala left ureteral stent. PROCEDURE AS FOLLOWS:  The patient is a 25-year-old gentleman who has a history of left nephrolithiasis and a UPJ proximal ureterolithiasis with obstruction. Preoperatively, he had a CT which demonstrated obstruction. The patient was brought to the operating room for purposes of possible ureteroscopy and placement of stent. Following a proper timeout and after prepping and draping, the cystoscopy was carried out under video control with a 21 panendoscope demonstrating a normal urethra, enlarged prostate with a huge median bar that makes it difficult to get over and around the prostate and to be able to get to the floor of the bladder. After some effort and dealing with murky appearing urine, which I washed out to the point where I could at least see something, I was able to identify the left ureteral orifice. I placed a 0.035 sensor wire up into the renal pelvis with some difficulty getting past the stone, which seemed to be wedged into the UPJ area.   When I placed the wire, a large amount of murky appearing material was gushing forth from the left ureteral orifice and it appeared to be somewhat possibly purulent versus simply stagnant urine. I went ahead and removed the cystoscope and attempted a rigid ureteroscopy alongside of and subsequently through an additional guidewire, which would not allow me to get the scope into the distal ureter because of the size of the  orifice; therefore, I did not make much of an effort to continue this. I simply backed off and placed a 6-Khmer 26 cm stent over the previously placed sensor wire into the renal pelvis. After placing the stent with a 1 inch string with plans to come back at a later date for centered ureteroscopy and the stent was positioned properly with the coil on each end, I drained the bladder and then abandoned the procedure at this time. The patient tolerated this procedure well and was returned to recovery area in satisfactory condition.       MD LACIE Aguirre / Adriana Candelaria  D: 12/18/2018 10:18     T: 12/18/2018 13:05  JOB #: [id number]

## 2018-12-18 NOTE — H&P
Urology    Subjective:   . Patient Name: Rosalva Molina. MRN: [id number]    History of Present Illness: Ute Falcon VISIT: New Patient  CC: \"Kidney Stone\"    This patient was seen in the emergency room this morning with acute left flank pain and found to have a large left upper ureteral stone here for consultation and E and M. Patient presented to the emergency room with severe flank pain and left testicular pain. He was given fentanyl for pain control. CT scanning demonstrated an 11 mm upper ureteral stone on the left with hydroureteronephrosis. Because of his testicular pain ultrasound of the scrotum demonstrated normal testicles bilaterally. Not known to have stones before. Still in significant pain. No fever or gross hematuria. Past history is significant for multiple medical problems. The patient is a chronic pain patient on chronic morphine. He has a past history of hepatitis and alcoholic cirrhosis. He is non-drinking presently. Treated for hepatitis 2 years ago. His PCP is at [facility] [place]. He says he does not bleed easily. Is a cigarette smoker and has COPD. Review of his record from the emergency room and what lab work I have available demonstrates his platelet count to be low. Review of his CT scan demonstrates a large stone in the upper ureter on the left with hydro-. He also has a smaller stone lower pole left kidney. There is a vascular calcification in the region of the upper pole left kidney and there is a large calcific density in the region of the lower pole left kidney that I believe is parenchymal.  Right side looks okay except for a cyst.  Also noted to have a cirrhotic liver and splenomegaly. Physical exam reveals him to be in apparent distress secondary to pain. Flanks are nontender. Urine shows microscopic hematuria no pus. KUB demonstrates a large calcific density in the region of the left upper ureter.     Review of lab work from this morning reveals his creatinine to be 1.15 with a BUN of 16. EGFR greater than 60. Ammonia level 25. PAST MEDICAL HISTORY:    Allergies: No known allergies. DENIES: Latex, Shellfish, X-Ray Dye, Iodine. Medications: OXYCODONE-ACETAMINOPHEN  MG ORAL TABLET (OXYCODONE-ACETAMINOPHEN) as needed  MORPHINE SULFATE ER 60 MG ORAL TABLET EXTENDED RELEASE (MORPHINE SULFATE) two daily  LOSARTAN POTASSIUM 50 MG ORAL TABLET (LOSARTAN POTASSIUM) daily  HYDROCHLOROTHIAZIDE 25 MG ORAL TABLET (HYDROCHLOROTHIAZIDE) daily    Problems: Left ureteral stone (ICD-592.1) (GTO04-N72.1)    Illnesses: Lung Disease, High Blood Pressure, and Hepatitis. DENIES: Heart Disease, Pacemaker/Defibrillator, Diabetes, Bowel Problems, Stroke/Seizure, Kidney Problems, Bleeding Problems, HIV, or Cancer. Surgeries: DENIES pertinent  surgeries      Family History: Prostate Cancer and Kidney Stones. DENIES: Kidney cancer, Kidney disease. Social History: Unemployed - disabled. . Smoking status: current every day smoker. Does not drink alcohol. System Review: DENIES: Unexplained Weight Loss, Dry Eyes, Dry Mouth, Leg Swelling, Shortness of Breath, Constipation, Involuntary Urine Loss, Lower Extremity Weakness, Dry Skin, Difficulty Walking, Psychiatric Problems, Impaired Sex Drive, Easy Bleeding, Rash.      EXAMINATION: Vitals: Temp: 98.1° F Pulse: 79 BP: 128/88 Weight: 244 lbs Height: 5' 10\" BMI: 35.14 kg/m^2  Appearance: well-developed NAD Conjunctiva/Lids: conjunctivae and lids normal External Ears/Nose: normal no lesions or deformities Neck: supple Respiratory Effort: breathing easily Lower Extremity: no edema Abdomen/Flank: soft non-tender without masses; no CVA tenderness Liver/Spleen: no organomegaly Hernia: no ventral hernia Gait/Station: normal Skin Inspection: warm and dry Mood/Affect: normal       URINALYSIS from Voided specimen  Urine Dip: pH: 6.0, Bld: +++, LE: Trace, Nit: Neg, Prot: Trace, Ket: Neg, Gluc: Neg  Urine Micro: WBC: 0, RBC: 0, Bacteria: 0  Comment: debris    IMPRESSION:    1. Large left upper ureteral stone  2. Cirrhosis  3. Splenomegaly  4. Thrombocytopenia  5. COPD  6. Cigarette smoking  7. Chronic pain management patient    PLAN: At this time I do not believe the patient is a candidate for lithotripsy because of the large size of his stone and potential for bleeding with his thrombocytopenia and cirrhosis. He perhaps may be able to be worked up and cleared for this at some point. At this time he needs immediate pain relief and relief of obstruction. We talked about all this with his wife present. I have recommended at least a ureteral stent placement on that side if not attempt at laser lithotripsy. He understands the procedure and attendant risk and possible complications and desires to proceed. We will go from there. The patient was given a verbal/written log of Blood Pressure and recommendations. Borderline Hypertension: Discuss this mildly elevated BP at next routine followup.     Transcribed by Speech to Text Technology  [employer]  E&M Service, Urinalysis Microscopic        Electronically signed by Dax Velasquez MD on 12/12/2018 at 6:07 PM    ________________________________________________________________________          Past Medical History:   Diagnosis Date    Aneurysm ([address]     cerebral    Arthritis     Asthma     Chronic obstructive pulmonary disease (HCC)     lung nodule being monitored    Chronic pain     headaches/arthritis    GERD (gastroesophageal reflux disease)     Hypertension     Ill-defined condition     Chronic back pain    Ill-defined condition     Left heel fracture    Ill-defined condition     Sciatica    Ill-defined condition     loss of most hearing in left ear    Ill-defined condition     heat stroke years ago    Liver disease     past hx Hep C--alcoholic cirrhosis    Migraine     Psychiatric disorder     axiety and depression      Past Surgical History: Procedure Laterality Date    ABDOMEN SURGERY PROC UNLISTED      hernia    COLONOSCOPY N/A 10/19/2016    COLONOSCOPY performed by Yi Olmedo MD at [facility] ENDOSCOPY    COLONOSCOPY N/A 4/18/2017    COLONOSCOPY performed by Yi Olmedo MD at [facility] ENDOSCOPY    HX COLONOSCOPY  2016    HX ENDOSCOPY  2016    HX HEENT      mastoid - surgery eardrum perforation    HX OTHER SURGICAL      colonoscopy - Mar 2016 - multiple polyps      Family History   Problem Relation Age of Onset    Cancer Mother         lung cancer    Hypertension Mother     Cancer Father         metastatic, colon cancer    Hypertension Father     Hypertension Sister       Social History     Tobacco Use    Smoking status: Current Every Day Smoker     Packs/day: 0.50     Years: 40.00     Pack years: 20.00    Smokeless tobacco: Never Used    Tobacco comment: 4-5 cigarettes per day   Substance Use Topics    Alcohol use: No     Comment: stopped     Allergies   Allergen Reactions    Pcn [Penicillins] Swelling    Cymbalta [Duloxetine] Other (comments)     Blood in urine      Prior to Admission medications    Medication Sig Start Date End Date Taking? Authorizing Provider   morphine CR (MS CONTIN) 60 mg CR tablet Take 60 mg by mouth every twelve (12) hours. Provider, Historical   tamsulosin (FLOMAX) 0.4 mg capsule Take 1 Cap by mouth daily for 7 doses. 12/12/18 12/19/18  Ashley Demarco MD   ondansetron (ZOFRAN ODT) 4 mg disintegrating tablet Take 1 Tab by mouth every eight (8) hours as needed for Nausea. 12/12/18   Ashley Demarco MD   diazePAM (VALIUM) 10 mg tablet Take 1 Tab by mouth two (2) times daily as needed. Max Daily Amount: 20 mg. 4/12/18   Yinka Soler MD   hydroCHLOROthiazide (HYDRODIURIL) 25 mg tablet Take 1 Tab by mouth daily. 4/12/18   Yinka Soler MD   naloxone 2 mg/actuation spry Use 1 spray intranasally into 1 nostril.  Use a new Narcan nasal spray for subsequent doses and administer into alternating nostrils. May repeat every 2 to 3 minutes as needed. 4/11/18   Yinka Soler MD   VENTOLIN HFA 90 mcg/actuation inhaler Take 2-3 Puffs by inhalation every four (4) hours as needed for Wheezing or Shortness of Breath. 3/17/18   Alvaro Lee MD   fluticasone-vilanterol (BREO ELLIPTA) 100-25 mcg/dose inhaler Take 1 Puff by inhalation daily. 3/18/18   Alvaro Lee MD   furosemide (LASIX) 40 mg tablet Take 40 mg po daily 3/17/18   Alvaro Lee MD   predniSONE (DELTASONE) 10 mg tablet Take 10 mg by mouth daily as needed for Pain. Other, MD Eulalio   Omeprazole delayed release (PRILOSEC D/R) 20 mg tablet Take 1 Tab by mouth daily. 5/10/17   Tiarra Butcher MD             Objective:     Data Review (Labs):    No results for input(s): WBC, HGB, PLT, NA, K, CREA, BUN, INR, HGBEXT, PLTEXT in the last 72 hours. No lab exists for component: INREXT    Patient Vitals for the past 8 hrs:   Height Weight   12/18/18 0742 5' 10\" (1.778 m) 107.7 kg (237 lb 7 oz)     . Yash Smith         Signed By: Tanika Dupree MD                         December 18, 2018

## 2018-12-18 NOTE — PERIOP NOTES
Handoff Report from Operating Room to PACU    Report received from [address] regarding Marjan Moralez. .      Surgeon(s):  Ceci Jansen MD  And Procedure(s) (LRB):  LEFT URETERAL STONE EXTRACTION, HOLMIUM LASER AND STENT (Left)  confirmed   with allergies and dressings discussed. Anesthesia type, drugs, patient history, complications, estimated blood loss, vital signs, intake and output, and last pain medication, lines and temperature were reviewed. 1130: Report given to [facility] RN in phase 2.   Patient transported to phase 2 via stretcher with belongings

## 2018-12-18 NOTE — ANESTHESIA PREPROCEDURE EVALUATION
Anesthetic History   No history of anesthetic complications            Review of Systems / Medical History  Patient summary reviewed, nursing notes reviewed and pertinent labs reviewed    Pulmonary    COPD: mild      Smoker (20 pk yr)  Asthma : well controlled       Neuro/Psych         Headaches (Migraine) and psychiatric history (anxiety and dpression)    Comments: Anxiety/depression Cardiovascular    Hypertension: well controlled              Exercise tolerance: >4 METS  Comments: ECHO from 3/2018 showed a 65-70% EF with trivial MR and TR   GI/Hepatic/Renal     GERD: well controlled  Hepatitis (treated 2016): type C  Renal disease: stones  Liver disease (cirrhosis)    Comments: Hx of polyps Endo/Other        Obesity and arthritis  Pertinent negatives: No morbid obesity   Other Findings   Comments: Hearing loss in left ear  Hx of sciatica/back pain    Hx of brain aneurysm    Chronic pain          Physical Exam    Airway  Mallampati: III  TM Distance: 4 - 6 cm  Neck ROM: normal range of motion   Mouth opening: Diminished (comment)     Cardiovascular    Rhythm: regular  Rate: normal         Dental    Dentition: Edentulous     Pulmonary  Breath sounds clear to auscultation               Abdominal  GI exam deferred       Other Findings            Anesthetic Plan    ASA: 3  Anesthesia type: general    Monitoring Plan: BIS      Induction: Intravenous  Anesthetic plan and risks discussed with: Patient

## 2018-12-18 NOTE — DISCHARGE INSTRUCTIONS
FOLLOW UP WITH DR. Jacey De Luna IN ONE WEEK     Learning About Ureteral Stents  What is a ureteral stent? Normally, urine passes from the kidneys to the bladder through a tube called the ureter. But sometimes the ureter can be blocked. The blockage may be caused by problems such as a kidney stone, a tumor, or an infection. A ureteral (say \"you-REE-ter-ul\") stent is a thin, hollow tube that is placed in the ureter to help urine pass from the kidney into the bladder. The stent keeps the ureter open. After the stent is placed, urine should flow better from your kidneys to your bladder. The stent may be left in place for several days. Or you may need it for several months. Your doctor will take it out when you no longer need it. You may also need to have the stent replaced. What can you expect when you get a ureteral stent? The stent will be placed during a procedure in an operating room. You will get medicine to prevent pain during the procedure. You may also get medicine to make you sleep. You will probably be able to go home the same day, but you might need to stay overnight. The doctor will place the stent by guiding it up the urethra. The urethra is the tube that carries urine from the bladder to outside the body. Then the doctor will pass the stent through the bladder and ureter into the kidney. The doctor will place one end of the stent in the kidney and the other end in the bladder. You may have a small amount of blood in your urine for 1 to 3 days after the procedure. While the stent is in place, you may have to urinate more often, feel a sudden need to urinate, or feel like you can't completely empty your bladder. You may feel some pain when you urinate or do strenuous activity. You also may notice a small amount of blood in your urine after strenuous activities. These side effects usually don't prevent people from doing their normal daily activities.   You may have a thin string coming out of your urethra. This string is attached to the stent. Try not to pull on the string. The doctor may use the string to pull out the stent when you no longer need it. How is it removed? There are several ways to remove the stent. If it has been in place for a while, you may have an X-ray to see if the stent is smooth or if it has become lined with a crust. This crust can make it harder to remove the stent. Some stents can be removed with an attached string that comes out of your urethra. In some cases, if your doctor recommends it, you will be able to remove the stent at home. Or your doctor will remove it with the string in the doctor's office or hospital.  If there is no string, you will need to have a procedure to remove the stent. It's done using a thin, lighted tube called a cystoscope, or scope. The doctor inserts the scope into your urethra and on into the bladder. The scope allows the doctor to check areas of your bladder and urethra that usually don't show up well on X-rays. Your doctor can also insert tiny tools through the scope to remove the stent. You may get medicine that relaxes you or puts you in a light sleep. The area where the scope is inserted may be numb. You will probably be able to go home the same day. What happens after a stent is removed? After the stent removal, you may need to urinate often. You may have some burning during and after urination for a day or two. It may help to drink lots of fluids (unless your doctor tells you not to). This also helps prevent a urinary tract infection. Slightly pink urine is common for several days after removal.  Ask your doctor about medicines for pain. Follow-up care is a key part of your treatment and safety. Be sure to make and go to all appointments, and call your doctor if you are having problems. It's also a good idea to know your test results and keep a list of the medicines you take. Where can you learn more?   Go to http://fuad-eloy.info/. Enter E752 in the search box to learn more about \"Learning About Ureteral Stents. \"  Current as of: March 21, 2018  Content Version: 11.8  © 6029-8738 [employer]. Care instructions adapted under license by [employer] (which disclaims liability or warranty for this information). If you have questions about a medical condition or this instruction, always ask your healthcare professional. [place] [address] any warranty or liability for your use of this information. DISCHARGE SUMMARY from Nurse    PATIENT INSTRUCTIONS:    After general anesthesia or intravenous sedation, for 24 hours or while taking prescription Narcotics:  · Limit your activities  · Do not drive and operate hazardous machinery  · Do not make important personal or business decisions  · Do  not drink alcoholic beverages  · If you have not urinated within 8 hours after discharge, please contact your surgeon on call. Report the following to your surgeon:  · Excessive pain, swelling, redness or odor of or around the surgical area  · Temperature over 100.5  · Nausea and vomiting lasting longer than 4 hours or if unable to take medications  · Any signs of decreased circulation or nerve impairment to extremity: change in color, persistent  numbness, tingling, coldness or increase pain  · Any questions    What to do at Home:  Recommended activity: Activity as tolerated,     *  Please give a list of your current medications to your Primary Care Provider. *  Please update this list whenever your medications are discontinued, doses are      changed, or new medications (including over-the-counter products) are added. *  Please carry medication information at all times in case of emergency situations.     These are general instructions for a healthy lifestyle:    No smoking/ No tobacco products/ Avoid exposure to second hand smoke  Surgeon General's Warning: Quitting smoking now greatly reduces serious risk to your health. Obesity, smoking, and sedentary lifestyle greatly increases your risk for illness    A healthy diet, regular physical exercise & weight monitoring are important for maintaining a healthy lifestyle    You may be retaining fluid if you have a history of heart failure or if you experience any of the following symptoms:  Weight gain of 3 pounds or more overnight or 5 pounds in a week, increased swelling in our hands or feet or shortness of breath while lying flat in bed. Please call your doctor as soon as you notice any of these symptoms; do not wait until your next office visit. Recognize signs and symptoms of STROKE:    F-face looks uneven    A-arms unable to move or move unevenly    S-speech slurred or non-existent    T-time-call 911 as soon as signs and symptoms begin-DO NOT go       Back to bed or wait to see if you get better-TIME IS BRAIN. Warning Signs of HEART ATTACK     Call 911 if you have these symptoms:   Chest discomfort. Most heart attacks involve discomfort in the center of the chest that lasts more than a few minutes, or that goes away and comes back. It can feel like uncomfortable pressure, squeezing, fullness, or pain.  Discomfort in other areas of the upper body. Symptoms can include pain or discomfort in one or both arms, the back, neck, jaw, or stomach.  Shortness of breath with or without chest discomfort.  Other signs may include breaking out in a cold sweat, nausea, or lightheadedness. Don't wait more than five minutes to call 911 - MINUTES MATTER! Fast action can save your life. Calling 911 is almost always the fastest way to get lifesaving treatment. Emergency Medical Services staff can begin treatment when they arrive -- up to an hour sooner than if someone gets to the hospital by car. The discharge information has been reviewed with the {PATIENT PARENT GUARDIAN:92624}.   The {PATIENT PARENT GUARDIAN:71612} verbalized understanding. Discharge medications reviewed with the {Dishcarge meds reviewed FXFF:08857} and appropriate educational materials and side effects teaching were provided. ___________________________________________________________________________________________________________________________________  Levofloxacin (By mouth)   Levofloxacin (big-eem-VIZF-a-sin)  Treats infections. This medicine is a quinolone antibiotic. Brand Name(s): Levaquin   There may be other brand names for this medicine. When This Medicine Should Not Be Used: This medicine is not right for everyone. Do not use it if you had an allergic reaction to levofloxacin or to similar medicines. How to Use This Medicine:   Liquid, Tablet  · Your doctor will tell you how much medicine to use. Do not use more than directed. Take your medicine at the same time each day. · Tablet: Take it with or without food. · Liquid: Take it 1 hour before or 2 hours after you eat. Measure the oral liquid medicine with a marked measuring spoon, oral syringe, or medicine cup. · Take all of the medicine in your prescription to clear up your infection, even if you feel better after the first few doses. · Drink extra fluids so you will urinate more often and help prevent kidney problems. · This medicine should come with a Medication Guide. Ask your pharmacist for a copy if you do not have one. · Missed dose: Take a dose as soon as you remember. If it is almost time for your next dose, wait until then and take a regular dose. Do not take extra medicine to make up for a missed dose. · Store the medicine in a closed container at room temperature, away from heat, moisture, and direct light. Drugs and Foods to Avoid:   Ask your doctor or pharmacist before using any other medicine, including over-the-counter medicines, vitamins, and herbal products. · Some foods and medicines can affect how levofloxacin works.  Tell your doctor if you are using any of the following:  ¨ Theophylline  ¨ Blood thinner (including warfarin)  ¨ Diabetes medicine  ¨ Medicine for heart rhythm problems (including amiodarone, procainamide, quinidine, sotalol)  ¨ NSAID pain or arthritis medicine (including aspirin, celecoxib, diclofenac, ibuprofen, naproxen)  ¨ Steroid medicine (including hydrocortisone, methylprednisolone, prednisone)  · Take levofloxacin at least 2 hours before or 2 hours after you take antacids that contain magnesium or aluminum, zinc, or iron supplements, sucralfate, or didanosine. Warnings While Using This Medicine:   · Tell your doctor if you are pregnant or breastfeeding, or if you have kidney disease, liver disease, diabetes, heart disease, myasthenia gravis, or a history of heart rhythm problems (such as QT prolongation) or seizures. Tell your doctor if you have ever had tendon or joint problems, including rheumatoid arthritis, or if you have received a transplant. · This medicine may cause the following problems:  ¨ Tendinitis and tendon rupture (may happen after treatment ends)  ¨ Liver damage  ¨ Nerve damage in the arms or legs  ¨ Heart rhythm changes  ¨ Changes in blood sugar levels  · This medicine may make you feel dizzy or lightheaded. Do not drive or do anything else that could be dangerous until you know how this medicine affects you. · This medicine can cause diarrhea. Call your doctor if the diarrhea becomes severe, does not stop, or is bloody. Do not take any medicine to stop diarrhea until you have talked to your doctor. Diarrhea can occur 2 months or more after you stop taking this medicine. · Tell any doctor or dentist who treats you that you are using this medicine. This medicine may affect certain medical test results. · This medicine may make your skin more sensitive to sunlight. Wear sunscreen. Do not use sunlamps or tanning beds. · Call your doctor if your symptoms do not improve or if they get worse.   · Keep all medicine out of the reach of children. Never share your medicine with anyone. Possible Side Effects While Using This Medicine:   Call your doctor right away if you notice any of these side effects:  · Allergic reaction: Itching or hives, swelling in your face or hands, swelling or tingling in your mouth or throat, chest tightness, trouble breathing  · Blistering, peeling, red skin rash  · Change in how much or how often you urinate  · Dark urine or pale stools, nausea, vomiting, loss of appetite, stomach pain, yellow skin or eyes  · Diarrhea that may contain blood  · Fainting, dizziness, or lightheadedness  · Fast, slow, or uneven heartbeat, chest pain  · Numbness, tingling, or burning pain in your hands, arms, legs, or feet  · Pain, stiffness, swelling, or bruises around your ankle, leg, shoulder, or other joint  · Seizures, severe headache, unusual thoughts or behaviors, trouble sleeping, feeling anxious, confused, or depressed, seeing, hearing, or feeling things that are not there  · Unusual bleeding, bruising, or weakness  If you notice these less serious side effects, talk with your doctor:   · Mild headache or nausea  If you notice other side effects that you think are caused by this medicine, tell your doctor. Call your doctor for medical advice about side effects. You may report side effects to FDA at 1-758-FDA-5966  © 2017 [address] [employer] Information is for End User's use only and may not be sold, redistributed or otherwise used for commercial purposes. The above information is an  only. It is not intended as medical advice for individual conditions or treatments. Talk to your doctor, nurse or pharmacist before following any medical regimen to see if it is safe and effective for you.

## 2018-12-18 NOTE — PROGRESS NOTES
Patient discharged to home. Iv removed. Discharge instructions, and medication education done with patient and wife douglas. Levaquin 500mg daily x7 days called into pharmacy. Verified levaquin script with urology.   Script was not sent electronically like originally thought by urologist.

## 2018-12-18 NOTE — BRIEF OP NOTE
BRIEF OPERATIVE NOTE    Date of Procedure: 12/18/2018   Preoperative Diagnosis: KIDNEY STONE  Postoperative Diagnosis: * No post-op diagnosis entered *    Procedure(s):  LEFT URETERAL STONE EXTRACTION, HOLMIUM LASER AND STENT  Surgeon(s) and Role:     * Santiago Ellis MD - Primary         Surgical Assistant: staff    Surgical Staff:  Circ-1: Amanda Rodrigues RN  Radiology Technician: Jefferson Tang  Scrub RN-1: Sisi Taveras  Surg Asst-1: Piotr Pop  Event Time In Time Out   Incision Start 2723    Incision Close 1012      Anesthesia: General   Estimated Blood Loss: none  Specimens: * No specimens in log *   Findings: impacted [employer] stone [employer] stone with pyonephrosis   Complications: none  Implants:   Implant Name Type Inv.  Item Serial No.  Lot No. LRB No. Used Action   STENT URET DBL-PGTL [id number] -- POLARIS - SNA  STENT URET DBL-PGTL [id number] -- POLARIS NA [facility] [id number] Left 1 Implanted

## 2018-12-18 NOTE — ANESTHESIA POSTPROCEDURE EVALUATION
Procedure(s):  LEFT URETERAL STONE EXTRACTION, HOLMIUM LASER AND STENT. Anesthesia Post Evaluation        Patient location during evaluation: PACU  Note status: Adequate. Level of consciousness: responsive to verbal stimuli and sleepy but conscious  Pain management: satisfactory to patient  Airway patency: patent  Anesthetic complications: no  Cardiovascular status: acceptable  Respiratory status: acceptable  Hydration status: acceptable  Comments: +Post-Anesthesia Evaluation and Assessment    Patient: Ritesh Ferro MRN: [id number]  SSN: [id number]   YOB: 1960  Age: 60 y.o. Sex: male      Cardiovascular Function/Vital Signs    /75 (BP 1 Location: Left arm, BP Patient Position: At rest)   Pulse 65   Temp 36.8 °C (98.2 °F)   Resp 14   Ht 5' 10\" (1.778 m)   Wt 107.7 kg (237 lb 7 oz)   SpO2 94%   BMI 34.07 kg/m²     Patient is status post Procedure(s):  LEFT URETERAL STONE EXTRACTION, HOLMIUM LASER AND STENT. Nausea/Vomiting: Controlled. Postoperative hydration reviewed and adequate. Pain:  Pain Scale 1: Numeric (0 - 10) (12/18/18 1115)  Pain Intensity 1: 0 (12/18/18 1115)   Managed. Neurological Status:   Neuro (WDL): Within Defined Limits (12/18/18 1030)   At baseline. Mental Status and Level of Consciousness: Arousable. Pulmonary Status:   O2 Device: Room air (12/18/18 1115)   Adequate oxygenation and airway patent. Complications related to anesthesia: None    Post-anesthesia assessment completed. No concerns.     Signed By: Octaviano Jorge MD    12/18/2018  Post anesthesia nausea and vomiting:  controlled      Visit Vitals  /75 (BP 1 Location: Left arm, BP Patient Position: At rest)   Pulse 65   Temp 36.8 °C (98.2 °F)   Resp 14   Ht 5' 10\" (1.778 m)   Wt 107.7 kg (237 lb 7 oz)   SpO2 94%   BMI 34.07 kg/m²

## 2019-05-16 ENCOUNTER — OFFICE VISIT (OUTPATIENT)
Dept: BEHAVIORAL/MENTAL HEALTH CLINIC | Age: 59
End: 2019-05-16

## 2019-05-16 VITALS
DIASTOLIC BLOOD PRESSURE: 78 MMHG | HEART RATE: 73 BPM | HEIGHT: 70 IN | WEIGHT: 250 LBS | BODY MASS INDEX: 35.79 KG/M2 | SYSTOLIC BLOOD PRESSURE: 130 MMHG

## 2019-05-16 DIAGNOSIS — F43.10 PTSD (POST-TRAUMATIC STRESS DISORDER): ICD-10-CM

## 2019-05-16 DIAGNOSIS — G47.00 INSOMNIA, UNSPECIFIED TYPE: ICD-10-CM

## 2019-05-16 DIAGNOSIS — F33.1 MODERATE EPISODE OF RECURRENT MAJOR DEPRESSIVE DISORDER (HCC): Primary | ICD-10-CM

## 2019-05-16 DIAGNOSIS — F41.9 ANXIETY: ICD-10-CM

## 2019-05-16 DIAGNOSIS — F10.21 HISTORY OF ALCOHOL DEPENDENCE (HCC): ICD-10-CM

## 2019-05-16 RX ORDER — SERTRALINE HYDROCHLORIDE 50 MG/1
50 TABLET, FILM COATED ORAL DAILY
Qty: 30 TAB | Refills: 2 | Status: SHIPPED | OUTPATIENT
Start: 2019-05-16 | End: 2019-07-05 | Stop reason: SDUPTHER

## 2019-05-16 RX ORDER — LOSARTAN POTASSIUM 50 MG/1
TABLET ORAL DAILY
COMMUNITY
End: 2019-11-07 | Stop reason: ALTCHOICE

## 2019-05-16 RX ORDER — ZOLPIDEM TARTRATE 10 MG/1
TABLET ORAL
COMMUNITY
End: 2019-05-16 | Stop reason: SDUPTHER

## 2019-05-16 RX ORDER — OXYCODONE AND ACETAMINOPHEN 10; 325 MG/1; MG/1
TABLET ORAL
COMMUNITY

## 2019-05-16 RX ORDER — ZOLPIDEM TARTRATE 10 MG/1
10 TABLET ORAL
Qty: 30 TAB | Refills: 2 | Status: SHIPPED | OUTPATIENT
Start: 2019-05-16 | End: 2019-08-08 | Stop reason: SDUPTHER

## 2019-06-03 ENCOUNTER — TELEPHONE (OUTPATIENT)
Dept: BEHAVIORAL/MENTAL HEALTH CLINIC | Age: 59
End: 2019-06-03

## 2019-06-03 RX ORDER — TRAZODONE HYDROCHLORIDE 100 MG/1
100 TABLET ORAL
Qty: 30 TAB | Refills: 0 | Status: SHIPPED | OUTPATIENT
Start: 2019-06-03 | End: 2019-08-08 | Stop reason: SDUPTHER

## 2019-06-03 NOTE — TELEPHONE ENCOUNTER
Patient states he stopped the sertraline due to possible SE, but he is going to restart it \"slowly. \" he also thinks this may have been due to the allergy medication.  He states he's hoping it will continue to [facility] and regulate it and he doesn't want to quit it\"

## 2019-06-30 ENCOUNTER — HOSPITAL ENCOUNTER (EMERGENCY)
Age: 59
Discharge: HOME | End: 2019-06-30
Attending: EMERGENCY MEDICINE
Payer: MEDICAID

## 2019-06-30 ENCOUNTER — APPOINTMENT (OUTPATIENT)
Dept: CT IMAGING | Age: 59
End: 2019-06-30
Attending: EMERGENCY MEDICINE
Payer: MEDICAID

## 2019-06-30 VITALS
DIASTOLIC BLOOD PRESSURE: 77 MMHG | TEMPERATURE: 98.4 F | OXYGEN SATURATION: 95 % | HEART RATE: 100 BPM | BODY MASS INDEX: 34.57 KG/M2 | WEIGHT: 240.96 LBS | RESPIRATION RATE: 20 BRPM | SYSTOLIC BLOOD PRESSURE: 131 MMHG

## 2019-06-30 DIAGNOSIS — K74.60 CIRRHOSIS OF LIVER WITHOUT ASCITES, UNSPECIFIED HEPATIC CIRRHOSIS TYPE (HCC): ICD-10-CM

## 2019-06-30 DIAGNOSIS — R10.84 ABDOMINAL PAIN, GENERALIZED: Primary | ICD-10-CM

## 2019-06-30 LAB
ALBUMIN SERPL-MCNC: 4.5 G/DL (ref 3.5–5)
ALBUMIN/GLOB SERPL: 1.1 {RATIO} (ref 1.1–2.2)
ALP SERPL-CCNC: 112 U/L (ref 45–117)
ALT SERPL-CCNC: 58 U/L (ref 12–78)
ANION GAP SERPL CALC-SCNC: 7 MMOL/L (ref 5–15)
APPEARANCE UR: CLEAR
AST SERPL-CCNC: 28 U/L (ref 15–37)
BACTERIA URNS QL MICRO: NEGATIVE /HPF
BASOPHILS # BLD: 0.1 K/UL (ref 0–0.1)
BASOPHILS NFR BLD: 1 % (ref 0–1)
BILIRUB SERPL-MCNC: 0.5 MG/DL (ref 0.2–1)
BILIRUB UR QL CFM: NEGATIVE
BUN SERPL-MCNC: 21 MG/DL (ref 6–20)
BUN/CREAT SERPL: 19 (ref 12–20)
CALCIUM SERPL-MCNC: 9.2 MG/DL (ref 8.5–10.1)
CHLORIDE SERPL-SCNC: 101 MMOL/L (ref 97–108)
CO2 SERPL-SCNC: 29 MMOL/L (ref 21–32)
COLOR UR: ABNORMAL
CREAT SERPL-MCNC: 1.08 MG/DL (ref 0.7–1.3)
DIFFERENTIAL METHOD BLD: ABNORMAL
EOSINOPHIL # BLD: 0.1 K/UL (ref 0–0.4)
EOSINOPHIL NFR BLD: 1 % (ref 0–7)
EPITH CASTS URNS QL MICRO: ABNORMAL /LPF
ERYTHROCYTE [DISTWIDTH] IN BLOOD BY AUTOMATED COUNT: 12.5 % (ref 11.5–14.5)
GLOBULIN SER CALC-MCNC: 4.1 G/DL (ref 2–4)
GLUCOSE SERPL-MCNC: 96 MG/DL (ref 65–100)
GLUCOSE UR STRIP.AUTO-MCNC: NEGATIVE MG/DL
HCT VFR BLD AUTO: 50.5 % (ref 36.6–50.3)
HGB BLD-MCNC: 17.1 G/DL (ref 12.1–17)
HGB UR QL STRIP: ABNORMAL
HYALINE CASTS URNS QL MICRO: ABNORMAL /LPF (ref 0–5)
IMM GRANULOCYTES # BLD AUTO: 0 K/UL (ref 0–0.04)
IMM GRANULOCYTES NFR BLD AUTO: 0 % (ref 0–0.5)
INR PPP: 1.1 (ref 0.9–1.1)
KETONES UR QL STRIP.AUTO: NEGATIVE MG/DL
LEUKOCYTE ESTERASE UR QL STRIP.AUTO: NEGATIVE
LIPASE SERPL-CCNC: 112 U/L (ref 73–393)
LYMPHOCYTES # BLD: 3.5 K/UL (ref 0.8–3.5)
LYMPHOCYTES NFR BLD: 35 % (ref 12–49)
MCH RBC QN AUTO: 28.5 PG (ref 26–34)
MCHC RBC AUTO-ENTMCNC: 33.9 G/DL (ref 30–36.5)
MCV RBC AUTO: 84.3 FL (ref 80–99)
MONOCYTES # BLD: 0.7 K/UL (ref 0–1)
MONOCYTES NFR BLD: 7 % (ref 5–13)
NEUTS SEG # BLD: 5.5 K/UL (ref 1.8–8)
NEUTS SEG NFR BLD: 56 % (ref 32–75)
NITRITE UR QL STRIP.AUTO: NEGATIVE
NRBC # BLD: 0 K/UL (ref 0–0.01)
NRBC BLD-RTO: 0 PER 100 WBC
PH UR STRIP: 5.5 [PH] (ref 5–8)
PLATELET # BLD AUTO: 117 K/UL (ref 150–400)
PMV BLD AUTO: 10 FL (ref 8.9–12.9)
POTASSIUM SERPL-SCNC: 3.5 MMOL/L (ref 3.5–5.1)
PROT SERPL-MCNC: 8.6 G/DL (ref 6.4–8.2)
PROT UR STRIP-MCNC: NEGATIVE MG/DL
PROTHROMBIN TIME: 10.8 SEC (ref 9–11.1)
RBC # BLD AUTO: 5.99 M/UL (ref 4.1–5.7)
RBC #/AREA URNS HPF: ABNORMAL /HPF (ref 0–5)
SODIUM SERPL-SCNC: 137 MMOL/L (ref 136–145)
SP GR UR REFRACTOMETRY: 1.03 (ref 1–1.03)
UROBILINOGEN UR QL STRIP.AUTO: 1 EU/DL (ref 0.2–1)
WBC # BLD AUTO: 9.9 K/UL (ref 4.1–11.1)
WBC URNS QL MICRO: ABNORMAL /HPF (ref 0–4)

## 2019-06-30 PROCEDURE — 74177 CT ABD & PELVIS W/CONTRAST: CPT

## 2019-06-30 PROCEDURE — 74011636320 HC RX REV CODE- 636/320: Performed by: EMERGENCY MEDICINE

## 2019-06-30 PROCEDURE — 81001 URINALYSIS AUTO W/SCOPE: CPT

## 2019-06-30 PROCEDURE — 80053 COMPREHEN METABOLIC PANEL: CPT

## 2019-06-30 PROCEDURE — 85025 COMPLETE CBC W/AUTO DIFF WBC: CPT

## 2019-06-30 PROCEDURE — 85610 PROTHROMBIN TIME: CPT

## 2019-06-30 PROCEDURE — 83690 ASSAY OF LIPASE: CPT

## 2019-06-30 PROCEDURE — 36415 COLL VENOUS BLD VENIPUNCTURE: CPT

## 2019-06-30 PROCEDURE — 99283 EMERGENCY DEPT VISIT LOW MDM: CPT

## 2019-06-30 RX ORDER — SODIUM CHLORIDE 0.9 % (FLUSH) 0.9 %
10 SYRINGE (ML) INJECTION
Status: COMPLETED | OUTPATIENT
Start: 2019-06-30 | End: 2019-06-30

## 2019-06-30 RX ORDER — DICYCLOMINE HYDROCHLORIDE 20 MG/1
20 TABLET ORAL
Qty: 20 TAB | Refills: 0 | Status: SHIPPED | OUTPATIENT
Start: 2019-06-30 | End: 2019-08-08 | Stop reason: ALTCHOICE

## 2019-06-30 RX ADMIN — IOPAMIDOL 100 ML: 755 INJECTION, SOLUTION INTRAVENOUS at 21:00

## 2019-06-30 RX ADMIN — Medication 10 ML: at 21:00

## 2019-06-30 NOTE — ED PROVIDER NOTES
EMERGENCY DEPARTMENT HISTORY AND PHYSICAL EXAM      Date: 6/30/2019  Patient Name: Tori Calix. History of Presenting Illness     Chief Complaint   Patient presents with    Nausea     Ambulatory into the ED with c/o nausea and abdominal pain x 1 month. Pt states, \"my hernia popped out last night and I pushed it back in. \" No obvious distress noted.  Abdominal Pain       History Provided By: Patient and Patient's Wife    HPI: Tori Calix., 60 y.o. male  presents to the ED with cc of abdominal pain. Patient has had several months of generalized abdominal pain. He has a history of cirrhosis. He has had hernias in the past.  He reports presence of his hernias off and on throughout this time. He had what appeared to be a hernia on his left upper quadrant that he pushed back and last night it was causing him sharp pain. He reports occasional fevers at home. He has not had any nausea or vomiting. Occasionally has loose bowels. Denies blood in his stool. No urinary frequency or dysuria. There are no other complaints, changes, or physical findings at this time. PCP: Gayla Melo MD    No current facility-administered medications on file prior to encounter. Current Outpatient Medications on File Prior to Encounter   Medication Sig Dispense Refill    traZODone (DESYREL) 100 mg tablet Take 1 Tab by mouth nightly. 30 Tab 0    oxyCODONE-acetaminophen (PERCOCET)  mg per tablet Take  by mouth.  losartan (COZAAR) 50 mg tablet Take  by mouth daily.  zolpidem (AMBIEN) 10 mg tablet Take 1 Tab by mouth nightly as needed for Sleep. Max Daily Amount: 10 mg. Indications: Difficulty Falling Asleep 30 Tab 2    sertraline (ZOLOFT) 50 mg tablet Take 1 Tab by mouth daily. 30 Tab 2    levoFLOXacin (LEVAQUIN) 250 mg tablet Take 2 Tabs by mouth daily. 8 Tab 1    morphine CR (MS CONTIN) 60 mg CR tablet Take 60 mg by mouth every twelve (12) hours.       ondansetron (ZOFRAN ODT) 4 mg disintegrating tablet Take 1 Tab by mouth every eight (8) hours as needed for Nausea. 10 Tab 0    diazePAM (VALIUM) 10 mg tablet Take 1 Tab by mouth two (2) times daily as needed. Max Daily Amount: 20 mg. 60 Tab 2    hydroCHLOROthiazide (HYDRODIURIL) 25 mg tablet Take 1 Tab by mouth daily. 30 Tab 2    naloxone 2 mg/actuation spry Use 1 spray intranasally into 1 nostril. Use a new Narcan nasal spray for subsequent doses and administer into alternating nostrils. May repeat every 2 to 3 minutes as needed. 4 Each 0    VENTOLIN HFA 90 mcg/actuation inhaler Take 2-3 Puffs by inhalation every four (4) hours as needed for Wheezing or Shortness of Breath. 1 Inhaler 0    fluticasone-vilanterol (BREO ELLIPTA) 100-25 mcg/dose inhaler Take 1 Puff by inhalation daily. 1 Inhaler 0    furosemide (LASIX) 40 mg tablet Take 40 mg po daily 30 Tab 0    predniSONE (DELTASONE) 10 mg tablet Take 10 mg by mouth daily as needed for Pain.  Omeprazole delayed release (PRILOSEC D/R) 20 mg tablet Take 1 Tab by mouth daily.  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    Ill-defined condition     heat stroke years ago    Liver disease     past hx Hep C--alcoholic cirrhosis    Migraine     Psychiatric disorder     axiety and depression       Past Surgical History:  Past Surgical History:   Procedure Laterality Date    ABDOMEN SURGERY PROC UNLISTED      hernia    COLONOSCOPY N/A 10/19/2016    COL[facility] performed [facility] ENDOSCOPY    COLONOSCOPY N/A 4/18/2017    COL[facility] ENDOSCOPY    HX COLONOSCOPY  2016    HX ENDOSCOPY  2016    HX HEENT      mastoid - surgery eardrum perforation    HX OTHER SURGICAL      colonoscopy - Mar 2016 - multiple polyps       Family History:  Family History   Problem Relation Age of Onset    Cancer Mother         lung cancer    Hypertension Mother     Cancer Father         metastatic, colon cancer    Hypertension Father     Hypertension Sister        Social History:  Social History     Tobacco Use    Smoking status: Current Every Day Smoker     Packs/day: 0.50     Years: 40.00     Pack years: 20.00    Smokeless tobacco: Never Used    Tobacco comment: 4-5 cigarettes per day   Substance Use Topics    Alcohol use: No     Comment: stopped    Drug use: No       Allergies: Allergies   Allergen Reactions    Pcn [Penicillins] Swelling    Cymbalta [Duloxetine] Other (comments)     Blood in urine         Review of Systems   Review of Systems   Constitutional: Positive for fever. Negative for chills. HENT: Negative for congestion, ear pain, rhinorrhea, sore throat and trouble swallowing. Eyes: Negative for visual disturbance. Respiratory: Negative for cough, chest tightness and shortness of breath. Cardiovascular: Negative for chest pain and palpitations. Gastrointestinal: Positive for abdominal pain and diarrhea. Negative for blood in stool, constipation, nausea and vomiting. Genitourinary: Negative for decreased urine volume, difficulty urinating, dysuria and frequency. Musculoskeletal: Negative for back pain and neck pain. Skin: Negative for color change and rash. Neurological: Negative for dizziness, weakness, light-headedness and headaches. Physical Exam   Physical Exam   Constitutional: He is oriented to person, place, and time. He appears well-developed and well-nourished. He does not appear ill. No distress.    HENT: Mouth/Throat: Oropharynx is clear and moist.   Eyes: Conjunctivae are normal.   Neck: Neck supple. Cardiovascular: Normal rate and regular rhythm. Pulmonary/Chest: Effort normal and breath sounds normal. No accessory muscle usage. No respiratory distress. Abdominal: Soft. He exhibits distension and ascites. There is generalized tenderness. Lymphadenopathy:     He has no cervical adenopathy. Neurological: He is alert and oriented to person, place, and time. He has normal strength. No cranial nerve deficit or sensory deficit. Skin: Skin is warm and dry. Nursing note and vitals reviewed. Diagnostic Study Results     Labs -     Recent Results (from the past 24 hour(s))   CBC WITH AUTOMATED DIFF    Collection Time: 06/30/19  7:35 PM   Result Value Ref Range    WBC 9.9 4.1 - 11.1 K/uL    RBC 5.99 (H) 4.10 - 5.70 M/uL    HGB 17.1 (H) 12.1 - 17.0 g/dL    HCT 50.5 (H) 36.6 - 50.3 %    MCV 84.3 80.0 - 99.0 FL    MCH 28.5 26.0 - 34.0 PG    MCHC 33.9 30.0 - 36.5 g/dL    RDW 12.5 11.5 - 14.5 %    PLATELET 794 (L) 038 - 400 K/uL    MPV 10.0 8.9 - 12.9 FL    NRBC 0.0 0  WBC    ABSOLUTE NRBC 0.00 0.00 - 0.01 K/uL    NEUTROPHILS 56 32 - 75 %    LYMPHOCYTES 35 12 - 49 %    MONOCYTES 7 5 - 13 %    EOSINOPHILS 1 0 - 7 %    BASOPHILS 1 0 - 1 %    IMMATURE GRANULOCYTES 0 0.0 - 0.5 %    ABS. NEUTROPHILS 5.5 1.8 - 8.0 K/UL    ABS. LYMPHOCYTES 3.5 0.8 - 3.5 K/UL    ABS. MONOCYTES 0.7 0.0 - 1.0 K/UL    ABS. EOSINOPHILS 0.1 0.0 - 0.4 K/UL    ABS. BASOPHILS 0.1 0.0 - 0.1 K/UL    ABS. IMM.  GRANS. 0.0 0.00 - 0.04 K/UL    DF AUTOMATED     PROTHROMBIN TIME + INR    Collection Time: 06/30/19  7:35 PM   Result Value Ref Range    INR 1.1 0.9 - 1.1      Prothrombin time 10.8 9.0 - 18.4 sec   METABOLIC PANEL, COMPREHENSIVE    Collection Time: 06/30/19  7:35 PM   Result Value Ref Range    Sodium 137 136 - 145 mmol/L    Potassium 3.5 3.5 - 5.1 mmol/L    Chloride 101 97 - 108 mmol/L    CO2 29 21 - 32 mmol/L    Anion gap 7 5 - 15 mmol/L    Glucose 96 65 - 100 mg/dL    BUN 21 (H) 6 - 20 MG/DL    Creatinine 1.08 0.70 - 1.30 MG/DL    BUN/Creatinine ratio 19 12 - 20      GFR est AA >60 >60 ml/min/1.73m2    GFR est non-AA >60 >60 ml/min/1.73m2    Calcium 9.2 8.5 - 10.1 MG/DL    Bilirubin, total 0.5 0.2 - 1.0 MG/DL    ALT (SGPT) 58 12 - 78 U/L    AST (SGOT) 28 15 - 37 U/L    Alk. phosphatase 112 45 - 117 U/L    Protein, total 8.6 (H) 6.4 - 8.2 g/dL    Albumin 4.5 3.5 - 5.0 g/dL    Globulin 4.1 (H) 2.0 - 4.0 g/dL    A-G Ratio 1.1 1.1 - 2.2     LIPASE    Collection Time: 06/30/19  7:35 PM   Result Value Ref Range    Lipase 112 73 - 393 U/L   URINALYSIS W/ RFLX MICROSCOPIC    Collection Time: 06/30/19  8:07 PM   Result Value Ref Range    Color YELLOW/STRAW      Appearance CLEAR CLEAR      Specific gravity 1.027 1.003 - 1.030      pH (UA) 5.5 5.0 - 8.0      Protein NEGATIVE  NEG mg/dL    Glucose NEGATIVE  NEG mg/dL    Ketone NEGATIVE  NEG mg/dL    Blood SMALL (A) NEG      Urobilinogen 1.0 0.2 - 1.0 EU/dL    Nitrites NEGATIVE  NEG      Leukocyte Esterase NEGATIVE  NEG      WBC 0-4 0 - 4 /hpf    RBC 10-20 0 - 5 /hpf    Epithelial cells FEW FEW /lpf    Bacteria NEGATIVE  NEG /hpf    Hyaline cast 0-2 0 - 5 /lpf   BILIRUBIN, CONFIRM    Collection Time: 06/30/19  8:07 PM   Result Value Ref Range    Bilirubin UA, confirm NEGATIVE  NEG         Radiologic Studies -   CT ABD PELV W CONT   Final Result   IMPRESSION:   No evidence of acute abdominal or pelvic process. Cirrhosis. Unchanged 3 mm left   lower lobe pulmonary nodule. Nonobstructing left renal calculi. CT Results  (Last 48 hours)               06/30/19 2103  CT ABD PELV W CONT Final result    Impression:  IMPRESSION:   No evidence of acute abdominal or pelvic process. Cirrhosis. Unchanged 3 mm left   lower lobe pulmonary nodule. Nonobstructing left renal calculi.        Narrative:  EXAM: CT ABD PELV W CONT       INDICATION: Abdominal pain        COMPARISON: December 12, 2018        CONTRAST: 95 mL of Isovue-370. TECHNIQUE:    Following the uneventful intravenous administration of contrast, thin axial   images were obtained through the abdomen and pelvis. Coronal and sagittal   reconstructions were generated. Oral contrast was not administered. CT dose   reduction was achieved through use of a standardized protocol tailored for this   examination and automatic exposure control for dose modulation. FINDINGS:    LUNG BASES: Unchanged 3 mm left lower lobe pulmonary nodule. INCIDENTALLY IMAGED HEART AND MEDIASTINUM: Unremarkable. LIVER: Cirrhotic, with no visualized focal mass. GALLBLADDER: Unremarkable. SPLEEN: No mass. PANCREAS: No mass or ductal dilatation. ADRENALS: Unremarkable. KIDNEYS: No mass or hydronephrosis. Nonobstructing left renal calculi. No   visualized ureteral calculus. STOMACH: Unremarkable. SMALL BOWEL: No dilatation or wall thickening. COLON: No dilatation or wall thickening. Mild sigmoid diverticulosis. APPENDIX: Normal.   PERITONEUM: No ascites or pneumoperitoneum. RETROPERITONEUM: No lymphadenopathy or aortic aneurysm. REPRODUCTIVE ORGANS: Normal sized prostate gland. URINARY BLADDER: Decompressed. BONES: No destructive bone lesion. ADDITIONAL COMMENTS: None               CXR Results  (Last 48 hours)    None            Medical Decision Making   I am the first provider for this patient. I reviewed the vital signs, available nursing notes, past medical history, past surgical history, family history and social history. Vital Signs-Reviewed the patient's vital signs. Patient Vitals for the past 24 hrs:   Temp Pulse Resp BP SpO2   06/30/19 1942 -- -- 20 131/77 95 %   06/30/19 1809 98.4 °F (36.9 °C) 100 16 (!) 164/101 96 %       Records Reviewed: Nursing Notes and Old Medical Records    Provider Notes (Medical Decision Making):   Patient presents with generalized abdominal pain for several weeks.   His labs and imaging did not show any acute processes. He does have a history of liver cirrhosis. No significant ascites. He describes what sounds like hernias however do not see any hernias on his CT scan. Also cannot palpate any hernias or abdominal wall defects. Patient needs to follow-up with a gastroenterologist.  His symptoms appear nonacute not sure what to make of them today. He states he has had endoscopy and colonoscopy within the past 2 years. He may just have more of an irritable bowel syndrome type picture. ED Course:   Initial assessment performed. The patients presenting problems have been discussed, and they are in agreement with the care plan formulated and outlined with them. I have encouraged them to ask questions as they arise throughout their visit. Orders Placed This Encounter    CT ABD PELV W CONT    CBC WITH AUTOMATED DIFF    PROTHROMBIN TIME + INR    COMPREHENSIVE METABOLIC PANEL    LIPASE    URINALYSIS W/ RFLX MICROSCOPIC    BILIRUBIN, CONFIRM    SALINE LOCK IV ONE TIME STAT    iopamidol (ISOVUE-370) 76 % injection 100 mL    sodium chloride (NS) flush 10 mL    dicyclomine (BENTYL) 20 mg tablet         Critical Care Time:   0    Disposition:  Discharge  10:25 PM    The patient's emergency department evaluation is now complete. I have reviewed all labs, imaging, and pertinent information. I have discussed all results with the patient and/or family. Based on our evaluation today I do believe that the patient is safe to be discharged home. The patient has been provided with at home instructions that are pertinent to their complaint today, although these may not be specific to the exact diagnosis. I have reviewed the patient's home medications and attempted to reconcile if not already done so by pharmacy or nursing staff. I have discussed all new prescriptions with the patient.   The patient has been encouraged to follow-up with primary care doctor and/or specialist, and these have been discussed with the patient. The patient has been advised that they may return to the emergency department if they have any worsening symptoms and or new symptoms that are of concern to them. Verbal discharge instructions may have also been provided to the patient that may not be specifically contained in the written discharge instructions. The patient has been given opportunity to ask questions prior to discharge. PLAN:  1. Discharge Medication List as of 6/30/2019 10:25 PM      START taking these medications    Details   dicyclomine (BENTYL) 20 mg tablet Take 1 Tab by mouth every six (6) hours as needed (abdominal cramps). , Print, Disp-20 Tab, R-0         CONTINUE these medications which have NOT CHANGED    Details   traZODone (DESYREL) 100 mg tablet Take 1 Tab by mouth nightly., Normal, Disp-30 Tab, R-0      oxyCODONE-acetaminophen (PERCOCET)  mg per tablet Take  by mouth., Historical Med      losartan (COZAAR) 50 mg tablet Take  by mouth daily. , Historical Med      zolpidem (AMBIEN) 10 mg tablet Take 1 Tab by mouth nightly as needed for Sleep. Max Daily Amount: 10 mg. Indications: Difficulty Falling Asleep, Print, Disp-30 Tab, R-2      sertraline (ZOLOFT) 50 mg tablet Take 1 Tab by mouth daily. , Normal, Disp-30 Tab, R-2      levoFLOXacin (LEVAQUIN) 250 mg tablet Take 2 Tabs by mouth daily. , Normal, Disp-8 Tab, R-1      morphine CR (MS CONTIN) 60 mg CR tablet Take 60 mg by mouth every twelve (12) hours. , Historical Med      ondansetron (ZOFRAN ODT) 4 mg disintegrating tablet Take 1 Tab by mouth every eight (8) hours as needed for Nausea. , Print, Disp-10 Tab, R-0      diazePAM (VALIUM) 10 mg tablet Take 1 Tab by mouth two (2) times daily as needed. Max Daily Amount: 20 mg., Print, Disp-60 Tab, R-2      hydroCHLOROthiazide (HYDRODIURIL) 25 mg tablet Take 1 Tab by mouth daily. , Normal, Disp-30 Tab, R-2      naloxone 2 mg/actuation spry Use 1 spray intranasally into 1 nostril.  Use a new Narcan nasal spray for subsequent doses and administer into alternating nostrils. May repeat every 2 to 3 minutes as needed. , Print, Disp-4 Each, R-0      VENTOLIN HFA 90 mcg/actuation inhaler Take 2-3 Puffs by inhalation every four (4) hours as needed for Wheezing or Shortness of Breath., Print, Disp-1 Inhaler, R-0, MARLENA      fluticasone-vilanterol (BREO ELLIPTA) 100-25 mcg/dose inhaler Take 1 Puff by inhalation daily. , Print, Disp-1 Inhaler, R-0      furosemide (LASIX) 40 mg tablet Take 40 mg po daily, Print, Disp-30 Tab, R-0      predniSONE (DELTASONE) 10 mg tablet Take 10 mg by mouth daily as needed for Pain., Historical Med      Omeprazole delayed release (PRILOSEC D/R) 20 mg tablet Take 1 Tab by mouth daily. , Normal, Disp-90 Tab, R-1           2. Follow-up Information     Follow up With Specialties Details Why Contact Info    Kassi Ribeiro MD Family Practice Schedule an appointment as soon as possible for a visit in 2 days  [address]      Nelly Ro MD Gastroenterology Schedule an appointment as soon as possible for a visit   [address]  [phone]          Return to ED if worse     Diagnosis     Clinical Impression:   1. Abdominal pain, generalized    2. Cirrhosis of liver without ascites, unspecified hepatic cirrhosis type ([facility] [address]            This note will not be viewable in [employer].

## 2019-07-08 RX ORDER — SERTRALINE HYDROCHLORIDE 50 MG/1
TABLET, FILM COATED ORAL
Qty: 30 TAB | Refills: 0 | Status: SHIPPED | OUTPATIENT
Start: 2019-07-08 | End: 2019-08-08 | Stop reason: SINTOL

## 2019-08-08 ENCOUNTER — OFFICE VISIT (OUTPATIENT)
Dept: BEHAVIORAL/MENTAL HEALTH CLINIC | Age: 59
End: 2019-08-08

## 2019-08-08 VITALS
SYSTOLIC BLOOD PRESSURE: 155 MMHG | HEIGHT: 70 IN | BODY MASS INDEX: 33.5 KG/M2 | HEART RATE: 67 BPM | WEIGHT: 234 LBS | DIASTOLIC BLOOD PRESSURE: 93 MMHG

## 2019-08-08 DIAGNOSIS — F10.21 HISTORY OF ALCOHOL DEPENDENCE (HCC): ICD-10-CM

## 2019-08-08 DIAGNOSIS — F33.1 MODERATE EPISODE OF RECURRENT MAJOR DEPRESSIVE DISORDER (HCC): Primary | ICD-10-CM

## 2019-08-08 DIAGNOSIS — F43.10 PTSD (POST-TRAUMATIC STRESS DISORDER): ICD-10-CM

## 2019-08-08 DIAGNOSIS — G47.00 INSOMNIA, UNSPECIFIED TYPE: ICD-10-CM

## 2019-08-08 RX ORDER — QUETIAPINE FUMARATE 50 MG/1
50 TABLET, FILM COATED ORAL 2 TIMES DAILY
Qty: 60 TAB | Refills: 2 | Status: SHIPPED | OUTPATIENT
Start: 2019-08-08 | End: 2019-11-07 | Stop reason: SDUPTHER

## 2019-08-08 RX ORDER — ZOLPIDEM TARTRATE 10 MG/1
10 TABLET ORAL
Qty: 30 TAB | Refills: 2 | Status: SHIPPED | OUTPATIENT
Start: 2019-08-08 | End: 2019-11-07 | Stop reason: SDUPTHER

## 2019-08-08 RX ORDER — TRAZODONE HYDROCHLORIDE 100 MG/1
100 TABLET ORAL
Qty: 30 TAB | Refills: 2 | Status: SHIPPED | OUTPATIENT
Start: 2019-08-08 | End: 2019-11-07 | Stop reason: SDUPTHER

## 2019-08-08 NOTE — PROGRESS NOTES
Reina Henrik.  1960  60 y.o.  male  [facility]    Chief Complaint   Patient presents with    Depression     5-6 out of 10 on the scale of 1-10 where 10 is worst    Anxiety     6-7 out of 10    Medication Problem     Not able to tolerate Zoloft reported being hyper with crazy thoughts    Insomnia     Significant trouble with sleep despite of taking both trazodone and Ambien    Stress     Psychosocial stress       [place]:   This is a 60years old   male with past psychiatric history and treatment of depression and anxiety who  was self referred to establish care and medication management and was seen for the only time on May 16, 2019 decided to continue trazodone  mg at bedtime, continue Ambien 10 mg daily, and try Zoloft 25-75 mg daily to get more help with depression, anxiety, PTSD and return in 4 weeks for reevaluation but presented after 2-1/2-month. Patient presented on time with his ex-wife, was alert awake oriented to time person and place and was calm and cooperative, polite, and pleasant during the interview. He requires some support to provide pertinent history due to multiple reasons including but not limited to history of significant alcohol and drug abuse, history of learning disability with special classes not able to go further than 9th grade, and possibly due to severity of his depression and anxiety.       Patient informed that he was not able to tolerate Zoloft and is stopped taking due to side effects of hand sweating, crazy thoughts and feeling hyper but continue to take trazodone and Ambien but is still report trouble with sleep and only sleeping 3 hours at night. He has lost 16 pounds in past 2-1/2-month and report no appetite. He report depression with a score of 5-6 out of 10 and anxiety 6-7 out of 10 on the scale of 1-10 where 10 is worst and requested help with depression, anxiety, insomnia, decreased appetite and weight loss, and PTSD. Patient was in his usual state of health up until  when he had accident which was life changing. He also report that his parent  and his health went down and he was seen by a psychiatrist couple of times at [facility] who prescribed him some medication which messed him up and very recently he discussed with his PCP Dr. April Gallo who first tried Prozac which initially helped but he started to have side effects so he was provided with trazodone which she takes 50 mg at bedtime and Ambien 10 mg which she take in the morning time and it helped him with his motivation and fatigue.      Patient was provided with support and psychoeducation, and after discussing risk/benefits/expectations with treatment alternatives available, patient decided to try Seroquel 25 mg twice a day and 50 mg at bedtime, continue as needed trazodone and Ambien but hopefully will not require as Seroquel is pretty sedating. He denies any current substance abuse and has support from his ex-wife who now is only his roommate but they are together for past 25 years. He is going through a lot of medical problems and is taking a lot of medications so I asked him to allow his PCP to contact me so we both can be on same page.     SUBSTANCE USE HISTORY:   TOBACCO: The patient was proud to say that he started to smoke cigarettes at age 2, used to smoke up to 1-1/2 pack/day but currently only 2 cigarettes daily. ALCOHOL: First drink was at age 9 and started to drink heavy and regularly from age [age] and quit in . CANNABIS: First time smoke at age 11, last was . COCAINE: Patient has a history of IV drug abuse of cocaine and heroine. He had hep C from tattoos but denies any HIV. OPIOIDS: History of IV heroin use.     OTHERS: Denies       MEDICAL HISTORY:    has a past medical history of Aneurysm ([address] Arthritis, Asthma, Chronic obstructive pulmonary disease ([address] Chronic pain, GERD (gastroesophageal reflux disease), Hypertension, Ill-defined condition, Ill-defined condition, Ill-defined condition, Ill-defined condition, Ill-defined condition, Liver disease, Migraine, and Psychiatric disorder. ALLERGIES:   Allergies   Allergen Reactions    Pcn [Penicillins] Swelling    Cymbalta [Duloxetine] Other (comments)     Blood in urine       VITAL SIGNS:  BP (!) 155/93 (BP 1 Location: Left arm, BP Patient Position: Sitting)   Pulse 67   Ht 5' 10\" (1.778 m)   Wt 106.1 kg (234 lb)   BMI 33.58 kg/m²     Current Outpatient Medications   Medication Sig Dispense Refill    zolpidem (AMBIEN) 10 mg tablet Take 1 Tab by mouth nightly as needed for Sleep. Max Daily Amount: 10 mg. Indications: Difficulty Falling Asleep 30 Tab 2    traZODone (DESYREL) 100 mg tablet Take 1 Tab by mouth nightly. Indications: insomnia associated with depression, major depressive disorder 30 Tab 2    QUEtiapine (SEROQUEL) 50 mg tablet Take 1 Tab by mouth two (2) times a day. Indications: Bipolar Depression 60 Tab 2    oxyCODONE-acetaminophen (PERCOCET)  mg per tablet Take  by mouth.  losartan (COZAAR) 50 mg tablet Take  by mouth daily.  morphine CR (MS CONTIN) 60 mg CR tablet Take 60 mg by mouth every twelve (12) hours.  diazePAM (VALIUM) 10 mg tablet Take 1 Tab by mouth two (2) times daily as needed. Max Daily Amount: 20 mg. 60 Tab 2    hydroCHLOROthiazide (HYDRODIURIL) 25 mg tablet Take 1 Tab by mouth daily. 30 Tab 2    VENTOLIN HFA 90 mcg/actuation inhaler Take 2-3 Puffs by inhalation every four (4) hours as needed for Wheezing or Shortness of Breath. 1 Inhaler 0    fluticasone-vilanterol (BREO ELLIPTA) 100-25 mcg/dose inhaler Take 1 Puff by inhalation daily. 1 Inhaler 0    furosemide (LASIX) 40 mg tablet Take 40 mg po daily 30 Tab 0    predniSONE (DELTASONE) 10 mg tablet Take 10 mg by mouth daily as needed for Pain.  Omeprazole delayed release (PRILOSEC D/R) 20 mg tablet Take 1 Tab by mouth daily.  90 Tab 1    naloxone 2 mg/actuation spry Use 1 spray intranasally into 1 nostril. Use a new Narcan nasal spray for subsequent doses and administer into alternating nostrils. May repeat every 2 to 3 minutes as needed. 4 Each 0       ROS:  Constitutional: Positive for fatigue and weight  loss  Eyes: Negative for contacts/classes  ENT: Negative for hearing loss and tinnitus  Neurological: Positive for memory problems  Behavioral/psych: Positive for insomnia, anxiety, depression, negative for SI/HI  Endocrine: Negative for diabetic symptoms including polyuria and polydipsia     MENTAL STATUS EXAMINATION:   Patient is a 60 y.o. male [facility] who looks older than his stated age. He is obese, long white uncombed hair and had limited hygiene. Speech is basic, fluent language, and thought process is concrete but goal directed. Reported mood is depressed and anxious with mood congruent depressed and anxious affect. Patient denies any suicidal ideation, homicidal ideation, and auditory visual hallucination. Not observed to be delusional or paranoid. Insight, judgement, reliability and impulse control is limited to intact. He was observed to be someone with borderline intellectual functioning and was observed to be reliable. DIAGNOSIS:  Encounter Diagnoses   Name Primary?  Moderate episode of recurrent major depressive disorder (HCC) Yes    Insomnia, unspecified type     PTSD (post-traumatic stress disorder)     History of alcohol dependence ([facility] [address]        PLAN:  1. MEDICATION:   1. Major depression, PTSD, and anxiety disorder: Discontinue Zoloft, patient was not able to tolerate and stopped taking reporting being crazy and hyper and had sweating.       2. Insomnia: Continue trazodone  mg at bedtime as needed for insomnia.      3. Insomnia:  Continue Ambien 10 mg at bedtime as needed for insomnia.       4.  Mood disorder versus treatment resistant depression versus complex PTSD, decreased appetite and weight loss, and insomnia: Seroquel 25 mg twice a day and 50 mg at bedtime. He and his wife were provided with psycho education, discussed risk/benefits, and expectations from medication changes. Patient agrees with plan.      2. PSYCHOTHERAPY: Patient was provided with supportive therapy, strongly encourage to seek psychotherapy. He and his wife required lot of support.     3. MEDICAL CARE: Patient was strongly encourage to take their medical medications and follow up with their PCP on regular basis. His weight is 234 pounds, it was 250 pounds on May 16. His blood pressure is 155/93 and pulse is 67. He has history of hypertension, migraine, brain aneurysm, GERD, chronic hepatitis C, cirrhosis of liver, respiratory failure with hypoxia, chronic back pain, severe obesity, and COPD.     4. SUBSTANCE ABUSE CARE: Patient smokes 2 cigarettes daily, quit drinking in 2006, quit his smoking marijuana in 2004, and does have a history of IV drug abuse but denies any illicit drug abuse at this time. 5. FOLLOW UP:   Follow-up and Dispositions    · Return in about 3 months (around 11/8/2019) for Medication management. Patient and his wife were seen face-to-face for 25 minutes with more than half time spent in supportive counseling.

## 2019-08-27 ENCOUNTER — TELEPHONE (OUTPATIENT)
Dept: BEHAVIORAL/MENTAL HEALTH CLINIC | Age: 59
End: 2019-08-27

## 2019-08-27 NOTE — TELEPHONE ENCOUNTER
Gabriella Gomez calls with patient in the room. Patient is \"doing ok\" on the Seroquel, still having some nervousness and anxiety and he doesn't really want to leave the house. Ms. Queen Mahmood states patient used to be prescribed diazepam and he'd like to start that again. I let them know provider is out of the office and they are ok with waiting until his return. Ms. Queen Mahmood asked if they could call the PCP, told her that is at their discretion.

## 2019-09-11 NOTE — TELEPHONE ENCOUNTER
Reach patient at [phone]. He informed that full dose of Seroquel and trazodone was making him too tired so now he is taking half a tablet and sometimes he take the full tablets and he is doing well.

## 2019-09-25 ENCOUNTER — TELEPHONE (OUTPATIENT)
Dept: BEHAVIORAL/MENTAL HEALTH CLINIC | Age: 59
End: 2019-09-25

## 2019-09-25 NOTE — TELEPHONE ENCOUNTER
Patient calls to report side effects from Seroquel including dizziness, nausea. He started taking 1/2 tab of trazodone. As Dr. Wade Mahmood requested, told patient to take 1/2 tab of the Seroquel. He then says he stopped the Seroquel \"about two days ago\" despite taking it regularly for 6 months. I mentioned it could be a SE or withdrawal from stopping Seroquel suddenly. He then said the dizziness is not as bad since stopping the Seroquel. He then decides he will now stop the trazodone and start 1/2 tab of Seroquel again and check in in about three days.

## 2019-11-07 ENCOUNTER — OFFICE VISIT (OUTPATIENT)
Dept: BEHAVIORAL/MENTAL HEALTH CLINIC | Age: 59
End: 2019-11-07

## 2019-11-07 VITALS
SYSTOLIC BLOOD PRESSURE: 147 MMHG | HEART RATE: 84 BPM | DIASTOLIC BLOOD PRESSURE: 97 MMHG | HEIGHT: 70 IN | BODY MASS INDEX: 35.79 KG/M2 | WEIGHT: 250 LBS

## 2019-11-07 DIAGNOSIS — F10.21 HISTORY OF ALCOHOL DEPENDENCE (HCC): ICD-10-CM

## 2019-11-07 DIAGNOSIS — F43.10 PTSD (POST-TRAUMATIC STRESS DISORDER): ICD-10-CM

## 2019-11-07 DIAGNOSIS — F33.1 MODERATE EPISODE OF RECURRENT MAJOR DEPRESSIVE DISORDER (HCC): Primary | ICD-10-CM

## 2019-11-07 DIAGNOSIS — G47.00 INSOMNIA, UNSPECIFIED TYPE: ICD-10-CM

## 2019-11-07 RX ORDER — QUETIAPINE FUMARATE 50 MG/1
50 TABLET, FILM COATED ORAL 2 TIMES DAILY
Qty: 60 TAB | Refills: 2 | Status: SHIPPED | OUTPATIENT
Start: 2019-11-07 | End: 2019-11-26 | Stop reason: SDUPTHER

## 2019-11-07 RX ORDER — TRAZODONE HYDROCHLORIDE 100 MG/1
100 TABLET ORAL
Qty: 30 TAB | Refills: 2 | Status: SHIPPED | OUTPATIENT
Start: 2019-11-07 | End: 2019-11-26 | Stop reason: SDUPTHER

## 2019-11-07 RX ORDER — ZOLPIDEM TARTRATE 10 MG/1
10 TABLET ORAL
Qty: 30 TAB | Refills: 3 | Status: SHIPPED | OUTPATIENT
Start: 2019-11-07 | End: 2019-11-26 | Stop reason: SDUPTHER

## 2019-11-07 NOTE — PROGRESS NOTES
Leamon Sacks.  1960  60 y.o.  male  [place]     Chief Complaint   Patient presents with    Depression     Patient reported improving depression since a started on Seroquel, stated that now he is able to come out of his home    Anxiety     Patient reported improving anxiety since a started on Seroquel, stated that now he is able to come out of his home    Insomnia     Very much improved sleep and complained about waking up groggy for 2 hours, is taking Ambien trazodone and Seroquel so we decided to drop trazodone and take Ambien 5 mg instead of 10 mg    Medication Problem     Too much sedation from taking 3 sedating medication at nighttime    Stress     Psychosocial and medical, talk about his liver and provided support       [place]:   This is a 56 years old   male with past psychiatric history and treatment of depression and anxiety who was self referred to establish care and medication management and was seen for the first time on May 16, 2019 decided to continue trazodone  mg at bedtime, continue Ambien 10 mg daily, and try Zoloft 25-75 mg daily to get more help with depression, anxiety, PTSD and return in 4 weeks for reevaluation but presented after 2-1/2-month.     Patient was seen last time on August 8, 2019 when he decided to try Seroquel 25 mg twice a day and 50 mg at bedtime, continue Ambien 10 mg at bedtime as needed for initial insomnia, and continue trazodone  mg at bedtime for insomnia. P receive a call from his wife Marie Hernandez on August 27, 2019 informing that he is doing well on Seroquel but is still is having a lot of anxiety so she requested diazepam which was prescribed by the PCP. Patient presented on time with his ex-wife, was alert awake oriented to time person and place and was calm and cooperative, polite, and pleasant during the interview. He is taking Seroquel 50 mg, trazodone 100 mg, and Ambien 10 mg at bedtime.   He reported much improved sleep but now he wakes up in the morning feeling sedated and groggy for a couple of hours and requested help.     Patient and his wife were provided with a lot of support and psychoeducation, spent a lot of time talking about how to take medications and discuss indication, side effect, and expectation from all of his medications. They decided to continue Seroquel 50 mg at bedtime, decrease and discontinue trazodone and only take Ambien 5 mg at bedtime as needed for initial insomnia and return in 3 months for evaluation.      Patient was in his usual state of health up until  when he had accident which was life changing. Nura Rule also report that his parent  and his health went down and he was seen by a psychiatrist couple of times at [facility] who prescribed him some medication which messed him up and very recently he discussed with his PCP Dr. Rupinder Cheng who first tried Prozac which initially helped but he started to have side effects so he was provided with trazodone which she takes 50 mg at bedtime and Ambien 10 mg which she take in the morning time and it helped him with his motivation and fatigue.      Patient was provided with support and psychoeducation, and after discussing risk/benefits/expectations with treatment alternatives available, patient decided to try Seroquel 25 mg twice a day and 50 mg at bedtime, continue as needed trazodone and Ambien but hopefully will not require as Seroquel is pretty sedating.  He denies any current substance abuse and has support from his ex-wife who now is only his roommate but they are together for past 25 years.  He is going through a lot of medical problems and is taking a lot of medications so I asked him to allow his PCP to contact me so we both can be on same page.     SUBSTANCE USE HISTORY:   TOBACCO: The patient was proud to say that he started to smoke cigarettes at age 2, used to smoke up to 1-1/2 pack/day but quit a couple of months ago.     ALCOHOL: First drink was at age 9 and started to drink heavy and regularly from age [age] and quit in 2006. Currently 2-3 times a month.     CANNABIS: First time smoke at age 11, last was 2004.     Hamida Navarrete has a history of IV drug abuse of cocaine and heroine. [facility] had hep C from tattoos but denies any HIV.     OPIOIDS: History of IV heroin use.     OTHERS: Denies    MEDICAL HISTORY:    has a past medical history of Aneurysm ([facility] [address] Arthritis, Asthma, Chronic obstructive pulmonary disease ([facility] [address] Chronic pain, GERD (gastroesophageal reflux disease), Hypertension, Ill-defined condition, Ill-defined condition, Ill-defined condition, Ill-defined condition, Ill-defined condition, Liver disease, Migraine, and Psychiatric disorder. ALLERGIES:   Allergies   Allergen Reactions    Pcn [Penicillins] Swelling    Cymbalta [Duloxetine] Other (comments)     Blood in urine       VITAL SIGNS:  BP (!) 147/97 (BP 1 Location: Left arm, BP Patient Position: Sitting)   Pulse 84   Ht 5' 10\" (1.778 m)   Wt 113.4 kg (250 lb)   BMI 35.87 kg/m²     Current Outpatient Medications   Medication Sig Dispense Refill    zolpidem (AMBIEN) 10 mg tablet Take 1 Tab by mouth nightly as needed for Sleep. Max Daily Amount: 10 mg. Indications: Difficulty Falling Asleep 30 Tab 3    traZODone (DESYREL) 100 mg tablet Take 1 Tab by mouth nightly. Indications: insomnia associated with depression, major depressive disorder 30 Tab 2    QUEtiapine (SEROQUEL) 50 mg tablet Take 1 Tab by mouth two (2) times a day. Indications: Bipolar Depression 60 Tab 2    oxyCODONE-acetaminophen (PERCOCET)  mg per tablet Take  by mouth.  morphine CR (MS CONTIN) 60 mg CR tablet Take 60 mg by mouth every twelve (12) hours.  diazePAM (VALIUM) 10 mg tablet Take 1 Tab by mouth two (2) times daily as needed. Max Daily Amount: 20 mg. 60 Tab 2    hydroCHLOROthiazide (HYDRODIURIL) 25 mg tablet Take 1 Tab by mouth daily.  30 Tab 2    VENTOLIN HFA 90 mcg/actuation inhaler Take 2-3 Puffs by inhalation every four (4) hours as needed for Wheezing or Shortness of Breath. 1 Inhaler 0    fluticasone-vilanterol (BREO ELLIPTA) 100-25 mcg/dose inhaler Take 1 Puff by inhalation daily. 1 Inhaler 0    furosemide (LASIX) 40 mg tablet Take 40 mg po daily 30 Tab 0    Omeprazole delayed release (PRILOSEC D/R) 20 mg tablet Take 1 Tab by mouth daily. 90 Tab 1    naloxone 2 mg/actuation spry Use 1 spray intranasally into 1 nostril. Use a new Narcan nasal spray for subsequent doses and administer into alternating nostrils. May repeat every 2 to 3 minutes as needed. 4 Each 0       ROS:  Constitutional: Positive for fatigue and weight gain  Eyes: Positive for yellowish tint could be indicating liver issues  Gastrointestinal: History of cirrhosis and hepatitis B and C virus  Genitourinary: Negative for frequency and urinary incontinence  Musculoskeletal: Negative for myalgias and arthralgias  Neurological: Positive for memory problems  Behavioral/psych: Positive for insomnia, anxiety, depression, negative for SI/HI  Endocrine: Negative for diabetic symptoms including polyuria and polydipsia    MENTAL STATUS EXAMINATION:   Patient is a 59 y. o. male [place]  who looks older than his stated age. He is obese, long white uncombed hair and had limited hygiene.  Speech is basic, fluent language, and thought process is concrete but goal directed. Reported mood is depressed and anxious with mood congruent depressed and anxious affect. Patient denies any suicidal ideation, homicidal ideation, and auditory visual hallucination. Not observed to be delusional or paranoid. Insight, judgement, reliability and impulse control is limited to intact. He was observed to be someone with borderline intellectual functioning and was observed to be reliable. DIAGNOSIS:  Encounter Diagnoses   Name Primary?     Moderate episode of recurrent major depressive disorder ([facility] [address] Yes    Insomnia, unspecified type     PTSD (post-traumatic stress disorder)     History of alcohol dependence ([facility] [address]        PLAN:  1. MEDICATION:   1.  Mood disorder versus treatment resistant depression versus complex PTSD, decreased appetite and weight loss, and insomnia: Seroquel 25 mg twice a day and 50 mg at bedtime. He is only taking Seroquel 50 mg at bedtime and agreed to try half a tablet twice a day in case if needed for anxiety.     2.  Insomnia:  Gradually taper and discontinue trazodone due to side effect of too much sleepy and groggy in morning as he is taking 3 sedating medications at bedtime.      3.  Insomnia:   Decrease Ambien 5 mg at bedtime as needed for insomnia.   Provided #30 with 3 refills which is good enough for next 8 months. He and his wife were provided with psycho education, discussed risk/benefits, and expectations from medication changes. Patient agrees with plan.      2. PSYCHOTHERAPY: Patient was provided with supportive therapy, strongly encourage to seek psychotherapy. He and his wife required lot of support.     3. MEDICAL CARE: Patient was strongly encourage to take their medical medications and follow up with their PCP on regular basis.  His weight is 250 pounds, blood pressure is  147/97 and pulse is 84.  He has history of hypertension, migraine, brain aneurysm, GERD, chronic hepatitis C, cirrhosis of liver, respiratory failure with hypoxia, chronic back pain, severe obesity, and COPD. His eyes look slightly yellowish so we talk about his liver issues and advise him to call his PCP and talk about it. Offered to do test but he is going to see the PCP anyway.     4. SUBSTANCE ABUSE CARE: Patient quit his smoking few months ago, quit drinking in 2006, quit his smoking marijuana in 2004, and does have a history of IV drug abuse but denies any illicit drug abuse at this time.     5. FOLLOW UP:   Follow-up and Dispositions    · Return in about 3 months (around 2/7/2020) for Medication management.

## 2019-11-20 ENCOUNTER — TELEPHONE (OUTPATIENT)
Dept: BEHAVIORAL/MENTAL HEALTH CLINIC | Age: 59
End: 2019-11-20

## 2019-11-20 NOTE — TELEPHONE ENCOUNTER
Pt calls and is not sleeping but 3 hours per night  He has increased \"anxiety and a lot of thoughts in his head. \"  He requests to speak to Dr Cristina Proctor.    Please call

## 2019-11-25 ENCOUNTER — TELEPHONE (OUTPATIENT)
Dept: BEHAVIORAL/MENTAL HEALTH CLINIC | Age: 59
End: 2019-11-25

## 2019-11-26 DIAGNOSIS — G47.00 INSOMNIA, UNSPECIFIED TYPE: ICD-10-CM

## 2019-11-26 RX ORDER — TRAZODONE HYDROCHLORIDE 100 MG/1
100 TABLET ORAL
Qty: 30 TAB | Refills: 5 | Status: SHIPPED | OUTPATIENT
Start: 2019-11-26 | End: 2020-06-05

## 2019-11-26 RX ORDER — QUETIAPINE FUMARATE 50 MG/1
50 TABLET, FILM COATED ORAL 3 TIMES DAILY
Qty: 90 TAB | Refills: 5 | Status: SHIPPED | OUTPATIENT
Start: 2019-11-26 | End: 2020-06-05 | Stop reason: SDUPTHER

## 2019-11-26 RX ORDER — ZOLPIDEM TARTRATE 10 MG/1
10 TABLET ORAL
Qty: 30 TAB | Refills: 5 | Status: SHIPPED | OUTPATIENT
Start: 2019-11-26 | End: 2020-03-05

## 2019-11-26 NOTE — PROGRESS NOTES
Patient report increased anxiety and is requiring Seroquel 50 mg twice a day and is still feel anxious so we decided to try Seroquel 50 mg 3 times a day and I sent prescription for Seroquel 50 mg #90 with 5 refills along with prescriptions for trazodone and Ambien also good enough for next 6 months.   He is going to make an appointment with Dr. Jim Martinez.

## 2019-12-06 ENCOUNTER — TELEPHONE (OUTPATIENT)
Dept: BEHAVIORAL/MENTAL HEALTH CLINIC | Age: 59
End: 2019-12-06

## 2019-12-06 NOTE — TELEPHONE ENCOUNTER
Pt calls to say you increased his seroquel to 100mg 3x a day last week but script at pharmacy still says 50mg 3x a day. I don't see documentation of the increase but I told pt I would message you. Can you check on this?     Pt's number is [phone] is his number     And he uses [facility] pharmacy

## 2019-12-06 NOTE — TELEPHONE ENCOUNTER
Reach patient at [phone]. He said that he was mistaken when he called and he is taking 50 mg 3 times a day and is doing well.

## 2020-01-17 ENCOUNTER — HOSPITAL ENCOUNTER (EMERGENCY)
Age: 60
Discharge: HOME | End: 2020-01-17
Attending: EMERGENCY MEDICINE
Payer: MEDICAID

## 2020-01-17 VITALS
DIASTOLIC BLOOD PRESSURE: 96 MMHG | RESPIRATION RATE: 18 BRPM | HEART RATE: 73 BPM | HEIGHT: 70 IN | SYSTOLIC BLOOD PRESSURE: 150 MMHG | OXYGEN SATURATION: 92 % | BODY MASS INDEX: 34.88 KG/M2 | WEIGHT: 243.61 LBS | TEMPERATURE: 97.9 F

## 2020-01-17 DIAGNOSIS — F41.1 ANXIETY STATE: ICD-10-CM

## 2020-01-17 DIAGNOSIS — F11.93 OPIOID WITHDRAWAL (HCC): Primary | ICD-10-CM

## 2020-01-17 PROCEDURE — 99283 EMERGENCY DEPT VISIT LOW MDM: CPT

## 2020-01-17 PROCEDURE — 74011250637 HC RX REV CODE- 250/637: Performed by: EMERGENCY MEDICINE

## 2020-01-17 RX ORDER — CLONIDINE HYDROCHLORIDE 0.1 MG/1
0.1 TABLET ORAL
Status: COMPLETED | OUTPATIENT
Start: 2020-01-17 | End: 2020-01-17

## 2020-01-17 RX ORDER — CLONIDINE HYDROCHLORIDE 0.1 MG/1
0.1 TABLET ORAL 3 TIMES DAILY
Qty: 90 TAB | Refills: 0 | Status: SHIPPED | OUTPATIENT
Start: 2020-01-17

## 2020-01-17 RX ADMIN — CLONIDINE HYDROCHLORIDE 0.1 MG: 0.1 TABLET ORAL at 18:25

## 2020-01-17 NOTE — DISCHARGE INSTRUCTIONS

## 2020-01-17 NOTE — ED NOTES
Patient states he is on chronic pain meds at home, as well as sleeping medications. Pt states his PCP took him off of his anxiety medication a month or two ago and since then his anxiety and panic attacks have been hard to control. Pt states he has had frequent panic attacks, during these attacks he feels \"fuzzy\" in his head, has abdominal \"knots in his stomach\", and feels \"dizzy. \" Pt denies CP, SOB, or dizziness at this moment. Pt states he does not have an appointment with his PCP until this July.

## 2020-01-17 NOTE — ED NOTES
Dr. Annette Redding at bedside to provide discharge paperwork. Vital signs stable. Pt in no apparent distress at this time. Mental status at baseline. Ambulatory to waiting room with steady gate, discharge paperwork in hand. Patient and or family acknowledged and signed discharge instructions that were created/provided by the Physician. Signed discharge form with patient label placed in scan box. Accompanied by family to drive pt home.

## 2020-01-17 NOTE — ED PROVIDER NOTES
EMERGENCY DEPARTMENT HISTORY AND PHYSICAL EXAM      Date: 1/17/2020  Patient Name: Urmila Jenkins. Patient Age and Sex: 60 y.o. male     History of Presenting Illness     Chief Complaint   Patient presents with    Anxiety     pt. reports anxiety and panic attacks for a few weeks since trying to wean himself down from narcotics he takes for chronic pain. patient reports taking seroquel and trazodone at night which help, but daytime anxiety is worse       History Provided By: Patient    HPI: Urmila Jenkins. is a 60-year-old male with a history of anxiety, chronic pain on MS Contin, presenting with anxiety. Patient states that for the past 2 to 3 weeks he has been having panic attacks. States that at night he takes trazodone, Ambien as well as Seroquel and then in the morning wakes up and takes his pain medications. Patient states that once the pain medications wear off, he starts to have significant panic attacks and describes it as shortness of breath, jitteriness, feeling very nervous. States that he recently saw his primary care doctor who put him on 15 mg of Atarax but he feels like after 45 minutes of that he gets headaches. Patient denies any nausea, vomiting. Patient states he is also been weaning himself off of the MS Contin. Has not seen anybody else besides primary care doctor for the anxiety. There are no other complaints, changes, or physical findings at this time. PCP: Hiral Kraft MD    No current facility-administered medications on file prior to encounter. Current Outpatient Medications on File Prior to Encounter   Medication Sig Dispense Refill    QUEtiapine (SEROQUEL) 50 mg tablet Take 1 Tab by mouth three (3) times daily. Indications: Bipolar Depression 90 Tab 5    traZODone (DESYREL) 100 mg tablet Take 1 Tab by mouth nightly.  Indications: insomnia associated with depression, major depressive disorder 30 Tab 5    zolpidem (AMBIEN) 10 mg tablet Take 1 Tab by mouth nightly as needed for Sleep. Max Daily Amount: 10 mg. Indications: Difficulty Falling Asleep 30 Tab 5    oxyCODONE-acetaminophen (PERCOCET)  mg per tablet Take  by mouth.  morphine CR (MS CONTIN) 60 mg CR tablet Take 60 mg by mouth every twelve (12) hours.  hydroCHLOROthiazide (HYDRODIURIL) 25 mg tablet Take 1 Tab by mouth daily. 30 Tab 2    VENTOLIN HFA 90 mcg/actuation inhaler Take 2-3 Puffs by inhalation every four (4) hours as needed for Wheezing or Shortness of Breath. 1 Inhaler 0    fluticasone-vilanterol (BREO ELLIPTA) 100-25 mcg/dose inhaler Take 1 Puff by inhalation daily. 1 Inhaler 0    Omeprazole delayed release (PRILOSEC D/R) 20 mg tablet Take 1 Tab by mouth daily. 90 Tab 1    diazePAM (VALIUM) 10 mg tablet Take 1 Tab by mouth two (2) times daily as needed. Max Daily Amount: 20 mg. 60 Tab 2    naloxone 2 mg/actuation spry Use 1 spray intranasally into 1 nostril. Use a new Narcan nasal spray for subsequent doses and administer into alternating nostrils. May repeat every 2 to 3 minutes as needed.  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    Ill-defined condition     heat stroke years ago    Liver disease     past hx Hep C--alcoholic cirrhosis    Migraine     Psychiatric disorder     axiety and depression       Past Surgical History:  Past Surgical History:   Procedure Laterality Date    ABDOMEN SURGERY PROC UNLISTED      hernia    COLONOSCOPY N/A 10/19/2016    COLONOSCOPY performed by Ron [facility]red MD at [facility] ENDOSCOPY    COLONOSCOPY N/A 4/18/2017    COLONOSCOPY performed by [facility]red MD at [facility] ENDOSCOPY    HX COLONOSCOPY  2016    HX ENDOSCOPY  2016    HX HEENT      mastoid - surgery eardrum perforation    HX OTHER SURGICAL      colonoscopy - Mar 2016 - multiple polyps       Family History:  Family History   Problem Relation Age of Onset    Cancer Mother         lung cancer    Hypertension Mother     Cancer Father         metastatic, colon cancer    Hypertension Father     Hypertension Sister        Social History:  Social History     Tobacco Use    Smoking status: Former Smoker     Packs/day: 0.50     Years: 40.00     Pack years: 20.00    Smokeless tobacco: Never Used    Tobacco comment: former smoker   Substance Use Topics    Alcohol use: No     Comment: stopped    Drug use: No       Allergies: Allergies   Allergen Reactions    Pcn [Penicillins] Swelling    Cymbalta [Duloxetine] Other (comments)     Blood in urine         Review of Systems   Review of Systems   Constitutional: Negative for chills and fever. Respiratory: Negative for cough and shortness of breath. Cardiovascular: Negative for chest pain. Gastrointestinal: Negative for abdominal pain, constipation, diarrhea, nausea and vomiting. Neurological: Negative for weakness and numbness. Psychiatric/Behavioral: Positive for sleep disturbance. The patient is nervous/anxious. All other systems reviewed and are negative. Physical Exam   Physical Exam  Vitals signs and nursing note reviewed. Constitutional:       Appearance: He is well-developed. HENT:      Head: Normocephalic and atraumatic. Eyes:      Conjunctiva/sclera: Conjunctivae normal.   Neck:      Musculoskeletal: Normal range of motion and neck supple. Cardiovascular:      Rate and Rhythm: Normal rate and regular rhythm.    Pulmonary:      Effort: Pulmonary effort is normal. No respiratory distress. Breath sounds: Normal breath sounds. Abdominal:      General: There is no distension. Palpations: Abdomen is soft. Tenderness: There is no tenderness. Musculoskeletal: Normal range of motion. Skin:     General: Skin is warm and dry. Neurological:      Mental Status: He is alert and oriented to person, place, and time. Psychiatric:      Comments: Patient appears very relaxed and does not appear very anxious. Diagnostic Study Results     Labs -   No results found for this or any previous visit (from the past 12 hour(s)). Radiologic Studies -   No orders to display     CT Results  (Last 48 hours)    None        CXR Results  (Last 48 hours)    None            Medical Decision Making   I am the first provider for this patient. I reviewed the vital signs, available nursing notes, past medical history, past surgical history, family history and social history. Vital Signs-Reviewed the patient's vital signs. Patient Vitals for the past 12 hrs:   Temp Pulse Resp BP SpO2   01/17/20 1825 -- 73 -- (!) 150/96 92 %   01/17/20 1716 97.9 °F (36.6 °C) 81 18 (!) 170/112 97 %       Records Reviewed: Nursing Notes and Old Medical Records    Provider Notes (Medical Decision Making):   Patient presenting with a history of opioid dependence and worsening anxiety once his opioids were off. I am more concerned about opioid withdrawal in him rather than panic attacks. He refuses to go up on his Atarax because he is afraid that is causing him to have headaches. Thus will treat with clonidine to help with opioid withdrawal as a more concerned that    ED Course:   Initial assessment performed. The patients presenting problems have been discussed, and they are in agreement with the care plan formulated and outlined with them. I have encouraged them to ask questions as they arise throughout their visit.        Critical Care Time:   0    Disposition:  Discharge Note:  The patient has been re-evaluated and is ready for discharge. Reviewed available results with patient. Counseled patient on diagnosis and care plan. Patient has expressed understanding, and all questions have been answered. Patient agrees with plan and agrees to follow up as recommended, or to return to the ED if their symptoms worsen. Discharge instructions have been provided and explained to the patient, along with reasons to return to the ED. PLAN:  Discharge Medication List as of 1/17/2020  6:34 PM      START taking these medications    Details   cloNIDine HCL (CATAPRES) 0.1 mg tablet Take 1 Tab by mouth three (3) times daily. , Normal, Disp-90 Tab, R-0         CONTINUE these medications which have NOT CHANGED    Details   QUEtiapine (SEROQUEL) 50 mg tablet Take 1 Tab by mouth three (3) times daily. Indications: Bipolar Depression, Normal, Disp-90 Tab, R-5      traZODone (DESYREL) 100 mg tablet Take 1 Tab by mouth nightly. Indications: insomnia associated with depression, major depressive disorder, Normal, Disp-30 Tab, R-5      zolpidem (AMBIEN) 10 mg tablet Take 1 Tab by mouth nightly as needed for Sleep. Max Daily Amount: 10 mg. Indications: Difficulty Falling Asleep, Print, Disp-30 Tab, R-5      oxyCODONE-acetaminophen (PERCOCET)  mg per tablet Take  by mouth., Historical Med      morphine CR (MS CONTIN) 60 mg CR tablet Take 60 mg by mouth every twelve (12) hours. , Historical Med      hydroCHLOROthiazide (HYDRODIURIL) 25 mg tablet Take 1 Tab by mouth daily. , Normal, Disp-30 Tab, R-2      VENTOLIN HFA 90 mcg/actuation inhaler Take 2-3 Puffs by inhalation every four (4) hours as needed for Wheezing or Shortness of Breath., Print, Disp-1 Inhaler, R-0, MARLENA      fluticasone-vilanterol (BREO ELLIPTA) 100-25 mcg/dose inhaler Take 1 Puff by inhalation daily. , Print, Disp-1 Inhaler, R-0      Omeprazole delayed release (PRILOSEC D/R) 20 mg tablet Take 1 Tab by mouth daily. , Normal, Disp-90 Tab, R-1 diazePAM (VALIUM) 10 mg tablet Take 1 Tab by mouth two (2) times daily as needed. Max Daily Amount: 20 mg., Print, Disp-60 Tab, R-2      naloxone 2 mg/actuation spry Use 1 spray intranasally into 1 nostril. Use a new Narcan nasal spray for subsequent doses and administer into alternating nostrils. May repeat every 2 to 3 minutes as needed. , Print, Disp-4 Each, R-0      furosemide (LASIX) 40 mg tablet Take 40 mg po daily, Print, Disp-30 Tab, R-0           2. Follow-up Information     Follow up With Specialties Details Why Contact Info    Caroline Olivera MD Family Practice Schedule an appointment as soon as possible for a visit  [address] floor  [address] [place] [address]  [phone]          3. Return to ED if worse     Diagnosis     Clinical Impression:   1. Opioid withdrawal ([facility] [address]    2. Anxiety state        Attestations:    Nam Mayorga M.D. Please note that this dictation was completed with [employer], the computer voice recognition software. Quite often unanticipated grammatical, syntax, homophones, and other interpretive errors are inadvertently transcribed by the computer software. Please disregard these errors. Please excuse any errors that have escaped final proofreading. Thank you.

## 2020-03-05 ENCOUNTER — OFFICE VISIT (OUTPATIENT)
Dept: BEHAVIORAL/MENTAL HEALTH CLINIC | Age: 60
End: 2020-03-05

## 2020-03-05 VITALS
HEIGHT: 70 IN | HEART RATE: 76 BPM | WEIGHT: 240 LBS | DIASTOLIC BLOOD PRESSURE: 87 MMHG | BODY MASS INDEX: 34.36 KG/M2 | SYSTOLIC BLOOD PRESSURE: 124 MMHG

## 2020-03-05 DIAGNOSIS — F41.9 ANXIETY AND DEPRESSION: Primary | ICD-10-CM

## 2020-03-05 DIAGNOSIS — G47.00 INSOMNIA DISORDER WITH NON-SLEEP DISORDER MENTAL COMORBIDITY: ICD-10-CM

## 2020-03-05 DIAGNOSIS — F32.A ANXIETY AND DEPRESSION: Primary | ICD-10-CM

## 2020-03-05 DIAGNOSIS — F33.0 DEPRESSION, MAJOR, RECURRENT, MILD (HCC): ICD-10-CM

## 2020-03-05 RX ORDER — HYDROXYZINE PAMOATE 25 MG/1
25 CAPSULE ORAL
Qty: 120 CAP | Refills: 2 | Status: SHIPPED | OUTPATIENT
Start: 2020-03-05 | End: 2020-06-29

## 2020-03-05 RX ORDER — ZOLPIDEM TARTRATE 10 MG/1
10 TABLET ORAL
Qty: 30 TAB | Refills: 5 | Status: SHIPPED | OUTPATIENT
Start: 2020-03-05 | End: 2020-06-05 | Stop reason: SDUPTHER
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
## 2020-04-02 ENCOUNTER — TELEPHONE (OUTPATIENT)
Dept: BEHAVIORAL/MENTAL HEALTH CLINIC | Age: 60
End: 2020-04-02

## 2020-04-02 NOTE — TELEPHONE ENCOUNTER
Returned call. Discussed options for medications. Pt has been using hydroxyzine BID. Informed him he could try TID. Discussed strategies such as deep breathing, positive self talk, re-starting ind therapy, music and coloring. Also encouraged pt to call his PCP to review O2 as he stated he has trouble catching his breath now that his allergies are activated.

## 2020-04-02 NOTE — TELEPHONE ENCOUNTER
Antwon patient  Pt c/o a lot of anxiety now with the corona virus. Says his stomach is messed up   I told pt I wasn't sure a covering provider would prescribe something new   He is aware you may not give him anything.   He may have to wait for Biju return

## 2020-05-05 ENCOUNTER — TELEPHONE (OUTPATIENT)
Dept: BEHAVIORAL/MENTAL HEALTH CLINIC | Age: 60
End: 2020-05-05

## 2020-05-05 DIAGNOSIS — F41.0 ANXIETY ATTACK: Primary | ICD-10-CM

## 2020-05-05 RX ORDER — ESCITALOPRAM OXALATE 10 MG/1
10 TABLET ORAL DAILY
Qty: 30 TAB | Refills: 2 | Status: SHIPPED | OUTPATIENT
Start: 2020-05-05 | End: 2020-06-05 | Stop reason: SDDI

## 2020-05-05 NOTE — TELEPHONE ENCOUNTER
Ning calls regarding pt. Tami Monroy he is having high anxiety and panic attacks. Doesn't want to talk on the phone and doesn't want to leave the house. Asking for something to be prescribed because of this.     [phone]

## 2020-05-05 NOTE — TELEPHONE ENCOUNTER
Patient was called to increase his atarax, he said  He already did that and it didn't work, then told to increase his seroquel to two tablets, he said he did that too. Advised that Lexapro was called in for him to take with the above two medications. His ex-wife indicated that he has not been well since the [address] ( referring to his neurologist).    He did not voice any SI/HI

## 2020-06-05 ENCOUNTER — VIRTUAL VISIT (OUTPATIENT)
Dept: BEHAVIORAL/MENTAL HEALTH CLINIC | Age: 60
End: 2020-06-05

## 2020-06-05 DIAGNOSIS — F33.0 DEPRESSION, MAJOR, RECURRENT, MILD (HCC): ICD-10-CM

## 2020-06-05 DIAGNOSIS — F41.9 ANXIETY AND DEPRESSION: ICD-10-CM

## 2020-06-05 DIAGNOSIS — G47.00 INSOMNIA DISORDER WITH NON-SLEEP DISORDER MENTAL COMORBIDITY: Primary | ICD-10-CM

## 2020-06-05 DIAGNOSIS — F32.A ANXIETY AND DEPRESSION: ICD-10-CM

## 2020-06-05 RX ORDER — QUETIAPINE FUMARATE 50 MG/1
50 TABLET, FILM COATED ORAL 3 TIMES DAILY
Qty: 90 TAB | Refills: 5 | Status: SHIPPED | OUTPATIENT
Start: 2020-06-05

## 2020-06-05 RX ORDER — ZOLPIDEM TARTRATE 10 MG/1
10 TABLET ORAL
Qty: 30 TAB | Refills: 5 | Status: SHIPPED | OUTPATIENT
Start: 2020-06-05
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
## 2020-06-29 DIAGNOSIS — F32.A ANXIETY AND DEPRESSION: ICD-10-CM

## 2020-06-29 DIAGNOSIS — F41.9 ANXIETY AND DEPRESSION: ICD-10-CM

## 2020-06-29 RX ORDER — HYDROXYZINE PAMOATE 25 MG/1
CAPSULE ORAL
Qty: 120 CAP | Refills: 0 | Status: SHIPPED | OUTPATIENT
Start: 2020-06-29 | End: 2020-07-29

## 2020-07-28 DIAGNOSIS — F41.9 ANXIETY AND DEPRESSION: ICD-10-CM

## 2020-07-28 DIAGNOSIS — F32.A ANXIETY AND DEPRESSION: ICD-10-CM

## 2020-07-29 RX ORDER — HYDROXYZINE PAMOATE 25 MG/1
CAPSULE ORAL
Qty: 120 CAP | Refills: 4 | Status: SHIPPED | OUTPATIENT
Start: 2020-07-29

## 2020-10-15 NOTE — PERIOP NOTES
LM w Dr Dorys Gil nurse. Pt is to have PAT apt tomorrow. Labs are in [address] [place] [address]. Last CXRAY was 3/2108. CT of Abd from 12/2018 notes a nodule in left lower lobe. Would MD like to repeat CXRAY at [facility] tomorrow? Request callback    Dr Madhuri Garcia office/[place] called and stated labs in [address] [place] [address] 12/2018 did not need repeating, however to have CXRAY performed at pre-op apt. Patient tolerated procedure well.  Bandage applied and remains clean, dry and intact to the injection site.  Pain, quality of gate, and vitals all remain at baseline at this time.  Discharge teaching provided to patient regarding Epidural Steriod Injection.  Questions answered, pt verbalized understanding.  Follow-up appointment to be made at patient's discretion for a repeat or follow up with Dave.  Patient discharged to home.

## 2022-03-19 PROBLEM — E66.01 SEVERE OBESITY (BMI 35.0-39.9) WITH COMORBIDITY (HCC): Status: ACTIVE | Noted: 2018-04-11

## 2022-03-19 PROBLEM — J96.91 RESPIRATORY FAILURE WITH HYPOXIA (HCC): Status: ACTIVE | Noted: 2018-03-14

## 2022-03-19 PROBLEM — G47.00 INSOMNIA DISORDER WITH NON-SLEEP DISORDER MENTAL COMORBIDITY: Status: ACTIVE | Noted: 2020-03-05

## 2022-03-19 PROBLEM — F33.0 DEPRESSION, MAJOR, RECURRENT, MILD (HCC): Status: ACTIVE | Noted: 2020-03-05

## 2022-10-13 NOTE — PROGRESS NOTES
Sly Collins. 
1960 
60 y.o. 
male WHITE OR  Chief Complaint Patient presents with  Depression PHQ 9 is score 13 indicating mild-to-moderate depression  Anxiety Llanes anxiety rating scale score 15 indicating mild to moderate anxiety  Bipolar Mood disorder questionnaire negative and patient denies any manic or hypomanic episode  Addiction problem Report significant history of alcohol and drug abuse [place]:  
This is a 60years old   male with past psychiatric history and treatment of depression and anxiety who is self referred to establish care and medication management. He presented on time with his ex-wife and current roommate who also participated in the interview with his permission. He was alert awake oriented to time person and place and was calm and cooperative, polite, and pleasant during the interview. He requires some support to provide pertinent history due to multiple reasons including but not limited to history of significant alcohol and drug abuse, history of learning disability with special classes not able to go further than 9th grade, and possibly due to severity of his depression and anxiety. He was able to discuss treatment alternatives and decide about the plan. Patient was in his usual state of health up until  when he had accident which was life changing. He also report that his parent  and his health went down and he was seen by a psychiatrist couple of times at [facility] who prescribed him some medication which messed him up and very recently he discussed with his PCP Dr. Anthony Jorge who first tried Prozac which initially helped but he started to have side effects so he was provided with trazodone which she takes 50 mg at bedtime and Ambien 10 mg which she take in the morning time and it helped him with his motivation and fatigue.   
 
Patient report mild to moderate depression with a score 13 on PHQ 9 imer moreno neg 2019 /8  cleared   reporting every day and somewhat difficult symptoms of feeling tired, more than half days of depressed mood, anhedonia, trouble sleeping, trouble concentrating and several days of feeling bad about himself and restless and fidgety. His Llanes anxiety rating scale score is 15 and he report symptoms of generalized anxiety, panic attack, and social anxiety. His mood disorder questionnaire is negative and he denies any manic or hypomanic episode, denies any hallucination, denies any OCD symptoms or any eating disorder symptoms. Patient was provided with support and psychoeducation, and after discussing risk/benefits/expectations with treatment alternatives available, patient decided to continue trazodone  mg at bedtime, continue Ambien 10 mg daily, and try Prozac 10 mg daily to get more help with depression, anxiety, PTSD. He denies any current substance abuse and has support from his ex-wife who now is only his roommate but they are together for past 25 years. He is going through a lot of medical problems and is taking a lot of medications so I asked him to allow his PCP to contact me so we both can be on same page. PAST PSYCHIATRIC HISTORY: 
Patient denies any acute inpatient psychiatric hospitalization, any substance abuse detox or rehab, and denies any suicide attempt ever. FAMILY HISTORY: 
Any of first degree relatives including biological parents, siblings, first cousins on both sides, uncle/aunt on both sides, and grand parents on both sides have been diagnosed or treated for any mental illness, substance abuse or attempted/commited suicide. Patient has 2 sisters who have psychiatric issues one is in and out of the hospital and report father was alcoholic. SUBSTANCE USE HISTORY:  
TOBACCO: The patient was proud to say that he started to smoke cigarettes at age 2, used to smoke up to 1-1/2 pack/day but currently only 2 cigarettes daily. ALCOHOL: First drink was at age 9 and started to drink heavy and regularly from age [age] and quit in 2006. CANNABIS: First time smoke at age 11, last was 2004. COCAINE: Patient has a history of IV drug abuse of cocaine and heroine. He had hep C from tattoos but denies any HIV. OPIOIDS: History of IV heroin use. OTHERS: Denies MEDICAL HISTORY:  
 has a past medical history of Aneurysm ([facility] Arthritis, Asthma, Chronic obstructive pulmonary disease ([facility] Chronic pain, GERD (gastroesophageal reflux disease), Hypertension, Ill-defined condition, Ill-defined condition, Ill-defined condition, Ill-defined condition, Ill-defined condition, Liver disease, Migraine, and Psychiatric disorder. ALLERGIES:  
Allergies Allergen Reactions  Pcn [Penicillins] Swelling  Cymbalta [Duloxetine] Other (comments) Blood in urine VITAL SIGNS: 
/78   Pulse 73   Ht 5' 10\" (1.778 m)   Wt 113.4 kg (250 lb)   BMI 35.87 kg/m² Current Outpatient Medications Medication Sig Dispense Refill  oxyCODONE-acetaminophen (PERCOCET)  mg per tablet Take  by mouth.  losartan (COZAAR) 50 mg tablet Take  by mouth daily.  zolpidem (AMBIEN) 10 mg tablet Take 1 Tab by mouth nightly as needed for Sleep. Max Daily Amount: 10 mg. Indications: Difficulty Falling Asleep 30 Tab 2  
 sertraline (ZOLOFT) 50 mg tablet Take 1 Tab by mouth daily. 30 Tab 2  
 morphine CR (MS CONTIN) 60 mg CR tablet Take 60 mg by mouth every twelve (12) hours.  hydroCHLOROthiazide (HYDRODIURIL) 25 mg tablet Take 1 Tab by mouth daily. 30 Tab 2  
 naloxone 2 mg/actuation spry Use 1 spray intranasally into 1 nostril. Use a new Narcan nasal spray for subsequent doses and administer into alternating nostrils. May repeat every 2 to 3 minutes as needed. 4 Each 0  
 VENTOLIN HFA 90 mcg/actuation inhaler Take 2-3 Puffs by inhalation every four (4) hours as needed for Wheezing or Shortness of Breath.  1 Inhaler 0  
  fluticasone-vilanterol (BREO ELLIPTA) 100-25 mcg/dose inhaler Take 1 Puff by inhalation daily. 1 Inhaler 0  
 Omeprazole delayed release (PRILOSEC D/R) 20 mg tablet Take 1 Tab by mouth daily. 90 Tab 1  
 levoFLOXacin (LEVAQUIN) 250 mg tablet Take 2 Tabs by mouth daily. 8 Tab 1  
 ondansetron (ZOFRAN ODT) 4 mg disintegrating tablet Take 1 Tab by mouth every eight (8) hours as needed for Nausea. 10 Tab 0  
 diazePAM (VALIUM) 10 mg tablet Take 1 Tab by mouth two (2) times daily as needed. Max Daily Amount: 20 mg. 60 Tab 2  
 furosemide (LASIX) 40 mg tablet Take 40 mg po daily 30 Tab 0  predniSONE (DELTASONE) 10 mg tablet Take 10 mg by mouth daily as needed for Pain. ROS: 
Constitutional: Positive for fatigue and weight gain Eyes: Negative for contacts/classes ENT: Negative for hearing loss and tinnitus Respiratory: Negative for cough or wheezing Cardiovascular: Negative for chest pain, palpitations Gastrointestinal: Negative for reflux symptoms and constipation Genitourinary: Negative for frequency and urinary incontinence Musculoskeletal: Negative for myalgias and arthralgias Neurological: Positive for memory problems Behavioral/psych: Positive for insomnia, anxiety, depression, negative for SI/HI Endocrine: Negative for diabetic symptoms including polyuria and polydipsia LEGAL HISTORY: 
Patient report felony charge and going to assisted at age 4 for twin robbery. His last incarceration was in 2006 for 1 year for cocaine and firearm possession together with felony charges and report about 21 assisted times. SOCIAL HISTORY: 
Patient was born and raised in [place]. History of childhood abuse: Denies. Education: History of learning disability and math reading and writing, dropout and 9 grade but was in special education and report significant trouble after 6 grade indicating possibility of mild ID.  history: Denies.  Marriage and children:  3 times first 2 wives  and  from third. Has 3 children from first marriage 40-year-old daughter, 51-year-old son, and 40-year-old daughter and 28years old daughter from second marriage. Scientologist/Sprituality: Believe in God. 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 MENTAL STATUS EXAMINATION:  
Patient is a 60 y.o. male [facility] who looks older than his stated age. He is obese, long white uncombed hair and had limited hygiene. Speech is basic, fluent language, and thought process is concrete but goal directed. Reported mood is depressed and anxious with mood congruent depressed and anxious affect. Patient denies any suicidal ideation, homicidal ideation, and auditory visual hallucination. Not observed to be delusional or paranoid. Insight, judgement, reliability and impulse control is limited to intact. Cognition was within normal limit and patient was observed to be reliable. DIAGNOSIS: 
Encounter Diagnoses Name Primary?  Moderate episode of recurrent major depressive disorder ([facility] Yes  Insomnia, unspecified type  PTSD (post-traumatic stress disorder)  Anxiety  History of alcohol dependence ([facility] PLAN: 
1. MEDICATION:  
1. Major depression, PTSD, and anxiety disorder: Zoloft 25-50 mg daily. 2.  Insomnia: Trazodone  mg at bedtime as needed for insomnia. 3.  Insomnia: Ambien 10 mg at bedtime as needed for insomnia. Patient was provided with psycho education, discussed risk/benefits, and expectations from medication changes. Patient agrees with plan. 2. PSYCHOTHERAPY: Patient was provided with supportive therapy, strongly encourage to seek psychotherapy. 3. MEDICAL CARE: Patient was strongly encourage to take their medical medications and follow up with their PCP on regular basis. His weight is 250 pound, it was 237 pound last year December.   His blood pressure is 130/78 and pulse is 73. He has history of hypertension, migraine, brain aneurysm, GERD, chronic hepatitis C, cirrhosis of liver, respiratory failure with hypoxia, chronic back pain, severe obesity, and COPD. 4. SUBSTANCE ABUSE CARE: Patient smokes 2 cigarettes daily, quit drinking in 2006, quit his smoking marijuana in 2004, and does have a history of IV drug abuse but denies any illicit drug abuse at this time. 5. FOLLOW UP: Follow-up and Dispositions · Return in about 1 month (around 6/13/2019) for Medication management. Patient was seen face-to-face for 45 minutes with more than half time spent in supportive counseling.

## 2023-05-30 RX ORDER — FLUTICASONE FUROATE AND VILANTEROL 100; 25 UG/1; UG/1
1 POWDER RESPIRATORY (INHALATION) DAILY
COMMUNITY
Start: 2018-03-18

## 2023-05-30 RX ORDER — ZOLPIDEM TARTRATE 10 MG/1
TABLET ORAL
COMMUNITY
Start: 2020-06-05

## 2023-05-30 RX ORDER — MORPHINE SULFATE 60 MG/1
TABLET, FILM COATED, EXTENDED RELEASE ORAL EVERY 12 HOURS
COMMUNITY

## 2023-05-30 RX ORDER — CLONIDINE HYDROCHLORIDE 0.1 MG/1
TABLET ORAL 3 TIMES DAILY
COMMUNITY
Start: 2020-01-17

## 2023-05-30 RX ORDER — OXYCODONE AND ACETAMINOPHEN 10; 325 MG/1; MG/1
TABLET ORAL
COMMUNITY

## 2023-05-30 RX ORDER — HYDROCHLOROTHIAZIDE 25 MG/1
TABLET ORAL DAILY
COMMUNITY
Start: 2018-04-12

## 2023-05-30 RX ORDER — QUETIAPINE FUMARATE 50 MG/1
TABLET, FILM COATED ORAL 3 TIMES DAILY
COMMUNITY
Start: 2020-06-05

## 2023-05-30 RX ORDER — ALBUTEROL SULFATE 90 UG/1
2-3 AEROSOL, METERED RESPIRATORY (INHALATION) EVERY 4 HOURS PRN
COMMUNITY
Start: 2018-03-17

## 2023-05-30 RX ORDER — OMEPRAZOLE 20 MG/1
TABLET, DELAYED RELEASE ORAL DAILY
COMMUNITY
Start: 2017-05-10

## 2023-05-30 RX ORDER — HYDROXYZINE PAMOATE 25 MG/1
CAPSULE ORAL
COMMUNITY
Start: 2020-07-29

## 2023-05-30 RX ORDER — FUROSEMIDE 40 MG/1
TABLET ORAL
COMMUNITY
Start: 2018-03-17

## 2024-06-13 ENCOUNTER — HOSPITAL ENCOUNTER (INPATIENT)
Facility: HOSPITAL | Age: 64
LOS: 12 days | Discharge: HOME | DRG: 432 | End: 2024-06-25
Attending: EMERGENCY MEDICINE | Admitting: INTERNAL MEDICINE
Payer: MEDICARE

## 2024-06-13 ENCOUNTER — ANESTHESIA EVENT (OUTPATIENT)
Facility: HOSPITAL | Age: 64
End: 2024-06-13
Payer: MEDICARE

## 2024-06-13 ENCOUNTER — APPOINTMENT (OUTPATIENT)
Facility: HOSPITAL | Age: 64
DRG: 432 | End: 2024-06-13
Payer: MEDICARE

## 2024-06-13 ENCOUNTER — ANESTHESIA (OUTPATIENT)
Facility: HOSPITAL | Age: 64
End: 2024-06-13
Payer: MEDICARE

## 2024-06-13 DIAGNOSIS — R06.02 SHORTNESS OF BREATH: ICD-10-CM

## 2024-06-13 DIAGNOSIS — R65.21 SEPTIC SHOCK (HCC): ICD-10-CM

## 2024-06-13 DIAGNOSIS — A41.9 SEPTIC SHOCK (HCC): ICD-10-CM

## 2024-06-13 DIAGNOSIS — K92.2 ACUTE GI BLEEDING: Primary | ICD-10-CM

## 2024-06-13 DIAGNOSIS — K70.31 ALCOHOLIC CIRRHOSIS OF LIVER WITH ASCITES (HCC): ICD-10-CM

## 2024-06-13 DIAGNOSIS — I50.810 RIGHT-SIDED CONGESTIVE HEART FAILURE, UNSPECIFIED HF CHRONICITY (HCC): ICD-10-CM

## 2024-06-13 DIAGNOSIS — K74.69 OTHER CIRRHOSIS OF LIVER (HCC): ICD-10-CM

## 2024-06-13 LAB
ALBUMIN SERPL-MCNC: 3.1 G/DL (ref 3.5–5)
ALBUMIN/GLOB SERPL: 1.1 (ref 1.1–2.2)
ALP SERPL-CCNC: 88 U/L (ref 45–117)
ALT SERPL-CCNC: 58 U/L (ref 12–78)
ANION GAP BLD CALC-SCNC: 7 (ref 10–20)
ANION GAP SERPL CALC-SCNC: 11 MMOL/L (ref 5–15)
ANION GAP SERPL CALC-SCNC: 4 MMOL/L (ref 5–15)
AST SERPL-CCNC: 77 U/L (ref 15–37)
BASE EXCESS BLD CALC-SCNC: 3.1 MMOL/L
BASOPHILS # BLD: 0 K/UL (ref 0–0.1)
BASOPHILS # BLD: 0 K/UL (ref 0–0.1)
BASOPHILS NFR BLD: 0 % (ref 0–1)
BASOPHILS NFR BLD: 0 % (ref 0–1)
BILIRUB SERPL-MCNC: 1.7 MG/DL (ref 0.2–1)
BUN SERPL-MCNC: 55 MG/DL (ref 6–20)
BUN SERPL-MCNC: 80 MG/DL (ref 6–20)
BUN/CREAT SERPL: 35 (ref 12–20)
BUN/CREAT SERPL: 51 (ref 12–20)
CA-I BLD-MCNC: 1.19 MMOL/L (ref 1.12–1.32)
CALCIUM SERPL-MCNC: 8 MG/DL (ref 8.5–10.1)
CALCIUM SERPL-MCNC: 9.2 MG/DL (ref 8.5–10.1)
CHLORIDE BLD-SCNC: 107 MMOL/L (ref 100–108)
CHLORIDE SERPL-SCNC: 108 MMOL/L (ref 97–108)
CHLORIDE SERPL-SCNC: 114 MMOL/L (ref 97–108)
CO2 BLD-SCNC: 28 MMOL/L (ref 19–24)
CO2 SERPL-SCNC: 24 MMOL/L (ref 21–32)
CO2 SERPL-SCNC: 25 MMOL/L (ref 21–32)
CREAT SERPL-MCNC: 1.56 MG/DL (ref 0.7–1.3)
CREAT SERPL-MCNC: 1.57 MG/DL (ref 0.7–1.3)
CREAT UR-MCNC: 1.4 MG/DL (ref 0.6–1.3)
DIFFERENTIAL METHOD BLD: ABNORMAL
DIFFERENTIAL METHOD BLD: ABNORMAL
EOSINOPHIL # BLD: 0 K/UL (ref 0–0.4)
EOSINOPHIL # BLD: 0 K/UL (ref 0–0.4)
EOSINOPHIL NFR BLD: 0 % (ref 0–7)
EOSINOPHIL NFR BLD: 0 % (ref 0–7)
ERYTHROCYTE [DISTWIDTH] IN BLOOD BY AUTOMATED COUNT: 14.1 % (ref 11.5–14.5)
ERYTHROCYTE [DISTWIDTH] IN BLOOD BY AUTOMATED COUNT: 14.2 % (ref 11.5–14.5)
GLOBULIN SER CALC-MCNC: 2.8 G/DL (ref 2–4)
GLUCOSE BLD STRIP.AUTO-MCNC: 120 MG/DL (ref 74–99)
GLUCOSE SERPL-MCNC: 122 MG/DL (ref 65–100)
GLUCOSE SERPL-MCNC: 128 MG/DL (ref 65–100)
HCO3 BLDA-SCNC: 28 MMOL/L
HCT VFR BLD AUTO: 26.2 % (ref 36.6–50.3)
HCT VFR BLD AUTO: 29.8 % (ref 36.6–50.3)
HCT VFR BLD AUTO: 31 % (ref 36.6–50.3)
HCT VFR BLD AUTO: 36.7 % (ref 36.6–50.3)
HEMOCCULT STL QL: POSITIVE
HGB BLD-MCNC: 11.6 G/DL (ref 12.1–17)
HGB BLD-MCNC: 8.1 G/DL (ref 12.1–17)
HGB BLD-MCNC: 9.2 G/DL (ref 12.1–17)
HGB BLD-MCNC: 9.9 G/DL (ref 12.1–17)
HISTORY CHECK: NORMAL
IMM GRANULOCYTES # BLD AUTO: 0 K/UL (ref 0–0.04)
IMM GRANULOCYTES # BLD AUTO: 0 K/UL (ref 0–0.04)
IMM GRANULOCYTES NFR BLD AUTO: 0 % (ref 0–0.5)
IMM GRANULOCYTES NFR BLD AUTO: 0 % (ref 0–0.5)
INR PPP: 1.4 (ref 0.9–1.1)
INR PPP: 1.4 (ref 0.9–1.1)
LACTATE BLD-SCNC: 4.06 MMOL/L (ref 0.4–2)
LACTATE BLD-SCNC: 5.71 MMOL/L (ref 0.4–2)
LACTATE SERPL-SCNC: 2.6 MMOL/L (ref 0.4–2)
LYMPHOCYTES # BLD: 11.6 K/UL (ref 0.8–3.5)
LYMPHOCYTES # BLD: 16.9 K/UL (ref 0.8–3.5)
LYMPHOCYTES NFR BLD: 49 % (ref 12–49)
LYMPHOCYTES NFR BLD: 50 % (ref 12–49)
MAGNESIUM SERPL-MCNC: 1.9 MG/DL (ref 1.6–2.4)
MCH RBC QN AUTO: 29.5 PG (ref 26–34)
MCH RBC QN AUTO: 29.7 PG (ref 26–34)
MCH RBC QN AUTO: 29.7 PG (ref 26–34)
MCH RBC QN AUTO: 29.9 PG (ref 26–34)
MCHC RBC AUTO-ENTMCNC: 30.9 G/DL (ref 30–36.5)
MCHC RBC AUTO-ENTMCNC: 30.9 G/DL (ref 30–36.5)
MCHC RBC AUTO-ENTMCNC: 31.6 G/DL (ref 30–36.5)
MCHC RBC AUTO-ENTMCNC: 31.9 G/DL (ref 30–36.5)
MCV RBC AUTO: 93.7 FL (ref 80–99)
MCV RBC AUTO: 93.9 FL (ref 80–99)
MCV RBC AUTO: 95.3 FL (ref 80–99)
MCV RBC AUTO: 96.1 FL (ref 80–99)
MONOCYTES # BLD: 1.7 K/UL (ref 0–1)
MONOCYTES # BLD: 4.1 K/UL (ref 0–1)
MONOCYTES NFR BLD: 12 % (ref 5–13)
MONOCYTES NFR BLD: 7 % (ref 5–13)
NEUTS BAND NFR BLD MANUAL: 1 %
NEUTS SEG # BLD: 10.4 K/UL (ref 1.8–8)
NEUTS SEG # BLD: 12.8 K/UL (ref 1.8–8)
NEUTS SEG NFR BLD: 37 % (ref 32–75)
NEUTS SEG NFR BLD: 44 % (ref 32–75)
NRBC # BLD: 0 K/UL (ref 0–0.01)
NRBC # BLD: 0.02 K/UL (ref 0–0.01)
NRBC # BLD: 0.04 K/UL (ref 0–0.01)
NRBC # BLD: 0.04 K/UL (ref 0–0.01)
NRBC BLD-RTO: 0 PER 100 WBC
NRBC BLD-RTO: 0 PER 100 WBC
NRBC BLD-RTO: 0.1 PER 100 WBC
NRBC BLD-RTO: 0.1 PER 100 WBC
PCO2 BLDV: 44.1 MMHG (ref 41–51)
PH BLDV: 7.41 (ref 7.32–7.42)
PHOSPHATE SERPL-MCNC: 4.1 MG/DL (ref 2.6–4.7)
PLATELET # BLD AUTO: 117 K/UL (ref 150–400)
PLATELET # BLD AUTO: 158 K/UL (ref 150–400)
PLATELET # BLD AUTO: 184 K/UL (ref 150–400)
PLATELET # BLD AUTO: 196 K/UL (ref 150–400)
PLATELET COMMENT: ABNORMAL
PMV BLD AUTO: 11.3 FL (ref 8.9–12.9)
PMV BLD AUTO: 11.3 FL (ref 8.9–12.9)
PMV BLD AUTO: 11.7 FL (ref 8.9–12.9)
PO2 BLDV: <27 MMHG (ref 25–40)
POTASSIUM BLD-SCNC: 5.1 MMOL/L (ref 3.5–5.5)
POTASSIUM SERPL-SCNC: 4.7 MMOL/L (ref 3.5–5.1)
POTASSIUM SERPL-SCNC: 5.2 MMOL/L (ref 3.5–5.1)
PROCALCITONIN SERPL-MCNC: 0.25 NG/ML
PROT SERPL-MCNC: 5.9 G/DL (ref 6.4–8.2)
PROTHROMBIN TIME: 14 SEC (ref 9–11.1)
PROTHROMBIN TIME: 14.1 SEC (ref 9–11.1)
RBC # BLD AUTO: 2.75 M/UL (ref 4.1–5.7)
RBC # BLD AUTO: 3.1 M/UL (ref 4.1–5.7)
RBC # BLD AUTO: 3.31 M/UL (ref 4.1–5.7)
RBC # BLD AUTO: 3.91 M/UL (ref 4.1–5.7)
RBC MORPH BLD: ABNORMAL
RBC MORPH BLD: ABNORMAL
SERVICE CMNT-IMP: ABNORMAL
SODIUM BLD-SCNC: 142 MMOL/L (ref 136–145)
SODIUM SERPL-SCNC: 143 MMOL/L (ref 136–145)
SODIUM SERPL-SCNC: 143 MMOL/L (ref 136–145)
SPECIMEN SITE: ABNORMAL
WBC # BLD AUTO: 23.7 K/UL (ref 4.1–11.1)
WBC # BLD AUTO: 33.8 K/UL (ref 4.1–11.1)
WBC # BLD AUTO: 40.8 K/UL (ref 4.1–11.1)
WBC # BLD AUTO: 51.4 K/UL (ref 4.1–11.1)
WBC MORPH BLD: ABNORMAL

## 2024-06-13 PROCEDURE — 7100000000 HC PACU RECOVERY - FIRST 15 MIN: Performed by: INTERNAL MEDICINE

## 2024-06-13 PROCEDURE — 06L38CZ OCCLUSION OF ESOPHAGEAL VEIN WITH EXTRALUMINAL DEVICE, VIA NATURAL OR ARTIFICIAL OPENING ENDOSCOPIC: ICD-10-PCS | Performed by: INTERNAL MEDICINE

## 2024-06-13 PROCEDURE — 6360000002 HC RX W HCPCS: Performed by: EMERGENCY MEDICINE

## 2024-06-13 PROCEDURE — 82272 OCCULT BLD FECES 1-3 TESTS: CPT

## 2024-06-13 PROCEDURE — 85027 COMPLETE CBC AUTOMATED: CPT

## 2024-06-13 PROCEDURE — 2580000003 HC RX 258: Performed by: NURSE PRACTITIONER

## 2024-06-13 PROCEDURE — 2500000003 HC RX 250 WO HCPCS: Performed by: NURSE ANESTHETIST, CERTIFIED REGISTERED

## 2024-06-13 PROCEDURE — 2580000003 HC RX 258: Performed by: STUDENT IN AN ORGANIZED HEALTH CARE EDUCATION/TRAINING PROGRAM

## 2024-06-13 PROCEDURE — 6360000002 HC RX W HCPCS: Performed by: INTERNAL MEDICINE

## 2024-06-13 PROCEDURE — 96375 TX/PRO/DX INJ NEW DRUG ADDON: CPT

## 2024-06-13 PROCEDURE — 6360000002 HC RX W HCPCS: Performed by: ANESTHESIOLOGY

## 2024-06-13 PROCEDURE — 3700000001 HC ADD 15 MINUTES (ANESTHESIA): Performed by: INTERNAL MEDICINE

## 2024-06-13 PROCEDURE — 99285 EMERGENCY DEPT VISIT HI MDM: CPT

## 2024-06-13 PROCEDURE — 2000000000 HC ICU R&B

## 2024-06-13 PROCEDURE — 80053 COMPREHEN METABOLIC PANEL: CPT

## 2024-06-13 PROCEDURE — 3600007512: Performed by: INTERNAL MEDICINE

## 2024-06-13 PROCEDURE — 87040 BLOOD CULTURE FOR BACTERIA: CPT

## 2024-06-13 PROCEDURE — 74176 CT ABD & PELVIS W/O CONTRAST: CPT

## 2024-06-13 PROCEDURE — 2580000003 HC RX 258: Performed by: ANESTHESIOLOGY

## 2024-06-13 PROCEDURE — 3600007502: Performed by: INTERNAL MEDICINE

## 2024-06-13 PROCEDURE — 86850 RBC ANTIBODY SCREEN: CPT

## 2024-06-13 PROCEDURE — 2580000003 HC RX 258: Performed by: INTERNAL MEDICINE

## 2024-06-13 PROCEDURE — 96361 HYDRATE IV INFUSION ADD-ON: CPT

## 2024-06-13 PROCEDURE — 82947 ASSAY GLUCOSE BLOOD QUANT: CPT

## 2024-06-13 PROCEDURE — 82803 BLOOD GASES ANY COMBINATION: CPT

## 2024-06-13 PROCEDURE — 86923 COMPATIBILITY TEST ELECTRIC: CPT

## 2024-06-13 PROCEDURE — 84100 ASSAY OF PHOSPHORUS: CPT

## 2024-06-13 PROCEDURE — 96365 THER/PROPH/DIAG IV INF INIT: CPT

## 2024-06-13 PROCEDURE — 6360000002 HC RX W HCPCS: Performed by: NURSE ANESTHETIST, CERTIFIED REGISTERED

## 2024-06-13 PROCEDURE — 85610 PROTHROMBIN TIME: CPT

## 2024-06-13 PROCEDURE — 83735 ASSAY OF MAGNESIUM: CPT

## 2024-06-13 PROCEDURE — 2580000003 HC RX 258: Performed by: EMERGENCY MEDICINE

## 2024-06-13 PROCEDURE — 7100000001 HC PACU RECOVERY - ADDTL 15 MIN: Performed by: INTERNAL MEDICINE

## 2024-06-13 PROCEDURE — 96374 THER/PROPH/DIAG INJ IV PUSH: CPT

## 2024-06-13 PROCEDURE — 2500000003 HC RX 250 WO HCPCS: Performed by: STUDENT IN AN ORGANIZED HEALTH CARE EDUCATION/TRAINING PROGRAM

## 2024-06-13 PROCEDURE — 71045 X-RAY EXAM CHEST 1 VIEW: CPT

## 2024-06-13 PROCEDURE — 84132 ASSAY OF SERUM POTASSIUM: CPT

## 2024-06-13 PROCEDURE — 86901 BLOOD TYPING SEROLOGIC RH(D): CPT

## 2024-06-13 PROCEDURE — C9113 INJ PANTOPRAZOLE SODIUM, VIA: HCPCS | Performed by: EMERGENCY MEDICINE

## 2024-06-13 PROCEDURE — 84295 ASSAY OF SERUM SODIUM: CPT

## 2024-06-13 PROCEDURE — 82330 ASSAY OF CALCIUM: CPT

## 2024-06-13 PROCEDURE — 6360000002 HC RX W HCPCS

## 2024-06-13 PROCEDURE — 36415 COLL VENOUS BLD VENIPUNCTURE: CPT

## 2024-06-13 PROCEDURE — 86900 BLOOD TYPING SEROLOGIC ABO: CPT

## 2024-06-13 PROCEDURE — 84145 PROCALCITONIN (PCT): CPT

## 2024-06-13 PROCEDURE — 83605 ASSAY OF LACTIC ACID: CPT

## 2024-06-13 PROCEDURE — 85025 COMPLETE CBC W/AUTO DIFF WBC: CPT

## 2024-06-13 PROCEDURE — 3700000000 HC ANESTHESIA ATTENDED CARE: Performed by: INTERNAL MEDICINE

## 2024-06-13 PROCEDURE — C9113 INJ PANTOPRAZOLE SODIUM, VIA: HCPCS | Performed by: INTERNAL MEDICINE

## 2024-06-13 RX ORDER — MIDAZOLAM HYDROCHLORIDE 1 MG/ML
INJECTION INTRAMUSCULAR; INTRAVENOUS PRN
Status: DISCONTINUED | OUTPATIENT
Start: 2024-06-13 | End: 2024-06-13 | Stop reason: SDUPTHER

## 2024-06-13 RX ORDER — 0.9 % SODIUM CHLORIDE 0.9 %
1000 INTRAVENOUS SOLUTION INTRAVENOUS ONCE
Status: COMPLETED | OUTPATIENT
Start: 2024-06-13 | End: 2024-06-13

## 2024-06-13 RX ORDER — MAGNESIUM SULFATE IN WATER 40 MG/ML
2000 INJECTION, SOLUTION INTRAVENOUS PRN
Status: DISCONTINUED | OUTPATIENT
Start: 2024-06-13 | End: 2024-06-13

## 2024-06-13 RX ORDER — LIDOCAINE HYDROCHLORIDE 20 MG/ML
INJECTION, SOLUTION EPIDURAL; INFILTRATION; INTRACAUDAL; PERINEURAL PRN
Status: DISCONTINUED | OUTPATIENT
Start: 2024-06-13 | End: 2024-06-13 | Stop reason: SDUPTHER

## 2024-06-13 RX ORDER — SODIUM CHLORIDE 0.9 % (FLUSH) 0.9 %
5-40 SYRINGE (ML) INJECTION PRN
Status: DISCONTINUED | OUTPATIENT
Start: 2024-06-13 | End: 2024-06-13 | Stop reason: HOSPADM

## 2024-06-13 RX ORDER — ACETAMINOPHEN 325 MG/1
650 TABLET ORAL EVERY 6 HOURS PRN
Status: DISCONTINUED | OUTPATIENT
Start: 2024-06-13 | End: 2024-06-25 | Stop reason: HOSPADM

## 2024-06-13 RX ORDER — OCTREOTIDE ACETATE 100 UG/ML
50 INJECTION, SOLUTION INTRAVENOUS; SUBCUTANEOUS ONCE
Status: DISCONTINUED | OUTPATIENT
Start: 2024-06-13 | End: 2024-06-14

## 2024-06-13 RX ORDER — PHENYLEPHRINE HCL IN 0.9% NACL 0.4MG/10ML
SYRINGE (ML) INTRAVENOUS PRN
Status: DISCONTINUED | OUTPATIENT
Start: 2024-06-13 | End: 2024-06-13 | Stop reason: SDUPTHER

## 2024-06-13 RX ORDER — CEFTRIAXONE 1 G/50ML
1000 INJECTION, SOLUTION INTRAVENOUS EVERY 24 HOURS
Status: DISCONTINUED | OUTPATIENT
Start: 2024-06-14 | End: 2024-06-13

## 2024-06-13 RX ORDER — POTASSIUM CHLORIDE 7.45 MG/ML
10 INJECTION INTRAVENOUS PRN
Status: DISCONTINUED | OUTPATIENT
Start: 2024-06-13 | End: 2024-06-13

## 2024-06-13 RX ORDER — ONDANSETRON 2 MG/ML
4 INJECTION INTRAMUSCULAR; INTRAVENOUS EVERY 6 HOURS PRN
Status: DISCONTINUED | OUTPATIENT
Start: 2024-06-13 | End: 2024-06-25 | Stop reason: HOSPADM

## 2024-06-13 RX ORDER — POLYETHYLENE GLYCOL 3350 17 G/17G
17 POWDER, FOR SOLUTION ORAL DAILY PRN
Status: DISCONTINUED | OUTPATIENT
Start: 2024-06-13 | End: 2024-06-25 | Stop reason: HOSPADM

## 2024-06-13 RX ORDER — 0.9 % SODIUM CHLORIDE 0.9 %
1000 INTRAVENOUS SOLUTION INTRAVENOUS ONCE
Status: COMPLETED | OUTPATIENT
Start: 2024-06-13 | End: 2024-06-14

## 2024-06-13 RX ORDER — SODIUM CHLORIDE 9 MG/ML
INJECTION, SOLUTION INTRAVENOUS PRN
Status: DISCONTINUED | OUTPATIENT
Start: 2024-06-13 | End: 2024-06-25 | Stop reason: HOSPADM

## 2024-06-13 RX ORDER — FENTANYL CITRATE 50 UG/ML
50 INJECTION, SOLUTION INTRAMUSCULAR; INTRAVENOUS ONCE
Status: COMPLETED | OUTPATIENT
Start: 2024-06-13 | End: 2024-06-13

## 2024-06-13 RX ORDER — PHENYLEPHRINE HYDROCHLORIDE 10 MG/ML
INJECTION INTRAVENOUS
Status: DISPENSED
Start: 2024-06-13 | End: 2024-06-14

## 2024-06-13 RX ORDER — POTASSIUM CHLORIDE 29.8 MG/ML
20 INJECTION INTRAVENOUS PRN
Status: DISCONTINUED | OUTPATIENT
Start: 2024-06-13 | End: 2024-06-13

## 2024-06-13 RX ORDER — SODIUM CHLORIDE 0.9 % (FLUSH) 0.9 %
5-40 SYRINGE (ML) INJECTION PRN
Status: DISCONTINUED | OUTPATIENT
Start: 2024-06-13 | End: 2024-06-25 | Stop reason: HOSPADM

## 2024-06-13 RX ORDER — METRONIDAZOLE 500 MG/100ML
500 INJECTION, SOLUTION INTRAVENOUS ONCE
Status: COMPLETED | OUTPATIENT
Start: 2024-06-13 | End: 2024-06-13

## 2024-06-13 RX ORDER — ONDANSETRON 4 MG/1
4 TABLET, ORALLY DISINTEGRATING ORAL EVERY 8 HOURS PRN
Status: DISCONTINUED | OUTPATIENT
Start: 2024-06-13 | End: 2024-06-15

## 2024-06-13 RX ORDER — SUCCINYLCHOLINE CHLORIDE 20 MG/ML
INJECTION INTRAMUSCULAR; INTRAVENOUS PRN
Status: DISCONTINUED | OUTPATIENT
Start: 2024-06-13 | End: 2024-06-13 | Stop reason: SDUPTHER

## 2024-06-13 RX ORDER — SODIUM CHLORIDE 9 MG/ML
INJECTION, SOLUTION INTRAVENOUS PRN
Status: DISCONTINUED | OUTPATIENT
Start: 2024-06-13 | End: 2024-06-15

## 2024-06-13 RX ORDER — MIDAZOLAM HYDROCHLORIDE 1 MG/ML
INJECTION INTRAMUSCULAR; INTRAVENOUS
Status: COMPLETED
Start: 2024-06-13 | End: 2024-06-13

## 2024-06-13 RX ORDER — SODIUM CHLORIDE 0.9 % (FLUSH) 0.9 %
5-40 SYRINGE (ML) INJECTION EVERY 12 HOURS SCHEDULED
Status: DISCONTINUED | OUTPATIENT
Start: 2024-06-13 | End: 2024-06-25 | Stop reason: HOSPADM

## 2024-06-13 RX ORDER — MIDAZOLAM HYDROCHLORIDE 2 MG/2ML
1 INJECTION, SOLUTION INTRAMUSCULAR; INTRAVENOUS ONCE
Status: DISCONTINUED | OUTPATIENT
Start: 2024-06-13 | End: 2024-06-15

## 2024-06-13 RX ORDER — SODIUM CHLORIDE 9 MG/ML
INJECTION, SOLUTION INTRAVENOUS CONTINUOUS
Status: DISCONTINUED | OUTPATIENT
Start: 2024-06-13 | End: 2024-06-14

## 2024-06-13 RX ORDER — ACETAMINOPHEN 650 MG/1
650 SUPPOSITORY RECTAL EVERY 6 HOURS PRN
Status: DISCONTINUED | OUTPATIENT
Start: 2024-06-13 | End: 2024-06-25 | Stop reason: HOSPADM

## 2024-06-13 RX ORDER — DEXMEDETOMIDINE HYDROCHLORIDE 4 UG/ML
.1-1.5 INJECTION, SOLUTION INTRAVENOUS CONTINUOUS
Status: DISCONTINUED | OUTPATIENT
Start: 2024-06-13 | End: 2024-06-13

## 2024-06-13 RX ORDER — SODIUM CHLORIDE 9 MG/ML
INJECTION, SOLUTION INTRAVENOUS CONTINUOUS
Status: DISCONTINUED | OUTPATIENT
Start: 2024-06-13 | End: 2024-06-13 | Stop reason: HOSPADM

## 2024-06-13 RX ORDER — ETOMIDATE 2 MG/ML
INJECTION INTRAVENOUS PRN
Status: DISCONTINUED | OUTPATIENT
Start: 2024-06-13 | End: 2024-06-13 | Stop reason: SDUPTHER

## 2024-06-13 RX ORDER — ENOXAPARIN SODIUM 100 MG/ML
30 INJECTION SUBCUTANEOUS 2 TIMES DAILY
Status: DISCONTINUED | OUTPATIENT
Start: 2024-06-13 | End: 2024-06-13

## 2024-06-13 RX ADMIN — Medication 80 MCG: at 13:15

## 2024-06-13 RX ADMIN — MIDAZOLAM HYDROCHLORIDE 3 MG: 1 INJECTION, SOLUTION INTRAMUSCULAR; INTRAVENOUS at 13:00

## 2024-06-13 RX ADMIN — ONDANSETRON 4 MG: 2 INJECTION INTRAMUSCULAR; INTRAVENOUS at 15:42

## 2024-06-13 RX ADMIN — PHENYLEPHRINE HYDROCHLORIDE 185 MCG/MIN: 10 INJECTION INTRAVENOUS at 23:18

## 2024-06-13 RX ADMIN — ETOMIDATE 5 MG: 2 INJECTION, SOLUTION INTRAVENOUS at 13:00

## 2024-06-13 RX ADMIN — SODIUM CHLORIDE: 9 INJECTION, SOLUTION INTRAVENOUS at 21:56

## 2024-06-13 RX ADMIN — METRONIDAZOLE 500 MG: 500 INJECTION, SOLUTION INTRAVENOUS at 11:37

## 2024-06-13 RX ADMIN — WATER 2000 MG: 1 INJECTION INTRAMUSCULAR; INTRAVENOUS; SUBCUTANEOUS at 09:26

## 2024-06-13 RX ADMIN — SODIUM CHLORIDE 1000 ML: 9 INJECTION, SOLUTION INTRAVENOUS at 22:00

## 2024-06-13 RX ADMIN — PANTOPRAZOLE SODIUM 40 MG: 40 INJECTION, POWDER, FOR SOLUTION INTRAVENOUS at 15:14

## 2024-06-13 RX ADMIN — OCTREOTIDE ACETATE 50 MCG/HR: 500 INJECTION, SOLUTION INTRAVENOUS; SUBCUTANEOUS at 22:42

## 2024-06-13 RX ADMIN — DEXMEDETOMIDINE 0.2 MCG/KG/HR: 200 INJECTION, SOLUTION INTRAVENOUS at 15:35

## 2024-06-13 RX ADMIN — SODIUM CHLORIDE, PRESERVATIVE FREE 10 ML: 5 INJECTION INTRAVENOUS at 19:51

## 2024-06-13 RX ADMIN — FENTANYL CITRATE 50 MCG: 50 INJECTION INTRAMUSCULAR; INTRAVENOUS at 09:10

## 2024-06-13 RX ADMIN — PHENYLEPHRINE HYDROCHLORIDE 30 MCG/MIN: 10 INJECTION INTRAVENOUS at 14:48

## 2024-06-13 RX ADMIN — PANTOPRAZOLE SODIUM 40 MG: 40 INJECTION, POWDER, FOR SOLUTION INTRAVENOUS at 08:01

## 2024-06-13 RX ADMIN — ETOMIDATE 4 MG: 2 INJECTION, SOLUTION INTRAVENOUS at 13:05

## 2024-06-13 RX ADMIN — OCTREOTIDE ACETATE 50 MCG/HR: 500 INJECTION, SOLUTION INTRAVENOUS; SUBCUTANEOUS at 14:16

## 2024-06-13 RX ADMIN — LIDOCAINE HYDROCHLORIDE 100 MG: 20 INJECTION, SOLUTION EPIDURAL; INFILTRATION; INTRACAUDAL; PERINEURAL at 13:15

## 2024-06-13 RX ADMIN — PANTOPRAZOLE SODIUM 8 MG/HR: 40 INJECTION, POWDER, FOR SOLUTION INTRAVENOUS at 15:28

## 2024-06-13 RX ADMIN — ETOMIDATE 12 MG: 2 INJECTION, SOLUTION INTRAVENOUS at 13:15

## 2024-06-13 RX ADMIN — PANTOPRAZOLE SODIUM 8 MG/HR: 40 INJECTION, POWDER, FOR SOLUTION INTRAVENOUS at 19:28

## 2024-06-13 RX ADMIN — MIDAZOLAM HYDROCHLORIDE 1 MG: 1 INJECTION, SOLUTION INTRAMUSCULAR; INTRAVENOUS at 14:35

## 2024-06-13 RX ADMIN — MIDAZOLAM HYDROCHLORIDE 2 MG: 1 INJECTION, SOLUTION INTRAMUSCULAR; INTRAVENOUS at 13:03

## 2024-06-13 RX ADMIN — SUCCINYLCHOLINE CHLORIDE 200 MG: 20 INJECTION, SOLUTION INTRAMUSCULAR; INTRAVENOUS at 13:15

## 2024-06-13 RX ADMIN — Medication 120 MCG: at 13:03

## 2024-06-13 RX ADMIN — SODIUM CHLORIDE 1000 ML: 9 INJECTION, SOLUTION INTRAVENOUS at 08:01

## 2024-06-13 RX ADMIN — SODIUM CHLORIDE: 9 INJECTION, SOLUTION INTRAVENOUS at 17:40

## 2024-06-13 RX ADMIN — SODIUM CHLORIDE 1000 ML: 9 INJECTION, SOLUTION INTRAVENOUS at 08:30

## 2024-06-13 ASSESSMENT — PAIN - FUNCTIONAL ASSESSMENT: PAIN_FUNCTIONAL_ASSESSMENT: ACTIVITIES ARE NOT PREVENTED

## 2024-06-13 ASSESSMENT — PAIN DESCRIPTION - ORIENTATION: ORIENTATION: LOWER;LEFT;RIGHT

## 2024-06-13 ASSESSMENT — PAIN DESCRIPTION - LOCATION: LOCATION: BACK;SHOULDER;LEG

## 2024-06-13 ASSESSMENT — PAIN SCALES - GENERAL: PAINLEVEL_OUTOF10: 8

## 2024-06-13 NOTE — ANESTHESIA POSTPROCEDURE EVALUATION
Department of Anesthesiology  Postprocedure Note    Patient: Sveta Eduardo Jr.  MRN: [id number]  YOB: 1960  Date of evaluation: 6/13/2024    Procedure Summary       Date: 06/13/24 Room / Location: [facility] 04 / [facility] ENDOSCOPY    Anesthesia Start: 1252 Anesthesia Stop: 1353    Procedure: ESOPHAGOGASTRODUODENOSCOPY Diagnosis:       Hematemesis with nausea      (Hematemesis with nausea [K92.0])    Surgeons: Kathy Martines MD Responsible Provider: Deepak Garcia MD    Anesthesia Type: General ASA Status: 3            Anesthesia Type: General    Betty Phase I: Betty Score: 8    Betty Phase II:      Anesthesia Post Evaluation    Patient location during evaluation: bedside  Patient participation: complete - patient participated  Level of consciousness: awake  Pain score: 0  Airway patency: patent  Nausea & Vomiting: no nausea and no vomiting  Cardiovascular status: hemodynamically stable  Respiratory status: acceptable  Hydration status: euvolemic  Pain management: adequate    No notable events documented.

## 2024-06-13 NOTE — ANESTHESIA PRE PROCEDURE
OTHER SURGICAL HISTORY      colonoscopy - Mar 2016 - multiple polyps    [place] UNLISTED PROCEDURE ABDOMEN PERITONEUM & OMENTUM      hernia    UPPER GASTROINTESTINAL ENDOSCOPY  2016       Social History:    Social History     Tobacco Use    Smoking status: Former     Current packs/day: 0.50     Types: Cigarettes    Smokeless tobacco: Never    Tobacco comments:     Quit smoking: former smoker   Substance Use Topics    Alcohol use: No                                Counseling given: Not Answered  Tobacco comments: Quit smoking: former smoker      Vital Signs (Current):   Vitals:    06/13/24 0920 06/13/24 0948 06/13/24 0951 06/13/24 1144   BP: 107/73 (!) 97/59 (!) 107/52 105/80   Pulse: (!) 110 (!) 107 (!) 107 (!) 107   Resp: 15 26 18 16   Temp:       TempSrc:       SpO2:   98% 97%                                              BP Readings from Last 3 Encounters:   06/13/24 105/80       NPO Status:                          Time of last solid consumption: 1155                        Date of last liquid consumption: 06/13/24                        Date of last solid food consumption: 06/11/24    BMI:   Wt Readings from Last 3 Encounters:   No data found for Wt     There is no height or weight on file to calculate BMI.    CBC:   Lab Results   Component Value Date/Time    WBC 23.7 06/13/2024 11:04 AM    RBC 3.31 06/13/2024 11:04 AM    HGB 9.9 06/13/2024 11:04 AM    HCT 31.0 06/13/2024 11:04 AM    MCV 93.7 06/13/2024 11:04 AM    RDW 14.1 06/13/2024 11:04 AM     06/13/2024 11:04 AM       CMP:   Lab Results   Component Value Date/Time     06/13/2024 07:40 AM    K 4.7 06/13/2024 07:40 AM     06/13/2024 07:40 AM    CO2 24 06/13/2024 07:40 AM    BUN 55 06/13/2024 07:40 AM    CREATININE 1.4 06/13/2024 08:17 AM    CREATININE 1.56 06/13/2024 07:40 AM    GFRAA >60 06/30/2019 07:35 PM    AGRATIO 1.1 06/30/2019 07:35 PM    LABGLOM 49 06/13/2024 07:40 AM    GLUCOSE 122 06/13/2024 07:40 AM    CALCIUM 9.2 06/13/2024 07:40 AM

## 2024-06-13 NOTE — PERIOP NOTE
2:05 PM  Spoke with Dr Martines; PA to call intensivist for CCU admission. TORB to start Octreotide and Protonix infusions -- message sent to pharmacy to request these medications..    2:40 PM  Pt non complaint and fighting staff/pulling off medical equipment including oxygen with desat to 80's. Bp has been low but now elevated with activity.  Versed 1mg given per Dr Garcia    2:45 PM  Spoke w anesthesia Dr Garcia -- Orders received to give Estuardo gtt and draw CBC    3 PM  Dr Fontanez at bedside in PACU. VORB to start precedex gtt.     3:45 PM  Dr Garcia at bedside; pt relaxed and cooperative on Precedex gtt, holding for CCU bed. Labs reviewed; Anesthesia signed off.     5:04 PM  GI MD at bedside, VORB to recheck H&h and Lactic tonight at 9pm    6:24 PM  Notified Dr Fontanez of no urine output post procedure; bladder scan not showing > 100ml however last void was at least 6hr ago.   VORB to place batres catheter.

## 2024-06-13 NOTE — ED NOTES
Bedside and Verbal shift change report given to NATALY Pozo (Endo nurse) by NATALY Miller (offgoing nurse). Report included the following information Nurse Handoff Report, Index, ED Encounter Summary, ED SBAR, MAR, Recent Results, Med Rec Status, and Neuro Assessment.      Oncoming RN aware that pt is on O2 for comfort. Pt is to return to ER room after endoscopy

## 2024-06-13 NOTE — H&P
ICU ADMISSION H&P                                                                                                                  [facility]      Chief Complaint: Upper GI bleeding    HPI: The patient is a 64/M with liver cirrhosis from  Hep C who we are seeing in consultation at the request of Dr. Ramirez for admission to ICU for acute GI bleeding.    Briefly, patient presented to ED with GI bleed. GI was consulted and he was taken for EGD. ICU consulted from PACU for admission post EGD.    On my arrival, he is lying on the bed, has received Versed x 1 hence not able to participate in interview. He is on phenylephrine with MAP >65.    Past Medical History:  Past Medical History:   Diagnosis Date    Aneurysm (HCC)     cerebral    Arthritis     Asthma     Chronic obstructive pulmonary disease (HCC)     lung nodule being monitored    Chronic pain     headaches/arthritis    GERD (gastroesophageal reflux disease)     Hypertension     Ill-defined condition     Chronic back pain    Ill-defined condition     Left heel fracture    Ill-defined condition     Sciatica    Ill-defined condition     loss of most hearing in left ear    Ill-defined condition     heat stroke years ago    Liver disease     past hx Hep C--alcoholic cirrhosis    Migraine     Psychiatric disorder     axiety and depression       Past Surgical History:  Past Surgical History:   Procedure Laterality Date    COLONOSCOPY N/A 4/18/2017    COLONOSCOPY performed by Edmond Claros MD at [facility] ENDOSCOPY    COLONOSCOPY N/A 10/19/2016    COLONOSCOPY performed by Edmond Claros MD at [facility] ENDOSCOPY    COLONOSCOPY  2016    HEENT      mastoid - surgery eardrum perforation    OTHER SURGICAL HISTORY      colonoscopy - Mar 2016 - multiple polyps    [place] UNLISTED PROCEDURE ABDOMEN PERITONEUM & OMENTUM      hernia    UPPER

## 2024-06-13 NOTE — PERIOP NOTE
ED Charge RNMary Jane told patient was intubated during EGD procedure and going to PACU post procedure then back to the ED if the patients ICU bed was not available.

## 2024-06-13 NOTE — ED PROVIDER NOTES
[facility] ENDOSCOPY  EMERGENCY DEPARTMENT ENCOUNTER       Pt Name: Sveta Eduardo Jr.  MRN: [id number]  Birthdate 1960  Date of evaluation: 6/13/2024  Provider: Bridger Ramirez MD   PCP: Gonzalez Manzo MD  Note Started: 8:10 AM EDT 6/13/24     CHIEF COMPLAINT       Chief Complaint   Patient presents with   • Vomiting Blood     Pt c/o coffee ground emesis and stool x 2 days. Pt pale, diaphoretic. Pt states he has cirrhosis of the liver and has been sober from alcohol for \"3-4 years but has a beer every now and then.\" Pt also states he wears oxygen at home PRN for COPD   • Rectal Bleeding        HISTORY OF PRESENT ILLNESS: 1 or more elements      History From: Patient and Patient's Wife  HPI Limitations: None     Sveta Eduardo Jr. is a 64 y.o. male who presents with complaints of coffee-ground emesis over the last 2 days, as well as dark melanotic stools.  Patient reports he has a history of cirrhosis of liver, has not drank in \"several years\".  He does not believe he seen a liver doctor in the past.  He said he had a colonoscopy where he had multiple polyps removed.  He is not on anticoagulants.  He reports chills but no fevers.  No chest pain, trouble breathing or cough.     Nursing Notes were all reviewed and agreed with or any disagreements were addressed in the HPI.     REVIEW OF SYSTEMS      Review of Systems     Positives and Pertinent negatives as per HPI.    PAST HISTORY     Past Medical History:  Past Medical History:   Diagnosis Date   • Aneurysm (HCC)     cerebral   • Arthritis    • Asthma    • Chronic obstructive pulmonary disease (HCC)     lung nodule being monitored   • Chronic pain     headaches/arthritis   • GERD (gastroesophageal reflux disease)    • Hypertension    • Ill-defined condition     Chronic back pain   • Ill-defined condition     Left heel fracture   • Ill-defined condition     Sciatica   • Ill-defined condition     loss of most hearing in left ear   • Ill-defined condition     
16   Temp:       TempSrc:       SpO2:   98% 97%            Patient was given the following medications:  Medications   0.9 % sodium chloride infusion (has no administration in time range)   metroNIDAZOLE (FLAGYL) 500 mg in 0.9% NaCl 100 mL IVPB premix (500 mg IntraVENous New Bag 6/13/24 1137)   octreotide (SANDOSTATIN) injection 50 mcg (has no administration in time range)   octreotide (SANDOSTATIN) 500 mcg in sodium chloride 0.9 % 100 mL infusion (has no administration in time range)   sodium chloride 0.9 % bolus 1,000 mL (0 mLs IntraVENous Stopped 6/13/24 0902)   pantoprazole (PROTONIX) 40 mg in sodium chloride (PF) 0.9 % 10 mL injection (40 mg IntraVENous Given 6/13/24 0801)   sodium chloride 0.9 % bolus 1,000 mL (1,000 mLs IntraVENous New Bag 6/13/24 0830)   ceFEPIme (MAXIPIME) 2,000 mg in sterile water 20 mL IV syringe (2,000 mg IntraVENous Given 6/13/24 0926)   fentaNYL (SUBLIMAZE) injection 50 mcg (50 mcg IntraVENous Given 6/13/24 0910)       CONSULTS: (Who and What was discussed)  IP CONSULT TO GI      CLINICAL MANAGEMENT TOOLS:  Not Applicable    Chronic Conditions:   Past Medical History:   Diagnosis Date    Aneurysm (HCC)     cerebral    Arthritis     Asthma     Chronic obstructive pulmonary disease (HCC)     lung nodule being monitored    Chronic pain     headaches/arthritis    GERD (gastroesophageal reflux disease)     Hypertension     Ill-defined condition     Chronic back pain    Ill-defined condition     Left heel fracture    Ill-defined condition     Sciatica    Ill-defined condition     loss of most hearing in left ear    Ill-defined condition     heat stroke years ago    Liver disease     past hx Hep C--alcoholic cirrhosis    Migraine     Psychiatric disorder     axiety and depression         Social Determinants affecting Dx or Tx: None    Records Reviewed (source and summary of external notes): Nursing Notes and Old Medical Records    CC/HPI Summary, DDx, ED Course, and Reassessment: Patient is

## 2024-06-13 NOTE — OP NOTE
[facility]  [facility]  Adama Martines MD, FACG  [phone]                 NAME:  Sveta Eduardo Jr.   :   1960   MRN:   [id number]     Date/Time:  2024 1:31 PM    Esophagogastroduodenoscopy (EGD) Procedure Note    :  Adama Martines MD, FACG    Staff: Circulator: Nohelia Stephenson RN  Endoscopy Technician: Eulalio Perez     Referring Provider:  N/A    Anethesia/Sedation:  General anesthesia          Description of Procedure:    Prior to the procedure its objectives, risks, consequences and alternatives were discussed with the patient who then elected to proceed.  Excellent deep sedation was provided by anesthesiologists. The Olympus video endoscope was inserted under direct vision into the mouth and then into the esophagus. Z line at 41 cm. The esohagus showed grade III large varices in the mid and distal esophagus. There were red [place] present. There was recent/fresh blood in the stomach.  There is poor visibility in the stomach due to blood. Otherwise, there were no diagnostic abnormalities of the body, fundus, antrum, cardia and incisura of the stomach. Limited view of the fundus due to pool of blood. This included direct and retroflexion examination.  The first and second portion of the duodenum appeared normal.      I used a [employer] \"seven shooter\". Due to equipment malfunction, one varices had 3 bands placed. Total, 3 varices banded with 5 bands, excellent hemostasis. Large varices are noted in the mid- distal esophagus post banding.  There was no bleeding.      Complications: There were no  apparent complications and the patient tolerated the procedure well.      Impressions:    - Large esophageal varices with red kwesi sign, presumed variceal hemorrhage s/p band ligation x 3 spots with excellent hemostasis  - Fresh blood in the stomach    Recommendations:  - Monitor H/H every 6-8 hours for next 24 hours  - Transfuse

## 2024-06-14 ENCOUNTER — APPOINTMENT (OUTPATIENT)
Facility: HOSPITAL | Age: 64
DRG: 432 | End: 2024-06-14
Payer: MEDICARE

## 2024-06-14 LAB
ALBUMIN FLD-MCNC: 0.6 G/DL
ALBUMIN SERPL-MCNC: 2.5 G/DL (ref 3.5–5)
ALBUMIN/GLOB SERPL: 1 (ref 1.1–2.2)
ALP SERPL-CCNC: 68 U/L (ref 45–117)
ALT SERPL-CCNC: 137 U/L (ref 12–78)
AMMONIA PLAS-SCNC: 22 UMOL/L
ANION GAP SERPL CALC-SCNC: 5 MMOL/L (ref 5–15)
ANION GAP SERPL CALC-SCNC: 5 MMOL/L (ref 5–15)
APPEARANCE FLD: ABNORMAL
AST SERPL-CCNC: 225 U/L (ref 15–37)
BASOPHILS # BLD: 0 K/UL (ref 0–0.1)
BASOPHILS NFR BLD: 0 % (ref 0–1)
BILIRUB DIRECT SERPL-MCNC: 0.3 MG/DL (ref 0–0.2)
BILIRUB SERPL-MCNC: 0.7 MG/DL (ref 0.2–1)
BUN SERPL-MCNC: 68 MG/DL (ref 6–20)
BUN SERPL-MCNC: 69 MG/DL (ref 6–20)
BUN/CREAT SERPL: 54 (ref 12–20)
BUN/CREAT SERPL: 61 (ref 12–20)
CALCIUM SERPL-MCNC: 8.1 MG/DL (ref 8.5–10.1)
CALCIUM SERPL-MCNC: 8.1 MG/DL (ref 8.5–10.1)
CHLORIDE SERPL-SCNC: 117 MMOL/L (ref 97–108)
CHLORIDE SERPL-SCNC: 117 MMOL/L (ref 97–108)
CO2 SERPL-SCNC: 24 MMOL/L (ref 21–32)
CO2 SERPL-SCNC: 24 MMOL/L (ref 21–32)
COLOR FLD: YELLOW
COMMENT:: NORMAL
CREAT SERPL-MCNC: 1.13 MG/DL (ref 0.7–1.3)
CREAT SERPL-MCNC: 1.27 MG/DL (ref 0.7–1.3)
DIFFERENTIAL METHOD BLD: ABNORMAL
EOSINOPHIL # BLD: 0 K/UL (ref 0–0.4)
EOSINOPHIL NFR BLD: 1 % (ref 0–7)
ERYTHROCYTE [DISTWIDTH] IN BLOOD BY AUTOMATED COUNT: 14.4 % (ref 11.5–14.5)
ERYTHROCYTE [DISTWIDTH] IN BLOOD BY AUTOMATED COUNT: 14.5 % (ref 11.5–14.5)
ERYTHROCYTE [DISTWIDTH] IN BLOOD BY AUTOMATED COUNT: 14.5 % (ref 11.5–14.5)
ERYTHROCYTE [DISTWIDTH] IN BLOOD BY AUTOMATED COUNT: 14.6 % (ref 11.5–14.5)
ERYTHROCYTE [DISTWIDTH] IN BLOOD BY AUTOMATED COUNT: 14.6 % (ref 11.5–14.5)
GLOBULIN SER CALC-MCNC: 2.6 G/DL (ref 2–4)
GLUCOSE SERPL-MCNC: 128 MG/DL (ref 65–100)
GLUCOSE SERPL-MCNC: 136 MG/DL (ref 65–100)
HCT VFR BLD AUTO: 15.5 % (ref 36.6–50.3)
HCT VFR BLD AUTO: 16.3 % (ref 36.6–50.3)
HCT VFR BLD AUTO: 22.8 % (ref 36.6–50.3)
HCT VFR BLD AUTO: 24.6 % (ref 36.6–50.3)
HCT VFR BLD AUTO: 26.7 % (ref 36.6–50.3)
HGB BLD-MCNC: 4.7 G/DL (ref 12.1–17)
HGB BLD-MCNC: 5.2 G/DL (ref 12.1–17)
HGB BLD-MCNC: 7.2 G/DL (ref 12.1–17)
HGB BLD-MCNC: 7.8 G/DL (ref 12.1–17)
HGB BLD-MCNC: 8.3 G/DL (ref 12.1–17)
HISTORY CHECK: NORMAL
IMM GRANULOCYTES # BLD AUTO: 0 K/UL (ref 0–0.04)
IMM GRANULOCYTES NFR BLD AUTO: 1 % (ref 0–0.5)
LACTATE SERPL-SCNC: 2.1 MMOL/L (ref 0.4–2)
LACTATE SERPL-SCNC: 2.5 MMOL/L (ref 0.4–2)
LYMPHOCYTES # BLD: 2.7 K/UL (ref 0.8–3.5)
LYMPHOCYTES NFR BLD: 46 % (ref 12–49)
LYMPHOCYTES NFR FLD: 58 %
MAGNESIUM SERPL-MCNC: 2.1 MG/DL (ref 1.6–2.4)
MCH RBC QN AUTO: 28.8 PG (ref 26–34)
MCH RBC QN AUTO: 29.5 PG (ref 26–34)
MCH RBC QN AUTO: 30 PG (ref 26–34)
MCH RBC QN AUTO: 30 PG (ref 26–34)
MCH RBC QN AUTO: 30.2 PG (ref 26–34)
MCHC RBC AUTO-ENTMCNC: 30.3 G/DL (ref 30–36.5)
MCHC RBC AUTO-ENTMCNC: 31.1 G/DL (ref 30–36.5)
MCHC RBC AUTO-ENTMCNC: 31.6 G/DL (ref 30–36.5)
MCHC RBC AUTO-ENTMCNC: 31.7 G/DL (ref 30–36.5)
MCHC RBC AUTO-ENTMCNC: 31.9 G/DL (ref 30–36.5)
MCV RBC AUTO: 94.6 FL (ref 80–99)
MCV RBC AUTO: 94.8 FL (ref 80–99)
MCV RBC AUTO: 95 FL (ref 80–99)
MCV RBC AUTO: 95 FL (ref 80–99)
MCV RBC AUTO: 95.1 FL (ref 80–99)
MONOCYTES # BLD: 0.4 K/UL (ref 0–1)
MONOCYTES NFR BLD: 7 % (ref 5–13)
MONOS+MACROS NFR FLD: 31 %
NEUTROPHILS NFR FLD: 11 %
NEUTS SEG # BLD: 2.6 K/UL (ref 1.8–8)
NEUTS SEG NFR BLD: 45 % (ref 32–75)
NRBC # BLD: 0 K/UL (ref 0–0.01)
NRBC # BLD: 0.02 K/UL (ref 0–0.01)
NRBC # BLD: 0.03 K/UL (ref 0–0.01)
NRBC BLD-RTO: 0 PER 100 WBC
NRBC BLD-RTO: 0.1 PER 100 WBC
NRBC BLD-RTO: 0.1 PER 100 WBC
NUC CELL # FLD: 523 /CU MM
PHOSPHATE SERPL-MCNC: 2.9 MG/DL (ref 2.6–4.7)
PLATELET # BLD AUTO: 104 K/UL (ref 150–400)
PLATELET # BLD AUTO: 142 K/UL (ref 150–400)
PLATELET # BLD AUTO: 193 K/UL (ref 150–400)
PLATELET # BLD AUTO: 54 K/UL (ref 150–400)
PLATELET # BLD AUTO: 60 K/UL (ref 150–400)
PMV BLD AUTO: 11 FL (ref 8.9–12.9)
PMV BLD AUTO: 11.3 FL (ref 8.9–12.9)
PMV BLD AUTO: 11.5 FL (ref 8.9–12.9)
POTASSIUM SERPL-SCNC: 4.2 MMOL/L (ref 3.5–5.1)
POTASSIUM SERPL-SCNC: 4.3 MMOL/L (ref 3.5–5.1)
PROT FLD-MCNC: 0.9 G/DL
PROT SERPL-MCNC: 5.1 G/DL (ref 6.4–8.2)
RBC # BLD AUTO: 1.63 M/UL (ref 4.1–5.7)
RBC # BLD AUTO: 1.72 M/UL (ref 4.1–5.7)
RBC # BLD AUTO: 2.4 M/UL (ref 4.1–5.7)
RBC # BLD AUTO: 2.6 M/UL (ref 4.1–5.7)
RBC # BLD AUTO: 2.81 M/UL (ref 4.1–5.7)
RBC # FLD: >100 /CU MM
SODIUM SERPL-SCNC: 146 MMOL/L (ref 136–145)
SODIUM SERPL-SCNC: 146 MMOL/L (ref 136–145)
SPECIMEN HOLD: NORMAL
SPECIMEN SOURCE FLD: ABNORMAL
SPECIMEN SOURCE FLD: NORMAL
SPECIMEN SOURCE FLD: NORMAL
WBC # BLD AUTO: 16.9 K/UL (ref 4.1–11.1)
WBC # BLD AUTO: 29.3 K/UL (ref 4.1–11.1)
WBC # BLD AUTO: 48 K/UL (ref 4.1–11.1)
WBC # BLD AUTO: 5.8 K/UL (ref 4.1–11.1)
WBC # BLD AUTO: 6.2 K/UL (ref 4.1–11.1)

## 2024-06-14 PROCEDURE — 2580000003 HC RX 258: Performed by: EMERGENCY MEDICINE

## 2024-06-14 PROCEDURE — 36430 TRANSFUSION BLD/BLD COMPNT: CPT

## 2024-06-14 PROCEDURE — P9016 RBC LEUKOCYTES REDUCED: HCPCS

## 2024-06-14 PROCEDURE — 85027 COMPLETE CBC AUTOMATED: CPT

## 2024-06-14 PROCEDURE — 2580000003 HC RX 258: Performed by: NURSE PRACTITIONER

## 2024-06-14 PROCEDURE — 80076 HEPATIC FUNCTION PANEL: CPT

## 2024-06-14 PROCEDURE — 2580000003 HC RX 258: Performed by: INTERNAL MEDICINE

## 2024-06-14 PROCEDURE — 6360000002 HC RX W HCPCS: Performed by: EMERGENCY MEDICINE

## 2024-06-14 PROCEDURE — 85025 COMPLETE CBC W/AUTO DIFF WBC: CPT

## 2024-06-14 PROCEDURE — 80048 BASIC METABOLIC PNL TOTAL CA: CPT

## 2024-06-14 PROCEDURE — 6370000000 HC RX 637 (ALT 250 FOR IP): Performed by: INTERNAL MEDICINE

## 2024-06-14 PROCEDURE — 2580000003 HC RX 258: Performed by: PHYSICIAN ASSISTANT

## 2024-06-14 PROCEDURE — 6360000002 HC RX W HCPCS: Performed by: INTERNAL MEDICINE

## 2024-06-14 PROCEDURE — P9047 ALBUMIN (HUMAN), 25%, 50ML: HCPCS | Performed by: PHYSICIAN ASSISTANT

## 2024-06-14 PROCEDURE — 87070 CULTURE OTHR SPECIMN AEROBIC: CPT

## 2024-06-14 PROCEDURE — 2700000000 HC OXYGEN THERAPY PER DAY

## 2024-06-14 PROCEDURE — 36415 COLL VENOUS BLD VENIPUNCTURE: CPT

## 2024-06-14 PROCEDURE — C9113 INJ PANTOPRAZOLE SODIUM, VIA: HCPCS | Performed by: INTERNAL MEDICINE

## 2024-06-14 PROCEDURE — 84100 ASSAY OF PHOSPHORUS: CPT

## 2024-06-14 PROCEDURE — 2000000000 HC ICU R&B

## 2024-06-14 PROCEDURE — C1729 CATH, DRAINAGE: HCPCS

## 2024-06-14 PROCEDURE — 84157 ASSAY OF PROTEIN OTHER: CPT

## 2024-06-14 PROCEDURE — 6360000002 HC RX W HCPCS: Performed by: PHYSICIAN ASSISTANT

## 2024-06-14 PROCEDURE — 83735 ASSAY OF MAGNESIUM: CPT

## 2024-06-14 PROCEDURE — 82042 OTHER SOURCE ALBUMIN QUAN EA: CPT

## 2024-06-14 PROCEDURE — 82140 ASSAY OF AMMONIA: CPT

## 2024-06-14 PROCEDURE — 6360000002 HC RX W HCPCS: Performed by: NURSE PRACTITIONER

## 2024-06-14 PROCEDURE — 76937 US GUIDE VASCULAR ACCESS: CPT

## 2024-06-14 PROCEDURE — 2500000003 HC RX 250 WO HCPCS: Performed by: STUDENT IN AN ORGANIZED HEALTH CARE EDUCATION/TRAINING PROGRAM

## 2024-06-14 PROCEDURE — 89050 BODY FLUID CELL COUNT: CPT

## 2024-06-14 PROCEDURE — 87205 SMEAR GRAM STAIN: CPT

## 2024-06-14 PROCEDURE — 83605 ASSAY OF LACTIC ACID: CPT

## 2024-06-14 PROCEDURE — 0W9G3ZZ DRAINAGE OF PERITONEAL CAVITY, PERCUTANEOUS APPROACH: ICD-10-PCS | Performed by: INTERNAL MEDICINE

## 2024-06-14 RX ORDER — LIDOCAINE 4 G/G
1 PATCH TOPICAL DAILY
Status: DISCONTINUED | OUTPATIENT
Start: 2024-06-14 | End: 2024-06-25 | Stop reason: HOSPADM

## 2024-06-14 RX ORDER — ALBUMIN (HUMAN) 12.5 G/50ML
75 SOLUTION INTRAVENOUS ONCE
Status: COMPLETED | OUTPATIENT
Start: 2024-06-14 | End: 2024-06-15

## 2024-06-14 RX ORDER — ALBUMIN (HUMAN) 12.5 G/50ML
25 SOLUTION INTRAVENOUS EVERY 6 HOURS
Status: DISCONTINUED | OUTPATIENT
Start: 2024-06-14 | End: 2024-06-14

## 2024-06-14 RX ORDER — LIDOCAINE HYDROCHLORIDE 10 MG/ML
10 INJECTION, SOLUTION EPIDURAL; INFILTRATION; INTRACAUDAL; PERINEURAL ONCE
Status: COMPLETED | OUTPATIENT
Start: 2024-06-14 | End: 2024-06-14

## 2024-06-14 RX ORDER — FENTANYL CITRATE 50 UG/ML
12.5 INJECTION, SOLUTION INTRAMUSCULAR; INTRAVENOUS ONCE
Status: COMPLETED | OUTPATIENT
Start: 2024-06-14 | End: 2024-06-14

## 2024-06-14 RX ORDER — FENTANYL CITRATE 50 UG/ML
12.5 INJECTION, SOLUTION INTRAMUSCULAR; INTRAVENOUS EVERY 4 HOURS PRN
Status: DISCONTINUED | OUTPATIENT
Start: 2024-06-14 | End: 2024-06-15

## 2024-06-14 RX ORDER — SODIUM CHLORIDE, SODIUM LACTATE, POTASSIUM CHLORIDE, AND CALCIUM CHLORIDE .6; .31; .03; .02 G/100ML; G/100ML; G/100ML; G/100ML
500 INJECTION, SOLUTION INTRAVENOUS ONCE
Status: COMPLETED | OUTPATIENT
Start: 2024-06-14 | End: 2024-06-14

## 2024-06-14 RX ORDER — SODIUM CHLORIDE 450 MG/100ML
INJECTION, SOLUTION INTRAVENOUS CONTINUOUS
Status: DISCONTINUED | OUTPATIENT
Start: 2024-06-14 | End: 2024-06-15

## 2024-06-14 RX ORDER — SODIUM CHLORIDE 9 MG/ML
INJECTION, SOLUTION INTRAVENOUS PRN
Status: DISCONTINUED | OUTPATIENT
Start: 2024-06-14 | End: 2024-06-15

## 2024-06-14 RX ORDER — ALBUMIN (HUMAN) 12.5 G/50ML
100 SOLUTION INTRAVENOUS ONCE
Status: COMPLETED | OUTPATIENT
Start: 2024-06-14 | End: 2024-06-14

## 2024-06-14 RX ADMIN — SODIUM CHLORIDE 1000 MG: 900 INJECTION INTRAVENOUS at 11:09

## 2024-06-14 RX ADMIN — FENTANYL CITRATE 12.5 MCG: 50 INJECTION INTRAMUSCULAR; INTRAVENOUS at 17:10

## 2024-06-14 RX ADMIN — FENTANYL CITRATE 12.5 MCG: 50 INJECTION INTRAMUSCULAR; INTRAVENOUS at 01:43

## 2024-06-14 RX ADMIN — FENTANYL CITRATE 12.5 MCG: 50 INJECTION INTRAMUSCULAR; INTRAVENOUS at 21:19

## 2024-06-14 RX ADMIN — PANTOPRAZOLE SODIUM 8 MG/HR: 40 INJECTION, POWDER, FOR SOLUTION INTRAVENOUS at 18:16

## 2024-06-14 RX ADMIN — SODIUM CHLORIDE, PRESERVATIVE FREE 10 ML: 5 INJECTION INTRAVENOUS at 20:10

## 2024-06-14 RX ADMIN — ALBUMIN (HUMAN) 75 G: 0.25 INJECTION, SOLUTION INTRAVENOUS at 22:36

## 2024-06-14 RX ADMIN — PANTOPRAZOLE SODIUM 8 MG/HR: 40 INJECTION, POWDER, FOR SOLUTION INTRAVENOUS at 12:34

## 2024-06-14 RX ADMIN — PANTOPRAZOLE SODIUM 8 MG/HR: 40 INJECTION, POWDER, FOR SOLUTION INTRAVENOUS at 23:50

## 2024-06-14 RX ADMIN — SODIUM CHLORIDE: 4.5 INJECTION, SOLUTION INTRAVENOUS at 03:16

## 2024-06-14 RX ADMIN — LIDOCAINE HYDROCHLORIDE 10 ML: 10 INJECTION, SOLUTION EPIDURAL; INFILTRATION; INTRACAUDAL; PERINEURAL at 12:29

## 2024-06-14 RX ADMIN — FENTANYL CITRATE 12.5 MCG: 50 INJECTION INTRAMUSCULAR; INTRAVENOUS at 08:39

## 2024-06-14 RX ADMIN — SODIUM CHLORIDE, PRESERVATIVE FREE 10 ML: 5 INJECTION INTRAVENOUS at 08:39

## 2024-06-14 RX ADMIN — PANTOPRAZOLE SODIUM 8 MG/HR: 40 INJECTION, POWDER, FOR SOLUTION INTRAVENOUS at 06:23

## 2024-06-14 RX ADMIN — SODIUM CHLORIDE: 4.5 INJECTION, SOLUTION INTRAVENOUS at 17:14

## 2024-06-14 RX ADMIN — FENTANYL CITRATE 12.5 MCG: 50 INJECTION INTRAMUSCULAR; INTRAVENOUS at 13:04

## 2024-06-14 RX ADMIN — SODIUM CHLORIDE, POTASSIUM CHLORIDE, SODIUM LACTATE AND CALCIUM CHLORIDE 500 ML: 600; 310; 30; 20 INJECTION, SOLUTION INTRAVENOUS at 03:17

## 2024-06-14 RX ADMIN — ALBUMIN (HUMAN) 100 G: 0.25 INJECTION, SOLUTION INTRAVENOUS at 13:01

## 2024-06-14 RX ADMIN — OCTREOTIDE ACETATE 50 MCG/HR: 500 INJECTION, SOLUTION INTRAVENOUS; SUBCUTANEOUS at 09:21

## 2024-06-14 RX ADMIN — OCTREOTIDE ACETATE 50 MCG/HR: 500 INJECTION, SOLUTION INTRAVENOUS; SUBCUTANEOUS at 20:55

## 2024-06-14 RX ADMIN — SODIUM CHLORIDE 1000 MG: 900 INJECTION INTRAVENOUS at 00:27

## 2024-06-14 RX ADMIN — PANTOPRAZOLE SODIUM 8 MG/HR: 40 INJECTION, POWDER, FOR SOLUTION INTRAVENOUS at 00:48

## 2024-06-14 ASSESSMENT — PAIN SCALES - GENERAL
PAINLEVEL_OUTOF10: 4
PAINLEVEL_OUTOF10: 6
PAINLEVEL_OUTOF10: 8
PAINLEVEL_OUTOF10: 10
PAINLEVEL_OUTOF10: 4
PAINLEVEL_OUTOF10: 3
PAINLEVEL_OUTOF10: 4
PAINLEVEL_OUTOF10: 9
PAINLEVEL_OUTOF10: 3
PAINLEVEL_OUTOF10: 8

## 2024-06-14 ASSESSMENT — PAIN DESCRIPTION - LOCATION
LOCATION: BACK;ABDOMEN;SHOULDER
LOCATION: BACK;LEG
LOCATION: BACK
LOCATION: ABDOMEN

## 2024-06-14 ASSESSMENT — PAIN DESCRIPTION - DESCRIPTORS
DESCRIPTORS: ACHING
DESCRIPTORS: ACHING;SORE;THROBBING
DESCRIPTORS: ACHING

## 2024-06-14 ASSESSMENT — PAIN DESCRIPTION - ORIENTATION
ORIENTATION: RIGHT;LEFT;LOWER
ORIENTATION: UPPER;MID;LOWER
ORIENTATION: RIGHT;LEFT;MID;LOWER

## 2024-06-14 NOTE — CONSENT
Informed Consent for Blood Component Transfusion Note    I have discussed with the patient and spouse the rationale for blood component transfusion; its benefits in treating or preventing fatigue, organ damage, or death; and its risk which includes mild transfusion reactions, rare risk of blood borne infection, or more serious but rare reactions. I have discussed the alternatives to transfusion, including the risk and consequences of not receiving transfusion. The patient and spouse had an opportunity to ask questions and had agreed to proceed with transfusion of blood components.    Electronically signed by Too Reyes MD on 6/14/24 at 7:09 PM EDT

## 2024-06-15 ENCOUNTER — ANESTHESIA EVENT (OUTPATIENT)
Facility: HOSPITAL | Age: 64
End: 2024-06-15
Payer: MEDICARE

## 2024-06-15 ENCOUNTER — ANESTHESIA (OUTPATIENT)
Facility: HOSPITAL | Age: 64
End: 2024-06-15
Payer: MEDICARE

## 2024-06-15 ENCOUNTER — APPOINTMENT (OUTPATIENT)
Facility: HOSPITAL | Age: 64
DRG: 432 | End: 2024-06-15
Payer: MEDICARE

## 2024-06-15 LAB
ALBUMIN SERPL-MCNC: 3.3 G/DL (ref 3.5–5)
ALBUMIN/GLOB SERPL: 2.2 (ref 1.1–2.2)
ALP SERPL-CCNC: 40 U/L (ref 45–117)
ALT SERPL-CCNC: 127 U/L (ref 12–78)
ANION GAP SERPL CALC-SCNC: 4 MMOL/L (ref 5–15)
ANION GAP SERPL CALC-SCNC: 6 MMOL/L (ref 5–15)
ARTERIAL PATENCY WRIST A: ABNORMAL
AST SERPL-CCNC: 166 U/L (ref 15–37)
BASE DEFICIT BLDA-SCNC: 6.5 MMOL/L
BDY SITE: ABNORMAL
BILIRUB DIRECT SERPL-MCNC: 0.4 MG/DL (ref 0–0.2)
BILIRUB SERPL-MCNC: 1 MG/DL (ref 0.2–1)
BLD PROD TYP BPU: NORMAL
BLOOD BANK DISPENSE STATUS: NORMAL
BPU ID: NORMAL
BUN SERPL-MCNC: 34 MG/DL (ref 6–20)
BUN SERPL-MCNC: 35 MG/DL (ref 6–20)
BUN/CREAT SERPL: 45 (ref 12–20)
BUN/CREAT SERPL: 59 (ref 12–20)
CALCIUM SERPL-MCNC: 7.2 MG/DL (ref 8.5–10.1)
CALCIUM SERPL-MCNC: 8.2 MG/DL (ref 8.5–10.1)
CHLORIDE SERPL-SCNC: 116 MMOL/L (ref 97–108)
CHLORIDE SERPL-SCNC: 120 MMOL/L (ref 97–108)
CO2 SERPL-SCNC: 22 MMOL/L (ref 21–32)
CO2 SERPL-SCNC: 25 MMOL/L (ref 21–32)
CREAT SERPL-MCNC: 0.58 MG/DL (ref 0.7–1.3)
CREAT SERPL-MCNC: 0.77 MG/DL (ref 0.7–1.3)
ERYTHROCYTE [DISTWIDTH] IN BLOOD BY AUTOMATED COUNT: 14.7 % (ref 11.5–14.5)
ERYTHROCYTE [DISTWIDTH] IN BLOOD BY AUTOMATED COUNT: 14.7 % (ref 11.5–14.5)
ERYTHROCYTE [DISTWIDTH] IN BLOOD BY AUTOMATED COUNT: 15 % (ref 11.5–14.5)
ERYTHROCYTE [DISTWIDTH] IN BLOOD BY AUTOMATED COUNT: 15 % (ref 11.5–14.5)
FIBRINOGEN PPP-MCNC: 127 MG/DL (ref 200–475)
FIBRINOGEN PPP-MCNC: 163 MG/DL (ref 200–475)
GLOBULIN SER CALC-MCNC: 1.5 G/DL (ref 2–4)
GLUCOSE BLD STRIP.AUTO-MCNC: 164 MG/DL (ref 65–117)
GLUCOSE SERPL-MCNC: 112 MG/DL (ref 65–100)
GLUCOSE SERPL-MCNC: 161 MG/DL (ref 65–100)
HCO3 BLDA-SCNC: 19 MMOL/L (ref 22–26)
HCT VFR BLD AUTO: 17 % (ref 36.6–50.3)
HCT VFR BLD AUTO: 24.5 % (ref 36.6–50.3)
HCT VFR BLD AUTO: 25.2 % (ref 36.6–50.3)
HCT VFR BLD AUTO: 28.1 % (ref 36.6–50.3)
HGB BLD-MCNC: 5.4 G/DL (ref 12.1–17)
HGB BLD-MCNC: 7.8 G/DL (ref 12.1–17)
HGB BLD-MCNC: 8.2 G/DL (ref 12.1–17)
HGB BLD-MCNC: 8.9 G/DL (ref 12.1–17)
HISTORY CHECK: NORMAL
HISTORY CHECK: NORMAL
INR PPP: 1.5 (ref 0.9–1.1)
MAGNESIUM SERPL-MCNC: 1.9 MG/DL (ref 1.6–2.4)
MCH RBC QN AUTO: 29.9 PG (ref 26–34)
MCH RBC QN AUTO: 30 PG (ref 26–34)
MCH RBC QN AUTO: 30.2 PG (ref 26–34)
MCH RBC QN AUTO: 30.6 PG (ref 26–34)
MCHC RBC AUTO-ENTMCNC: 31.7 G/DL (ref 30–36.5)
MCHC RBC AUTO-ENTMCNC: 31.8 G/DL (ref 30–36.5)
MCHC RBC AUTO-ENTMCNC: 31.8 G/DL (ref 30–36.5)
MCHC RBC AUTO-ENTMCNC: 32.5 G/DL (ref 30–36.5)
MCV RBC AUTO: 93.9 FL (ref 80–99)
MCV RBC AUTO: 94 FL (ref 80–99)
MCV RBC AUTO: 94.4 FL (ref 80–99)
MCV RBC AUTO: 95.3 FL (ref 80–99)
NRBC # BLD: 0 K/UL (ref 0–0.01)
NRBC # BLD: 0 K/UL (ref 0–0.01)
NRBC # BLD: 0.02 K/UL (ref 0–0.01)
NRBC # BLD: 0.03 K/UL (ref 0–0.01)
NRBC BLD-RTO: 0 PER 100 WBC
NRBC BLD-RTO: 0 PER 100 WBC
NRBC BLD-RTO: 0.2 PER 100 WBC
NRBC BLD-RTO: 0.4 PER 100 WBC
PCO2 BLDA: 40 MMHG (ref 35–45)
PH BLDA: 7.3 (ref 7.35–7.45)
PHOSPHATE SERPL-MCNC: 1.6 MG/DL (ref 2.6–4.7)
PLATELET # BLD AUTO: 44 K/UL (ref 150–400)
PLATELET # BLD AUTO: 48 K/UL (ref 150–400)
PLATELET # BLD AUTO: 57 K/UL (ref 150–400)
PLATELET # BLD AUTO: 97 K/UL (ref 150–400)
PMV BLD AUTO: 10.6 FL (ref 8.9–12.9)
PMV BLD AUTO: 10.8 FL (ref 8.9–12.9)
PMV BLD AUTO: 10.8 FL (ref 8.9–12.9)
PMV BLD AUTO: 10.9 FL (ref 8.9–12.9)
PO2 BLDA: 83 MMHG (ref 80–100)
POTASSIUM SERPL-SCNC: 2.8 MMOL/L (ref 3.5–5.1)
POTASSIUM SERPL-SCNC: 4.1 MMOL/L (ref 3.5–5.1)
PROT SERPL-MCNC: 4.8 G/DL (ref 6.4–8.2)
PROTHROMBIN TIME: 14.8 SEC (ref 9–11.1)
RBC # BLD AUTO: 1.8 M/UL (ref 4.1–5.7)
RBC # BLD AUTO: 2.61 M/UL (ref 4.1–5.7)
RBC # BLD AUTO: 2.68 M/UL (ref 4.1–5.7)
RBC # BLD AUTO: 2.95 M/UL (ref 4.1–5.7)
SAO2 % BLD: 95 % (ref 92–97)
SAO2% DEVICE SAO2% SENSOR NAME: ABNORMAL
SERVICE CMNT-IMP: ABNORMAL
SODIUM SERPL-SCNC: 145 MMOL/L (ref 136–145)
SODIUM SERPL-SCNC: 148 MMOL/L (ref 136–145)
SPECIMEN SITE: ABNORMAL
UNIT DIVISION: 0
WBC # BLD AUTO: 15.2 K/UL (ref 4.1–11.1)
WBC # BLD AUTO: 4.5 K/UL (ref 4.1–11.1)
WBC # BLD AUTO: 5.8 K/UL (ref 4.1–11.1)
WBC # BLD AUTO: 6.5 K/UL (ref 4.1–11.1)

## 2024-06-15 PROCEDURE — P9016 RBC LEUKOCYTES REDUCED: HCPCS

## 2024-06-15 PROCEDURE — 2580000003 HC RX 258: Performed by: INTERNAL MEDICINE

## 2024-06-15 PROCEDURE — C9113 INJ PANTOPRAZOLE SODIUM, VIA: HCPCS | Performed by: INTERNAL MEDICINE

## 2024-06-15 PROCEDURE — 94002 VENT MGMT INPAT INIT DAY: CPT

## 2024-06-15 PROCEDURE — 80076 HEPATIC FUNCTION PANEL: CPT

## 2024-06-15 PROCEDURE — 82803 BLOOD GASES ANY COMBINATION: CPT

## 2024-06-15 PROCEDURE — 2580000003 HC RX 258: Performed by: NURSE ANESTHETIST, CERTIFIED REGISTERED

## 2024-06-15 PROCEDURE — 6360000002 HC RX W HCPCS: Performed by: ANESTHESIOLOGY

## 2024-06-15 PROCEDURE — 05HD33Z INSERTION OF INFUSION DEVICE INTO RIGHT CEPHALIC VEIN, PERCUTANEOUS APPROACH: ICD-10-PCS | Performed by: INTERNAL MEDICINE

## 2024-06-15 PROCEDURE — 6360000004 HC RX CONTRAST MEDICATION: Performed by: STUDENT IN AN ORGANIZED HEALTH CARE EDUCATION/TRAINING PROGRAM

## 2024-06-15 PROCEDURE — 2580000003 HC RX 258: Performed by: NURSE PRACTITIONER

## 2024-06-15 PROCEDURE — 6360000002 HC RX W HCPCS: Performed by: EMERGENCY MEDICINE

## 2024-06-15 PROCEDURE — 02HV33Z INSERTION OF INFUSION DEVICE INTO SUPERIOR VENA CAVA, PERCUTANEOUS APPROACH: ICD-10-PCS | Performed by: INTERNAL MEDICINE

## 2024-06-15 PROCEDURE — 36555 INSERT NON-TUNNEL CV CATH: CPT

## 2024-06-15 PROCEDURE — 2580000003 HC RX 258: Performed by: PHYSICIAN ASSISTANT

## 2024-06-15 PROCEDURE — 6360000002 HC RX W HCPCS: Performed by: NURSE PRACTITIONER

## 2024-06-15 PROCEDURE — 84100 ASSAY OF PHOSPHORUS: CPT

## 2024-06-15 PROCEDURE — 6360000002 HC RX W HCPCS: Performed by: INTERNAL MEDICINE

## 2024-06-15 PROCEDURE — P9073 PLATELETS PHERESIS PATH REDU: HCPCS

## 2024-06-15 PROCEDURE — 2500000003 HC RX 250 WO HCPCS: Performed by: NURSE PRACTITIONER

## 2024-06-15 PROCEDURE — 36415 COLL VENOUS BLD VENIPUNCTURE: CPT

## 2024-06-15 PROCEDURE — 85610 PROTHROMBIN TIME: CPT

## 2024-06-15 PROCEDURE — 36556 INSERT NON-TUNNEL CV CATH: CPT

## 2024-06-15 PROCEDURE — 6360000002 HC RX W HCPCS: Performed by: NURSE ANESTHETIST, CERTIFIED REGISTERED

## 2024-06-15 PROCEDURE — 82962 GLUCOSE BLOOD TEST: CPT

## 2024-06-15 PROCEDURE — 0BH17EZ INSERTION OF ENDOTRACHEAL AIRWAY INTO TRACHEA, VIA NATURAL OR ARTIFICIAL OPENING: ICD-10-PCS | Performed by: INTERNAL MEDICINE

## 2024-06-15 PROCEDURE — 71045 X-RAY EXAM CHEST 1 VIEW: CPT

## 2024-06-15 PROCEDURE — 83735 ASSAY OF MAGNESIUM: CPT

## 2024-06-15 PROCEDURE — 85384 FIBRINOGEN ACTIVITY: CPT

## 2024-06-15 PROCEDURE — 5A1955Z RESPIRATORY VENTILATION, GREATER THAN 96 CONSECUTIVE HOURS: ICD-10-PCS | Performed by: INTERNAL MEDICINE

## 2024-06-15 PROCEDURE — 2580000003 HC RX 258: Performed by: EMERGENCY MEDICINE

## 2024-06-15 PROCEDURE — 6370000000 HC RX 637 (ALT 250 FOR IP): Performed by: NURSE PRACTITIONER

## 2024-06-15 PROCEDURE — 2500000003 HC RX 250 WO HCPCS: Performed by: ANESTHESIOLOGY

## 2024-06-15 PROCEDURE — 80048 BASIC METABOLIC PNL TOTAL CA: CPT

## 2024-06-15 PROCEDURE — 3700000000 HC ANESTHESIA ATTENDED CARE

## 2024-06-15 PROCEDURE — 74174 CTA ABD&PLVS W/CONTRAST: CPT

## 2024-06-15 PROCEDURE — 36620 INSERTION CATHETER ARTERY: CPT

## 2024-06-15 PROCEDURE — B51T1ZZ FLUOROSCOPY OF PORTAL AND SPLANCHNIC VEINS USING LOW OSMOLAR CONTRAST: ICD-10-PCS | Performed by: STUDENT IN AN ORGANIZED HEALTH CARE EDUCATION/TRAINING PROGRAM

## 2024-06-15 PROCEDURE — 36430 TRANSFUSION BLD/BLD COMPNT: CPT

## 2024-06-15 PROCEDURE — 85027 COMPLETE CBC AUTOMATED: CPT

## 2024-06-15 PROCEDURE — 6360000002 HC RX W HCPCS: Performed by: PHYSICIAN ASSISTANT

## 2024-06-15 PROCEDURE — 3700000001 HC ADD 15 MINUTES (ANESTHESIA)

## 2024-06-15 PROCEDURE — 2000000000 HC ICU R&B

## 2024-06-15 PROCEDURE — P9047 ALBUMIN (HUMAN), 25%, 50ML: HCPCS | Performed by: NURSE ANESTHETIST, CERTIFIED REGISTERED

## 2024-06-15 PROCEDURE — P9012 CRYOPRECIPITATE EACH UNIT: HCPCS

## 2024-06-15 RX ORDER — IPRATROPIUM BROMIDE AND ALBUTEROL SULFATE 2.5; .5 MG/3ML; MG/3ML
1 SOLUTION RESPIRATORY (INHALATION) ONCE
Status: DISCONTINUED | OUTPATIENT
Start: 2024-06-15 | End: 2024-06-16

## 2024-06-15 RX ORDER — SODIUM CHLORIDE 9 MG/ML
INJECTION, SOLUTION INTRAVENOUS PRN
Status: DISCONTINUED | OUTPATIENT
Start: 2024-06-15 | End: 2024-06-15

## 2024-06-15 RX ORDER — NOREPINEPHRINE BITARTRATE 0.06 MG/ML
0-50 INJECTION, SOLUTION INTRAVENOUS CONTINUOUS
Status: DISCONTINUED | OUTPATIENT
Start: 2024-06-15 | End: 2024-06-17

## 2024-06-15 RX ORDER — LIDOCAINE HYDROCHLORIDE 20 MG/ML
INJECTION, SOLUTION EPIDURAL; INFILTRATION; INTRACAUDAL; PERINEURAL PRN
Status: DISCONTINUED | OUTPATIENT
Start: 2024-06-15 | End: 2024-06-15 | Stop reason: SDUPTHER

## 2024-06-15 RX ORDER — DIPHENHYDRAMINE HYDROCHLORIDE 50 MG/ML
50 INJECTION INTRAMUSCULAR; INTRAVENOUS ONCE
Status: COMPLETED | OUTPATIENT
Start: 2024-06-15 | End: 2024-06-15

## 2024-06-15 RX ORDER — HEPARIN SODIUM 200 [USP'U]/100ML
200 INJECTION, SOLUTION INTRAVENOUS ONCE
Status: DISCONTINUED | OUTPATIENT
Start: 2024-06-15 | End: 2024-06-15

## 2024-06-15 RX ORDER — SODIUM CHLORIDE, SODIUM LACTATE, POTASSIUM CHLORIDE, CALCIUM CHLORIDE 600; 310; 30; 20 MG/100ML; MG/100ML; MG/100ML; MG/100ML
INJECTION, SOLUTION INTRAVENOUS CONTINUOUS
Status: DISCONTINUED | OUTPATIENT
Start: 2024-06-15 | End: 2024-06-15

## 2024-06-15 RX ORDER — GLUCAGON 1 MG/ML
1 KIT INJECTION PRN
Status: DISCONTINUED | OUTPATIENT
Start: 2024-06-15 | End: 2024-06-25 | Stop reason: HOSPADM

## 2024-06-15 RX ORDER — SODIUM CHLORIDE 9 MG/ML
INJECTION, SOLUTION INTRAVENOUS CONTINUOUS PRN
Status: DISCONTINUED | OUTPATIENT
Start: 2024-06-15 | End: 2024-06-15 | Stop reason: SDUPTHER

## 2024-06-15 RX ORDER — DIPHENHYDRAMINE HYDROCHLORIDE 50 MG/ML
50 INJECTION INTRAMUSCULAR; INTRAVENOUS ONCE
Status: DISCONTINUED | OUTPATIENT
Start: 2024-06-15 | End: 2024-06-15

## 2024-06-15 RX ORDER — DEXTROSE MONOHYDRATE 100 MG/ML
INJECTION, SOLUTION INTRAVENOUS CONTINUOUS PRN
Status: DISCONTINUED | OUTPATIENT
Start: 2024-06-15 | End: 2024-06-25 | Stop reason: HOSPADM

## 2024-06-15 RX ORDER — CEFAZOLIN SODIUM 1 G/3ML
INJECTION, POWDER, FOR SOLUTION INTRAMUSCULAR; INTRAVENOUS PRN
Status: DISCONTINUED | OUTPATIENT
Start: 2024-06-15 | End: 2024-06-15 | Stop reason: SDUPTHER

## 2024-06-15 RX ORDER — FENTANYL CITRATE 50 UG/ML
25 INJECTION, SOLUTION INTRAMUSCULAR; INTRAVENOUS
Status: DISCONTINUED | OUTPATIENT
Start: 2024-06-15 | End: 2024-06-17

## 2024-06-15 RX ORDER — PROPOFOL 10 MG/ML
5-50 INJECTION, EMULSION INTRAVENOUS CONTINUOUS
Status: DISCONTINUED | OUTPATIENT
Start: 2024-06-15 | End: 2024-06-18

## 2024-06-15 RX ORDER — PROPOFOL 10 MG/ML
INJECTION, EMULSION INTRAVENOUS
Status: DISCONTINUED
Start: 2024-06-15 | End: 2024-06-16

## 2024-06-15 RX ORDER — FENTANYL CITRATE-0.9 % NACL/PF 10 MCG/ML
25-200 PLASTIC BAG, INJECTION (ML) INTRAVENOUS CONTINUOUS
Status: DISCONTINUED | OUTPATIENT
Start: 2024-06-15 | End: 2024-06-17

## 2024-06-15 RX ORDER — LIDOCAINE HYDROCHLORIDE 20 MG/ML
20 INJECTION, SOLUTION INFILTRATION; PERINEURAL ONCE
Status: DISCONTINUED | OUTPATIENT
Start: 2024-06-15 | End: 2024-06-15

## 2024-06-15 RX ORDER — FENTANYL CITRATE 50 UG/ML
INJECTION, SOLUTION INTRAMUSCULAR; INTRAVENOUS PRN
Status: DISCONTINUED | OUTPATIENT
Start: 2024-06-15 | End: 2024-06-15 | Stop reason: SDUPTHER

## 2024-06-15 RX ORDER — DEXTROSE, SODIUM CHLORIDE, SODIUM LACTATE, POTASSIUM CHLORIDE, AND CALCIUM CHLORIDE 5; .6; .31; .03; .02 G/100ML; G/100ML; G/100ML; G/100ML; G/100ML
INJECTION, SOLUTION INTRAVENOUS CONTINUOUS
Status: DISCONTINUED | OUTPATIENT
Start: 2024-06-15 | End: 2024-06-16

## 2024-06-15 RX ORDER — ROCURONIUM BROMIDE 10 MG/ML
INJECTION, SOLUTION INTRAVENOUS PRN
Status: DISCONTINUED | OUTPATIENT
Start: 2024-06-15 | End: 2024-06-15 | Stop reason: SDUPTHER

## 2024-06-15 RX ORDER — POTASSIUM CHLORIDE 7.45 MG/ML
10 INJECTION INTRAVENOUS
Status: COMPLETED | OUTPATIENT
Start: 2024-06-15 | End: 2024-06-15

## 2024-06-15 RX ORDER — INSULIN LISPRO 100 [IU]/ML
0-8 INJECTION, SOLUTION INTRAVENOUS; SUBCUTANEOUS EVERY 6 HOURS
Status: DISCONTINUED | OUTPATIENT
Start: 2024-06-15 | End: 2024-06-24

## 2024-06-15 RX ORDER — POTASSIUM CHLORIDE, DEXTROSE MONOHYDRATE 150; 5 MG/100ML; G/100ML
INJECTION, SOLUTION INTRAVENOUS CONTINUOUS
Status: DISCONTINUED | OUTPATIENT
Start: 2024-06-15 | End: 2024-06-15

## 2024-06-15 RX ORDER — SUCCINYLCHOLINE CHLORIDE 20 MG/ML
INJECTION INTRAMUSCULAR; INTRAVENOUS PRN
Status: DISCONTINUED | OUTPATIENT
Start: 2024-06-15 | End: 2024-06-15 | Stop reason: SDUPTHER

## 2024-06-15 RX ORDER — PHENYLEPHRINE HCL IN 0.9% NACL 0.4MG/10ML
SYRINGE (ML) INTRAVENOUS PRN
Status: DISCONTINUED | OUTPATIENT
Start: 2024-06-15 | End: 2024-06-15 | Stop reason: SDUPTHER

## 2024-06-15 RX ORDER — NOREPINEPHRINE BIT/0.9 % NACL 16MG/250ML
INFUSION BOTTLE (ML) INTRAVENOUS PRN
Status: DISCONTINUED | OUTPATIENT
Start: 2024-06-15 | End: 2024-06-15 | Stop reason: SDUPTHER

## 2024-06-15 RX ORDER — NOREPINEPHRINE BITARTRATE/D5W 8 MG/250ML
PLASTIC BAG, INJECTION (ML) INTRAVENOUS CONTINUOUS PRN
Status: DISCONTINUED | OUTPATIENT
Start: 2024-06-15 | End: 2024-06-15 | Stop reason: SDUPTHER

## 2024-06-15 RX ORDER — IPRATROPIUM BROMIDE AND ALBUTEROL SULFATE 2.5; .5 MG/3ML; MG/3ML
1 SOLUTION RESPIRATORY (INHALATION) EVERY 4 HOURS PRN
Status: DISCONTINUED | OUTPATIENT
Start: 2024-06-15 | End: 2024-06-25 | Stop reason: HOSPADM

## 2024-06-15 RX ORDER — ALBUMIN (HUMAN) 12.5 G/50ML
SOLUTION INTRAVENOUS PRN
Status: DISCONTINUED | OUTPATIENT
Start: 2024-06-15 | End: 2024-06-15 | Stop reason: SDUPTHER

## 2024-06-15 RX ORDER — SODIUM CHLORIDE, SODIUM LACTATE, POTASSIUM CHLORIDE, CALCIUM CHLORIDE 600; 310; 30; 20 MG/100ML; MG/100ML; MG/100ML; MG/100ML
INJECTION, SOLUTION INTRAVENOUS CONTINUOUS PRN
Status: DISCONTINUED | OUTPATIENT
Start: 2024-06-15 | End: 2024-06-15 | Stop reason: SDUPTHER

## 2024-06-15 RX ORDER — SODIUM CHLORIDE 9 MG/ML
INJECTION, SOLUTION INTRAVENOUS PRN
Status: DISCONTINUED | OUTPATIENT
Start: 2024-06-15 | End: 2024-06-25 | Stop reason: HOSPADM

## 2024-06-15 RX ADMIN — POTASSIUM CHLORIDE 10 MEQ: 10 INJECTION, SOLUTION INTRAVENOUS at 08:35

## 2024-06-15 RX ADMIN — PANTOPRAZOLE SODIUM 8 MG/HR: 40 INJECTION, POWDER, FOR SOLUTION INTRAVENOUS at 05:23

## 2024-06-15 RX ADMIN — Medication 40 MCG: at 14:53

## 2024-06-15 RX ADMIN — Medication 80 MCG: at 14:43

## 2024-06-15 RX ADMIN — POTASSIUM CHLORIDE 10 MEQ: 10 INJECTION, SOLUTION INTRAVENOUS at 11:04

## 2024-06-15 RX ADMIN — POTASSIUM CHLORIDE 10 MEQ: 10 INJECTION, SOLUTION INTRAVENOUS at 06:20

## 2024-06-15 RX ADMIN — Medication 80 MCG: at 14:51

## 2024-06-15 RX ADMIN — POTASSIUM CHLORIDE 10 MEQ: 10 INJECTION, SOLUTION INTRAVENOUS at 05:04

## 2024-06-15 RX ADMIN — Medication 16 MCG: at 15:19

## 2024-06-15 RX ADMIN — FENTANYL CITRATE 12.5 MCG: 50 INJECTION INTRAMUSCULAR; INTRAVENOUS at 06:14

## 2024-06-15 RX ADMIN — POTASSIUM CHLORIDE AND DEXTROSE MONOHYDRATE: 150; 5 INJECTION, SOLUTION INTRAVENOUS at 11:08

## 2024-06-15 RX ADMIN — SODIUM CHLORIDE, PRESERVATIVE FREE 40 ML: 5 INJECTION INTRAVENOUS at 20:00

## 2024-06-15 RX ADMIN — PROPOFOL 50 MCG/KG/MIN: 10 INJECTION, EMULSION INTRAVENOUS at 19:16

## 2024-06-15 RX ADMIN — SODIUM CHLORIDE, SODIUM LACTATE, POTASSIUM CHLORIDE, CALCIUM CHLORIDE AND DEXTROSE MONOHYDRATE: 5; 600; 310; 30; 20 INJECTION, SOLUTION INTRAVENOUS at 23:17

## 2024-06-15 RX ADMIN — WATER 40 MG: 1 INJECTION INTRAMUSCULAR; INTRAVENOUS; SUBCUTANEOUS at 03:53

## 2024-06-15 RX ADMIN — Medication 16 MCG: at 15:04

## 2024-06-15 RX ADMIN — FENTANYL CITRATE 50 MCG/HR: at 19:19

## 2024-06-15 RX ADMIN — Medication 16 MCG: at 15:00

## 2024-06-15 RX ADMIN — PROPOFOL 100 MCG/KG/MIN: 10 INJECTION, EMULSION INTRAVENOUS at 16:38

## 2024-06-15 RX ADMIN — OCTREOTIDE ACETATE 50 MCG/HR: 500 INJECTION, SOLUTION INTRAVENOUS; SUBCUTANEOUS at 05:05

## 2024-06-15 RX ADMIN — ROCURONIUM BROMIDE 20 MG: 10 INJECTION INTRAVENOUS at 14:12

## 2024-06-15 RX ADMIN — FENTANYL CITRATE 12.5 MCG: 50 INJECTION INTRAMUSCULAR; INTRAVENOUS at 09:24

## 2024-06-15 RX ADMIN — LIDOCAINE HYDROCHLORIDE 100 MG: 20 INJECTION, SOLUTION EPIDURAL; INFILTRATION; INTRACAUDAL; PERINEURAL at 13:31

## 2024-06-15 RX ADMIN — PROPOFOL 45 MCG/KG/MIN: 10 INJECTION, EMULSION INTRAVENOUS at 19:57

## 2024-06-15 RX ADMIN — SODIUM CHLORIDE, PRESERVATIVE FREE 40 MG: 5 INJECTION INTRAVENOUS at 21:45

## 2024-06-15 RX ADMIN — PROPOFOL 150 MG: 10 INJECTION, EMULSION INTRAVENOUS at 13:31

## 2024-06-15 RX ADMIN — IOPAMIDOL 100 ML: 755 INJECTION, SOLUTION INTRAVENOUS at 17:48

## 2024-06-15 RX ADMIN — WATER 40 MG: 1 INJECTION INTRAMUSCULAR; INTRAVENOUS; SUBCUTANEOUS at 08:32

## 2024-06-15 RX ADMIN — CEFAZOLIN 2 G: 1 INJECTION, POWDER, FOR SOLUTION INTRAMUSCULAR; INTRAVENOUS; PARENTERAL at 14:25

## 2024-06-15 RX ADMIN — Medication 5 MCG/MIN: at 15:22

## 2024-06-15 RX ADMIN — ARFORMOTEROL TARTRATE: 15 SOLUTION RESPIRATORY (INHALATION) at 20:44

## 2024-06-15 RX ADMIN — SODIUM CHLORIDE: 9 INJECTION, SOLUTION INTRAVENOUS at 15:19

## 2024-06-15 RX ADMIN — Medication 80 MCG: at 14:49

## 2024-06-15 RX ADMIN — DIPHENHYDRAMINE HYDROCHLORIDE 50 MG: 50 INJECTION INTRAMUSCULAR; INTRAVENOUS at 12:42

## 2024-06-15 RX ADMIN — ROCURONIUM BROMIDE 30 MG: 10 INJECTION INTRAVENOUS at 13:42

## 2024-06-15 RX ADMIN — CEFTRIAXONE SODIUM 2000 MG: 2 INJECTION, POWDER, FOR SOLUTION INTRAMUSCULAR; INTRAVENOUS at 02:11

## 2024-06-15 RX ADMIN — FENTANYL CITRATE 12.5 MCG: 50 INJECTION INTRAMUSCULAR; INTRAVENOUS at 01:29

## 2024-06-15 RX ADMIN — LIDOCAINE HYDROCHLORIDE 100 MG: 20 INJECTION, SOLUTION EPIDURAL; INFILTRATION; INTRACAUDAL; PERINEURAL at 14:43

## 2024-06-15 RX ADMIN — Medication 16 MCG: at 15:09

## 2024-06-15 RX ADMIN — Medication 40 MCG: at 14:47

## 2024-06-15 RX ADMIN — ROCURONIUM BROMIDE 50 MG: 10 INJECTION INTRAVENOUS at 16:05

## 2024-06-15 RX ADMIN — Medication 80 MCG: at 14:45

## 2024-06-15 RX ADMIN — WATER 40 MG: 1 INJECTION INTRAMUSCULAR; INTRAVENOUS; SUBCUTANEOUS at 12:43

## 2024-06-15 RX ADMIN — Medication 16 MCG: at 13:31

## 2024-06-15 RX ADMIN — SODIUM CHLORIDE, POTASSIUM CHLORIDE, SODIUM LACTATE AND CALCIUM CHLORIDE: 600; 310; 30; 20 INJECTION, SOLUTION INTRAVENOUS at 04:55

## 2024-06-15 RX ADMIN — POTASSIUM PHOSPHATE, MONOBASIC AND POTASSIUM PHOSPHATE, DIBASIC 30 MMOL: 224; 236 INJECTION, SOLUTION, CONCENTRATE INTRAVENOUS at 06:36

## 2024-06-15 RX ADMIN — SUCCINYLCHOLINE CHLORIDE 200 MG: 20 INJECTION, SOLUTION INTRAMUSCULAR; INTRAVENOUS at 13:31

## 2024-06-15 RX ADMIN — SODIUM CHLORIDE, PRESERVATIVE FREE 10 ML: 5 INJECTION INTRAVENOUS at 08:36

## 2024-06-15 RX ADMIN — ALBUMIN (HUMAN) 12.5 G: 0.25 INJECTION, SOLUTION INTRAVENOUS at 15:26

## 2024-06-15 RX ADMIN — SODIUM CHLORIDE, POTASSIUM CHLORIDE, SODIUM LACTATE AND CALCIUM CHLORIDE: 600; 310; 30; 20 INJECTION, SOLUTION INTRAVENOUS at 13:18

## 2024-06-15 RX ADMIN — Medication 16 MCG: at 15:13

## 2024-06-15 RX ADMIN — FENTANYL CITRATE 100 MCG: 50 INJECTION, SOLUTION INTRAMUSCULAR; INTRAVENOUS at 14:19

## 2024-06-15 ASSESSMENT — PAIN DESCRIPTION - LOCATION
LOCATION: ABDOMEN

## 2024-06-15 ASSESSMENT — PAIN - FUNCTIONAL ASSESSMENT
PAIN_FUNCTIONAL_ASSESSMENT: PREVENTS OR INTERFERES WITH MANY ACTIVE NOT PASSIVE ACTIVITIES
PAIN_FUNCTIONAL_ASSESSMENT: PREVENTS OR INTERFERES WITH MANY ACTIVE NOT PASSIVE ACTIVITIES

## 2024-06-15 ASSESSMENT — PAIN SCALES - GENERAL
PAINLEVEL_OUTOF10: 9
PAINLEVEL_OUTOF10: 0
PAINLEVEL_OUTOF10: 7
PAINLEVEL_OUTOF10: 7
PAINLEVEL_OUTOF10: 4
PAINLEVEL_OUTOF10: 0
PAINLEVEL_OUTOF10: 9
PAINLEVEL_OUTOF10: 5
PAINLEVEL_OUTOF10: 3

## 2024-06-15 ASSESSMENT — PAIN DESCRIPTION - DESCRIPTORS
DESCRIPTORS: ACHING

## 2024-06-15 ASSESSMENT — PAIN DESCRIPTION - ORIENTATION
ORIENTATION: ANTERIOR

## 2024-06-15 ASSESSMENT — PULMONARY FUNCTION TESTS
PIF_VALUE: 31

## 2024-06-15 NOTE — ANESTHESIA PROCEDURE NOTES
Arterial Line:    An arterial line was placed using surface landmarks, in the pre-op for the following indication(s): continuous blood pressure monitoring and blood sampling needed.    A 20 gauge (size) (length), Arrow (type) catheter was placed, Seldinger technique used, into the left radial artery, secured by Tegaderm.  Anesthesia type: Local  Local infiltration: Injection    Events:  patient tolerated procedure well with no complications.6/15/2024 12:45 PM6/15/2024 12:50 PM  Performed: Anesthesiologist   Preanesthetic Checklist  Completed: patient identified, IV checked, site marked, risks and benefits discussed, surgical/procedural consents, equipment checked, pre-op evaluation, timeout performed, anesthesia consent given and oxygen available           ams none

## 2024-06-15 NOTE — ANESTHESIA POSTPROCEDURE EVALUATION
Department of Anesthesiology  Postprocedure Note    Patient: Sveta Eduardo Jr.  MRN: [id number]  YOB: 1960  Date of evaluation: 6/15/2024    Procedure Summary       Date: 06/15/24 Room / Location: [facility] IR    Anesthesia Start: 1318 Anesthesia Stop: 1742    Procedure: IR TIPS INSERTION Diagnosis: (varices bleeding)    Scheduled Providers:  Responsible Provider: Deepak Garcia MD    Anesthesia Type: general ASA Status: 4            Anesthesia Type: No value filed.    Betty Phase I: Betty Score: 8    Betty Phase II:      Anesthesia Post Evaluation    Patient location during evaluation: ICU  Patient participation: complete - patient cannot participate  Level of consciousness: sedated and ventilated  Pain score: 0  Airway patency: patent  Nausea & Vomiting: no nausea and no vomiting  Cardiovascular status: hemodynamically stable  Respiratory status: intubated  Hydration status: stable  There was medical reason for not using a multimodal analgesia pain management approach.    No notable events documented.

## 2024-06-15 NOTE — PROCEDURES
PROCEDURE NOTE  Date: 6/15/2024   Name: Sveta Eduardo Jr.  YOB: 1960        Midline Insertion Procedure Note    Diagnosis: GI Bleed    Indications: Vasoactive infusions, Access    Performed by: Ferdinand OLMOS    Date of Procedure:    Assistant: NATALY    Procedure Details  Informed verbal consent was obtained for the procedure. from the patient     Time-Out Process performed    Under sterile conditions (hand hygiene prior to donning gloves, gown, mask, sterile drape), maximal barrier precautions (mask, hat, sterile gloves and gown for the person performing the procedure and full body drape on the patient) were utilized. Using Seldinger technique. A midline catheter was placed in the Right basilic vein, guide wire was removed intact. Catheter was aspirated for blood, flushed, and secured with suture. A CHG sterile dressing was applied to the insertion site.  Location confirmed via blood return and guidewire confirmation by ultrasound in the vessel. Guidewire removed intact. Patient tolerated procedure well. No complications.    Ultrasound Guidance:  Yes     Complications: None      [facility] - [facility]

## 2024-06-15 NOTE — ANESTHESIA PRE PROCEDURE
Department of Anesthesiology  Preprocedure Note       Name:  Sveta Eduardo Jr.   Age:  64 y.o.  :  1960                                          MRN:  [id number]         Date:  6/15/2024      Surgeon: * No surgeons listed *    Procedure: * No procedures listed *    Medications prior to admission:   Prior to Admission medications    Medication Sig Start Date End Date Taking? Authorizing Provider   albuterol sulfate HFA (VENTOLIN HFA) 108 (90 Base) MCG/ACT inhaler Inhale 2-3 puffs into the lungs every 4 hours as needed 3/17/18   Automatic Reconciliation, Ar   cloNIDine (CATAPRES) 0.1 MG tablet Take by mouth 3 times daily 20   Automatic Reconciliation, Ar   fluticasone furoate-vilanterol (BREO ELLIPTA) 100-25 MCG/ACT inhaler Inhale 1 puff into the lungs daily 3/18/18   Automatic Reconciliation, Ar   furosemide (LASIX) 40 MG tablet Take 40 mg po daily 3/17/18   Automatic Reconciliation, Ar   hydroCHLOROthiazide (HYDRODIURIL) 25 MG tablet Take by mouth daily 18   Automatic Reconciliation, Ar   hydrOXYzine pamoate (VISTARIL) 25 MG capsule TAKE ONE CAPSULE BY MOUTH FOUR TIMES A DAY AS NEEDED FOR ITCHING. 20   Automatic Reconciliation, Ar   morphine (MS CONTIN) 60 MG extended release tablet Take by mouth every 12 hours.    Automatic Reconciliation, Ar   omeprazole (PRILOSEC OTC) 20 MG tablet Take by mouth daily 5/10/17   Automatic Reconciliation, Ar   oxyCODONE-acetaminophen (PERCOCET)  MG per tablet Take by mouth.    Automatic Reconciliation, Ar   QUEtiapine (SEROQUEL) 50 MG tablet Take by mouth 3 times daily 20   Automatic Reconciliation, Ar   zolpidem (AMBIEN) 10 MG tablet Take by mouth. 20   Automatic Reconciliation, Ar       Current medications:    Current Facility-Administered Medications   Medication Dose Route Frequency Provider Last Rate Last Admin   • 0.9 % sodium chloride infusion   IntraVENous PRN Mally Hernandez, APRN - NP       • methylPREDNISolone sodium succ

## 2024-06-15 NOTE — ANESTHESIA PROCEDURE NOTES
Central Venous Line:    A central venous line was placed using surface landmarks, in the OR for the following indication(s): central venous access and CVP monitoring.6/15/2024 1:30 PM6/15/2024 1:35 PM    Sterility preparation included the following: provider used sterile gloves, gown, hat and mask, hand hygiene performed prior to central venous catheter insertion, sterile gel and probe cover used in ultrasound-guided central venous catheter insertion and maximum sterile barriers used during central venous catheter insertion.    The patient was placed in Trendelenburg position.The left internal jugular vein was prepped.    The site was prepped with Chloraprep.  A 8.5 Fr (size), 16 (length), quad lumen was placed.    During the procedure, the following specific steps were taken: target vein identified, needle advanced into vein and blood aspirated and guidewire advanced into vein.    During the procedure the patient experienced: patient tolerated procedure well with no complications.      Outcomes: uncomplicated  Real-time US image taken/store: Yes  Anesthesia type: general  Staffing  Performed by: Deepak Garcia MD  Authorized by: Deepak Garcia MD    Preanesthetic Checklist  Completed: patient identified, IV checked, site marked, risks and benefits discussed, surgical/procedural consents, equipment checked, pre-op evaluation, timeout performed, anesthesia consent given, oxygen available, monitors applied/VS acknowledged, fire risk safety assessment completed and verbalized and blood product R/B/A discussed and consented

## 2024-06-15 NOTE — PROCEDURES
Brief Postoperative Note    Sveta Eduardo Jr.  YOB: 1960  [id number]    Pre-operative Diagnosis: Upper GI bleeding     Post-operative Diagnosis: Portal vein thrombosis     Procedure: Aborted TIPS     Anesthesia: General    Surgeons/Assistants: Patricia Connors MD    Estimated Blood Loss: Minimal     Complications: None    Specimens: Was Not Obtained    Findings:   Indirect portal venogram failed to opacify a portal vein. Multiple blind transjugular intrahepatic needle passes into the mini hepatis failed to opacify a portal vein. Ultrasound showed portal vein occlusion. Direct access into the portal vein with a 22 gauge needle confirmed portal vein occlusion. The TIPS was aborted.     The patient was taken to CT, where multiphase CT showed occlusion of the splenic and portal veins.     Plan:  Very difficult to manage acute bleeding in the setting of portal vein thrombosis. Could attempt TIPS + portal vein recanalization Monday. If he continues to bleed, may need more urgent endoscopy over the weekend. Spoke with Dr. Larson about this possibility, and I appreciate his assistance.       Electronically signed by Patricia Connors MD on 6/15/2024 at 5:35 PM

## 2024-06-15 NOTE — SIGNIFICANT EVENT
Patient came back from IR after prolonged procedure. Discussed findings with Dr Patricia Connors of IR. Pt has portal vein thrombosis which prohibited TIPS and is probably the acute event that precipitated esophageal variceal bleeding. CTA of abd/pelvis was done which has confirmed this finding and also reportedly demonstrated superior mesenteric vein thrombosis. Dr Gomez has another procedure that can perhaps successfully re-cannulate the portal vein and permit possible TIPS but this cannot be performed until Monday 06/17. He was intubated for the procedure and remains intubated on return to the ICU. We will continue to support on the vent until the subsequent procedure can be done.    Upon return to the ICU, he is noted to be very bronchospastic with diffuse prolonged wheezes. Will initiate bronchodilators. Vent settings established. Low dose vasopressors to maintain MAP > 65 mmHg. Sedation orders placed. CXR and ABG ordered. Care plan reviewed with bedside RN    Additional CCM time: 40 mins. This time includes time spent reviewing database, interviewing and examining patient, discussing care plan on MDRs and communicating with other providers    Solitario Vilchis MD  [facility]  [facility] 4th floor [facility] phone: [phone]  [facility] 7th floor [facility] phone: [phone]  [facility] phone: [phone]  6/15/2024

## 2024-06-16 LAB
ALBUMIN SERPL-MCNC: 3.6 G/DL (ref 3.5–5)
ALBUMIN/GLOB SERPL: 1.9 (ref 1.1–2.2)
ALP SERPL-CCNC: 52 U/L (ref 45–117)
ALT SERPL-CCNC: 117 U/L (ref 12–78)
AMMONIA PLAS-SCNC: 111 UMOL/L
ANION GAP SERPL CALC-SCNC: 4 MMOL/L (ref 5–15)
APTT PPP: 29.7 SEC (ref 22.1–31)
AST SERPL-CCNC: 93 U/L (ref 15–37)
BILIRUB DIRECT SERPL-MCNC: 0.3 MG/DL (ref 0–0.2)
BILIRUB SERPL-MCNC: 0.7 MG/DL (ref 0.2–1)
BLD PROD TYP BPU: NORMAL
BLOOD BANK BLOOD PRODUCT EXPIRATION DATE: NORMAL
BLOOD BANK BLOOD PRODUCT EXPIRATION DATE: NORMAL
BLOOD BANK DISPENSE STATUS: NORMAL
BLOOD BANK ISBT PRODUCT BLOOD TYPE: 6200
BLOOD BANK ISBT PRODUCT BLOOD TYPE: 6200
BLOOD BANK PRODUCT CODE: NORMAL
BLOOD BANK PRODUCT CODE: NORMAL
BLOOD BANK UNIT TYPE AND RH: NORMAL
BLOOD BANK UNIT TYPE AND RH: NORMAL
BPU ID: NORMAL
BUN SERPL-MCNC: 34 MG/DL (ref 6–20)
BUN/CREAT SERPL: 40 (ref 12–20)
CALCIUM SERPL-MCNC: 8.3 MG/DL (ref 8.5–10.1)
CHLORIDE SERPL-SCNC: 118 MMOL/L (ref 97–108)
CO2 SERPL-SCNC: 25 MMOL/L (ref 21–32)
CREAT SERPL-MCNC: 0.86 MG/DL (ref 0.7–1.3)
ERYTHROCYTE [DISTWIDTH] IN BLOOD BY AUTOMATED COUNT: 15.3 % (ref 11.5–14.5)
ERYTHROCYTE [DISTWIDTH] IN BLOOD BY AUTOMATED COUNT: 15.5 % (ref 11.5–14.5)
ERYTHROCYTE [DISTWIDTH] IN BLOOD BY AUTOMATED COUNT: 15.7 % (ref 11.5–14.5)
FIBRINOGEN PPP-MCNC: 164 MG/DL (ref 200–475)
GLOBULIN SER CALC-MCNC: 1.9 G/DL (ref 2–4)
GLUCOSE BLD STRIP.AUTO-MCNC: 147 MG/DL (ref 65–117)
GLUCOSE BLD STRIP.AUTO-MCNC: 147 MG/DL (ref 65–117)
GLUCOSE BLD STRIP.AUTO-MCNC: 165 MG/DL (ref 65–117)
GLUCOSE BLD STRIP.AUTO-MCNC: 182 MG/DL (ref 65–117)
GLUCOSE SERPL-MCNC: 173 MG/DL (ref 65–100)
HCT VFR BLD AUTO: 24.2 % (ref 36.6–50.3)
HCT VFR BLD AUTO: 24.4 % (ref 36.6–50.3)
HCT VFR BLD AUTO: 27.2 % (ref 36.6–50.3)
HGB BLD-MCNC: 7.6 G/DL (ref 12.1–17)
HGB BLD-MCNC: 7.7 G/DL (ref 12.1–17)
HGB BLD-MCNC: 8.3 G/DL (ref 12.1–17)
INR PPP: 1.4 (ref 0.9–1.1)
MAGNESIUM SERPL-MCNC: 2.5 MG/DL (ref 1.6–2.4)
MCH RBC QN AUTO: 30 PG (ref 26–34)
MCH RBC QN AUTO: 30.2 PG (ref 26–34)
MCH RBC QN AUTO: 30.2 PG (ref 26–34)
MCHC RBC AUTO-ENTMCNC: 30.5 G/DL (ref 30–36.5)
MCHC RBC AUTO-ENTMCNC: 31.4 G/DL (ref 30–36.5)
MCHC RBC AUTO-ENTMCNC: 31.6 G/DL (ref 30–36.5)
MCV RBC AUTO: 95.7 FL (ref 80–99)
MCV RBC AUTO: 96 FL (ref 80–99)
MCV RBC AUTO: 98.2 FL (ref 80–99)
NRBC # BLD: 0.02 K/UL (ref 0–0.01)
NRBC # BLD: 0.02 K/UL (ref 0–0.01)
NRBC # BLD: 0.03 K/UL (ref 0–0.01)
NRBC BLD-RTO: 0.3 PER 100 WBC
NRBC BLD-RTO: 0.3 PER 100 WBC
NRBC BLD-RTO: 0.4 PER 100 WBC
PHOSPHATE SERPL-MCNC: 2.3 MG/DL (ref 2.6–4.7)
PLATELET # BLD AUTO: 65 K/UL (ref 150–400)
PLATELET # BLD AUTO: 67 K/UL (ref 150–400)
PLATELET # BLD AUTO: 78 K/UL (ref 150–400)
PMV BLD AUTO: 10.8 FL (ref 8.9–12.9)
PMV BLD AUTO: 11.3 FL (ref 8.9–12.9)
PMV BLD AUTO: 11.3 FL (ref 8.9–12.9)
POTASSIUM SERPL-SCNC: 4 MMOL/L (ref 3.5–5.1)
PROT SERPL-MCNC: 5.5 G/DL (ref 6.4–8.2)
PROTHROMBIN TIME: 14.5 SEC (ref 9–11.1)
RBC # BLD AUTO: 2.52 M/UL (ref 4.1–5.7)
RBC # BLD AUTO: 2.55 M/UL (ref 4.1–5.7)
RBC # BLD AUTO: 2.77 M/UL (ref 4.1–5.7)
SERVICE CMNT-IMP: ABNORMAL
SODIUM SERPL-SCNC: 147 MMOL/L (ref 136–145)
THERAPEUTIC RANGE: NORMAL SECS (ref 58–77)
UNIT DIVISION: 0
UNIT ISSUE DATE/TIME: NORMAL
UNIT ISSUE DATE/TIME: NORMAL
WBC # BLD AUTO: 5.9 K/UL (ref 4.1–11.1)
WBC # BLD AUTO: 5.9 K/UL (ref 4.1–11.1)
WBC # BLD AUTO: 7.6 K/UL (ref 4.1–11.1)

## 2024-06-16 PROCEDURE — 85610 PROTHROMBIN TIME: CPT

## 2024-06-16 PROCEDURE — 6370000000 HC RX 637 (ALT 250 FOR IP): Performed by: NURSE PRACTITIONER

## 2024-06-16 PROCEDURE — 2580000003 HC RX 258: Performed by: EMERGENCY MEDICINE

## 2024-06-16 PROCEDURE — 30233N1 TRANSFUSION OF NONAUTOLOGOUS RED BLOOD CELLS INTO PERIPHERAL VEIN, PERCUTANEOUS APPROACH: ICD-10-PCS | Performed by: INTERNAL MEDICINE

## 2024-06-16 PROCEDURE — 36415 COLL VENOUS BLD VENIPUNCTURE: CPT

## 2024-06-16 PROCEDURE — 94640 AIRWAY INHALATION TREATMENT: CPT

## 2024-06-16 PROCEDURE — 80048 BASIC METABOLIC PNL TOTAL CA: CPT

## 2024-06-16 PROCEDURE — 2580000003 HC RX 258: Performed by: INTERNAL MEDICINE

## 2024-06-16 PROCEDURE — 84100 ASSAY OF PHOSPHORUS: CPT

## 2024-06-16 PROCEDURE — 94003 VENT MGMT INPAT SUBQ DAY: CPT

## 2024-06-16 PROCEDURE — 6370000000 HC RX 637 (ALT 250 FOR IP): Performed by: INTERNAL MEDICINE

## 2024-06-16 PROCEDURE — 85730 THROMBOPLASTIN TIME PARTIAL: CPT

## 2024-06-16 PROCEDURE — 6360000002 HC RX W HCPCS: Performed by: INTERNAL MEDICINE

## 2024-06-16 PROCEDURE — 80076 HEPATIC FUNCTION PANEL: CPT

## 2024-06-16 PROCEDURE — 30233R1 TRANSFUSION OF NONAUTOLOGOUS PLATELETS INTO PERIPHERAL VEIN, PERCUTANEOUS APPROACH: ICD-10-PCS | Performed by: INTERNAL MEDICINE

## 2024-06-16 PROCEDURE — 2000000000 HC ICU R&B

## 2024-06-16 PROCEDURE — C9113 INJ PANTOPRAZOLE SODIUM, VIA: HCPCS | Performed by: INTERNAL MEDICINE

## 2024-06-16 PROCEDURE — 82140 ASSAY OF AMMONIA: CPT

## 2024-06-16 PROCEDURE — 82962 GLUCOSE BLOOD TEST: CPT

## 2024-06-16 PROCEDURE — 6360000002 HC RX W HCPCS: Performed by: EMERGENCY MEDICINE

## 2024-06-16 PROCEDURE — 6360000002 HC RX W HCPCS: Performed by: PHYSICIAN ASSISTANT

## 2024-06-16 PROCEDURE — 83735 ASSAY OF MAGNESIUM: CPT

## 2024-06-16 PROCEDURE — 85384 FIBRINOGEN ACTIVITY: CPT

## 2024-06-16 PROCEDURE — 2580000003 HC RX 258: Performed by: PHYSICIAN ASSISTANT

## 2024-06-16 PROCEDURE — 85027 COMPLETE CBC AUTOMATED: CPT

## 2024-06-16 RX ORDER — CASTOR OIL AND BALSAM, PERU 788; 87 MG/G; MG/G
OINTMENT TOPICAL 3 TIMES DAILY
Status: DISCONTINUED | OUTPATIENT
Start: 2024-06-16 | End: 2024-06-25 | Stop reason: HOSPADM

## 2024-06-16 RX ORDER — CHLORHEXIDINE GLUCONATE ORAL RINSE 1.2 MG/ML
15 SOLUTION DENTAL 2 TIMES DAILY
Status: DISCONTINUED | OUTPATIENT
Start: 2024-06-16 | End: 2024-06-24

## 2024-06-16 RX ORDER — DEXTROSE MONOHYDRATE 50 MG/ML
INJECTION, SOLUTION INTRAVENOUS CONTINUOUS
Status: DISCONTINUED | OUTPATIENT
Start: 2024-06-16 | End: 2024-06-18

## 2024-06-16 RX ADMIN — FENTANYL CITRATE 150 MCG/HR: at 03:19

## 2024-06-16 RX ADMIN — SODIUM CHLORIDE: 9 INJECTION, SOLUTION INTRAVENOUS at 01:00

## 2024-06-16 RX ADMIN — ARFORMOTEROL TARTRATE: 15 SOLUTION RESPIRATORY (INHALATION) at 08:28

## 2024-06-16 RX ADMIN — FENTANYL CITRATE 175 MCG/HR: at 08:53

## 2024-06-16 RX ADMIN — DEXTROSE MONOHYDRATE: 50 INJECTION, SOLUTION INTRAVENOUS at 07:58

## 2024-06-16 RX ADMIN — OCTREOTIDE ACETATE 50 MCG/HR: 500 INJECTION, SOLUTION INTRAVENOUS; SUBCUTANEOUS at 00:55

## 2024-06-16 RX ADMIN — FENTANYL CITRATE 25 MCG: 50 INJECTION INTRAMUSCULAR; INTRAVENOUS at 22:13

## 2024-06-16 RX ADMIN — SODIUM CHLORIDE, PRESERVATIVE FREE 10 ML: 5 INJECTION INTRAVENOUS at 07:58

## 2024-06-16 RX ADMIN — FENTANYL CITRATE 200 MCG/HR: at 23:53

## 2024-06-16 RX ADMIN — OCTREOTIDE ACETATE 50 MCG/HR: 500 INJECTION, SOLUTION INTRAVENOUS; SUBCUTANEOUS at 11:02

## 2024-06-16 RX ADMIN — OCTREOTIDE ACETATE 50 MCG/HR: 500 INJECTION, SOLUTION INTRAVENOUS; SUBCUTANEOUS at 22:06

## 2024-06-16 RX ADMIN — Medication 1 AMPULE: at 23:53

## 2024-06-16 RX ADMIN — ARFORMOTEROL TARTRATE: 15 SOLUTION RESPIRATORY (INHALATION) at 21:00

## 2024-06-16 RX ADMIN — PROPOFOL 45 MCG/KG/MIN: 10 INJECTION, EMULSION INTRAVENOUS at 23:19

## 2024-06-16 RX ADMIN — FENTANYL CITRATE 175 MCG/HR: at 14:25

## 2024-06-16 RX ADMIN — CEFTRIAXONE SODIUM 2000 MG: 2 INJECTION, POWDER, FOR SOLUTION INTRAMUSCULAR; INTRAVENOUS at 01:01

## 2024-06-16 RX ADMIN — SODIUM CHLORIDE, PRESERVATIVE FREE 40 MG: 5 INJECTION INTRAVENOUS at 22:00

## 2024-06-16 RX ADMIN — PROPOFOL 15 MCG/KG/MIN: 10 INJECTION, EMULSION INTRAVENOUS at 00:14

## 2024-06-16 RX ADMIN — SODIUM CHLORIDE, PRESERVATIVE FREE 40 MG: 5 INJECTION INTRAVENOUS at 08:36

## 2024-06-16 RX ADMIN — FENTANYL CITRATE 175 MCG/HR: at 19:22

## 2024-06-16 RX ADMIN — SODIUM CHLORIDE, PRESERVATIVE FREE 40 ML: 5 INJECTION INTRAVENOUS at 20:00

## 2024-06-16 RX ADMIN — CHLORHEXIDINE GLUCONATE 15 ML: 1.2 RINSE ORAL at 23:54

## 2024-06-16 RX ADMIN — PROPOFOL 15 MCG/KG/MIN: 10 INJECTION, EMULSION INTRAVENOUS at 17:18

## 2024-06-16 RX ADMIN — PROPOFOL 15 MCG/KG/MIN: 10 INJECTION, EMULSION INTRAVENOUS at 08:59

## 2024-06-16 RX ADMIN — Medication: at 23:26

## 2024-06-16 RX ADMIN — IPRATROPIUM BROMIDE AND ALBUTEROL SULFATE 1 DOSE: .5; 3 SOLUTION RESPIRATORY (INHALATION) at 21:00

## 2024-06-16 ASSESSMENT — PULMONARY FUNCTION TESTS
PIF_VALUE: 40
PIF_VALUE: 26
PIF_VALUE: 28
PIF_VALUE: 23

## 2024-06-17 ENCOUNTER — APPOINTMENT (OUTPATIENT)
Facility: HOSPITAL | Age: 64
DRG: 432 | End: 2024-06-17
Payer: MEDICARE

## 2024-06-17 LAB
ABO + RH BLD: NORMAL
ALBUMIN SERPL-MCNC: 3.3 G/DL (ref 3.5–5)
ALBUMIN/GLOB SERPL: 1.7 (ref 1.1–2.2)
ALP SERPL-CCNC: 52 U/L (ref 45–117)
ALT SERPL-CCNC: 87 U/L (ref 12–78)
AMMONIA PLAS-SCNC: 77 UMOL/L
ANION GAP SERPL CALC-SCNC: 4 MMOL/L (ref 5–15)
APTT PPP: 25.3 SEC (ref 22.1–31)
AST SERPL-CCNC: 45 U/L (ref 15–37)
BASE EXCESS BLD CALC-SCNC: 0.1 MMOL/L
BILIRUB DIRECT SERPL-MCNC: 0.3 MG/DL (ref 0–0.2)
BILIRUB SERPL-MCNC: 0.6 MG/DL (ref 0.2–1)
BLD PROD TYP BPU: NORMAL
BLOOD BANK BLOOD PRODUCT EXPIRATION DATE: NORMAL
BLOOD BANK DISPENSE STATUS: NORMAL
BLOOD BANK ISBT PRODUCT BLOOD TYPE: 6200
BLOOD BANK PRODUCT CODE: NORMAL
BLOOD BANK UNIT TYPE AND RH: NORMAL
BLOOD GROUP ANTIBODIES SERPL: NORMAL
BPU ID: NORMAL
BUN SERPL-MCNC: 37 MG/DL (ref 6–20)
BUN/CREAT SERPL: 42 (ref 12–20)
CA-I BLD-SCNC: 1.22 MMOL/L (ref 1.12–1.32)
CALCIUM SERPL-MCNC: 8.3 MG/DL (ref 8.5–10.1)
CHLORIDE SERPL-SCNC: 117 MMOL/L (ref 97–108)
CO2 SERPL-SCNC: 26 MMOL/L (ref 21–32)
CREAT SERPL-MCNC: 0.88 MG/DL (ref 0.7–1.3)
CROSSMATCH RESULT: NORMAL
ERYTHROCYTE [DISTWIDTH] IN BLOOD BY AUTOMATED COUNT: 15.9 % (ref 11.5–14.5)
ERYTHROCYTE [DISTWIDTH] IN BLOOD BY AUTOMATED COUNT: 16 % (ref 11.5–14.5)
ERYTHROCYTE [DISTWIDTH] IN BLOOD BY AUTOMATED COUNT: 16.3 % (ref 11.5–14.5)
FIBRINOGEN PPP-MCNC: 116 MG/DL (ref 200–475)
GAS FLOW.O2 SETTING OXYMISER: 16 BPM
GLOBULIN SER CALC-MCNC: 2 G/DL (ref 2–4)
GLUCOSE BLD STRIP.AUTO-MCNC: 127 MG/DL (ref 65–117)
GLUCOSE BLD STRIP.AUTO-MCNC: 138 MG/DL (ref 65–117)
GLUCOSE BLD STRIP.AUTO-MCNC: 140 MG/DL (ref 65–117)
GLUCOSE BLD STRIP.AUTO-MCNC: 157 MG/DL (ref 65–117)
GLUCOSE SERPL-MCNC: 150 MG/DL (ref 65–100)
HCO3 BLD-SCNC: 26.4 MMOL/L (ref 22–26)
HCT VFR BLD AUTO: 24.2 % (ref 36.6–50.3)
HCT VFR BLD AUTO: 25.5 % (ref 36.6–50.3)
HCT VFR BLD AUTO: 29.9 % (ref 36.6–50.3)
HGB BLD-MCNC: 7.5 G/DL (ref 12.1–17)
HGB BLD-MCNC: 8.1 G/DL (ref 12.1–17)
HGB BLD-MCNC: 9.2 G/DL (ref 12.1–17)
INR PPP: 1.3 (ref 0.9–1.1)
MAGNESIUM SERPL-MCNC: 2.8 MG/DL (ref 1.6–2.4)
MCH RBC QN AUTO: 30.3 PG (ref 26–34)
MCH RBC QN AUTO: 30.5 PG (ref 26–34)
MCH RBC QN AUTO: 31 PG (ref 26–34)
MCHC RBC AUTO-ENTMCNC: 30.8 G/DL (ref 30–36.5)
MCHC RBC AUTO-ENTMCNC: 31 G/DL (ref 30–36.5)
MCHC RBC AUTO-ENTMCNC: 31.8 G/DL (ref 30–36.5)
MCV RBC AUTO: 97.7 FL (ref 80–99)
MCV RBC AUTO: 98.4 FL (ref 80–99)
MCV RBC AUTO: 98.4 FL (ref 80–99)
NRBC # BLD: 0.03 K/UL (ref 0–0.01)
NRBC # BLD: 0.04 K/UL (ref 0–0.01)
NRBC # BLD: 0.06 K/UL (ref 0–0.01)
NRBC BLD-RTO: 0.4 PER 100 WBC
NRBC BLD-RTO: 0.5 PER 100 WBC
NRBC BLD-RTO: 0.9 PER 100 WBC
NT PRO BNP: 1259 PG/ML
O2/TOTAL GAS SETTING VFR VENT: 80 %
PCO2 BLD: 49.8 MMHG (ref 35–45)
PEEP RESPIRATORY: 10 CMH2O
PH BLD: 7.33 (ref 7.35–7.45)
PHOSPHATE SERPL-MCNC: 2.3 MG/DL (ref 2.6–4.7)
PLATELET # BLD AUTO: 106 K/UL (ref 150–400)
PLATELET # BLD AUTO: 71 K/UL (ref 150–400)
PLATELET # BLD AUTO: 75 K/UL (ref 150–400)
PMV BLD AUTO: 11.1 FL (ref 8.9–12.9)
PMV BLD AUTO: 11.2 FL (ref 8.9–12.9)
PMV BLD AUTO: 11.3 FL (ref 8.9–12.9)
PO2 BLD: 59 MMHG (ref 80–100)
POTASSIUM SERPL-SCNC: 3.8 MMOL/L (ref 3.5–5.1)
PROT SERPL-MCNC: 5.3 G/DL (ref 6.4–8.2)
PROTHROMBIN TIME: 13.5 SEC (ref 9–11.1)
RBC # BLD AUTO: 2.46 M/UL (ref 4.1–5.7)
RBC # BLD AUTO: 2.61 M/UL (ref 4.1–5.7)
RBC # BLD AUTO: 3.04 M/UL (ref 4.1–5.7)
SAO2 % BLD: 87.9 % (ref 92–97)
SERVICE CMNT-IMP: ABNORMAL
SODIUM SERPL-SCNC: 147 MMOL/L (ref 136–145)
SPECIMEN EXP DATE BLD: NORMAL
SPECIMEN TYPE: ABNORMAL
THERAPEUTIC RANGE: NORMAL SECS (ref 58–77)
UNIT DIVISION: 0
UNIT ISSUE DATE/TIME: NORMAL
VENTILATION MODE VENT: ABNORMAL
VT SETTING VENT: 450 ML
WBC # BLD AUTO: 14.6 K/UL (ref 4.1–11.1)
WBC # BLD AUTO: 4.7 K/UL (ref 4.1–11.1)
WBC # BLD AUTO: 6.2 K/UL (ref 4.1–11.1)

## 2024-06-17 PROCEDURE — 83735 ASSAY OF MAGNESIUM: CPT

## 2024-06-17 PROCEDURE — 71045 X-RAY EXAM CHEST 1 VIEW: CPT

## 2024-06-17 PROCEDURE — 6370000000 HC RX 637 (ALT 250 FOR IP): Performed by: NURSE PRACTITIONER

## 2024-06-17 PROCEDURE — 82962 GLUCOSE BLOOD TEST: CPT

## 2024-06-17 PROCEDURE — 85027 COMPLETE CBC AUTOMATED: CPT

## 2024-06-17 PROCEDURE — 2500000003 HC RX 250 WO HCPCS: Performed by: INTERNAL MEDICINE

## 2024-06-17 PROCEDURE — 82140 ASSAY OF AMMONIA: CPT

## 2024-06-17 PROCEDURE — 85384 FIBRINOGEN ACTIVITY: CPT

## 2024-06-17 PROCEDURE — 2700000000 HC OXYGEN THERAPY PER DAY

## 2024-06-17 PROCEDURE — 6360000002 HC RX W HCPCS: Performed by: INTERNAL MEDICINE

## 2024-06-17 PROCEDURE — 85610 PROTHROMBIN TIME: CPT

## 2024-06-17 PROCEDURE — 2580000003 HC RX 258: Performed by: INTERNAL MEDICINE

## 2024-06-17 PROCEDURE — 94640 AIRWAY INHALATION TREATMENT: CPT

## 2024-06-17 PROCEDURE — 2580000003 HC RX 258: Performed by: EMERGENCY MEDICINE

## 2024-06-17 PROCEDURE — 83880 ASSAY OF NATRIURETIC PEPTIDE: CPT

## 2024-06-17 PROCEDURE — 84100 ASSAY OF PHOSPHORUS: CPT

## 2024-06-17 PROCEDURE — 85730 THROMBOPLASTIN TIME PARTIAL: CPT

## 2024-06-17 PROCEDURE — 80048 BASIC METABOLIC PNL TOTAL CA: CPT

## 2024-06-17 PROCEDURE — 2500000003 HC RX 250 WO HCPCS: Performed by: NURSE PRACTITIONER

## 2024-06-17 PROCEDURE — 2580000003 HC RX 258: Performed by: PHYSICIAN ASSISTANT

## 2024-06-17 PROCEDURE — 6360000002 HC RX W HCPCS: Performed by: EMERGENCY MEDICINE

## 2024-06-17 PROCEDURE — 36415 COLL VENOUS BLD VENIPUNCTURE: CPT

## 2024-06-17 PROCEDURE — 80076 HEPATIC FUNCTION PANEL: CPT

## 2024-06-17 PROCEDURE — 6360000002 HC RX W HCPCS: Performed by: NURSE PRACTITIONER

## 2024-06-17 PROCEDURE — C9113 INJ PANTOPRAZOLE SODIUM, VIA: HCPCS | Performed by: INTERNAL MEDICINE

## 2024-06-17 PROCEDURE — 6360000002 HC RX W HCPCS: Performed by: PHYSICIAN ASSISTANT

## 2024-06-17 PROCEDURE — 2000000000 HC ICU R&B

## 2024-06-17 PROCEDURE — 6370000000 HC RX 637 (ALT 250 FOR IP): Performed by: INTERNAL MEDICINE

## 2024-06-17 PROCEDURE — 82803 BLOOD GASES ANY COMBINATION: CPT

## 2024-06-17 PROCEDURE — 2580000003 HC RX 258: Performed by: NURSE PRACTITIONER

## 2024-06-17 PROCEDURE — 94003 VENT MGMT INPAT SUBQ DAY: CPT

## 2024-06-17 RX ORDER — FENTANYL CITRATE-0.9 % NACL/PF 10 MCG/ML
25-200 PLASTIC BAG, INJECTION (ML) INTRAVENOUS CONTINUOUS
Status: DISCONTINUED | OUTPATIENT
Start: 2024-06-17 | End: 2024-06-18

## 2024-06-17 RX ORDER — FENTANYL CITRATE-0.9 % NACL/PF 20 MCG/2ML
25 SYRINGE (ML) INTRAVENOUS
Status: DISCONTINUED | OUTPATIENT
Start: 2024-06-17 | End: 2024-06-18

## 2024-06-17 RX ORDER — DEXMEDETOMIDINE HYDROCHLORIDE 4 UG/ML
.1-.5 INJECTION, SOLUTION INTRAVENOUS CONTINUOUS
Status: DISCONTINUED | OUTPATIENT
Start: 2024-06-17 | End: 2024-06-17

## 2024-06-17 RX ORDER — LORAZEPAM 2 MG/ML
2 INJECTION INTRAMUSCULAR ONCE
Status: DISCONTINUED | OUTPATIENT
Start: 2024-06-17 | End: 2024-06-17

## 2024-06-17 RX ORDER — FENTANYL CITRATE-0.9 % NACL/PF 10 MCG/ML
25-200 PLASTIC BAG, INJECTION (ML) INTRAVENOUS CONTINUOUS
Status: DISCONTINUED | OUTPATIENT
Start: 2024-06-17 | End: 2024-06-17

## 2024-06-17 RX ORDER — FENTANYL CITRATE-0.9 % NACL/PF 20 MCG/2ML
50 SYRINGE (ML) INTRAVENOUS EVERY 30 MIN PRN
Status: DISCONTINUED | OUTPATIENT
Start: 2024-06-17 | End: 2024-06-17

## 2024-06-17 RX ORDER — FUROSEMIDE 10 MG/ML
40 INJECTION INTRAMUSCULAR; INTRAVENOUS ONCE
Status: COMPLETED | OUTPATIENT
Start: 2024-06-17 | End: 2024-06-17

## 2024-06-17 RX ORDER — NOREPINEPHRINE BITARTRATE 0.06 MG/ML
0-40 INJECTION, SOLUTION INTRAVENOUS CONTINUOUS
Status: DISCONTINUED | OUTPATIENT
Start: 2024-06-17 | End: 2024-06-20

## 2024-06-17 RX ORDER — FENTANYL CITRATE-0.9 % NACL/PF 20 MCG/2ML
25 SYRINGE (ML) INTRAVENOUS
Status: DISCONTINUED | OUTPATIENT
Start: 2024-06-17 | End: 2024-06-17

## 2024-06-17 RX ORDER — DEXMEDETOMIDINE HYDROCHLORIDE 4 UG/ML
.1-.5 INJECTION, SOLUTION INTRAVENOUS CONTINUOUS
Status: DISCONTINUED | OUTPATIENT
Start: 2024-06-17 | End: 2024-06-18

## 2024-06-17 RX ORDER — POTASSIUM CHLORIDE 29.8 MG/ML
20 INJECTION INTRAVENOUS
Status: COMPLETED | OUTPATIENT
Start: 2024-06-17 | End: 2024-06-17

## 2024-06-17 RX ADMIN — CHLORHEXIDINE GLUCONATE 15 ML: 1.2 RINSE ORAL at 09:31

## 2024-06-17 RX ADMIN — Medication: at 19:24

## 2024-06-17 RX ADMIN — Medication 25 MCG: at 06:45

## 2024-06-17 RX ADMIN — SODIUM CHLORIDE, PRESERVATIVE FREE 40 MG: 5 INJECTION INTRAVENOUS at 09:30

## 2024-06-17 RX ADMIN — FENTANYL CITRATE 25 MCG: 50 INJECTION INTRAMUSCULAR; INTRAVENOUS at 00:02

## 2024-06-17 RX ADMIN — FENTANYL CITRATE 200 MCG/HR: at 09:19

## 2024-06-17 RX ADMIN — Medication 25 MCG: at 14:20

## 2024-06-17 RX ADMIN — PROPOFOL 15 MCG/KG/MIN: 10 INJECTION, EMULSION INTRAVENOUS at 02:20

## 2024-06-17 RX ADMIN — FENTANYL CITRATE 175 MCG/HR: at 15:58

## 2024-06-17 RX ADMIN — FENTANYL CITRATE 150 MCG/HR: at 19:21

## 2024-06-17 RX ADMIN — DEXTROSE MONOHYDRATE: 50 INJECTION, SOLUTION INTRAVENOUS at 20:03

## 2024-06-17 RX ADMIN — OCTREOTIDE ACETATE 50 MCG/HR: 500 INJECTION, SOLUTION INTRAVENOUS; SUBCUTANEOUS at 19:24

## 2024-06-17 RX ADMIN — POTASSIUM CHLORIDE 20 MEQ: 29.8 INJECTION, SOLUTION INTRAVENOUS at 13:25

## 2024-06-17 RX ADMIN — PROPOFOL 35 MCG/KG/MIN: 10 INJECTION, EMULSION INTRAVENOUS at 15:56

## 2024-06-17 RX ADMIN — DEXTROSE MONOHYDRATE: 50 INJECTION, SOLUTION INTRAVENOUS at 04:15

## 2024-06-17 RX ADMIN — Medication 1 AMPULE: at 09:30

## 2024-06-17 RX ADMIN — PROPOFOL 40 MCG/KG/MIN: 10 INJECTION, EMULSION INTRAVENOUS at 12:15

## 2024-06-17 RX ADMIN — CEFTRIAXONE SODIUM 2000 MG: 2 INJECTION, POWDER, FOR SOLUTION INTRAMUSCULAR; INTRAVENOUS at 00:57

## 2024-06-17 RX ADMIN — CHLORHEXIDINE GLUCONATE 15 ML: 1.2 RINSE ORAL at 19:24

## 2024-06-17 RX ADMIN — POTASSIUM CHLORIDE 20 MEQ: 29.8 INJECTION, SOLUTION INTRAVENOUS at 14:43

## 2024-06-17 RX ADMIN — ARFORMOTEROL TARTRATE: 15 SOLUTION RESPIRATORY (INHALATION) at 19:15

## 2024-06-17 RX ADMIN — PROPOFOL 40 MCG/KG/MIN: 10 INJECTION, EMULSION INTRAVENOUS at 09:17

## 2024-06-17 RX ADMIN — Medication: at 13:26

## 2024-06-17 RX ADMIN — FUROSEMIDE 40 MG: 10 INJECTION, SOLUTION INTRAMUSCULAR; INTRAVENOUS at 13:22

## 2024-06-17 RX ADMIN — Medication 5 MCG/MIN: at 08:10

## 2024-06-17 RX ADMIN — DEXMEDETOMIDINE 0.2 MCG/KG/HR: 200 INJECTION, SOLUTION INTRAVENOUS at 01:03

## 2024-06-17 RX ADMIN — FENTANYL CITRATE 175 MCG/HR: at 05:07

## 2024-06-17 RX ADMIN — SODIUM CHLORIDE, PRESERVATIVE FREE 10 ML: 5 INJECTION INTRAVENOUS at 19:25

## 2024-06-17 RX ADMIN — SODIUM CHLORIDE, PRESERVATIVE FREE 10 ML: 5 INJECTION INTRAVENOUS at 09:30

## 2024-06-17 RX ADMIN — IPRATROPIUM BROMIDE AND ALBUTEROL SULFATE 1 DOSE: .5; 3 SOLUTION RESPIRATORY (INHALATION) at 08:24

## 2024-06-17 RX ADMIN — Medication 1000 ML: at 14:06

## 2024-06-17 RX ADMIN — SODIUM CHLORIDE: 9 INJECTION, SOLUTION INTRAVENOUS at 04:13

## 2024-06-17 RX ADMIN — Medication: at 09:30

## 2024-06-17 RX ADMIN — FENTANYL CITRATE 200 MCG/HR: at 13:34

## 2024-06-17 RX ADMIN — ARFORMOTEROL TARTRATE: 15 SOLUTION RESPIRATORY (INHALATION) at 08:29

## 2024-06-17 RX ADMIN — PROPOFOL 50 MCG/KG/MIN: 10 INJECTION, EMULSION INTRAVENOUS at 20:00

## 2024-06-17 RX ADMIN — Medication 25 MCG: at 16:50

## 2024-06-17 RX ADMIN — Medication 1 AMPULE: at 19:44

## 2024-06-17 RX ADMIN — Medication 5 MCG/MIN: at 19:55

## 2024-06-17 RX ADMIN — OCTREOTIDE ACETATE 50 MCG/HR: 500 INJECTION, SOLUTION INTRAVENOUS; SUBCUTANEOUS at 09:17

## 2024-06-17 RX ADMIN — SODIUM CHLORIDE, PRESERVATIVE FREE 40 MG: 5 INJECTION INTRAVENOUS at 19:43

## 2024-06-17 ASSESSMENT — PAIN SCALES - GENERAL
PAINLEVEL_OUTOF10: 3
PAINLEVEL_OUTOF10: 3
PAINLEVEL_OUTOF10: 0

## 2024-06-17 ASSESSMENT — PULMONARY FUNCTION TESTS
PIF_VALUE: 24
PIF_VALUE: 23
PIF_VALUE: 23
PIF_VALUE: 25
PIF_VALUE: 23
PIF_VALUE: 24

## 2024-06-17 NOTE — CONSULTS
See note by Patricia Connors MD.  
Complaint: hematemesis     History of Present Illness: hematemesis     Sveta Eduardo is a 65 y/o male with hx of COPD, anxiety/depression, GERD and chronic Hepatitis C cirrhosis (risk factors IVDU and tattoos - reports of people getting HCV from same  in 1970s). He completed 12-week tx with [facility] in 2016. He was being followed by Dr. Levy and his team at [facility] but has not been seen by them since 2017. Had colonoscopy done with [facility] but never seen for office visit. Last EGD was 4/2016 with Dr. Levy: portal gastropathy, no varices; 2 year recall recommended.Last colonoscopy was 4/18/2017 by Dr. Claros.     He reports that he started having n/v/d that started last night. He had an episode of hematemesis, vomited 1/2 pint of bright red blood. He also had several episodes of diarrhea, states that stools were dark-appearing. He also c/o abdominal pain. States he's felt feverish. Denies EtOH use, NSAID use. Not taking any blood thinners.  Mild diffuse abdominal TTP, No asterix on exam. A&O x3. States he has not eaten anything in the past day and a half.     Labs: WBC 33.8 Hgb 11.6  BUN 55 Cr 1.56 eGFR 49 Lactic acid 5.71 ALT 58 AST 77 ALP 88 Albumin 3.1 Tbili 1.7 +FOBT     Recent Labs     06/13/24  0740   WBC 33.8*   HGB 11.6*   HCT 36.7   MCV 93.9        Lab Results   Component Value Date     06/13/2024    K 4.7 06/13/2024     06/13/2024    CO2 24 06/13/2024    BUN 55 (H) 06/13/2024    CREATININE 1.4 (H) 06/13/2024    GLUCOSE 122 (H) 06/13/2024    CALCIUM 9.2 06/13/2024    BILITOT 1.7 (H) 06/13/2024    ALKPHOS 88 06/13/2024    AST 77 (H) 06/13/2024    ALT 58 06/13/2024    LABGLOM 49 (L) 06/13/2024    GFRAA >60 06/30/2019    AGRATIO 1.1 06/30/2019    GLOB 2.8 06/13/2024         CT A/P WO contrast: Cirrhotic liver morphology with new small to moderate ascites throughout the abdomen and pelvis. Diffuse mesenteric edema.     Started on IV Flagyl and 
Not on file   Occupational History    Not on file   Tobacco Use    Smoking status: Former     Current packs/day: 0.50     Types: Cigarettes    Smokeless tobacco: Never    Tobacco comments:     Quit smoking: former smoker   Substance and Sexual Activity    Alcohol use: No    Drug use: No    Sexual activity: Not on file   Other Topics Concern    Not on file   Social History Narrative    Not on file     Social Determinants of Health     Financial Resource Strain: Not on file   Food Insecurity: Not on file   Transportation Needs: Not on file   Physical Activity: Not on file   Stress: Not on file   Social Connections: Not on file   Intimate Partner Violence: Not on file   Housing Stability: Not on file       Allergies:   Allergies   Allergen Reactions    Iodinated Contrast Media      \"Throat closing\"    Penicillins Swelling    Duloxetine Other (See Comments)     Blood in urine       Current Medications:  Current Facility-Administered Medications   Medication Dose Route Frequency    0.9 % sodium chloride infusion   IntraVENous PRN    heparin 2 units/mL solution in 0.9% sodium chloride  200 mL Irrigation Once    iopamidol (ISOVUE-370) 76 % injection 300 mL  300 mL Other Once    lidocaine 2 % injection 20 mL  20 mL IntraDERmal Once    pantoprazole (PROTONIX) 40 mg in sodium chloride (PF) 0.9 % 10 mL injection  40 mg IntraVENous Q12H    dextrose 5 % with KCl 20 mEq infusion   IntraVENous Continuous    0.9 % sodium chloride infusion   IntraVENous PRN    0.9 % sodium chloride infusion   IntraVENous PRN    lidocaine 4 % external patch 1 patch  1 patch TransDERmal Daily    fentaNYL (SUBLIMAZE) injection 12.5 mcg  12.5 mcg IntraVENous Q4H PRN    cefTRIAXone (ROCEPHIN) 2,000 mg in sodium chloride 0.9 % 50 mL IVPB (mini-bag)  2,000 mg IntraVENous Q24H    octreotide (SANDOSTATIN) 500 mcg in sodium chloride 0.9 % 100 mL infusion  50 mcg/hr IntraVENous Continuous    sodium chloride flush 0.9 % injection 5-40 mL  5-40 mL IntraVENous 2

## 2024-06-17 NOTE — CARE COORDINATION
Care Management Initial Assessment       RUR: 16% \"medium risk\"  Readmission? No  1st IM letter given? Yes, given by Patient Access Team on 6/13/24  1st  letter given: N/A    Initial note - 1610 PM: Chart reviewed. CM unable to meet with pt at the bedside as pt remains intubated at this time. CM attempted to reach pt emergency contact; unable to reach at this time. Pt PCP is Dr. Manzo. Pt is independent with ADL's at baseline. Pt has no ACP on file; pt is a FULL code.     CM to follow up for completion of full, detailed assessment.      06/17/24 1610   Service Assessment   Patient Orientation Unable to Assess   Cognition Other (see comment)  (Pt remains intubated at this time.)   History Provided By Medical Record   Primary Caregiver Self   Support Systems Spouse/Significant Other   Patient's Healthcare Decision Maker is: Legal Next of Kin   PCP Verified by CM Yes   Prior Functional Level Independent in ADLs/IADLs   Current Functional Level Independent in ADLs/IADLs   Can patient return to prior living arrangement Yes   Ability to make needs known: Good   Family able to assist with home care needs: Yes   Would you like for me to discuss the discharge plan with any other family members/significant others, and if so, who? Yes  (Lady Del, [phone])   Financial Resources Medicare;Medicaid  ([facility] Medicare, [facility] Medicaid)   Community Resources None   Social/Functional History   Lives With Spouse   Type of Home House   Home Equipment None   Receives Help From Family   ADL Assistance Independent   Homemaking Assistance Independent   Ambulation Assistance Independent   Transfer Assistance Independent   Active  Yes   Mode of Transportation Car   Occupation Retired   Discharge Planning   Type of Residence House   Living Arrangements Spouse/Significant Other   Current Services Prior To Admission None   Potential Assistance Needed Home Care   DME Ordered? No   Potential Assistance Purchasing Medications

## 2024-06-18 ENCOUNTER — APPOINTMENT (OUTPATIENT)
Facility: HOSPITAL | Age: 64
DRG: 432 | End: 2024-06-18
Payer: MEDICARE

## 2024-06-18 LAB
ALBUMIN SERPL-MCNC: 3.2 G/DL (ref 3.5–5)
ALBUMIN/GLOB SERPL: 1.4 (ref 1.1–2.2)
ALP SERPL-CCNC: 58 U/L (ref 45–117)
ALT SERPL-CCNC: 73 U/L (ref 12–78)
AMMONIA PLAS-SCNC: 78 UMOL/L
ANION GAP SERPL CALC-SCNC: 2 MMOL/L (ref 5–15)
APTT PPP: 25.9 SEC (ref 22.1–31)
AST SERPL-CCNC: 39 U/L (ref 15–37)
BACTERIA SPEC CULT: NORMAL
BACTERIA SPEC CULT: NORMAL
BILIRUB DIRECT SERPL-MCNC: 0.4 MG/DL (ref 0–0.2)
BILIRUB SERPL-MCNC: 0.9 MG/DL (ref 0.2–1)
BUN SERPL-MCNC: 25 MG/DL (ref 6–20)
BUN/CREAT SERPL: 36 (ref 12–20)
CALCIUM SERPL-MCNC: 8 MG/DL (ref 8.5–10.1)
CHLORIDE SERPL-SCNC: 116 MMOL/L (ref 97–108)
CO2 SERPL-SCNC: 27 MMOL/L (ref 21–32)
CREAT SERPL-MCNC: 0.69 MG/DL (ref 0.7–1.3)
ECHO AO ASC DIAM: 3.3 CM
ECHO AO ASCENDING AORTA INDEX: 1.41 CM/M2
ECHO AV AREA PEAK VELOCITY: 2.6 CM2
ECHO AV AREA VTI: 2.8 CM2
ECHO AV AREA/BSA PEAK VELOCITY: 1.1 CM2/M2
ECHO AV AREA/BSA VTI: 1.2 CM2/M2
ECHO AV MEAN GRADIENT: 8 MMHG
ECHO AV MEAN VELOCITY: 1.4 M/S
ECHO AV PEAK GRADIENT: 14 MMHG
ECHO AV PEAK VELOCITY: 1.9 M/S
ECHO AV VELOCITY RATIO: 0.63
ECHO AV VTI: 39.2 CM
ECHO BSA: 2.46 M2
ECHO EST RA PRESSURE: 15 MMHG
ECHO LA DIAMETER INDEX: 1.88 CM/M2
ECHO LA DIAMETER: 4.4 CM
ECHO LA VOL A-L A2C: 111 ML (ref 18–58)
ECHO LA VOL A-L A4C: 76 ML (ref 18–58)
ECHO LA VOL MOD A2C: 107 ML (ref 18–58)
ECHO LA VOL MOD A4C: 74 ML (ref 18–58)
ECHO LA VOLUME AREA LENGTH: 95 ML
ECHO LA VOLUME INDEX A-L A2C: 47 ML/M2 (ref 16–34)
ECHO LA VOLUME INDEX A-L A4C: 32 ML/M2 (ref 16–34)
ECHO LA VOLUME INDEX AREA LENGTH: 41 ML/M2 (ref 16–34)
ECHO LA VOLUME INDEX MOD A2C: 46 ML/M2 (ref 16–34)
ECHO LA VOLUME INDEX MOD A4C: 32 ML/M2 (ref 16–34)
ECHO LV E' LATERAL VELOCITY: 11 CM/S
ECHO LV E' SEPTAL VELOCITY: 10 CM/S
ECHO LV EDV A4C: 126 ML
ECHO LV EDV INDEX A4C: 54 ML/M2
ECHO LV EJECTION FRACTION A4C: 68 %
ECHO LV ESV A4C: 40 ML
ECHO LV ESV INDEX A4C: 17 ML/M2
ECHO LV FRACTIONAL SHORTENING: 36 % (ref 28–44)
ECHO LV INTERNAL DIMENSION DIASTOLE INDEX: 1.79 CM/M2
ECHO LV INTERNAL DIMENSION DIASTOLIC: 4.2 CM (ref 4.2–5.9)
ECHO LV INTERNAL DIMENSION SYSTOLIC INDEX: 1.15 CM/M2
ECHO LV INTERNAL DIMENSION SYSTOLIC: 2.7 CM
ECHO LV IVSD: 1.4 CM (ref 0.6–1)
ECHO LV MASS 2D: 189.2 G (ref 88–224)
ECHO LV MASS INDEX 2D: 80.9 G/M2 (ref 49–115)
ECHO LV POSTERIOR WALL DIASTOLIC: 1.1 CM (ref 0.6–1)
ECHO LV RELATIVE WALL THICKNESS RATIO: 0.52
ECHO LVOT AREA: 4.2 CM2
ECHO LVOT AV VTI INDEX: 0.67
ECHO LVOT DIAM: 2.3 CM
ECHO LVOT MEAN GRADIENT: 3 MMHG
ECHO LVOT PEAK GRADIENT: 6 MMHG
ECHO LVOT PEAK VELOCITY: 1.2 M/S
ECHO LVOT STROKE VOLUME INDEX: 46.7 ML/M2
ECHO LVOT SV: 109.2 ML
ECHO LVOT VTI: 26.3 CM
ECHO MV A VELOCITY: 0.39 M/S
ECHO MV AREA VTI: 2.8 CM2
ECHO MV E DECELERATION TIME (DT): 232.1 MS
ECHO MV E VELOCITY: 1.3 M/S
ECHO MV E/A RATIO: 3.33
ECHO MV E/E' LATERAL: 11.82
ECHO MV E/E' RATIO (AVERAGED): 12.41
ECHO MV E/E' SEPTAL: 13
ECHO MV LVOT VTI INDEX: 1.49
ECHO MV MAX VELOCITY: 1.6 M/S
ECHO MV MEAN GRADIENT: 3 MMHG
ECHO MV MEAN VELOCITY: 0.7 M/S
ECHO MV PEAK GRADIENT: 10 MMHG
ECHO MV VTI: 39.2 CM
ECHO RIGHT VENTRICULAR SYSTOLIC PRESSURE (RVSP): 55 MMHG
ECHO RV FREE WALL PEAK S': 18 CM/S
ECHO RV TAPSE: 3.5 CM (ref 1.7–?)
ECHO TV REGURGITANT MAX VELOCITY: 3.18 M/S
ECHO TV REGURGITANT PEAK GRADIENT: 40 MMHG
EKG ATRIAL RATE: 115 BPM
EKG DIAGNOSIS: NORMAL
EKG P AXIS: 106 DEGREES
EKG P-R INTERVAL: 152 MS
EKG Q-T INTERVAL: 346 MS
EKG QRS DURATION: 80 MS
EKG QTC CALCULATION (BAZETT): 478 MS
EKG R AXIS: 31 DEGREES
EKG T AXIS: 61 DEGREES
EKG VENTRICULAR RATE: 115 BPM
ERYTHROCYTE [DISTWIDTH] IN BLOOD BY AUTOMATED COUNT: 16 % (ref 11.5–14.5)
ERYTHROCYTE [DISTWIDTH] IN BLOOD BY AUTOMATED COUNT: 16.2 % (ref 11.5–14.5)
FIBRINOGEN PPP-MCNC: 140 MG/DL (ref 200–475)
GLOBULIN SER CALC-MCNC: 2.3 G/DL (ref 2–4)
GLUCOSE BLD STRIP.AUTO-MCNC: 108 MG/DL (ref 65–117)
GLUCOSE BLD STRIP.AUTO-MCNC: 110 MG/DL (ref 65–117)
GLUCOSE BLD STRIP.AUTO-MCNC: 120 MG/DL (ref 65–117)
GLUCOSE BLD STRIP.AUTO-MCNC: 133 MG/DL (ref 65–117)
GLUCOSE SERPL-MCNC: 133 MG/DL (ref 65–100)
GRAM STN SPEC: NORMAL
GRAM STN SPEC: NORMAL
HCT VFR BLD AUTO: 28.5 % (ref 36.6–50.3)
HCT VFR BLD AUTO: 29.5 % (ref 36.6–50.3)
HGB BLD-MCNC: 8.7 G/DL (ref 12.1–17)
HGB BLD-MCNC: 9 G/DL (ref 12.1–17)
INR PPP: 1.2 (ref 0.9–1.1)
LACTATE SERPL-SCNC: 0.9 MMOL/L (ref 0.4–2)
MAGNESIUM SERPL-MCNC: 2.5 MG/DL (ref 1.6–2.4)
MCH RBC QN AUTO: 29.9 PG (ref 26–34)
MCH RBC QN AUTO: 30 PG (ref 26–34)
MCHC RBC AUTO-ENTMCNC: 30.5 G/DL (ref 30–36.5)
MCHC RBC AUTO-ENTMCNC: 30.5 G/DL (ref 30–36.5)
MCV RBC AUTO: 98 FL (ref 80–99)
MCV RBC AUTO: 98.3 FL (ref 80–99)
NRBC # BLD: 0.05 K/UL (ref 0–0.01)
NRBC # BLD: 0.06 K/UL (ref 0–0.01)
NRBC BLD-RTO: 0.4 PER 100 WBC
NRBC BLD-RTO: 0.6 PER 100 WBC
NT PRO BNP: 1248 PG/ML
PHOSPHATE SERPL-MCNC: 1.8 MG/DL (ref 2.6–4.7)
PLATELET # BLD AUTO: 91 K/UL (ref 150–400)
PLATELET # BLD AUTO: 98 K/UL (ref 150–400)
PMV BLD AUTO: 11.6 FL (ref 8.9–12.9)
PMV BLD AUTO: 11.6 FL (ref 8.9–12.9)
POTASSIUM SERPL-SCNC: 3.7 MMOL/L (ref 3.5–5.1)
PROCALCITONIN SERPL-MCNC: <0.05 NG/ML
PROT SERPL-MCNC: 5.5 G/DL (ref 6.4–8.2)
PROTHROMBIN TIME: 12.6 SEC (ref 9–11.1)
RBC # BLD AUTO: 2.9 M/UL (ref 4.1–5.7)
RBC # BLD AUTO: 3.01 M/UL (ref 4.1–5.7)
SERVICE CMNT-IMP: ABNORMAL
SERVICE CMNT-IMP: ABNORMAL
SERVICE CMNT-IMP: NORMAL
SODIUM SERPL-SCNC: 145 MMOL/L (ref 136–145)
THERAPEUTIC RANGE: NORMAL SECS (ref 58–77)
WBC # BLD AUTO: 10.9 K/UL (ref 4.1–11.1)
WBC # BLD AUTO: 11.7 K/UL (ref 4.1–11.1)

## 2024-06-18 PROCEDURE — 2500000003 HC RX 250 WO HCPCS: Performed by: NURSE PRACTITIONER

## 2024-06-18 PROCEDURE — 6360000002 HC RX W HCPCS: Performed by: INTERNAL MEDICINE

## 2024-06-18 PROCEDURE — 94003 VENT MGMT INPAT SUBQ DAY: CPT

## 2024-06-18 PROCEDURE — 80076 HEPATIC FUNCTION PANEL: CPT

## 2024-06-18 PROCEDURE — 6360000004 HC RX CONTRAST MEDICATION: Performed by: STUDENT IN AN ORGANIZED HEALTH CARE EDUCATION/TRAINING PROGRAM

## 2024-06-18 PROCEDURE — 87205 SMEAR GRAM STAIN: CPT

## 2024-06-18 PROCEDURE — 80048 BASIC METABOLIC PNL TOTAL CA: CPT

## 2024-06-18 PROCEDURE — 6360000002 HC RX W HCPCS: Performed by: NURSE PRACTITIONER

## 2024-06-18 PROCEDURE — 2580000003 HC RX 258: Performed by: INTERNAL MEDICINE

## 2024-06-18 PROCEDURE — 85384 FIBRINOGEN ACTIVITY: CPT

## 2024-06-18 PROCEDURE — 71045 X-RAY EXAM CHEST 1 VIEW: CPT

## 2024-06-18 PROCEDURE — 6360000002 HC RX W HCPCS: Performed by: STUDENT IN AN ORGANIZED HEALTH CARE EDUCATION/TRAINING PROGRAM

## 2024-06-18 PROCEDURE — C9113 INJ PANTOPRAZOLE SODIUM, VIA: HCPCS | Performed by: INTERNAL MEDICINE

## 2024-06-18 PROCEDURE — 6360000002 HC RX W HCPCS: Performed by: EMERGENCY MEDICINE

## 2024-06-18 PROCEDURE — 2580000003 HC RX 258: Performed by: EMERGENCY MEDICINE

## 2024-06-18 PROCEDURE — 87186 SC STD MICRODIL/AGAR DIL: CPT

## 2024-06-18 PROCEDURE — 2700000000 HC OXYGEN THERAPY PER DAY

## 2024-06-18 PROCEDURE — 2580000003 HC RX 258: Performed by: NURSE PRACTITIONER

## 2024-06-18 PROCEDURE — 36415 COLL VENOUS BLD VENIPUNCTURE: CPT

## 2024-06-18 PROCEDURE — 82962 GLUCOSE BLOOD TEST: CPT

## 2024-06-18 PROCEDURE — 83880 ASSAY OF NATRIURETIC PEPTIDE: CPT

## 2024-06-18 PROCEDURE — 85027 COMPLETE CBC AUTOMATED: CPT

## 2024-06-18 PROCEDURE — C8929 TTE W OR WO FOL WCON,DOPPLER: HCPCS

## 2024-06-18 PROCEDURE — 6370000000 HC RX 637 (ALT 250 FOR IP): Performed by: NURSE PRACTITIONER

## 2024-06-18 PROCEDURE — 83735 ASSAY OF MAGNESIUM: CPT

## 2024-06-18 PROCEDURE — 2000000000 HC ICU R&B

## 2024-06-18 PROCEDURE — 87070 CULTURE OTHR SPECIMN AEROBIC: CPT

## 2024-06-18 PROCEDURE — 93005 ELECTROCARDIOGRAM TRACING: CPT | Performed by: NURSE PRACTITIONER

## 2024-06-18 PROCEDURE — 82140 ASSAY OF AMMONIA: CPT

## 2024-06-18 PROCEDURE — 84145 PROCALCITONIN (PCT): CPT

## 2024-06-18 PROCEDURE — 94640 AIRWAY INHALATION TREATMENT: CPT

## 2024-06-18 PROCEDURE — 85610 PROTHROMBIN TIME: CPT

## 2024-06-18 PROCEDURE — 85730 THROMBOPLASTIN TIME PARTIAL: CPT

## 2024-06-18 PROCEDURE — 84100 ASSAY OF PHOSPHORUS: CPT

## 2024-06-18 PROCEDURE — 87077 CULTURE AEROBIC IDENTIFY: CPT

## 2024-06-18 PROCEDURE — 2580000003 HC RX 258: Performed by: STUDENT IN AN ORGANIZED HEALTH CARE EDUCATION/TRAINING PROGRAM

## 2024-06-18 PROCEDURE — 83605 ASSAY OF LACTIC ACID: CPT

## 2024-06-18 RX ORDER — DEXMEDETOMIDINE HYDROCHLORIDE 4 UG/ML
.1-.5 INJECTION, SOLUTION INTRAVENOUS CONTINUOUS
Status: DISCONTINUED | OUTPATIENT
Start: 2024-06-18 | End: 2024-06-19

## 2024-06-18 RX ORDER — METOPROLOL TARTRATE 1 MG/ML
5 INJECTION, SOLUTION INTRAVENOUS EVERY 6 HOURS PRN
Status: DISCONTINUED | OUTPATIENT
Start: 2024-06-18 | End: 2024-06-25 | Stop reason: HOSPADM

## 2024-06-18 RX ORDER — FUROSEMIDE 10 MG/ML
80 INJECTION INTRAMUSCULAR; INTRAVENOUS ONCE
Status: COMPLETED | OUTPATIENT
Start: 2024-06-18 | End: 2024-06-18

## 2024-06-18 RX ORDER — PROPOFOL 10 MG/ML
5-50 INJECTION, EMULSION INTRAVENOUS CONTINUOUS
Status: DISPENSED | OUTPATIENT
Start: 2024-06-18 | End: 2024-06-19

## 2024-06-18 RX ORDER — FENTANYL CITRATE-0.9 % NACL/PF 20 MCG/2ML
25 SYRINGE (ML) INTRAVENOUS
Status: DISCONTINUED | OUTPATIENT
Start: 2024-06-18 | End: 2024-06-21

## 2024-06-18 RX ORDER — FENTANYL CITRATE-0.9 % NACL/PF 10 MCG/ML
25-200 PLASTIC BAG, INJECTION (ML) INTRAVENOUS CONTINUOUS
Status: DISCONTINUED | OUTPATIENT
Start: 2024-06-18 | End: 2024-06-21

## 2024-06-18 RX ORDER — DEXTRAN 70, GLYCERIN, HYPROMELLOSE 1; 2; 3 MG/ML; MG/ML; MG/ML
1 SOLUTION/ DROPS OPHTHALMIC EVERY 4 HOURS PRN
Status: DISCONTINUED | OUTPATIENT
Start: 2024-06-18 | End: 2024-06-25 | Stop reason: HOSPADM

## 2024-06-18 RX ADMIN — CEFEPIME 2000 MG: 2 INJECTION, POWDER, FOR SOLUTION INTRAVENOUS at 00:02

## 2024-06-18 RX ADMIN — OCTREOTIDE ACETATE 50 MCG/HR: 500 INJECTION, SOLUTION INTRAVENOUS; SUBCUTANEOUS at 13:25

## 2024-06-18 RX ADMIN — PROPOFOL 50 MCG/KG/MIN: 10 INJECTION, EMULSION INTRAVENOUS at 17:39

## 2024-06-18 RX ADMIN — SODIUM CHLORIDE, PRESERVATIVE FREE 10 ML: 5 INJECTION INTRAVENOUS at 19:43

## 2024-06-18 RX ADMIN — PROPOFOL 45 MCG/KG/MIN: 10 INJECTION, EMULSION INTRAVENOUS at 03:48

## 2024-06-18 RX ADMIN — ARFORMOTEROL TARTRATE: 15 SOLUTION RESPIRATORY (INHALATION) at 08:19

## 2024-06-18 RX ADMIN — PROPOFOL 40 MCG/KG/MIN: 10 INJECTION, EMULSION INTRAVENOUS at 15:12

## 2024-06-18 RX ADMIN — FENTANYL CITRATE 150 MCG/HR: at 14:35

## 2024-06-18 RX ADMIN — FENTANYL CITRATE 150 MCG/HR: at 00:57

## 2024-06-18 RX ADMIN — Medication: at 19:41

## 2024-06-18 RX ADMIN — CEFEPIME 2000 MG: 2 INJECTION, POWDER, FOR SOLUTION INTRAVENOUS at 16:36

## 2024-06-18 RX ADMIN — OCTREOTIDE ACETATE 50 MCG/HR: 500 INJECTION, SOLUTION INTRAVENOUS; SUBCUTANEOUS at 23:27

## 2024-06-18 RX ADMIN — METOPROLOL TARTRATE 5 MG: 5 INJECTION INTRAVENOUS at 22:15

## 2024-06-18 RX ADMIN — POTASSIUM PHOSPHATE, MONOBASIC POTASSIUM PHOSPHATE, DIBASIC 30 MMOL: 224; 236 INJECTION, SOLUTION, CONCENTRATE INTRAVENOUS at 08:46

## 2024-06-18 RX ADMIN — CHLORHEXIDINE GLUCONATE 15 ML: 1.2 RINSE ORAL at 19:43

## 2024-06-18 RX ADMIN — Medication: at 12:40

## 2024-06-18 RX ADMIN — PROPOFOL 50 MCG/KG/MIN: 10 INJECTION, EMULSION INTRAVENOUS at 09:45

## 2024-06-18 RX ADMIN — VANCOMYCIN HYDROCHLORIDE 1250 MG: 10 INJECTION, POWDER, LYOPHILIZED, FOR SOLUTION INTRAVENOUS at 14:29

## 2024-06-18 RX ADMIN — FENTANYL CITRATE 150 MCG/HR: at 07:24

## 2024-06-18 RX ADMIN — Medication: at 08:05

## 2024-06-18 RX ADMIN — SODIUM CHLORIDE, PRESERVATIVE FREE 10 ML: 5 INJECTION INTRAVENOUS at 08:09

## 2024-06-18 RX ADMIN — Medication 1 AMPULE: at 19:40

## 2024-06-18 RX ADMIN — SODIUM CHLORIDE, PRESERVATIVE FREE 40 MG: 5 INJECTION INTRAVENOUS at 08:06

## 2024-06-18 RX ADMIN — PROPOFOL 35 MCG/KG/MIN: 10 INJECTION, EMULSION INTRAVENOUS at 20:33

## 2024-06-18 RX ADMIN — PROPOFOL 45 MCG/KG/MIN: 10 INJECTION, EMULSION INTRAVENOUS at 00:00

## 2024-06-18 RX ADMIN — PERFLUTREN 1.5 ML: 6.52 INJECTION, SUSPENSION INTRAVENOUS at 09:08

## 2024-06-18 RX ADMIN — PROPOFOL 45 MCG/KG/MIN: 10 INJECTION, EMULSION INTRAVENOUS at 06:43

## 2024-06-18 RX ADMIN — CHLORHEXIDINE GLUCONATE 15 ML: 1.2 RINSE ORAL at 08:09

## 2024-06-18 RX ADMIN — Medication 1 AMPULE: at 08:05

## 2024-06-18 RX ADMIN — Medication 2500 MG: at 00:52

## 2024-06-18 RX ADMIN — OCTREOTIDE ACETATE 50 MCG/HR: 500 INJECTION, SOLUTION INTRAVENOUS; SUBCUTANEOUS at 03:19

## 2024-06-18 RX ADMIN — FUROSEMIDE 80 MG: 10 INJECTION, SOLUTION INTRAMUSCULAR; INTRAVENOUS at 06:39

## 2024-06-18 RX ADMIN — FUROSEMIDE 80 MG: 10 INJECTION, SOLUTION INTRAMUSCULAR; INTRAVENOUS at 21:56

## 2024-06-18 RX ADMIN — SODIUM CHLORIDE, PRESERVATIVE FREE 40 MG: 5 INJECTION INTRAVENOUS at 19:48

## 2024-06-18 RX ADMIN — FENTANYL CITRATE 150 MCG/HR: at 20:34

## 2024-06-18 RX ADMIN — CEFEPIME 2000 MG: 2 INJECTION, POWDER, FOR SOLUTION INTRAVENOUS at 08:08

## 2024-06-18 RX ADMIN — CEFEPIME 2000 MG: 2 INJECTION, POWDER, FOR SOLUTION INTRAVENOUS at 23:48

## 2024-06-18 RX ADMIN — PROPOFOL 45 MCG/KG/MIN: 10 INJECTION, EMULSION INTRAVENOUS at 23:44

## 2024-06-18 RX ADMIN — ARFORMOTEROL TARTRATE: 15 SOLUTION RESPIRATORY (INHALATION) at 20:41

## 2024-06-18 ASSESSMENT — PULMONARY FUNCTION TESTS
PIF_VALUE: 26
PIF_VALUE: 34
PIF_VALUE: 24
PIF_VALUE: 28
PIF_VALUE: 26
PIF_VALUE: 28

## 2024-06-18 ASSESSMENT — PAIN SCALES - GENERAL
PAINLEVEL_OUTOF10: 0
PAINLEVEL_OUTOF10: 3
PAINLEVEL_OUTOF10: 0
PAINLEVEL_OUTOF10: 0

## 2024-06-18 NOTE — WOUND CARE
Wound care consult for the left thumb blister:  Chart reviewed and patient assessed.  He is intubated, sedated, has an arm board immobilizer on his left arm for an A line.  Left hand near thumb has an intact, fluid filled blister that is sluggishly blanchable. Venelex applied and needs to be applied three times daily.       Plan: will recheck patient on Friday     Clover Chen RN, BSN, CWON

## 2024-06-19 ENCOUNTER — APPOINTMENT (OUTPATIENT)
Facility: HOSPITAL | Age: 64
DRG: 432 | End: 2024-06-19
Payer: MEDICARE

## 2024-06-19 LAB
ALBUMIN SERPL-MCNC: 3.2 G/DL (ref 3.5–5)
ALBUMIN/GLOB SERPL: 1.4 (ref 1.1–2.2)
ALP SERPL-CCNC: 64 U/L (ref 45–117)
ALT SERPL-CCNC: 59 U/L (ref 12–78)
ANION GAP SERPL CALC-SCNC: 3 MMOL/L (ref 5–15)
AST SERPL-CCNC: 30 U/L (ref 15–37)
BACTERIA SPEC CULT: NORMAL
BACTERIA SPEC CULT: NORMAL
BILIRUB DIRECT SERPL-MCNC: 0.7 MG/DL (ref 0–0.2)
BILIRUB SERPL-MCNC: 1.3 MG/DL (ref 0.2–1)
BUN SERPL-MCNC: 18 MG/DL (ref 6–20)
BUN/CREAT SERPL: 27 (ref 12–20)
CALCIUM SERPL-MCNC: 8 MG/DL (ref 8.5–10.1)
CHLORIDE SERPL-SCNC: 112 MMOL/L (ref 97–108)
CO2 SERPL-SCNC: 31 MMOL/L (ref 21–32)
CREAT SERPL-MCNC: 0.67 MG/DL (ref 0.7–1.3)
ERYTHROCYTE [DISTWIDTH] IN BLOOD BY AUTOMATED COUNT: 15.9 % (ref 11.5–14.5)
GLOBULIN SER CALC-MCNC: 2.3 G/DL (ref 2–4)
GLUCOSE BLD STRIP.AUTO-MCNC: 104 MG/DL (ref 65–117)
GLUCOSE BLD STRIP.AUTO-MCNC: 104 MG/DL (ref 65–117)
GLUCOSE BLD STRIP.AUTO-MCNC: 127 MG/DL (ref 65–117)
GLUCOSE SERPL-MCNC: 108 MG/DL (ref 65–100)
HCT VFR BLD AUTO: 30.8 % (ref 36.6–50.3)
HGB BLD-MCNC: 9.6 G/DL (ref 12.1–17)
INR PPP: 1.2 (ref 0.9–1.1)
MAGNESIUM SERPL-MCNC: 2.1 MG/DL (ref 1.6–2.4)
MCH RBC QN AUTO: 29.9 PG (ref 26–34)
MCHC RBC AUTO-ENTMCNC: 31.2 G/DL (ref 30–36.5)
MCV RBC AUTO: 96 FL (ref 80–99)
NRBC # BLD: 0.04 K/UL (ref 0–0.01)
NRBC BLD-RTO: 0.3 PER 100 WBC
PHOSPHATE SERPL-MCNC: 2.4 MG/DL (ref 2.6–4.7)
PLATELET # BLD AUTO: 104 K/UL (ref 150–400)
PMV BLD AUTO: 11.5 FL (ref 8.9–12.9)
POTASSIUM SERPL-SCNC: 3.4 MMOL/L (ref 3.5–5.1)
PROCALCITONIN SERPL-MCNC: 0.05 NG/ML
PROT SERPL-MCNC: 5.5 G/DL (ref 6.4–8.2)
PROTHROMBIN TIME: 12.2 SEC (ref 9–11.1)
RBC # BLD AUTO: 3.21 M/UL (ref 4.1–5.7)
SERVICE CMNT-IMP: ABNORMAL
SERVICE CMNT-IMP: NORMAL
SODIUM SERPL-SCNC: 146 MMOL/L (ref 136–145)
VANCOMYCIN SERPL-MCNC: 12.6 UG/ML
WBC # BLD AUTO: 13.6 K/UL (ref 4.1–11.1)

## 2024-06-19 PROCEDURE — 84145 PROCALCITONIN (PCT): CPT

## 2024-06-19 PROCEDURE — 2580000003 HC RX 258: Performed by: INTERNAL MEDICINE

## 2024-06-19 PROCEDURE — 94003 VENT MGMT INPAT SUBQ DAY: CPT

## 2024-06-19 PROCEDURE — 80048 BASIC METABOLIC PNL TOTAL CA: CPT

## 2024-06-19 PROCEDURE — 2500000003 HC RX 250 WO HCPCS: Performed by: NURSE PRACTITIONER

## 2024-06-19 PROCEDURE — 6360000002 HC RX W HCPCS: Performed by: INTERNAL MEDICINE

## 2024-06-19 PROCEDURE — 6360000002 HC RX W HCPCS: Performed by: STUDENT IN AN ORGANIZED HEALTH CARE EDUCATION/TRAINING PROGRAM

## 2024-06-19 PROCEDURE — 2000000000 HC ICU R&B

## 2024-06-19 PROCEDURE — 80202 ASSAY OF VANCOMYCIN: CPT

## 2024-06-19 PROCEDURE — 94640 AIRWAY INHALATION TREATMENT: CPT

## 2024-06-19 PROCEDURE — 71045 X-RAY EXAM CHEST 1 VIEW: CPT

## 2024-06-19 PROCEDURE — 6370000000 HC RX 637 (ALT 250 FOR IP): Performed by: NURSE PRACTITIONER

## 2024-06-19 PROCEDURE — 80076 HEPATIC FUNCTION PANEL: CPT

## 2024-06-19 PROCEDURE — 2700000000 HC OXYGEN THERAPY PER DAY

## 2024-06-19 PROCEDURE — 83735 ASSAY OF MAGNESIUM: CPT

## 2024-06-19 PROCEDURE — 6360000002 HC RX W HCPCS: Performed by: NURSE PRACTITIONER

## 2024-06-19 PROCEDURE — 2580000003 HC RX 258: Performed by: NURSE PRACTITIONER

## 2024-06-19 PROCEDURE — 2580000003 HC RX 258: Performed by: STUDENT IN AN ORGANIZED HEALTH CARE EDUCATION/TRAINING PROGRAM

## 2024-06-19 PROCEDURE — 85027 COMPLETE CBC AUTOMATED: CPT

## 2024-06-19 PROCEDURE — 2580000003 HC RX 258: Performed by: EMERGENCY MEDICINE

## 2024-06-19 PROCEDURE — 85610 PROTHROMBIN TIME: CPT

## 2024-06-19 PROCEDURE — C9113 INJ PANTOPRAZOLE SODIUM, VIA: HCPCS | Performed by: INTERNAL MEDICINE

## 2024-06-19 PROCEDURE — 84100 ASSAY OF PHOSPHORUS: CPT

## 2024-06-19 PROCEDURE — 36415 COLL VENOUS BLD VENIPUNCTURE: CPT

## 2024-06-19 PROCEDURE — 82962 GLUCOSE BLOOD TEST: CPT

## 2024-06-19 PROCEDURE — 6360000002 HC RX W HCPCS: Performed by: EMERGENCY MEDICINE

## 2024-06-19 PROCEDURE — 2500000003 HC RX 250 WO HCPCS: Performed by: INTERNAL MEDICINE

## 2024-06-19 RX ORDER — POTASSIUM CHLORIDE 29.8 MG/ML
20 INJECTION INTRAVENOUS
Status: COMPLETED | OUTPATIENT
Start: 2024-06-19 | End: 2024-06-19

## 2024-06-19 RX ORDER — FUROSEMIDE 10 MG/ML
80 INJECTION INTRAMUSCULAR; INTRAVENOUS ONCE
Status: COMPLETED | OUTPATIENT
Start: 2024-06-19 | End: 2024-06-19

## 2024-06-19 RX ORDER — DEXMEDETOMIDINE HYDROCHLORIDE 4 UG/ML
.1-1.5 INJECTION, SOLUTION INTRAVENOUS CONTINUOUS
Status: DISCONTINUED | OUTPATIENT
Start: 2024-06-19 | End: 2024-06-21

## 2024-06-19 RX ADMIN — POTASSIUM CHLORIDE 20 MEQ: 29.8 INJECTION, SOLUTION INTRAVENOUS at 05:46

## 2024-06-19 RX ADMIN — SODIUM CHLORIDE, PRESERVATIVE FREE 40 MG: 5 INJECTION INTRAVENOUS at 09:01

## 2024-06-19 RX ADMIN — Medication 1.5 MCG/KG/HR: at 19:57

## 2024-06-19 RX ADMIN — SODIUM CHLORIDE, PRESERVATIVE FREE 40 MG: 5 INJECTION INTRAVENOUS at 19:46

## 2024-06-19 RX ADMIN — Medication 1.5 MCG/KG/HR: at 16:06

## 2024-06-19 RX ADMIN — FENTANYL CITRATE 150 MCG/HR: at 10:24

## 2024-06-19 RX ADMIN — Medication: at 19:46

## 2024-06-19 RX ADMIN — POTASSIUM PHOSPHATE, MONOBASIC POTASSIUM PHOSPHATE, DIBASIC: 224; 236 INJECTION, SOLUTION, CONCENTRATE INTRAVENOUS at 16:49

## 2024-06-19 RX ADMIN — ARFORMOTEROL TARTRATE: 15 SOLUTION RESPIRATORY (INHALATION) at 19:15

## 2024-06-19 RX ADMIN — Medication 25 MCG: at 14:58

## 2024-06-19 RX ADMIN — Medication 1.5 MCG/KG/HR: at 22:00

## 2024-06-19 RX ADMIN — Medication 1 AMPULE: at 19:47

## 2024-06-19 RX ADMIN — FENTANYL CITRATE 150 MCG/HR: at 03:11

## 2024-06-19 RX ADMIN — PROPOFOL 30 MCG/KG/MIN: 10 INJECTION, EMULSION INTRAVENOUS at 11:50

## 2024-06-19 RX ADMIN — PROPOFOL 45 MCG/KG/MIN: 10 INJECTION, EMULSION INTRAVENOUS at 05:17

## 2024-06-19 RX ADMIN — PROPOFOL 45 MCG/KG/MIN: 10 INJECTION, EMULSION INTRAVENOUS at 02:50

## 2024-06-19 RX ADMIN — PROPOFOL 45 MCG/KG/MIN: 10 INJECTION, EMULSION INTRAVENOUS at 08:21

## 2024-06-19 RX ADMIN — Medication: at 15:05

## 2024-06-19 RX ADMIN — SODIUM CHLORIDE, PRESERVATIVE FREE 10 ML: 5 INJECTION INTRAVENOUS at 09:01

## 2024-06-19 RX ADMIN — Medication 1 AMPULE: at 09:01

## 2024-06-19 RX ADMIN — CHLORHEXIDINE GLUCONATE 15 ML: 1.2 RINSE ORAL at 09:01

## 2024-06-19 RX ADMIN — FENTANYL CITRATE 175 MCG/HR: at 22:00

## 2024-06-19 RX ADMIN — CHLORHEXIDINE GLUCONATE 15 ML: 1.2 RINSE ORAL at 19:46

## 2024-06-19 RX ADMIN — VANCOMYCIN HYDROCHLORIDE 1250 MG: 10 INJECTION, POWDER, LYOPHILIZED, FOR SOLUTION INTRAVENOUS at 01:41

## 2024-06-19 RX ADMIN — SODIUM CHLORIDE, PRESERVATIVE FREE 10 ML: 5 INJECTION INTRAVENOUS at 19:47

## 2024-06-19 RX ADMIN — Medication: at 09:22

## 2024-06-19 RX ADMIN — Medication 0.2 MCG/KG/HR: at 10:28

## 2024-06-19 RX ADMIN — Medication 1.5 MCG/KG/HR: at 17:56

## 2024-06-19 RX ADMIN — CEFEPIME 2000 MG: 2 INJECTION, POWDER, FOR SOLUTION INTRAVENOUS at 16:41

## 2024-06-19 RX ADMIN — OCTREOTIDE ACETATE 50 MCG/HR: 500 INJECTION, SOLUTION INTRAVENOUS; SUBCUTANEOUS at 10:19

## 2024-06-19 RX ADMIN — FUROSEMIDE 80 MG: 10 INJECTION, SOLUTION INTRAMUSCULAR; INTRAVENOUS at 03:32

## 2024-06-19 RX ADMIN — CEFEPIME 2000 MG: 2 INJECTION, POWDER, FOR SOLUTION INTRAVENOUS at 09:07

## 2024-06-19 RX ADMIN — POTASSIUM CHLORIDE 20 MEQ: 29.8 INJECTION, SOLUTION INTRAVENOUS at 05:15

## 2024-06-19 RX ADMIN — FENTANYL CITRATE 175 MCG/HR: at 16:35

## 2024-06-19 RX ADMIN — ARFORMOTEROL TARTRATE: 15 SOLUTION RESPIRATORY (INHALATION) at 08:00

## 2024-06-19 RX ADMIN — POTASSIUM PHOSPHATE, MONOBASIC POTASSIUM PHOSPHATE, DIBASIC 30 MMOL: 224; 236 INJECTION, SOLUTION, CONCENTRATE INTRAVENOUS at 09:19

## 2024-06-19 ASSESSMENT — PULMONARY FUNCTION TESTS
PIF_VALUE: 24
PIF_VALUE: 26
PIF_VALUE: 29
PIF_VALUE: 28
PIF_VALUE: 25
PIF_VALUE: 22

## 2024-06-19 ASSESSMENT — PAIN SCALES - GENERAL: PAINLEVEL_OUTOF10: 0

## 2024-06-19 NOTE — INTERDISCIPLINARY ROUNDS
Critical care interdisciplinary rounds today.  Following members present: Case Management, , Clinical Lead, Diabetes Team, Nursing, Nutrition, Pharmacy, and Physician. Family invited to participate.  Plan of care discussed.  See clinical pathway for plan of care and interventions and desired outcomes.    CVC in place for IV access/Pressors.     Brian in place For strict I/O's.     FMS in place.     Restraints in place and in use.

## 2024-06-20 LAB
ALBUMIN SERPL-MCNC: 2.8 G/DL (ref 3.5–5)
ALBUMIN/GLOB SERPL: 1.2 (ref 1.1–2.2)
ALP SERPL-CCNC: 56 U/L (ref 45–117)
ALT SERPL-CCNC: 38 U/L (ref 12–78)
ANION GAP SERPL CALC-SCNC: 0 MMOL/L (ref 5–15)
ARTERIAL PATENCY WRIST A: POSITIVE
AST SERPL-CCNC: 20 U/L (ref 15–37)
BACTERIA SPEC CULT: ABNORMAL
BACTERIA SPEC CULT: ABNORMAL
BASE EXCESS BLD CALC-SCNC: 8.6 MMOL/L
BDY SITE: ABNORMAL
BILIRUB DIRECT SERPL-MCNC: 0.9 MG/DL (ref 0–0.2)
BILIRUB SERPL-MCNC: 1.9 MG/DL (ref 0.2–1)
BUN SERPL-MCNC: 27 MG/DL (ref 6–20)
BUN/CREAT SERPL: 42 (ref 12–20)
CALCIUM SERPL-MCNC: 8.1 MG/DL (ref 8.5–10.1)
CHLORIDE SERPL-SCNC: 113 MMOL/L (ref 97–108)
CO2 SERPL-SCNC: 33 MMOL/L (ref 21–32)
CREAT SERPL-MCNC: 0.65 MG/DL (ref 0.7–1.3)
ERYTHROCYTE [DISTWIDTH] IN BLOOD BY AUTOMATED COUNT: 15.1 % (ref 11.5–14.5)
GAS FLOW.O2 O2 DELIVERY SYS: ABNORMAL
GLOBULIN SER CALC-MCNC: 2.3 G/DL (ref 2–4)
GLUCOSE BLD STRIP.AUTO-MCNC: 137 MG/DL (ref 65–117)
GLUCOSE BLD STRIP.AUTO-MCNC: 147 MG/DL (ref 65–117)
GLUCOSE BLD STRIP.AUTO-MCNC: 165 MG/DL (ref 65–117)
GLUCOSE BLD STRIP.AUTO-MCNC: 178 MG/DL (ref 65–117)
GLUCOSE SERPL-MCNC: 160 MG/DL (ref 65–100)
GRAM STN SPEC: ABNORMAL
HCO3 BLD-SCNC: 34.4 MMOL/L (ref 22–26)
HCT VFR BLD AUTO: 26.5 % (ref 36.6–50.3)
HGB BLD-MCNC: 8.4 G/DL (ref 12.1–17)
INR PPP: 1.2 (ref 0.9–1.1)
MAGNESIUM SERPL-MCNC: 2.3 MG/DL (ref 1.6–2.4)
MCH RBC QN AUTO: 30.4 PG (ref 26–34)
MCHC RBC AUTO-ENTMCNC: 31.7 G/DL (ref 30–36.5)
MCV RBC AUTO: 96 FL (ref 80–99)
NRBC # BLD: 0 K/UL (ref 0–0.01)
NRBC BLD-RTO: 0 PER 100 WBC
O2/TOTAL GAS SETTING VFR VENT: 50 %
PCO2 BLD: 53.9 MMHG (ref 35–45)
PEEP RESPIRATORY: 5 CMH2O
PH BLD: 7.41 (ref 7.35–7.45)
PHOSPHATE SERPL-MCNC: 2.1 MG/DL (ref 2.6–4.7)
PLATELET # BLD AUTO: 65 K/UL (ref 150–400)
PMV BLD AUTO: 11.2 FL (ref 8.9–12.9)
PO2 BLD: 78 MMHG (ref 80–100)
POTASSIUM SERPL-SCNC: 4.2 MMOL/L (ref 3.5–5.1)
PRESSURE SUPPORT SETTING VENT: 8 CMH2O
PROCALCITONIN SERPL-MCNC: 0.06 NG/ML
PROT SERPL-MCNC: 5.1 G/DL (ref 6.4–8.2)
PROTHROMBIN TIME: 12.5 SEC (ref 9–11.1)
RBC # BLD AUTO: 2.76 M/UL (ref 4.1–5.7)
SAO2 % BLD: 95.2 % (ref 92–97)
SERVICE CMNT-IMP: ABNORMAL
SODIUM SERPL-SCNC: 146 MMOL/L (ref 136–145)
SPECIMEN TYPE: ABNORMAL
TRIGL SERPL-MCNC: 76 MG/DL
VENTILATION MODE VENT: ABNORMAL
WBC # BLD AUTO: 4 K/UL (ref 4.1–11.1)

## 2024-06-20 PROCEDURE — 2000000000 HC ICU R&B

## 2024-06-20 PROCEDURE — 6370000000 HC RX 637 (ALT 250 FOR IP): Performed by: NURSE PRACTITIONER

## 2024-06-20 PROCEDURE — 82803 BLOOD GASES ANY COMBINATION: CPT

## 2024-06-20 PROCEDURE — 6360000002 HC RX W HCPCS: Performed by: INTERNAL MEDICINE

## 2024-06-20 PROCEDURE — 36600 WITHDRAWAL OF ARTERIAL BLOOD: CPT

## 2024-06-20 PROCEDURE — 85027 COMPLETE CBC AUTOMATED: CPT

## 2024-06-20 PROCEDURE — 85610 PROTHROMBIN TIME: CPT

## 2024-06-20 PROCEDURE — 84100 ASSAY OF PHOSPHORUS: CPT

## 2024-06-20 PROCEDURE — 2580000003 HC RX 258: Performed by: PHYSICIAN ASSISTANT

## 2024-06-20 PROCEDURE — 82962 GLUCOSE BLOOD TEST: CPT

## 2024-06-20 PROCEDURE — 5A0945A ASSISTANCE WITH RESPIRATORY VENTILATION, 24-96 CONSECUTIVE HOURS, HIGH NASAL FLOW/VELOCITY: ICD-10-PCS | Performed by: INTERNAL MEDICINE

## 2024-06-20 PROCEDURE — 84145 PROCALCITONIN (PCT): CPT

## 2024-06-20 PROCEDURE — 6360000002 HC RX W HCPCS: Performed by: STUDENT IN AN ORGANIZED HEALTH CARE EDUCATION/TRAINING PROGRAM

## 2024-06-20 PROCEDURE — 84478 ASSAY OF TRIGLYCERIDES: CPT

## 2024-06-20 PROCEDURE — 6360000002 HC RX W HCPCS: Performed by: PHYSICIAN ASSISTANT

## 2024-06-20 PROCEDURE — 2580000003 HC RX 258: Performed by: NURSE PRACTITIONER

## 2024-06-20 PROCEDURE — 80048 BASIC METABOLIC PNL TOTAL CA: CPT

## 2024-06-20 PROCEDURE — 2500000003 HC RX 250 WO HCPCS: Performed by: INTERNAL MEDICINE

## 2024-06-20 PROCEDURE — 36415 COLL VENOUS BLD VENIPUNCTURE: CPT

## 2024-06-20 PROCEDURE — 94003 VENT MGMT INPAT SUBQ DAY: CPT

## 2024-06-20 PROCEDURE — 6360000002 HC RX W HCPCS: Performed by: NURSE PRACTITIONER

## 2024-06-20 PROCEDURE — 2580000003 HC RX 258: Performed by: INTERNAL MEDICINE

## 2024-06-20 PROCEDURE — C9113 INJ PANTOPRAZOLE SODIUM, VIA: HCPCS | Performed by: INTERNAL MEDICINE

## 2024-06-20 PROCEDURE — 83735 ASSAY OF MAGNESIUM: CPT

## 2024-06-20 PROCEDURE — 80076 HEPATIC FUNCTION PANEL: CPT

## 2024-06-20 PROCEDURE — 94640 AIRWAY INHALATION TREATMENT: CPT

## 2024-06-20 RX ORDER — FUROSEMIDE 10 MG/ML
40 INJECTION INTRAMUSCULAR; INTRAVENOUS ONCE
Status: COMPLETED | OUTPATIENT
Start: 2024-06-20 | End: 2024-06-20

## 2024-06-20 RX ADMIN — Medication 1.5 MCG/KG/HR: at 09:19

## 2024-06-20 RX ADMIN — SODIUM CHLORIDE, PRESERVATIVE FREE 40 MG: 5 INJECTION INTRAVENOUS at 09:43

## 2024-06-20 RX ADMIN — FENTANYL CITRATE 100 MCG/HR: at 08:12

## 2024-06-20 RX ADMIN — CHLORHEXIDINE GLUCONATE 15 ML: 1.2 RINSE ORAL at 09:23

## 2024-06-20 RX ADMIN — Medication: at 09:23

## 2024-06-20 RX ADMIN — POTASSIUM PHOSPHATE, MONOBASIC POTASSIUM PHOSPHATE, DIBASIC: 224; 236 INJECTION, SOLUTION, CONCENTRATE INTRAVENOUS at 17:55

## 2024-06-20 RX ADMIN — SODIUM CHLORIDE, PRESERVATIVE FREE 10 ML: 5 INJECTION INTRAVENOUS at 09:28

## 2024-06-20 RX ADMIN — Medication 1 AMPULE: at 20:29

## 2024-06-20 RX ADMIN — Medication 1 AMPULE: at 09:40

## 2024-06-20 RX ADMIN — CEFEPIME 2000 MG: 2 INJECTION, POWDER, FOR SOLUTION INTRAVENOUS at 01:13

## 2024-06-20 RX ADMIN — SODIUM CHLORIDE, PRESERVATIVE FREE 40 MG: 5 INJECTION INTRAVENOUS at 20:33

## 2024-06-20 RX ADMIN — CEFTRIAXONE SODIUM 1000 MG: 1 INJECTION, POWDER, FOR SOLUTION INTRAMUSCULAR; INTRAVENOUS at 11:20

## 2024-06-20 RX ADMIN — SODIUM CHLORIDE, PRESERVATIVE FREE 10 ML: 5 INJECTION INTRAVENOUS at 20:33

## 2024-06-20 RX ADMIN — FUROSEMIDE 40 MG: 10 INJECTION, SOLUTION INTRAMUSCULAR; INTRAVENOUS at 09:24

## 2024-06-20 RX ADMIN — ARFORMOTEROL TARTRATE: 15 SOLUTION RESPIRATORY (INHALATION) at 19:08

## 2024-06-20 RX ADMIN — CHLORHEXIDINE GLUCONATE 15 ML: 1.2 RINSE ORAL at 20:31

## 2024-06-20 RX ADMIN — Medication: at 20:31

## 2024-06-20 RX ADMIN — SODIUM CHLORIDE: 9 INJECTION, SOLUTION INTRAVENOUS at 20:39

## 2024-06-20 RX ADMIN — Medication 1.5 MCG/KG/HR: at 04:44

## 2024-06-20 RX ADMIN — Medication: at 14:30

## 2024-06-20 RX ADMIN — FENTANYL CITRATE 175 MCG/HR: at 03:53

## 2024-06-20 RX ADMIN — ARFORMOTEROL TARTRATE: 15 SOLUTION RESPIRATORY (INHALATION) at 07:04

## 2024-06-20 RX ADMIN — Medication 1.5 MCG/KG/HR: at 02:35

## 2024-06-20 ASSESSMENT — PAIN SCALES - GENERAL
PAINLEVEL_OUTOF10: 0

## 2024-06-20 ASSESSMENT — PULMONARY FUNCTION TESTS
PIF_VALUE: 23
PIF_VALUE: 25

## 2024-06-20 NOTE — INTERDISCIPLINARY ROUNDS
Critical care interdisciplinary rounds today.  Following members present: Case Management, , Nursing, Nutrition, Pharmacy, and Physician. Central line for TPN. Brian for I/O.  Plan of care discussed.  See clinical pathway for plan of care and interventions and desired outcomes.

## 2024-06-20 NOTE — CARE COORDINATION
Transition of Care Plan:    RUR: 17%  Prior Level of Functioning: Independent  Disposition: Family to see how patient progresses,poss hh vs rehab.  If SNF or IPR: Date FOC offered: To speak with family  Date FOC received: To speak with family  Accepting facility: N/A  Date authorization started with reference number: N/A  Date authorization received and expires: N/A  Follow up appointments: To be done prior to discharge.   DME needed: None  Transportation at discharge: Family  IM/IMM Medicare/ letter given: To be given prior to discharge.   Is patient a [place] and connected with [facility]? No   If yes, was  transfer form completed and [facility] notified? N/A  Caregiver Contact: [place]  Discharge Caregiver contacted prior to discharge? Caregiver to be contacted prior to discharge.   Care Conference needed? No  Barriers to discharge: Medical stability      Patient continues to receive care in ccu. Extubated this morning to 8 liters mid flow oxygen. Will continue to follow . No family at bedside.        Suha Tang RN BSN CRM        [phone]

## 2024-06-21 ENCOUNTER — APPOINTMENT (OUTPATIENT)
Facility: HOSPITAL | Age: 64
DRG: 432 | End: 2024-06-21
Payer: MEDICARE

## 2024-06-21 LAB
ALBUMIN SERPL-MCNC: 2.9 G/DL (ref 3.5–5)
ALBUMIN/GLOB SERPL: 1.2 (ref 1.1–2.2)
ALP SERPL-CCNC: 53 U/L (ref 45–117)
ALT SERPL-CCNC: 35 U/L (ref 12–78)
AMMONIA PLAS-SCNC: 61 UMOL/L
ANION GAP SERPL CALC-SCNC: 3 MMOL/L (ref 5–15)
AST SERPL-CCNC: 29 U/L (ref 15–37)
BILIRUB DIRECT SERPL-MCNC: 2.9 MG/DL (ref 0–0.2)
BILIRUB SERPL-MCNC: 4.5 MG/DL (ref 0.2–1)
BUN SERPL-MCNC: 22 MG/DL (ref 6–20)
BUN/CREAT SERPL: 36 (ref 12–20)
CALCIUM SERPL-MCNC: 8.4 MG/DL (ref 8.5–10.1)
CHLORIDE SERPL-SCNC: 111 MMOL/L (ref 97–108)
CO2 SERPL-SCNC: 32 MMOL/L (ref 21–32)
CREAT SERPL-MCNC: 0.61 MG/DL (ref 0.7–1.3)
EKG ATRIAL RATE: 101 BPM
EKG ATRIAL RATE: 50 BPM
EKG DIAGNOSIS: NORMAL
EKG DIAGNOSIS: NORMAL
EKG P AXIS: 23 DEGREES
EKG P-R INTERVAL: 178 MS
EKG Q-T INTERVAL: 332 MS
EKG Q-T INTERVAL: 394 MS
EKG QRS DURATION: 90 MS
EKG QRS DURATION: 98 MS
EKG QTC CALCULATION (BAZETT): 396 MS
EKG QTC CALCULATION (BAZETT): 467 MS
EKG R AXIS: 38 DEGREES
EKG R AXIS: 60 DEGREES
EKG T AXIS: -35 DEGREES
EKG T AXIS: 27 DEGREES
EKG VENTRICULAR RATE: 119 BPM
EKG VENTRICULAR RATE: 61 BPM
ERYTHROCYTE [DISTWIDTH] IN BLOOD BY AUTOMATED COUNT: 15.1 % (ref 11.5–14.5)
GLOBULIN SER CALC-MCNC: 2.5 G/DL (ref 2–4)
GLUCOSE BLD STRIP.AUTO-MCNC: 116 MG/DL (ref 65–117)
GLUCOSE BLD STRIP.AUTO-MCNC: 121 MG/DL (ref 65–117)
GLUCOSE BLD STRIP.AUTO-MCNC: 131 MG/DL (ref 65–117)
GLUCOSE BLD STRIP.AUTO-MCNC: 138 MG/DL (ref 65–117)
GLUCOSE BLD STRIP.AUTO-MCNC: 152 MG/DL (ref 65–117)
GLUCOSE SERPL-MCNC: 136 MG/DL (ref 65–100)
HCT VFR BLD AUTO: 26.8 % (ref 36.6–50.3)
HGB BLD-MCNC: 8.3 G/DL (ref 12.1–17)
INR PPP: 1.2 (ref 0.9–1.1)
MAGNESIUM SERPL-MCNC: 2.4 MG/DL (ref 1.6–2.4)
MCH RBC QN AUTO: 29.6 PG (ref 26–34)
MCHC RBC AUTO-ENTMCNC: 31 G/DL (ref 30–36.5)
MCV RBC AUTO: 95.7 FL (ref 80–99)
NRBC # BLD: 0 K/UL (ref 0–0.01)
NRBC BLD-RTO: 0 PER 100 WBC
PHOSPHATE SERPL-MCNC: 1.7 MG/DL (ref 2.6–4.7)
PLATELET # BLD AUTO: 68 K/UL (ref 150–400)
PMV BLD AUTO: 10.9 FL (ref 8.9–12.9)
POTASSIUM SERPL-SCNC: 3.1 MMOL/L (ref 3.5–5.1)
PROT SERPL-MCNC: 5.4 G/DL (ref 6.4–8.2)
PROTHROMBIN TIME: 12.5 SEC (ref 9–11.1)
RBC # BLD AUTO: 2.8 M/UL (ref 4.1–5.7)
SERVICE CMNT-IMP: ABNORMAL
SERVICE CMNT-IMP: NORMAL
SODIUM SERPL-SCNC: 146 MMOL/L (ref 136–145)
WBC # BLD AUTO: 4.8 K/UL (ref 4.1–11.1)

## 2024-06-21 PROCEDURE — 6360000002 HC RX W HCPCS: Performed by: INTERNAL MEDICINE

## 2024-06-21 PROCEDURE — 94640 AIRWAY INHALATION TREATMENT: CPT

## 2024-06-21 PROCEDURE — 6370000000 HC RX 637 (ALT 250 FOR IP): Performed by: NURSE PRACTITIONER

## 2024-06-21 PROCEDURE — 2580000003 HC RX 258: Performed by: NURSE PRACTITIONER

## 2024-06-21 PROCEDURE — 83735 ASSAY OF MAGNESIUM: CPT

## 2024-06-21 PROCEDURE — 84100 ASSAY OF PHOSPHORUS: CPT

## 2024-06-21 PROCEDURE — 82140 ASSAY OF AMMONIA: CPT

## 2024-06-21 PROCEDURE — 97535 SELF CARE MNGMENT TRAINING: CPT

## 2024-06-21 PROCEDURE — 36415 COLL VENOUS BLD VENIPUNCTURE: CPT

## 2024-06-21 PROCEDURE — 2500000003 HC RX 250 WO HCPCS: Performed by: INTERNAL MEDICINE

## 2024-06-21 PROCEDURE — C9113 INJ PANTOPRAZOLE SODIUM, VIA: HCPCS | Performed by: INTERNAL MEDICINE

## 2024-06-21 PROCEDURE — 97530 THERAPEUTIC ACTIVITIES: CPT

## 2024-06-21 PROCEDURE — 6360000002 HC RX W HCPCS: Performed by: PHYSICIAN ASSISTANT

## 2024-06-21 PROCEDURE — 97166 OT EVAL MOD COMPLEX 45 MIN: CPT

## 2024-06-21 PROCEDURE — 2000000000 HC ICU R&B

## 2024-06-21 PROCEDURE — 2580000003 HC RX 258: Performed by: INTERNAL MEDICINE

## 2024-06-21 PROCEDURE — 2700000000 HC OXYGEN THERAPY PER DAY

## 2024-06-21 PROCEDURE — 97162 PT EVAL MOD COMPLEX 30 MIN: CPT

## 2024-06-21 PROCEDURE — 2500000003 HC RX 250 WO HCPCS: Performed by: NURSE PRACTITIONER

## 2024-06-21 PROCEDURE — 76700 US EXAM ABDOM COMPLETE: CPT

## 2024-06-21 PROCEDURE — 6370000000 HC RX 637 (ALT 250 FOR IP): Performed by: INTERNAL MEDICINE

## 2024-06-21 PROCEDURE — 2580000003 HC RX 258: Performed by: PHYSICIAN ASSISTANT

## 2024-06-21 PROCEDURE — 71045 X-RAY EXAM CHEST 1 VIEW: CPT

## 2024-06-21 PROCEDURE — 6360000002 HC RX W HCPCS: Performed by: NURSE PRACTITIONER

## 2024-06-21 PROCEDURE — 80048 BASIC METABOLIC PNL TOTAL CA: CPT

## 2024-06-21 PROCEDURE — 85027 COMPLETE CBC AUTOMATED: CPT

## 2024-06-21 PROCEDURE — 74018 RADEX ABDOMEN 1 VIEW: CPT

## 2024-06-21 PROCEDURE — 80076 HEPATIC FUNCTION PANEL: CPT

## 2024-06-21 PROCEDURE — 85610 PROTHROMBIN TIME: CPT

## 2024-06-21 PROCEDURE — 82962 GLUCOSE BLOOD TEST: CPT

## 2024-06-21 PROCEDURE — 92610 EVALUATE SWALLOWING FUNCTION: CPT

## 2024-06-21 RX ORDER — POTASSIUM CHLORIDE 29.8 MG/ML
20 INJECTION INTRAVENOUS
Status: COMPLETED | OUTPATIENT
Start: 2024-06-21 | End: 2024-06-21

## 2024-06-21 RX ORDER — BISACODYL 10 MG
10 SUPPOSITORY, RECTAL RECTAL DAILY PRN
Status: DISCONTINUED | OUTPATIENT
Start: 2024-06-21 | End: 2024-06-25 | Stop reason: HOSPADM

## 2024-06-21 RX ORDER — LACTULOSE 10 G/15ML
10 SOLUTION ORAL 3 TIMES DAILY
Status: DISCONTINUED | OUTPATIENT
Start: 2024-06-21 | End: 2024-06-22

## 2024-06-21 RX ORDER — DIPHENHYDRAMINE HYDROCHLORIDE 50 MG/ML
50 INJECTION INTRAMUSCULAR; INTRAVENOUS ONCE
Status: DISCONTINUED | OUTPATIENT
Start: 2024-06-21 | End: 2024-06-21

## 2024-06-21 RX ORDER — FUROSEMIDE 40 MG/1
40 TABLET ORAL DAILY
Status: DISCONTINUED | OUTPATIENT
Start: 2024-06-22 | End: 2024-06-22

## 2024-06-21 RX ORDER — CARVEDILOL 3.12 MG/1
3.12 TABLET ORAL 2 TIMES DAILY WITH MEALS
Status: DISCONTINUED | OUTPATIENT
Start: 2024-06-21 | End: 2024-06-25 | Stop reason: HOSPADM

## 2024-06-21 RX ORDER — BISACODYL 10 MG
10 SUPPOSITORY, RECTAL RECTAL ONCE
Status: COMPLETED | OUTPATIENT
Start: 2024-06-21 | End: 2024-06-21

## 2024-06-21 RX ADMIN — ARFORMOTEROL TARTRATE: 15 SOLUTION RESPIRATORY (INHALATION) at 19:50

## 2024-06-21 RX ADMIN — Medication: at 20:40

## 2024-06-21 RX ADMIN — SODIUM CHLORIDE, PRESERVATIVE FREE 40 MG: 5 INJECTION INTRAVENOUS at 20:40

## 2024-06-21 RX ADMIN — CHLORHEXIDINE GLUCONATE 15 ML: 1.2 RINSE ORAL at 20:40

## 2024-06-21 RX ADMIN — LACTULOSE 10 G: 20 SOLUTION ORAL at 20:39

## 2024-06-21 RX ADMIN — BISACODYL 10 MG: 10 SUPPOSITORY RECTAL at 10:47

## 2024-06-21 RX ADMIN — LACTULOSE 10 G: 20 SOLUTION ORAL at 14:48

## 2024-06-21 RX ADMIN — SODIUM CHLORIDE, PRESERVATIVE FREE 10 ML: 5 INJECTION INTRAVENOUS at 09:26

## 2024-06-21 RX ADMIN — CHLORHEXIDINE GLUCONATE 15 ML: 1.2 RINSE ORAL at 09:26

## 2024-06-21 RX ADMIN — POTASSIUM CHLORIDE 20 MEQ: 29.8 INJECTION, SOLUTION INTRAVENOUS at 06:50

## 2024-06-21 RX ADMIN — RIFAXIMIN 550 MG: 550 TABLET ORAL at 09:17

## 2024-06-21 RX ADMIN — Medication: at 09:26

## 2024-06-21 RX ADMIN — POTASSIUM PHOSPHATE, MONOBASIC POTASSIUM PHOSPHATE, DIBASIC 30 MMOL: 224; 236 INJECTION, SOLUTION, CONCENTRATE INTRAVENOUS at 06:25

## 2024-06-21 RX ADMIN — CARVEDILOL 3.12 MG: 3.12 TABLET, FILM COATED ORAL at 17:12

## 2024-06-21 RX ADMIN — Medication 1 AMPULE: at 09:27

## 2024-06-21 RX ADMIN — POTASSIUM CHLORIDE 20 MEQ: 29.8 INJECTION, SOLUTION INTRAVENOUS at 05:47

## 2024-06-21 RX ADMIN — SODIUM CHLORIDE, PRESERVATIVE FREE 40 MG: 5 INJECTION INTRAVENOUS at 09:22

## 2024-06-21 RX ADMIN — SODIUM CHLORIDE, PRESERVATIVE FREE 10 ML: 5 INJECTION INTRAVENOUS at 20:40

## 2024-06-21 RX ADMIN — CEFTRIAXONE SODIUM 1000 MG: 1 INJECTION, POWDER, FOR SOLUTION INTRAMUSCULAR; INTRAVENOUS at 10:46

## 2024-06-21 RX ADMIN — Medication 1 AMPULE: at 20:41

## 2024-06-21 RX ADMIN — POTASSIUM PHOSPHATE, MONOBASIC POTASSIUM PHOSPHATE, DIBASIC: 224; 236 INJECTION, SOLUTION, CONCENTRATE INTRAVENOUS at 18:17

## 2024-06-21 RX ADMIN — SMOFLIPID 250 ML: 6; 6; 5; 3 INJECTION, EMULSION INTRAVENOUS at 18:04

## 2024-06-21 RX ADMIN — CARVEDILOL 3.12 MG: 3.12 TABLET, FILM COATED ORAL at 10:42

## 2024-06-21 RX ADMIN — LACTULOSE 10 G: 20 SOLUTION ORAL at 09:15

## 2024-06-21 RX ADMIN — ARFORMOTEROL TARTRATE: 15 SOLUTION RESPIRATORY (INHALATION) at 07:25

## 2024-06-21 RX ADMIN — Medication: at 14:50

## 2024-06-21 RX ADMIN — RIFAXIMIN 550 MG: 550 TABLET ORAL at 20:40

## 2024-06-21 ASSESSMENT — PAIN SCALES - GENERAL
PAINLEVEL_OUTOF10: 0
PAINLEVEL_OUTOF10: 3

## 2024-06-21 ASSESSMENT — PAIN DESCRIPTION - LOCATION: LOCATION: ABDOMEN

## 2024-06-21 NOTE — WOUND CARE
Wound Care Follow up for the Left thumb wound. Pt. Had an A-line and somehow the skin was pinched and it blistered. Earlier in the week when we first saw it the wound was discolored, dark but most of it was blanchable.  Today the surface has some slough on the wound.   Plan :  continue to use Venelex ointment for now. Cover if needed. Wipe the old ointment off of the wound prior to the next layer of the Venelex.   Clover Chen RN, BSN, CWON

## 2024-06-21 NOTE — CARE COORDINATION
Chaplain Valle to see patient to complete ACP.      Suha Tang RN BSN CRM        [phone]

## 2024-06-21 NOTE — ACP (ADVANCE CARE PLANNING)
Advance Care Planning     Advance Care Planning Inpatient Note  Spiritual Care Department    Today's Date: 6/21/2024  Unit: [facility] CRITICAL CARE    Received request from HealthCare Provider.  Upon review of chart and communication with care team, Spiritual Care will defer advance care planning with patient at this time.. Patient was/were present in the room during visit.    Goals of ACP Conversation:  Unable to assess    Health Care Decision Makers:     No healthcare decision makers have been documented.  Click here to complete HealthCare Decision Makers including selection of the Healthcare Decision Maker Relationship (ie \"Primary\")  Summary:  No Decision Maker named by patient at this time      Advance Care Planning Documents (Patient Wishes):  Living Will/Advance Directive     Assessment:   Janina asked the  to provide an Advance Medical Directive for Mr. Eduardo.  reviewed the patient's chart prior to the visit.  talked with his nurse Ashwini RN in reference his cognitive ability to participate in the Advance Medical Directive process. Mr. Eduardo's nurse stated he is not fully alert at this time.  advised to page us when he is ready to start the Advance Medical Directive process.     Spiritual Health Services are available 24 hours a day as requested.     Interventions:  Unable to assess    Care Preferences Communicated:   No    Outcomes/Plan:  ACP Discussion: Postponed    Electronically signed by Chaplain Anette on 6/21/2024 at 3:06 PM

## 2024-06-22 LAB
ANION GAP SERPL CALC-SCNC: 2 MMOL/L (ref 5–15)
ANION GAP SERPL CALC-SCNC: 3 MMOL/L (ref 5–15)
BUN SERPL-MCNC: 20 MG/DL (ref 6–20)
BUN SERPL-MCNC: 21 MG/DL (ref 6–20)
BUN/CREAT SERPL: 36 (ref 12–20)
BUN/CREAT SERPL: 42 (ref 12–20)
CALCIUM SERPL-MCNC: 8.3 MG/DL (ref 8.5–10.1)
CALCIUM SERPL-MCNC: 8.4 MG/DL (ref 8.5–10.1)
CHLORIDE SERPL-SCNC: 105 MMOL/L (ref 97–108)
CHLORIDE SERPL-SCNC: 108 MMOL/L (ref 97–108)
CO2 SERPL-SCNC: 32 MMOL/L (ref 21–32)
CO2 SERPL-SCNC: 34 MMOL/L (ref 21–32)
CREAT SERPL-MCNC: 0.48 MG/DL (ref 0.7–1.3)
CREAT SERPL-MCNC: 0.59 MG/DL (ref 0.7–1.3)
ERYTHROCYTE [DISTWIDTH] IN BLOOD BY AUTOMATED COUNT: 15 % (ref 11.5–14.5)
GLUCOSE BLD STRIP.AUTO-MCNC: 113 MG/DL (ref 65–117)
GLUCOSE BLD STRIP.AUTO-MCNC: 119 MG/DL (ref 65–117)
GLUCOSE BLD STRIP.AUTO-MCNC: 124 MG/DL (ref 65–117)
GLUCOSE BLD STRIP.AUTO-MCNC: 126 MG/DL (ref 65–117)
GLUCOSE SERPL-MCNC: 121 MG/DL (ref 65–100)
GLUCOSE SERPL-MCNC: 124 MG/DL (ref 65–100)
HCT VFR BLD AUTO: 26.8 % (ref 36.6–50.3)
HGB BLD-MCNC: 8.4 G/DL (ref 12.1–17)
INR PPP: 1.2 (ref 0.9–1.1)
MAGNESIUM SERPL-MCNC: 2.4 MG/DL (ref 1.6–2.4)
MAGNESIUM SERPL-MCNC: 2.5 MG/DL (ref 1.6–2.4)
MCH RBC QN AUTO: 30.1 PG (ref 26–34)
MCHC RBC AUTO-ENTMCNC: 31.3 G/DL (ref 30–36.5)
MCV RBC AUTO: 96.1 FL (ref 80–99)
NRBC # BLD: 0 K/UL (ref 0–0.01)
NRBC BLD-RTO: 0 PER 100 WBC
PHOSPHATE SERPL-MCNC: 2.3 MG/DL (ref 2.6–4.7)
PHOSPHATE SERPL-MCNC: 2.6 MG/DL (ref 2.6–4.7)
PLATELET # BLD AUTO: 65 K/UL (ref 150–400)
PMV BLD AUTO: 11.8 FL (ref 8.9–12.9)
POTASSIUM SERPL-SCNC: 2.9 MMOL/L (ref 3.5–5.1)
POTASSIUM SERPL-SCNC: 3.1 MMOL/L (ref 3.5–5.1)
PROTHROMBIN TIME: 12.4 SEC (ref 9–11.1)
RBC # BLD AUTO: 2.79 M/UL (ref 4.1–5.7)
SERVICE CMNT-IMP: ABNORMAL
SERVICE CMNT-IMP: NORMAL
SODIUM SERPL-SCNC: 142 MMOL/L (ref 136–145)
SODIUM SERPL-SCNC: 142 MMOL/L (ref 136–145)
WBC # BLD AUTO: 5 K/UL (ref 4.1–11.1)

## 2024-06-22 PROCEDURE — C9113 INJ PANTOPRAZOLE SODIUM, VIA: HCPCS | Performed by: INTERNAL MEDICINE

## 2024-06-22 PROCEDURE — 6370000000 HC RX 637 (ALT 250 FOR IP): Performed by: INTERNAL MEDICINE

## 2024-06-22 PROCEDURE — 2580000003 HC RX 258: Performed by: INTERNAL MEDICINE

## 2024-06-22 PROCEDURE — 80048 BASIC METABOLIC PNL TOTAL CA: CPT

## 2024-06-22 PROCEDURE — 6370000000 HC RX 637 (ALT 250 FOR IP): Performed by: NURSE PRACTITIONER

## 2024-06-22 PROCEDURE — 2500000003 HC RX 250 WO HCPCS: Performed by: INTERNAL MEDICINE

## 2024-06-22 PROCEDURE — 94640 AIRWAY INHALATION TREATMENT: CPT

## 2024-06-22 PROCEDURE — 36415 COLL VENOUS BLD VENIPUNCTURE: CPT

## 2024-06-22 PROCEDURE — 2500000003 HC RX 250 WO HCPCS: Performed by: HOSPITALIST

## 2024-06-22 PROCEDURE — 85610 PROTHROMBIN TIME: CPT

## 2024-06-22 PROCEDURE — 6360000002 HC RX W HCPCS: Performed by: PHYSICIAN ASSISTANT

## 2024-06-22 PROCEDURE — 6360000002 HC RX W HCPCS: Performed by: HOSPITALIST

## 2024-06-22 PROCEDURE — 2580000003 HC RX 258: Performed by: HOSPITALIST

## 2024-06-22 PROCEDURE — 2580000003 HC RX 258: Performed by: PHYSICIAN ASSISTANT

## 2024-06-22 PROCEDURE — 6370000000 HC RX 637 (ALT 250 FOR IP): Performed by: HOSPITALIST

## 2024-06-22 PROCEDURE — 83735 ASSAY OF MAGNESIUM: CPT

## 2024-06-22 PROCEDURE — 6360000002 HC RX W HCPCS: Performed by: INTERNAL MEDICINE

## 2024-06-22 PROCEDURE — 2000000000 HC ICU R&B

## 2024-06-22 PROCEDURE — 84100 ASSAY OF PHOSPHORUS: CPT

## 2024-06-22 PROCEDURE — 85027 COMPLETE CBC AUTOMATED: CPT

## 2024-06-22 PROCEDURE — 82962 GLUCOSE BLOOD TEST: CPT

## 2024-06-22 PROCEDURE — 2700000000 HC OXYGEN THERAPY PER DAY

## 2024-06-22 RX ORDER — LACTULOSE 10 G/15ML
10 SOLUTION ORAL 2 TIMES DAILY
Status: DISCONTINUED | OUTPATIENT
Start: 2024-06-22 | End: 2024-06-23

## 2024-06-22 RX ORDER — FUROSEMIDE 10 MG/ML
40 INJECTION INTRAMUSCULAR; INTRAVENOUS 2 TIMES DAILY
Status: DISCONTINUED | OUTPATIENT
Start: 2024-06-22 | End: 2024-06-25 | Stop reason: HOSPADM

## 2024-06-22 RX ORDER — SPIRONOLACTONE 25 MG/1
50 TABLET ORAL DAILY
Status: DISCONTINUED | OUTPATIENT
Start: 2024-06-22 | End: 2024-06-24

## 2024-06-22 RX ADMIN — CHLORHEXIDINE GLUCONATE 15 ML: 1.2 RINSE ORAL at 20:14

## 2024-06-22 RX ADMIN — SODIUM CHLORIDE, PRESERVATIVE FREE 10 ML: 5 INJECTION INTRAVENOUS at 20:14

## 2024-06-22 RX ADMIN — RIFAXIMIN 550 MG: 550 TABLET ORAL at 07:57

## 2024-06-22 RX ADMIN — SODIUM CHLORIDE, PRESERVATIVE FREE 40 MG: 5 INJECTION INTRAVENOUS at 20:13

## 2024-06-22 RX ADMIN — SMOFLIPID 250 ML: 6; 6; 5; 3 INJECTION, EMULSION INTRAVENOUS at 18:30

## 2024-06-22 RX ADMIN — RIFAXIMIN 550 MG: 550 TABLET ORAL at 20:13

## 2024-06-22 RX ADMIN — ARFORMOTEROL TARTRATE: 15 SOLUTION RESPIRATORY (INHALATION) at 07:07

## 2024-06-22 RX ADMIN — Medication 1 AMPULE: at 20:13

## 2024-06-22 RX ADMIN — Medication: at 07:58

## 2024-06-22 RX ADMIN — FUROSEMIDE 40 MG: 40 TABLET ORAL at 07:57

## 2024-06-22 RX ADMIN — ARFORMOTEROL TARTRATE: 15 SOLUTION RESPIRATORY (INHALATION) at 19:35

## 2024-06-22 RX ADMIN — POTASSIUM PHOSPHATE, MONOBASIC POTASSIUM PHOSPHATE, DIBASIC: 224; 236 INJECTION, SOLUTION, CONCENTRATE INTRAVENOUS at 18:30

## 2024-06-22 RX ADMIN — POTASSIUM BICARBONATE 40 MEQ: 782 TABLET, EFFERVESCENT ORAL at 07:57

## 2024-06-22 RX ADMIN — CARVEDILOL 3.12 MG: 3.12 TABLET, FILM COATED ORAL at 07:57

## 2024-06-22 RX ADMIN — Medication: at 14:56

## 2024-06-22 RX ADMIN — CARVEDILOL 3.12 MG: 3.12 TABLET, FILM COATED ORAL at 18:16

## 2024-06-22 RX ADMIN — SODIUM CHLORIDE, PRESERVATIVE FREE 40 MG: 5 INJECTION INTRAVENOUS at 08:14

## 2024-06-22 RX ADMIN — LACTULOSE 10 G: 20 SOLUTION ORAL at 08:14

## 2024-06-22 RX ADMIN — SODIUM CHLORIDE, PRESERVATIVE FREE 10 ML: 5 INJECTION INTRAVENOUS at 07:58

## 2024-06-22 RX ADMIN — FUROSEMIDE 40 MG: 10 INJECTION, SOLUTION INTRAMUSCULAR; INTRAVENOUS at 11:19

## 2024-06-22 RX ADMIN — LACTULOSE 10 G: 10 SOLUTION ORAL at 20:13

## 2024-06-22 RX ADMIN — CHLORHEXIDINE GLUCONATE 15 ML: 1.2 RINSE ORAL at 08:15

## 2024-06-22 RX ADMIN — POTASSIUM BICARBONATE 40 MEQ: 782 TABLET, EFFERVESCENT ORAL at 22:38

## 2024-06-22 RX ADMIN — POTASSIUM BICARBONATE 40 MEQ: 782 TABLET, EFFERVESCENT ORAL at 21:37

## 2024-06-22 RX ADMIN — FUROSEMIDE 40 MG: 10 INJECTION, SOLUTION INTRAMUSCULAR; INTRAVENOUS at 18:16

## 2024-06-22 RX ADMIN — CEFTRIAXONE SODIUM 1000 MG: 1 INJECTION, POWDER, FOR SOLUTION INTRAMUSCULAR; INTRAVENOUS at 11:12

## 2024-06-22 RX ADMIN — Medication: at 20:13

## 2024-06-22 RX ADMIN — POTASSIUM PHOSPHATE, MONOBASIC AND POTASSIUM PHOSPHATE, DIBASIC 20 MMOL: 224; 236 INJECTION, SOLUTION, CONCENTRATE INTRAVENOUS at 12:40

## 2024-06-22 RX ADMIN — SPIRONOLACTONE 50 MG: 25 TABLET ORAL at 11:20

## 2024-06-22 RX ADMIN — Medication 1 AMPULE: at 07:58

## 2024-06-22 ASSESSMENT — PAIN SCALES - GENERAL
PAINLEVEL_OUTOF10: 0

## 2024-06-23 LAB
ALBUMIN SERPL-MCNC: 2.9 G/DL (ref 3.5–5)
ALBUMIN SERPL-MCNC: 3.1 G/DL (ref 3.5–5)
ALBUMIN/GLOB SERPL: 1.1 (ref 1.1–2.2)
ALBUMIN/GLOB SERPL: 1.1 (ref 1.1–2.2)
ALP SERPL-CCNC: 68 U/L (ref 45–117)
ALP SERPL-CCNC: 80 U/L (ref 45–117)
ALT SERPL-CCNC: 48 U/L (ref 12–78)
ALT SERPL-CCNC: 54 U/L (ref 12–78)
ANION GAP SERPL CALC-SCNC: 3 MMOL/L (ref 5–15)
ANION GAP SERPL CALC-SCNC: 4 MMOL/L (ref 5–15)
AST SERPL-CCNC: 61 U/L (ref 15–37)
AST SERPL-CCNC: 67 U/L (ref 15–37)
BILIRUB DIRECT SERPL-MCNC: 1.8 MG/DL (ref 0–0.2)
BILIRUB SERPL-MCNC: 2.6 MG/DL (ref 0.2–1)
BILIRUB SERPL-MCNC: 2.6 MG/DL (ref 0.2–1)
BUN SERPL-MCNC: 22 MG/DL (ref 6–20)
BUN SERPL-MCNC: 23 MG/DL (ref 6–20)
BUN/CREAT SERPL: 37 (ref 12–20)
BUN/CREAT SERPL: 38 (ref 12–20)
CALCIUM SERPL-MCNC: 8.4 MG/DL (ref 8.5–10.1)
CALCIUM SERPL-MCNC: 8.5 MG/DL (ref 8.5–10.1)
CHLORIDE SERPL-SCNC: 106 MMOL/L (ref 97–108)
CHLORIDE SERPL-SCNC: 107 MMOL/L (ref 97–108)
CO2 SERPL-SCNC: 33 MMOL/L (ref 21–32)
CO2 SERPL-SCNC: 33 MMOL/L (ref 21–32)
CREAT SERPL-MCNC: 0.58 MG/DL (ref 0.7–1.3)
CREAT SERPL-MCNC: 0.63 MG/DL (ref 0.7–1.3)
ERYTHROCYTE [DISTWIDTH] IN BLOOD BY AUTOMATED COUNT: 14.8 % (ref 11.5–14.5)
GLOBULIN SER CALC-MCNC: 2.6 G/DL (ref 2–4)
GLOBULIN SER CALC-MCNC: 2.9 G/DL (ref 2–4)
GLUCOSE BLD STRIP.AUTO-MCNC: 112 MG/DL (ref 65–117)
GLUCOSE SERPL-MCNC: 122 MG/DL (ref 65–100)
GLUCOSE SERPL-MCNC: 131 MG/DL (ref 65–100)
HCT VFR BLD AUTO: 28 % (ref 36.6–50.3)
HGB BLD-MCNC: 8.6 G/DL (ref 12.1–17)
INR PPP: 1.2 (ref 0.9–1.1)
MAGNESIUM SERPL-MCNC: 2.4 MG/DL (ref 1.6–2.4)
MCH RBC QN AUTO: 29.9 PG (ref 26–34)
MCHC RBC AUTO-ENTMCNC: 30.7 G/DL (ref 30–36.5)
MCV RBC AUTO: 97.2 FL (ref 80–99)
NRBC # BLD: 0 K/UL (ref 0–0.01)
NRBC BLD-RTO: 0 PER 100 WBC
PHOSPHATE SERPL-MCNC: 2.5 MG/DL (ref 2.6–4.7)
PHOSPHATE SERPL-MCNC: 3.6 MG/DL (ref 2.6–4.7)
PLATELET # BLD AUTO: 74 K/UL (ref 150–400)
PMV BLD AUTO: 12.1 FL (ref 8.9–12.9)
POTASSIUM SERPL-SCNC: 3.2 MMOL/L (ref 3.5–5.1)
POTASSIUM SERPL-SCNC: 4 MMOL/L (ref 3.5–5.1)
PROT SERPL-MCNC: 5.5 G/DL (ref 6.4–8.2)
PROT SERPL-MCNC: 6 G/DL (ref 6.4–8.2)
PROTHROMBIN TIME: 11.9 SEC (ref 9–11.1)
RBC # BLD AUTO: 2.88 M/UL (ref 4.1–5.7)
SERVICE CMNT-IMP: NORMAL
SODIUM SERPL-SCNC: 143 MMOL/L (ref 136–145)
SODIUM SERPL-SCNC: 143 MMOL/L (ref 136–145)
WBC # BLD AUTO: 5.2 K/UL (ref 4.1–11.1)

## 2024-06-23 PROCEDURE — 85610 PROTHROMBIN TIME: CPT

## 2024-06-23 PROCEDURE — 2580000003 HC RX 258: Performed by: INTERNAL MEDICINE

## 2024-06-23 PROCEDURE — 2060000000 HC ICU INTERMEDIATE R&B

## 2024-06-23 PROCEDURE — 6360000002 HC RX W HCPCS: Performed by: HOSPITALIST

## 2024-06-23 PROCEDURE — 6360000002 HC RX W HCPCS: Performed by: INTERNAL MEDICINE

## 2024-06-23 PROCEDURE — 36415 COLL VENOUS BLD VENIPUNCTURE: CPT

## 2024-06-23 PROCEDURE — 6370000000 HC RX 637 (ALT 250 FOR IP): Performed by: NURSE PRACTITIONER

## 2024-06-23 PROCEDURE — 2500000003 HC RX 250 WO HCPCS: Performed by: NURSE PRACTITIONER

## 2024-06-23 PROCEDURE — 80076 HEPATIC FUNCTION PANEL: CPT

## 2024-06-23 PROCEDURE — C9113 INJ PANTOPRAZOLE SODIUM, VIA: HCPCS | Performed by: INTERNAL MEDICINE

## 2024-06-23 PROCEDURE — 83735 ASSAY OF MAGNESIUM: CPT

## 2024-06-23 PROCEDURE — 85027 COMPLETE CBC AUTOMATED: CPT

## 2024-06-23 PROCEDURE — 2580000003 HC RX 258: Performed by: PHYSICIAN ASSISTANT

## 2024-06-23 PROCEDURE — 80053 COMPREHEN METABOLIC PANEL: CPT

## 2024-06-23 PROCEDURE — 82962 GLUCOSE BLOOD TEST: CPT

## 2024-06-23 PROCEDURE — 6370000000 HC RX 637 (ALT 250 FOR IP): Performed by: INTERNAL MEDICINE

## 2024-06-23 PROCEDURE — 6360000002 HC RX W HCPCS: Performed by: NURSE PRACTITIONER

## 2024-06-23 PROCEDURE — 94640 AIRWAY INHALATION TREATMENT: CPT

## 2024-06-23 PROCEDURE — 6360000002 HC RX W HCPCS: Performed by: PHYSICIAN ASSISTANT

## 2024-06-23 PROCEDURE — 84100 ASSAY OF PHOSPHORUS: CPT

## 2024-06-23 PROCEDURE — 2580000003 HC RX 258: Performed by: NURSE PRACTITIONER

## 2024-06-23 PROCEDURE — 6370000000 HC RX 637 (ALT 250 FOR IP): Performed by: HOSPITALIST

## 2024-06-23 PROCEDURE — 2700000000 HC OXYGEN THERAPY PER DAY

## 2024-06-23 RX ORDER — LACTULOSE 10 G/15ML
10 SOLUTION ORAL DAILY
Status: DISCONTINUED | OUTPATIENT
Start: 2024-06-24 | End: 2024-06-25 | Stop reason: HOSPADM

## 2024-06-23 RX ORDER — POTASSIUM CHLORIDE 29.8 MG/ML
20 INJECTION INTRAVENOUS
Status: COMPLETED | OUTPATIENT
Start: 2024-06-23 | End: 2024-06-23

## 2024-06-23 RX ADMIN — CHLORHEXIDINE GLUCONATE 15 ML: 1.2 RINSE ORAL at 08:19

## 2024-06-23 RX ADMIN — Medication: at 18:21

## 2024-06-23 RX ADMIN — CARVEDILOL 3.12 MG: 3.12 TABLET, FILM COATED ORAL at 08:19

## 2024-06-23 RX ADMIN — POTASSIUM CHLORIDE 20 MEQ: 29.8 INJECTION, SOLUTION INTRAVENOUS at 06:28

## 2024-06-23 RX ADMIN — SPIRONOLACTONE 50 MG: 25 TABLET ORAL at 08:19

## 2024-06-23 RX ADMIN — CARVEDILOL 3.12 MG: 3.12 TABLET, FILM COATED ORAL at 18:21

## 2024-06-23 RX ADMIN — Medication 1 AMPULE: at 08:18

## 2024-06-23 RX ADMIN — FUROSEMIDE 40 MG: 10 INJECTION, SOLUTION INTRAMUSCULAR; INTRAVENOUS at 18:21

## 2024-06-23 RX ADMIN — RIFAXIMIN 550 MG: 550 TABLET ORAL at 22:08

## 2024-06-23 RX ADMIN — SODIUM CHLORIDE, PRESERVATIVE FREE 40 MG: 5 INJECTION INTRAVENOUS at 22:08

## 2024-06-23 RX ADMIN — Medication 1 AMPULE: at 22:19

## 2024-06-23 RX ADMIN — Medication: at 22:09

## 2024-06-23 RX ADMIN — POTASSIUM CHLORIDE 20 MEQ: 29.8 INJECTION, SOLUTION INTRAVENOUS at 08:10

## 2024-06-23 RX ADMIN — SODIUM CHLORIDE, PRESERVATIVE FREE 10 ML: 5 INJECTION INTRAVENOUS at 22:09

## 2024-06-23 RX ADMIN — ARFORMOTEROL TARTRATE: 15 SOLUTION RESPIRATORY (INHALATION) at 07:23

## 2024-06-23 RX ADMIN — SODIUM CHLORIDE, PRESERVATIVE FREE 40 MG: 5 INJECTION INTRAVENOUS at 08:19

## 2024-06-23 RX ADMIN — RIFAXIMIN 550 MG: 550 TABLET ORAL at 08:19

## 2024-06-23 RX ADMIN — POTASSIUM PHOSPHATE, MONOBASIC POTASSIUM PHOSPHATE, DIBASIC 30 MMOL: 224; 236 INJECTION, SOLUTION, CONCENTRATE INTRAVENOUS at 08:15

## 2024-06-23 RX ADMIN — SODIUM CHLORIDE, PRESERVATIVE FREE 10 ML: 5 INJECTION INTRAVENOUS at 08:19

## 2024-06-23 RX ADMIN — Medication: at 08:18

## 2024-06-23 RX ADMIN — CEFTRIAXONE SODIUM 1000 MG: 1 INJECTION, POWDER, FOR SOLUTION INTRAMUSCULAR; INTRAVENOUS at 13:33

## 2024-06-23 RX ADMIN — POTASSIUM CHLORIDE 20 MEQ: 29.8 INJECTION, SOLUTION INTRAVENOUS at 09:13

## 2024-06-23 RX ADMIN — LACTULOSE 10 G: 10 SOLUTION ORAL at 08:19

## 2024-06-23 RX ADMIN — FUROSEMIDE 40 MG: 10 INJECTION, SOLUTION INTRAMUSCULAR; INTRAVENOUS at 08:19

## 2024-06-23 ASSESSMENT — PAIN SCALES - GENERAL
PAINLEVEL_OUTOF10: 0

## 2024-06-24 LAB
ALBUMIN SERPL-MCNC: 3.1 G/DL (ref 3.5–5)
ALBUMIN/GLOB SERPL: 1 (ref 1.1–2.2)
ALP SERPL-CCNC: 83 U/L (ref 45–117)
ALT SERPL-CCNC: 52 U/L (ref 12–78)
ANION GAP SERPL CALC-SCNC: 5 MMOL/L (ref 5–15)
AST SERPL-CCNC: 61 U/L (ref 15–37)
BILIRUB DIRECT SERPL-MCNC: 1.6 MG/DL (ref 0–0.2)
BILIRUB SERPL-MCNC: 2.7 MG/DL (ref 0.2–1)
BUN SERPL-MCNC: 23 MG/DL (ref 6–20)
BUN/CREAT SERPL: 41 (ref 12–20)
CALCIUM SERPL-MCNC: 8.8 MG/DL (ref 8.5–10.1)
CHLORIDE SERPL-SCNC: 106 MMOL/L (ref 97–108)
CO2 SERPL-SCNC: 30 MMOL/L (ref 21–32)
CREAT SERPL-MCNC: 0.56 MG/DL (ref 0.7–1.3)
GLOBULIN SER CALC-MCNC: 3 G/DL (ref 2–4)
GLUCOSE SERPL-MCNC: 96 MG/DL (ref 65–100)
MAGNESIUM SERPL-MCNC: 2.5 MG/DL (ref 1.6–2.4)
PHOSPHATE SERPL-MCNC: 2.3 MG/DL (ref 2.6–4.7)
POTASSIUM SERPL-SCNC: 3.5 MMOL/L (ref 3.5–5.1)
PROT SERPL-MCNC: 6.1 G/DL (ref 6.4–8.2)
SODIUM SERPL-SCNC: 141 MMOL/L (ref 136–145)

## 2024-06-24 PROCEDURE — 6370000000 HC RX 637 (ALT 250 FOR IP): Performed by: NURSE PRACTITIONER

## 2024-06-24 PROCEDURE — 6370000000 HC RX 637 (ALT 250 FOR IP): Performed by: HOSPITALIST

## 2024-06-24 PROCEDURE — 83735 ASSAY OF MAGNESIUM: CPT

## 2024-06-24 PROCEDURE — 94640 AIRWAY INHALATION TREATMENT: CPT

## 2024-06-24 PROCEDURE — 84100 ASSAY OF PHOSPHORUS: CPT

## 2024-06-24 PROCEDURE — C9113 INJ PANTOPRAZOLE SODIUM, VIA: HCPCS | Performed by: INTERNAL MEDICINE

## 2024-06-24 PROCEDURE — 97116 GAIT TRAINING THERAPY: CPT

## 2024-06-24 PROCEDURE — 6370000000 HC RX 637 (ALT 250 FOR IP): Performed by: STUDENT IN AN ORGANIZED HEALTH CARE EDUCATION/TRAINING PROGRAM

## 2024-06-24 PROCEDURE — 97530 THERAPEUTIC ACTIVITIES: CPT

## 2024-06-24 PROCEDURE — 6360000002 HC RX W HCPCS: Performed by: INTERNAL MEDICINE

## 2024-06-24 PROCEDURE — 80076 HEPATIC FUNCTION PANEL: CPT

## 2024-06-24 PROCEDURE — 6370000000 HC RX 637 (ALT 250 FOR IP): Performed by: GENERAL ACUTE CARE HOSPITAL

## 2024-06-24 PROCEDURE — 6360000002 HC RX W HCPCS: Performed by: HOSPITALIST

## 2024-06-24 PROCEDURE — 80048 BASIC METABOLIC PNL TOTAL CA: CPT

## 2024-06-24 PROCEDURE — 2580000003 HC RX 258: Performed by: INTERNAL MEDICINE

## 2024-06-24 PROCEDURE — 2700000000 HC OXYGEN THERAPY PER DAY

## 2024-06-24 PROCEDURE — 92507 TX SP LANG VOICE COMM INDIV: CPT

## 2024-06-24 PROCEDURE — 6360000002 HC RX W HCPCS: Performed by: GENERAL ACUTE CARE HOSPITAL

## 2024-06-24 PROCEDURE — 6370000000 HC RX 637 (ALT 250 FOR IP): Performed by: INTERNAL MEDICINE

## 2024-06-24 PROCEDURE — 36415 COLL VENOUS BLD VENIPUNCTURE: CPT

## 2024-06-24 PROCEDURE — 2060000000 HC ICU INTERMEDIATE R&B

## 2024-06-24 PROCEDURE — 2580000003 HC RX 258: Performed by: GENERAL ACUTE CARE HOSPITAL

## 2024-06-24 RX ORDER — LACTOBACILLUS RHAMNOSUS GG 10B CELL
1 CAPSULE ORAL DAILY
Status: DISCONTINUED | OUTPATIENT
Start: 2024-06-24 | End: 2024-06-25 | Stop reason: HOSPADM

## 2024-06-24 RX ORDER — LANOLIN ALCOHOL/MO/W.PET/CERES
6 CREAM (GRAM) TOPICAL NIGHTLY PRN
Status: DISCONTINUED | OUTPATIENT
Start: 2024-06-24 | End: 2024-06-25 | Stop reason: HOSPADM

## 2024-06-24 RX ORDER — SPIRONOLACTONE 25 MG/1
100 TABLET ORAL 2 TIMES DAILY
Status: DISCONTINUED | OUTPATIENT
Start: 2024-06-24 | End: 2024-06-25 | Stop reason: HOSPADM

## 2024-06-24 RX ADMIN — POTASSIUM & SODIUM PHOSPHATES POWDER PACK 280-160-250 MG 250 MG: 280-160-250 PACK at 16:34

## 2024-06-24 RX ADMIN — SODIUM CHLORIDE: 9 INJECTION, SOLUTION INTRAVENOUS at 11:34

## 2024-06-24 RX ADMIN — FUROSEMIDE 40 MG: 10 INJECTION, SOLUTION INTRAMUSCULAR; INTRAVENOUS at 09:02

## 2024-06-24 RX ADMIN — FUROSEMIDE 40 MG: 10 INJECTION, SOLUTION INTRAMUSCULAR; INTRAVENOUS at 17:29

## 2024-06-24 RX ADMIN — ARFORMOTEROL TARTRATE: 15 SOLUTION RESPIRATORY (INHALATION) at 08:30

## 2024-06-24 RX ADMIN — CARVEDILOL 3.12 MG: 3.12 TABLET, FILM COATED ORAL at 09:01

## 2024-06-24 RX ADMIN — Medication: at 09:04

## 2024-06-24 RX ADMIN — ARFORMOTEROL TARTRATE: 15 SOLUTION RESPIRATORY (INHALATION) at 19:33

## 2024-06-24 RX ADMIN — SODIUM CHLORIDE, PRESERVATIVE FREE 10 ML: 5 INJECTION INTRAVENOUS at 21:00

## 2024-06-24 RX ADMIN — POTASSIUM & SODIUM PHOSPHATES POWDER PACK 280-160-250 MG 250 MG: 280-160-250 PACK at 11:36

## 2024-06-24 RX ADMIN — MELATONIN 6 MG: at 22:33

## 2024-06-24 RX ADMIN — Medication: at 21:00

## 2024-06-24 RX ADMIN — LACTULOSE 10 G: 10 SOLUTION ORAL at 09:02

## 2024-06-24 RX ADMIN — SPIRONOLACTONE 50 MG: 25 TABLET ORAL at 09:01

## 2024-06-24 RX ADMIN — SODIUM CHLORIDE, PRESERVATIVE FREE 10 ML: 5 INJECTION INTRAVENOUS at 16:37

## 2024-06-24 RX ADMIN — SODIUM CHLORIDE, PRESERVATIVE FREE 40 MG: 5 INJECTION INTRAVENOUS at 09:02

## 2024-06-24 RX ADMIN — Medication: at 15:03

## 2024-06-24 RX ADMIN — POTASSIUM & SODIUM PHOSPHATES POWDER PACK 280-160-250 MG 250 MG: 280-160-250 PACK at 20:59

## 2024-06-24 RX ADMIN — CEFTRIAXONE SODIUM 1000 MG: 1 INJECTION, POWDER, FOR SOLUTION INTRAMUSCULAR; INTRAVENOUS at 11:36

## 2024-06-24 RX ADMIN — SPIRONOLACTONE 100 MG: 25 TABLET ORAL at 16:35

## 2024-06-24 RX ADMIN — Medication 1 CAPSULE: at 16:34

## 2024-06-24 RX ADMIN — RIFAXIMIN 550 MG: 550 TABLET ORAL at 09:02

## 2024-06-24 RX ADMIN — SODIUM CHLORIDE, PRESERVATIVE FREE 40 MG: 5 INJECTION INTRAVENOUS at 20:59

## 2024-06-24 RX ADMIN — SODIUM CHLORIDE, PRESERVATIVE FREE 10 ML: 5 INJECTION INTRAVENOUS at 09:02

## 2024-06-24 RX ADMIN — RIFAXIMIN 550 MG: 550 TABLET ORAL at 20:59

## 2024-06-24 RX ADMIN — CARVEDILOL 3.12 MG: 3.12 TABLET, FILM COATED ORAL at 16:34

## 2024-06-24 NOTE — CARE COORDINATION
Care Management Initial Assessment       RUR: 18% \"medium risk\"  Readmission? No  1st IM letter given? Yes, given by Patient Access Team on 6/13/24  1st  letter given: N/A    Initial note - 1604 PM: Chart reviewed. CM met with pt at the bedside to introduce self and role. Verified contact information and demographics. Pt resides with pt friend/previous significant other in a one level home with 2 LETI. Pt PCP is Dr. Gonzalez Manzo with the last visit being within the last three months. Preferred pharmacy is [facility] in [place], [place]. Pt has no hx of HH or a SNF stay. Pt is independent with ADL's and has no DME needs as pt has a cane and walker at home. Pt is not an active . Pt friend/previous significant other transport pt to and from medical appointments. Pt has no ACP on file; pt is a FULL code.     CM discussed PT/OT recommendation of HH services and a RW with pt. Pt politely declined HH services and has a RW at home. Pt is inquiring about getting a recliner; CM discussed pt will want to go to pt pharmacy or PCP to seek a new recliner. CM will put resources on pt AVS for assistance. Full assessment below:     06/24/24 1604   Service Assessment   Patient Orientation Alert and Oriented;Person;Place;Situation;Self   Cognition Alert   History Provided By Patient   Primary Caregiver Self   Support Systems Spouse/Significant Other   Patient's Healthcare Decision Maker is: Legal Next of Kin   PCP Verified by CM Yes  (Dr. Gonzalez Manzo)   Last Visit to PCP Within last 3 months   Prior Functional Level Independent in ADLs/IADLs   Current Functional Level Independent in ADLs/IADLs   Can patient return to prior living arrangement Yes   Family able to assist with home care needs: Yes   Would you like for me to discuss the discharge plan with any other family members/significant others, and if so, who? Yes  (Lady Mathis (ex-spouse), [phone])   Financial Resources Medicare;Medicaid  (Primary:

## 2024-06-25 ENCOUNTER — APPOINTMENT (OUTPATIENT)
Facility: HOSPITAL | Age: 64
DRG: 432 | End: 2024-06-25
Payer: MEDICARE

## 2024-06-25 VITALS
DIASTOLIC BLOOD PRESSURE: 68 MMHG | OXYGEN SATURATION: 97 % | BODY MASS INDEX: 42.6 KG/M2 | HEART RATE: 95 BPM | WEIGHT: 271.39 LBS | SYSTOLIC BLOOD PRESSURE: 101 MMHG | HEIGHT: 67 IN | TEMPERATURE: 98.1 F | RESPIRATION RATE: 18 BRPM

## 2024-06-25 LAB
ALBUMIN FLD-MCNC: 0.9 G/DL
ALBUMIN SERPL-MCNC: 3.1 G/DL (ref 3.5–5)
ALBUMIN/GLOB SERPL: 1.1 (ref 1.1–2.2)
ALP SERPL-CCNC: 92 U/L (ref 45–117)
ALT SERPL-CCNC: 53 U/L (ref 12–78)
ANION GAP SERPL CALC-SCNC: 6 MMOL/L (ref 5–15)
APPEARANCE FLD: ABNORMAL
AST SERPL-CCNC: 59 U/L (ref 15–37)
BILIRUB SERPL-MCNC: 2.7 MG/DL (ref 0.2–1)
BUN SERPL-MCNC: 25 MG/DL (ref 6–20)
BUN/CREAT SERPL: 38 (ref 12–20)
CALCIUM SERPL-MCNC: 8.8 MG/DL (ref 8.5–10.1)
CHLORIDE SERPL-SCNC: 105 MMOL/L (ref 97–108)
CO2 SERPL-SCNC: 29 MMOL/L (ref 21–32)
COLOR FLD: YELLOW
CREAT SERPL-MCNC: 0.65 MG/DL (ref 0.7–1.3)
ERYTHROCYTE [DISTWIDTH] IN BLOOD BY AUTOMATED COUNT: 15.2 % (ref 11.5–14.5)
GLOBULIN SER CALC-MCNC: 2.9 G/DL (ref 2–4)
GLUCOSE SERPL-MCNC: 95 MG/DL (ref 65–100)
HCT VFR BLD AUTO: 28.2 % (ref 36.6–50.3)
HGB BLD-MCNC: 8.7 G/DL (ref 12.1–17)
INR PPP: 1.2 (ref 0.9–1.1)
LDH SERPL L TO P-CCNC: 254 U/L (ref 85–241)
LYMPHOCYTES NFR FLD: 49 %
MAGNESIUM SERPL-MCNC: 2.5 MG/DL (ref 1.6–2.4)
MCH RBC QN AUTO: 30 PG (ref 26–34)
MCHC RBC AUTO-ENTMCNC: 30.9 G/DL (ref 30–36.5)
MCV RBC AUTO: 97.2 FL (ref 80–99)
MESOTHL CELL NFR FLD: 8 %
MONOS+MACROS NFR FLD: 17 %
NEUTROPHILS NFR FLD: 26 %
NRBC # BLD: 0 K/UL (ref 0–0.01)
NRBC BLD-RTO: 0 PER 100 WBC
NUC CELL # FLD: 290 /CU MM
PHOSPHATE SERPL-MCNC: 2.6 MG/DL (ref 2.6–4.7)
PLATELET # BLD AUTO: 74 K/UL (ref 150–400)
PMV BLD AUTO: 12.6 FL (ref 8.9–12.9)
POTASSIUM SERPL-SCNC: 3.4 MMOL/L (ref 3.5–5.1)
PROT FLD-MCNC: 1.2 G/DL
PROT SERPL-MCNC: 6 G/DL (ref 6.4–8.2)
PROTHROMBIN TIME: 12.3 SEC (ref 9–11.1)
RBC # BLD AUTO: 2.9 M/UL (ref 4.1–5.7)
RBC # FLD: >100 /CU MM
SODIUM SERPL-SCNC: 140 MMOL/L (ref 136–145)
SPECIMEN SOURCE FLD: ABNORMAL
SPECIMEN SOURCE FLD: NORMAL
SPECIMEN SOURCE FLD: NORMAL
WBC # BLD AUTO: 5.5 K/UL (ref 4.1–11.1)

## 2024-06-25 PROCEDURE — 83735 ASSAY OF MAGNESIUM: CPT

## 2024-06-25 PROCEDURE — 88112 CYTOPATH CELL ENHANCE TECH: CPT

## 2024-06-25 PROCEDURE — 36415 COLL VENOUS BLD VENIPUNCTURE: CPT

## 2024-06-25 PROCEDURE — 80053 COMPREHEN METABOLIC PANEL: CPT

## 2024-06-25 PROCEDURE — 2580000003 HC RX 258: Performed by: INTERNAL MEDICINE

## 2024-06-25 PROCEDURE — 89050 BODY FLUID CELL COUNT: CPT

## 2024-06-25 PROCEDURE — 85027 COMPLETE CBC AUTOMATED: CPT

## 2024-06-25 PROCEDURE — 6370000000 HC RX 637 (ALT 250 FOR IP): Performed by: INTERNAL MEDICINE

## 2024-06-25 PROCEDURE — 88305 TISSUE EXAM BY PATHOLOGIST: CPT

## 2024-06-25 PROCEDURE — 85610 PROTHROMBIN TIME: CPT

## 2024-06-25 PROCEDURE — 82042 OTHER SOURCE ALBUMIN QUAN EA: CPT

## 2024-06-25 PROCEDURE — 2709999900 US GUIDED PARACENTESIS

## 2024-06-25 PROCEDURE — 6370000000 HC RX 637 (ALT 250 FOR IP): Performed by: GENERAL ACUTE CARE HOSPITAL

## 2024-06-25 PROCEDURE — 97530 THERAPEUTIC ACTIVITIES: CPT

## 2024-06-25 PROCEDURE — 87205 SMEAR GRAM STAIN: CPT

## 2024-06-25 PROCEDURE — C9113 INJ PANTOPRAZOLE SODIUM, VIA: HCPCS | Performed by: INTERNAL MEDICINE

## 2024-06-25 PROCEDURE — 6360000002 HC RX W HCPCS: Performed by: INTERNAL MEDICINE

## 2024-06-25 PROCEDURE — 0W9G3ZZ DRAINAGE OF PERITONEAL CAVITY, PERCUTANEOUS APPROACH: ICD-10-PCS | Performed by: INTERNAL MEDICINE

## 2024-06-25 PROCEDURE — 84100 ASSAY OF PHOSPHORUS: CPT

## 2024-06-25 PROCEDURE — 83615 LACTATE (LD) (LDH) ENZYME: CPT

## 2024-06-25 PROCEDURE — 84157 ASSAY OF PROTEIN OTHER: CPT

## 2024-06-25 PROCEDURE — 2700000000 HC OXYGEN THERAPY PER DAY

## 2024-06-25 PROCEDURE — 6360000002 HC RX W HCPCS: Performed by: GENERAL ACUTE CARE HOSPITAL

## 2024-06-25 PROCEDURE — 87070 CULTURE OTHR SPECIMN AEROBIC: CPT

## 2024-06-25 PROCEDURE — 82105 ALPHA-FETOPROTEIN SERUM: CPT

## 2024-06-25 PROCEDURE — 97535 SELF CARE MNGMENT TRAINING: CPT | Performed by: OCCUPATIONAL THERAPIST

## 2024-06-25 PROCEDURE — 97116 GAIT TRAINING THERAPY: CPT

## 2024-06-25 RX ORDER — CARVEDILOL 3.12 MG/1
3.12 TABLET ORAL 2 TIMES DAILY WITH MEALS
Qty: 60 TABLET | Refills: 3 | Status: SHIPPED | OUTPATIENT
Start: 2024-06-25

## 2024-06-25 RX ORDER — CIPROFLOXACIN 500 MG/1
500 TABLET, FILM COATED ORAL
Status: DISCONTINUED | OUTPATIENT
Start: 2024-06-25 | End: 2024-06-25 | Stop reason: HOSPADM

## 2024-06-25 RX ORDER — LACTULOSE 10 G/15ML
10 SOLUTION ORAL DAILY
Qty: 450 ML | Refills: 3 | Status: SHIPPED | OUTPATIENT
Start: 2024-06-26 | End: 2024-07-26

## 2024-06-25 RX ORDER — CIPROFLOXACIN 500 MG/1
500 TABLET, FILM COATED ORAL
Qty: 30 TABLET | Refills: 3 | Status: SHIPPED | OUTPATIENT
Start: 2024-06-26 | End: 2024-06-25

## 2024-06-25 RX ORDER — CIPROFLOXACIN 500 MG/1
TABLET, FILM COATED ORAL
Qty: 104 TABLET | Refills: 2 | Status: SHIPPED | OUTPATIENT
Start: 2024-06-25 | End: 2024-09-30

## 2024-06-25 RX ORDER — FUROSEMIDE 40 MG/1
40 TABLET ORAL 2 TIMES DAILY
Qty: 60 TABLET | Refills: 3 | Status: SHIPPED | OUTPATIENT
Start: 2024-06-25 | End: 2024-07-25

## 2024-06-25 RX ORDER — POTASSIUM CHLORIDE 750 MG/1
40 TABLET, FILM COATED, EXTENDED RELEASE ORAL ONCE
Status: COMPLETED | OUTPATIENT
Start: 2024-06-25 | End: 2024-06-25

## 2024-06-25 RX ORDER — PANTOPRAZOLE SODIUM 40 MG/1
TABLET, DELAYED RELEASE ORAL
Qty: 150 TABLET | Refills: 0 | Status: SHIPPED | OUTPATIENT
Start: 2024-06-25 | End: 2024-10-23

## 2024-06-25 RX ORDER — SPIRONOLACTONE 100 MG/1
100 TABLET, FILM COATED ORAL 2 TIMES DAILY
Qty: 60 TABLET | Refills: 3 | Status: SHIPPED | OUTPATIENT
Start: 2024-06-25

## 2024-06-25 RX ORDER — LACTOBACILLUS RHAMNOSUS GG 10B CELL
1 CAPSULE ORAL DAILY
Qty: 30 CAPSULE | Refills: 0 | Status: SHIPPED | OUTPATIENT
Start: 2024-06-26 | End: 2024-07-26

## 2024-06-25 RX ORDER — PANTOPRAZOLE SODIUM 40 MG/1
40 TABLET, DELAYED RELEASE ORAL
Status: DISCONTINUED | OUTPATIENT
Start: 2024-06-25 | End: 2024-06-25 | Stop reason: HOSPADM

## 2024-06-25 RX ORDER — LANOLIN ALCOHOL/MO/W.PET/CERES
6 CREAM (GRAM) TOPICAL NIGHTLY PRN
Status: DISCONTINUED | OUTPATIENT
Start: 2024-06-25 | End: 2024-06-25 | Stop reason: HOSPADM

## 2024-06-25 RX ADMIN — POTASSIUM CHLORIDE 40 MEQ: 750 TABLET, FILM COATED, EXTENDED RELEASE ORAL at 11:21

## 2024-06-25 RX ADMIN — FUROSEMIDE 40 MG: 10 INJECTION, SOLUTION INTRAMUSCULAR; INTRAVENOUS at 08:44

## 2024-06-25 RX ADMIN — SPIRONOLACTONE 100 MG: 25 TABLET ORAL at 08:44

## 2024-06-25 RX ADMIN — SODIUM CHLORIDE, PRESERVATIVE FREE 10 ML: 5 INJECTION INTRAVENOUS at 08:44

## 2024-06-25 RX ADMIN — RIFAXIMIN 550 MG: 550 TABLET ORAL at 08:44

## 2024-06-25 RX ADMIN — CARVEDILOL 3.12 MG: 3.12 TABLET, FILM COATED ORAL at 08:44

## 2024-06-25 RX ADMIN — CIPROFLOXACIN HYDROCHLORIDE 500 MG: 500 TABLET, FILM COATED ORAL at 11:21

## 2024-06-25 RX ADMIN — SODIUM CHLORIDE, PRESERVATIVE FREE 40 MG: 5 INJECTION INTRAVENOUS at 08:44

## 2024-06-25 RX ADMIN — Medication 1 CAPSULE: at 08:44

## 2024-06-25 RX ADMIN — Medication: at 08:43

## 2024-06-25 NOTE — DISCHARGE SUMMARY
Discharge Summary    Name: Sveta Eduardo Jr.  [id number]  YOB: 1960 (Age: 64 y.o.)   Date of Admission: 6/13/2024  Date of Discharge: 6/25/2024  Attending Physician: No att. providers found      Discharge Diagnosis:       Consultations:  IP CONSULT TO GI  IP CONSULT TO INTERVENTIONAL RADIOLOGY  IP WOUND CARE NURSE CONSULT TO EVAL  IP CONSULT TO DIETITIAN  IP WOUND CARE NURSE CONSULT TO EVAL      Brief Admission History/Reason for Admission Per Elmer Fontanez MD:         Brief Hospital Course by Main Problems:     Cipro BID for 7 days to cover for S Maltophilia as per d/w Dr Rosario, then resume 500 mg daily for SBP ppx  Pt singed AMA  Avoid K supplement given high dose off aldactone     Discharge Exam:  Patient seen and examined by me on discharge day.  Pertinent Findings:  Gen:    Not in distress  Chest: Clear lungs  CVS:   Regular rhythm.  No edema  Abd:  Soft, not distended, not tender  Neuro: alert       Patient Vitals for the past 24 hrs:   BP Temp Temp src Pulse Resp SpO2 Weight   06/25/24 1445 101/68 98.1 °F (36.7 °C) Oral 95 18 97 % --   06/25/24 1420 (!) 123/56 -- -- 93 17 95 % --   06/25/24 1410 (!) 113/44 -- -- 93 15 93 % --   06/25/24 1400 (!) 114/49 -- -- 95 16 94 % --   06/25/24 1345 127/66 98 °F (36.7 °C) Oral 93 -- 95 % --   06/25/24 1140 102/63 -- -- 97 -- 98 % --   06/25/24 1115 (!) 100/56 98 °F (36.7 °C) Oral 95 -- 97 % --   06/25/24 0850 113/64 -- -- 97 -- 97 % --   06/25/24 0740 111/66 98.5 °F (36.9 °C) Oral 96 17 99 % --   06/25/24 0619 -- -- -- -- -- -- 123.1 kg (271 lb 6.2 oz)   06/25/24 0322 114/72 98.4 °F (36.9 °C) Oral 94 16 98 % --   06/24/24 2237 115/74 98.1 °F (36.7 °C) Oral 97 18 95 % --   06/24/24 1941 116/69 98.2 °F (36.8 °C) Oral 94 20 99 % --         Discharge/Recent Laboratory Results:  Recent Labs     06/25/24  0347      K 3.4*      CO2 29   BUN 25*   CREATININE 0.65*   GLUCOSE 95   CALCIUM 8.8   PHOS 2.6   MG 2.5*

## 2024-06-25 NOTE — PROGRESS NOTES
Susan Swanson, CAR[facility]                       [phone] cell                 Monday-Thursday 7:30-5:00                               GI PROGRESS NOTE        NAME:   Sveta Eduardo Jr.       :    1960       MRN:    [id number]     Assessment/Plan     GI consult for GIB. Sveta Eduardo is a 65 y/o male with hx of COPD, anxiety/depression, GERD and chronic Hepatitis C cirrhosis (risk factors IVDU and tattoos - reports of people getting HCV from same  in 1970s). He completed 12-week tx with [facility] in . He was being followed by Dr. Levy and his team at [facility] but has not been seen by them since 2017. Had colonoscopy done with [facility] but never seen for office visit. Last EGD was 2016 with Dr. Levy: portal gastropathy, no varices; 2 year recall recommended.Last colonoscopy was 2017 by Dr. Claros.   He reports that he started having n/v/d that started last night. He had an episode of hematemesis, vomited 1/2 pint of bright red blood. He also had several episodes of diarrhea, states that stools were dark-appearing. He also c/o abdominal pain. States he's felt feverish. Denies EtOH use, NSAID use. Not taking any blood thinners.  Mild diffuse abdominal TTP, No asterix on exam. A&O x3. States he has not eaten anything in the past day and a half.   Labs: WBC 33.8 Hgb 11.6  BUN 55 Cr 1.56 eGFR 49 Lactic acid 5.71 ALT 58 AST 77 ALP 88 Albumin 3.1 Tbili 1.7 +FOBT   CT A/P WO contrast: Cirrhotic liver morphology with new small to moderate ascites throughout the abdomen and pelvis. Diffuse mesenteric edema.   Started on IV Flagyl and Cefepime     Impression  Cirrhosis with ascites  Hematemesis  Melena    EGD 2024 by Dr. Martines:  Impressions:    - Large esophageal varices with red kwesi sign, presumed variceal hemorrhage s/p band ligation x 3 spots with excellent hemostasis  - 
                 GI PROGRESS NOTE        NAME:   Sveta Eduardo Jr.       :    1960       MRN:    [id number]     Assessment/Plan     Sveta Eduardo is a 63yo with decompensated HCV cirrhosis s/p SFR MELDNa 11 c/b ascites now with SBP (leukocytic ascites, not PMNs), variceal hemorrhage s/p banding c/b re-hemorrhage s/p unsuccessful TIPS attempt due to complete acute portal & superior mesenteric & splenic vein thromboses with visualized esophageal & gastric varices CTA 6/15, with spontaneous bacterial peritonitis (while on ABX para wbcs >500). Recommend ceftriaxone 2g daily.     Recommend 1g/kg on  to complete SBP protocol but defer to primary team.    Continue antibiotics for 5d AFTER hemostasis achieved. Will need life-long cipro 500mg daily for SBP Ppx.    Octreotide gtt indefinitely until TIPS. If he decompensates further, recommend surgical consult for surgical distal-splenorenal shunt or preferably a splenectomy.    He is at high risk of decompensation if not death, recommend palliative care be involved to help his family through this.    No further bleeding or instability per RN     Patient Active Problem List   Diagnosis    Obesity    Gastroesophageal reflux disease without esophagitis    Migraine    Respiratory failure with hypoxia (HCC)    Cirrhosis (HCC)    Severe obesity (BMI 35.0-39.9) with comorbidity (HCC)    Depression, major, recurrent, mild (HCC)    Insomnia disorder with non-sleep disorder mental comorbidity    Brain aneurysm    Chronic hepatitis C without hepatic coma (HCC)    Essential hypertension    Chronic back pain    Anxiety and depression    Gastrointestinal bleed    UGI bleed    GI bleed       Subjective:     Intubated, sedated but awake and comfortable      Objective:     VITALS:   Last 24hrs VS reviewed since prior hospitalist progress note. Most recent are:  Vitals:    24 0900   BP:    Pulse: 62   Resp: 16   Temp:    SpO2: 93%       Intake/Output Summary (Last 24 
         Gastroenterology Daily Progress Note (MARIIA Ellison for Dr. Estrada)   [facility]    Admit Date: 6/13/2024     Follow up of variceal bleed    Subjective:       Admitted yesterday with hematemesis. Has cirrhosis from hep C s/p treatment and eradication, followed previously by Dr. Levy but not yamelin x 2017.      EGD 6/13/24 at 12:50pm by Dr. Martines revealed grade 3 varices with red [place] and a stomach full of blood.  5 bands were put on 3 varices.      Started on octreotide drip and neosynepthrine for hypotension.      Hgb 11.6-->9.9-->9.2-->8.1-->8.3-->7.8    Having multiple dark loose stools but not red per bedside nursing    WBC 33-->23-->40-->51-->48-->29-->16  Lactic acid 2.1  Tm 99.3  On ceftriaxone 1gm/24hrs    CT showed small to moderate amount of ascites    Current Facility-Administered Medications   Medication Dose Route Frequency    lidocaine 4 % external patch 1 patch  1 patch TransDERmal Daily    0.45 % sodium chloride infusion   IntraVENous Continuous    fentaNYL (SUBLIMAZE) injection 12.5 mcg  12.5 mcg IntraVENous Q4H PRN    [START ON 6/15/2024] cefTRIAXone (ROCEPHIN) 2,000 mg in sodium chloride 0.9 % 50 mL IVPB (mini-bag)  2,000 mg IntraVENous Q24H    albumin human 25% IV solution 25 g  25 g IntraVENous Q6H    0.9 % sodium chloride infusion   IntraVENous PRN    octreotide (SANDOSTATIN) 500 mcg in sodium chloride 0.9 % 100 mL infusion  50 mcg/hr IntraVENous Continuous    pantoprazole (PROTONIX) 40 mg in sodium chloride 0.9 % 50 mL (mini-bag) infusion  8 mg/hr IntraVENous Continuous    phenylephrine (HERRERA-SYNEPHRINE) 50 mg in sodium chloride 0.9 % 250 mL infusion ([id number])   mcg/min IntraVENous Continuous    midazolam PF (VERSED) injection 1 mg  1 mg IntraVENous Once    sodium chloride flush 0.9 % injection 5-40 mL  5-40 mL IntraVENous 2 times per day    sodium chloride flush 0.9 % injection 5-40 mL  5-40 mL IntraVENous PRN    0.9 % sodium chloride 
      Hospitalist Progress Note    NAME:   Sveta Eduardo Jr.   : 1960   MRN: [id number]     Date/Time: 2024    Patient PCP: Gonzalez Manzo MD      Assessment / Plan:    In summary, patient has been admitted for UGIB . EGD revealed 3 varices which were banded. Patient admitted to ICU post-procedure on low dose vasopressors. Admitting diagnoses of UGIB, esophageal varices, ABL, hemorrhagic shock. Treated empirically with ceftriaxone for SBP. He has failed TIPS attempts x 2 and CT has shown extensive portal vein thrombus and  Imaging reviewed by IR and decision made to not attempt portal vein recanalization at this time. He was intubated but has been extubated now, able to wean down oxygen.     Upper GIB 2/2 varices   s/p bands 24.   ON 6/15 attempted TIPS- unsuccessful due to portal vein thrombosis-  S/p transfusion of 2 Plts, 1 plasma unit, 11 cryo, 3 PRBCs  No active bleeding. Continue Protonix.      Portal vein thrombus, Acute   unable to recanalize portal vein and failed TIPS due to thrombus involving the entire splenic vein, upper super mesenteric vein, and main/right/left portal veins. , plan was to reattempted . On  Imaging reviewed by IR and decision made to not attempt portal vein recanalization at this time   Monitor daily CBC, continue PPI     Hepatic encephalopathy  Cirrhosis  HCV +  Etoh  Continue rifaximin and lactulose.   Cont BB  Still drinks etoh, but now socially, 1-2 beers daily, advised abstinence      Volume overload/ Ascites   Continue diuresis with IV lasix and PO aldactone.   Completed CTX 7 days    Will need life-long cipro 500mg daily for SBP Ppx.   IR consult for paracentesis       Nutritional support  diet approved by GI ok to feed advance as tolerated.   off TPN     Hemorrhagic shock 2/2 GIB.   Resolved. Stable Hb now, no melena or rectal bleeding   ECHO: EF of 75 - 80%. Diastolic dysfunction. Moderately elevated RVSP, consistent with moderate 
      Hospitalist Progress Note    NAME:   Sveta Eduardo Jr.   : 1960   MRN: [id number]     Date/Time: 2024    Patient PCP: Gonzalez Manzo MD      Assessment / Plan:    In summary, patient has been admitted for UGIB . EGD revealed 3 varices which were banded. Patient admitted to ICU post-procedure on low dose vasopressors. Admitting diagnoses of UGIB, esophageal varices, ABL, hemorrhagic shock. Treated empirically with ceftriaxone for SBP. He has failed TIPS attempts x 2 and CT has shown extensive portal vein thrombus and  Imaging reviewed by IR and decision made to not attempt portal vein recanalization at this time. He was intubated but has been extubated now, able to wean down oxygen.     Upper GIB 2/2 varices   s/p bands 24.   ON 6/15 attempted TIPS- unsuccessful due to portal vein thrombosis-  S/p transfusion of 2 Plts, 1 plasma unit, 11 cryo, 3 PRBCs  No active bleeding. Continue Protonix.      Portal vein thrombus, Acute   unable to recanalize portal vein and failed TIPS due to thrombus involving the entire splenic vein, upper super mesenteric vein, and main/right/left portal veins. , plan was to reattempted . On  Imaging reviewed by IR and decision made to not attempt portal vein recanalization at this time   Monitor daily CBC, continue PPI     Hepatic encephalopathy  Cirrhosis  HCV +  Etoh  Continue rifaximin and lactulose.   Cont BB  Still drinks etoh, but now socially, 1-2 beers daily, advised abstinence      Volume overload/ Ascites   Diuretics   Continue diuresis with IV lasix and PO aldactone.   Completed CTX 7 days    Will need life-long cipro 500mg daily for SBP Ppx.   IR consult for paracentesis       Nutritional support  diet approved by GI ok to feed advance as tolerated.   off TPN     Hemorrhagic shock 2/2 GIB.   Resolved. Stable Hb now, no melena or rectal bleeding   ECHO: EF of 75 - 80%. Diastolic dysfunction. Moderately elevated RVSP, consistent with 
     Nutrition Note    Chart reviewed, case discussed during CCU rounds.  Per GI no plans to to place NGT/OGT due to recent variceal banding in the esophagus.  Plan is to start TPN tonight.  Recommend initiate 115g AA and 150g Dextrose (provides 970kcals).  He remains on propofol, no need for lipids at this time.     Electronically signed by Lisa Robbins RD, [facility] on 6/19/24 at 12:56 PM EDT    Contact: ext [phone]    
     [facility] PROGRESS NOTE.      Name: Sveta Eduardo Jr.   : 1960   MRN: [id number]   Date: 2024      Chief Complaint   Patient presents with    Vomiting Blood     Pt c/o coffee ground emesis and stool x 2 days. Pt pale, diaphoretic. Pt states he has cirrhosis of the liver and has been sober from alcohol for \"3-4 years but has a beer every now and then.\" Pt also states he wears oxygen at home PRN for COPD    Rectal Bleeding       Reason for ICU admission: GIB    Hospital Course:     64 M with history of cirrhosis/HCV +.   24 -  Admitted for UGIB . EGD revealed 3 varices which were banded. Patient admitted to ICU post-procedure on low dose vasopressors. Admitting diagnoses of UGIB, esophageal varices, ABL, hemorrhagic shock. Treated empirically with ceftriaxone for SBP     - sitting comfortably on the bed. Denies any complaints.   06/15 NAD. Has received a total of 5 units RBC. Also cryoprecipitate and platelets. 2 units platelets on this day. TIPS planned  06/15 TIPS unsuccessful due to portal vein thrombosis which is likely precipitant for esophageal variceal bleeding. Pt was intubated for procedure and remained intubated on return to ICU. Discussed with Dr Connors of IR - plan attempt portal vein recanalization and TIPS . Patient to remain intubated and A-line, CVL placed for that procedure  06/15 CTA abd/pelvis: IMPRESSION: Acute appearing portal vein thrombus involving the entire splenic vein, upper super mesenteric vein, and main/right/left portal veins. Small volume ascites and small gastric and esophageal varices. No evidence of bowel ischemia.   RASS -2, + F/C. Synchronous with vent. Hemodynamically stable. No evidence of ongoing bleeding   Imaging reviewed by IR and decision made to not attempt portal vein recanalization at this time. Patient remains intubated and sedated with intermittent agitation and episodic hypotension on low dose 
  ICU Progress Note        Subjective:    - sitting comfortably on the bed. Denies any complaints.   06/15 NAD. Has received a total of 5 units RBC. Also cryoprecipitate and platelets. 2 units platelets on this day. TIPS planned      Vital Signs:    /63   Pulse 65   Temp 98.2 °F (36.8 °C)   Resp 23   Ht 1.702 m (5' 7\")   Wt 124.7 kg (275 lb)   SpO2 90%   BMI 43.07 kg/m²             Temp (24hrs), Av.2 °F (37.3 °C), Min:98 °F (36.7 °C), Max:100 °F (37.8 °C)       Intake/Output:   Last shift:      06/15 07 - 06/15 1900  In: 1000 [I.V.:1000]  Out: 465 [Urine:465]  Last 3 shifts: 1901 - 06/15 0700  In: 8181.5 [P.O.:800; I.V.:3216.2]  Out: 3645 [Urine:3645]    Intake/Output Summary (Last 24 hours) at 6/15/2024 1554  Last data filed at 6/15/2024 1530  Gross per 24 hour   Intake 5505.68 ml   Output 2290 ml   Net 3215.68 ml         Physical Exam:    General: Cognition intact, NAD  HEENT:  NCAT, sclerae white  Neck: no JVD  Resp:  Clear  CV:  Sinus, reg, no M  GI:  Obese, soft, + BS  Extremities:  Warm, no edema  Skin:  Warm; no rashes/ lesions noted  Neurologic:  alert, awake and follows commands.     DATA:     Current Facility-Administered Medications   Medication Dose Route Frequency    0.9 % sodium chloride infusion   IntraVENous PRN    heparin 2 units/mL solution in 0.9% sodium chloride  200 mL Irrigation Once    iopamidol (ISOVUE-370) 76 % injection 300 mL  300 mL Other Once    lidocaine 2 % injection 20 mL  20 mL IntraDERmal Once    pantoprazole (PROTONIX) 40 mg in sodium chloride (PF) 0.9 % 10 mL injection  40 mg IntraVENous Q12H    dextrose 5 % with KCl 20 mEq infusion   IntraVENous Continuous    0.9 % sodium chloride infusion   IntraVENous PRN    0.9 % sodium chloride infusion   IntraVENous PRN    lidocaine 4 % external patch 1 patch  1 patch TransDERmal Daily    fentaNYL (SUBLIMAZE) injection 12.5 mcg  12.5 mcg IntraVENous Q4H PRN    cefTRIAXone (ROCEPHIN) 2,000 mg in sodium chloride 
  ICU Progress Note      SUMMARY OF ADMSSION:  64 M with history of cirrhosis/HCV +, adm for UGIB . EGD revealed 3 varices which were banded. Patient admitted to ICU post-procedure on low dose vasopressors. Admitting diagnoses of UGIB, esophageal varices, ABL, hemorrhagic shock. Treated empirically with ceftriaxone for SBP    Subjective/Hospital course:    - sitting comfortably on the bed. Denies any complaints.   06/15 NAD. Has received a total of 5 units RBC. Also cryoprecipitate and platelets. 2 units platelets on this day. TIPS planned  06/15 TIPS unsuccessful due to portal vein thrombosis which is likely precipitant for esophageal variceal bleeding. Pt was intubated for procedure and remained intubated on return to ICU. Discussed with Dr Connors of IR - plan attempt portal vein recanalization and TIPS . Patient to remain intubated and A-line, CVL placed for that procedure  06/15 CTA abd/pelvis: IMPRESSION: Acute appearing portal vein thrombus involving the entire splenic vein, upper super mesenteric vein, and main/right/left portal veins. Small volume ascites and small gastric and esophageal varices. No evidence of bowel ischemia.   RASS -2, + F/C. Synchronous with vent. Hemodynamically stable. No evidence of ongoing bleeding   Imaging reviewed by IR and decision made to not attempt portal vein recanalization at this time. Patient remains intubated and sedated with intermittent agitation and episodic hypotension on low dose vasopressors. Furosemide X 1 dose    Past 24 hrs:  Intubated, sedated on propofol and fentanyl. Increased O2 req o/n. More edema on CXR. Net negative just over 500mL w/ diuresis.       Vital Signs:    BP 97/61   Pulse 59   Temp 97.6 °F (36.4 °C) (Axillary)   Resp 16   Ht 1.702 m (5' 7.01\")   Wt 127.8 kg (281 lb 12 oz)   SpO2 95%   BMI 44.12 kg/m²             Temp (24hrs), Av.3 °F (36.8 °C), Min:97.6 °F (36.4 °C), Max:98.8 °F (37.1 °C)   
  ICU Progress Note      SUMMARY OF ADMSSION:  64 M with history of cirrhosis/HCV +, adm for UGIB . EGD revealed 3 varices which were banded. Patient admitted to ICU post-procedure on low dose vasopressors. Admitting diagnoses of UGIB, esophageal varices, ABL, hemorrhagic shock. Treated empirically with ceftriaxone for SBP prophylaxis    Subjective/Hospital course:    - sitting comfortably on the bed. Denies any complaints.   06/15 NAD. Has received a total of 5 units RBC. Also cryoprecipitate and platelets. 2 units platelets on this day. TIPS planned  06/15 TIPS unsuccessful due to portal vein thrombosis which is likely precipitant for esophageal variceal bleeding. Pt was intubated for procedure and remained intubated on return to ICU. Discussed with Dr Connors of IR - plan attempt portal vein recanalization and TIPS . Patient to remain intubated and A-line, CVL placed for that procedure  06/15 CTA abd/pelvis: IMPRESSION: Acute appearing portal vein thrombus involving the entire splenic vein, upper super mesenteric vein, and main/right/left portal veins. Small volume ascites and small gastric and esophageal varices. No evidence of bowel ischemia.   RASS -2, + F/C. Synchronous with vent. Hemodynamically stable. No evidence of ongoing bleeding      Vital Signs:    BP (!) 97/59   Pulse 69   Temp 98.1 °F (36.7 °C) (Axillary)   Resp 16   Ht 1.702 m (5' 7\")   Wt 128.6 kg (283 lb 8.2 oz)   SpO2 90%   BMI 44.40 kg/m²             Temp (24hrs), Av.3 °F (36.8 °C), Min:98 °F (36.7 °C), Max:98.8 °F (37.1 °C)       Intake/Output:   Last shift:       07 -  1900  In: 556.2 [I.V.:556.2]  Out: 155 [Urine:155]  Last 3 shifts: 1901 -  0700  In: 7311.5 [I.V.:4089.8]  Out: 2588 [Urine:2588]    Intake/Output Summary (Last 24 hours) at 2024 1247  Last data filed at 2024 1200  Gross per 24 hour   Intake 3362.01 ml   Output 1153 ml   Net 2209.01 ml         Physical 
  ICU Progress Note      SUMMARY OF ADMSSION:  64 M with history of cirrhosis/HCV +, adm for UGIB . EGD revealed 3 varices which were banded. Patient admitted to ICU post-procedure on low dose vasopressors. Admitting diagnoses of UGIB, esophageal varices, ABL, hemorrhagic shock. Treated empirically with ceftriaxone for SBP prophylaxis    Subjective/Hospital course:    - sitting comfortably on the bed. Denies any complaints.   06/15 NAD. Has received a total of 5 units RBC. Also cryoprecipitate and platelets. 2 units platelets on this day. TIPS planned  06/15 TIPS unsuccessful due to portal vein thrombosis which is likely precipitant for esophageal variceal bleeding. Pt was intubated for procedure and remained intubated on return to ICU. Discussed with Dr Connors of IR - plan attempt portal vein recanalization and TIPS . Patient to remain intubated and A-line, CVL placed for that procedure  06/15 CTA abd/pelvis: IMPRESSION: Acute appearing portal vein thrombus involving the entire splenic vein, upper super mesenteric vein, and main/right/left portal veins. Small volume ascites and small gastric and esophageal varices. No evidence of bowel ischemia.   RASS -2, + F/C. Synchronous with vent. Hemodynamically stable. No evidence of ongoing bleeding   Imaging reviewed by IR and decision made to not attempt portal vein recanalization at this time. Patient remains intubated and sedated with intermittent agitation and episodic hypotension on low dose vasopressors. Furosemide X 1 dose      Vital Signs:    BP (!) 101/57   Pulse 66   Temp 98.2 °F (36.8 °C) (Axillary)   Resp 16   Ht 1.702 m (5' 7\")   Wt 127.8 kg (281 lb 12 oz)   SpO2 93%   BMI 44.13 kg/m²             Temp (24hrs), Av.2 °F (36.8 °C), Min:98 °F (36.7 °C), Max:98.6 °F (37 °C)       Intake/Output:   Last shift:      701 - 1900  In: 102.6 [I.V.:102.6]  Out: 370 [Urine:370]  Last 3 shifts: 06/15 1901 -  
  ICU Progress Note      SUMMARY OF ADMSSION:  64 M with history of cirrhosis/HCV +, adm for UGIB 06/13. EGD revealed 3 varices which were banded. Patient admitted to ICU post-procedure on low dose vasopressors. Admitting diagnoses of UGIB, esophageal varices, ABL, hemorrhagic shock. Treated empirically with ceftriaxone for SBP    Subjective/Hospital course:   06/14 - sitting comfortably on the bed. Denies any complaints.   06/15 NAD. Has received a total of 5 units RBC. Also cryoprecipitate and platelets. 2 units platelets on this day. TIPS planned  06/15 TIPS unsuccessful due to portal vein thrombosis which is likely precipitant for esophageal variceal bleeding. Pt was intubated for procedure and remained intubated on return to ICU. Discussed with Dr Connors of IR - plan attempt portal vein recanalization and TIPS Monday 6/17. Patient to remain intubated and A-line, CVL placed for that procedure  06/15 CTA abd/pelvis: IMPRESSION: Acute appearing portal vein thrombus involving the entire splenic vein, upper super mesenteric vein, and main/right/left portal veins. Small volume ascites and small gastric and esophageal varices. No evidence of bowel ischemia.  06/16 RASS -2, + F/C. Synchronous with vent. Hemodynamically stable. No evidence of ongoing bleeding  06/17 Imaging reviewed by IR and decision made to not attempt portal vein recanalization at this time. Patient remains intubated and sedated with intermittent agitation and episodic hypotension on low dose vasopressors. Furosemide X 1 dose  06/18 Intubated, sedated on propofol and fentanyl. Increased O2 req o/n. More edema on CXR. Net negative just over 500mL w/ diuresis.   06/19 Remains intubated and sedated requiring high vent support with PEEP 12 cm H2O and FiO2 90%. Norepinephrine stopped this morning. Remains on octreotide infusion. Gastroenterology to address whether NGT/OGT can be placed for nutritional support - otherwise will need to start TPN      Vital 
  ICU Progress Note      SUMMARY OF ADMSSION:  64 M with history of cirrhosis/HCV +, adm for UGIB 06/13. EGD revealed 3 varices which were banded. Patient admitted to ICU post-procedure on low dose vasopressors. Admitting diagnoses of UGIB, esophageal varices, ABL, hemorrhagic shock. Treated empirically with ceftriaxone for SBP    Subjective/Hospital course:   06/14 - sitting comfortably on the bed. Denies any complaints.   06/15 NAD. Has received a total of 5 units RBC. Also cryoprecipitate and platelets. 2 units platelets on this day. TIPS planned  06/15 TIPS unsuccessful due to portal vein thrombosis which is likely precipitant for esophageal variceal bleeding. Pt was intubated for procedure and remained intubated on return to ICU. Discussed with Dr Connors of IR - plan attempt portal vein recanalization and TIPS Monday 6/17. Patient to remain intubated and A-line, CVL placed for that procedure  06/15 CTA abd/pelvis: IMPRESSION: Acute appearing portal vein thrombus involving the entire splenic vein, upper super mesenteric vein, and main/right/left portal veins. Small volume ascites and small gastric and esophageal varices. No evidence of bowel ischemia.  06/16 RASS -2, + F/C. Synchronous with vent. Hemodynamically stable. No evidence of ongoing bleeding  06/17 Imaging reviewed by IR and decision made to not attempt portal vein recanalization at this time. Patient remains intubated and sedated with intermittent agitation and episodic hypotension on low dose vasopressors. Furosemide X 1 dose  06/18 Intubated, sedated on propofol and fentanyl. Increased O2 req o/n. More edema on CXR. Net negative just over 500mL w/ diuresis.   06/19 Remains intubated and sedated requiring high vent support with PEEP 12 cm H2O and FiO2 90%. Norepinephrine stopped this morning. Remains on octreotide infusion. Gastroenterology to address whether NGT/OGT can be placed for nutritional support - otherwise will need to start TPN  06/20 At 
 attended Interdisciplinary rounds. Doctor discussed patient's treatment with staff.    Gaston Norrislain Intern  [facility]  [facility] [phone]  
 attended interdisciplinary rounds. Patient's medical status was discussed.    Rev. TRAE Cheema [facility]   Paging Service [phone]  
 reviewed the patient's chart prior to the visit. Mr. Eduardo is receiving medical care at this time.  will follow up at another time.    [facility] are available 24 hours a day as requested.     Rev. TRAE Cheema [facility]   Paging Service [phone])  
 visited patient while rounding CVICU.  offered silent prayer over patient as patient was resting peacefully.     Jenn Ventura  Intern [facility]  [facility]  Paging Service: [phone] (FRIDA)   
0700 - received report  from NATALY Andrews    0800 - patient assessment completed. Patient is non responsive to pain after turning propofol up to 50 mcs. Positive cough and gag reflex. Lung sounds are clear. Currently ventilated. Patient in a NSR in the 60's - 70's. Patient is currently on LEVO at 5mcs See MAR for titrations. Patient on 40 of prop and 200 of FENT.     0900 - patient blood pressure maintaining in the 70's systolic. Dr. Simmonds notified. Verbal order for LEVO received and given. See MAR for titrations.     1000 - Susan from gastroenterology at the bedside. Currently not planning to do the TIPS procedure again. Will manage with variceal bands.     1200 - reassessment completed. No changes noted.     1400 - lactulose enema ordered and given. FMS inserted with Dr. Reyes's verbal okay.      1600 - reassessment completed. No changes noted.     1900 - report given to NATALY Raya.           
0700 - report received from NATALY Garcia.     0800 - patient assessment completed. Patient is alert and oriented x4 and following all commands. Patient report 7/10 pain. PRN fentanyl administered. Patient is currently on 2 LNC. Breath sounds are coarse and diminished. Patient is NSR on the monitor. Pulses are all palpable with no edema. Abdomen is distended but non tender. Patient received with a batres and x4 PIV. Protonix and octreotide running. See MAR for continued titrations. Platelets started. See flow sheets for admin vitals.     1000 - Labs drawn and sent off. Patient had a bowel movement. Patient cleaned.     1200 - reassessment completed. Patient cleaned and bathed. CBC came back with a critical platelet level. Dr. Simmonds notified. One unit of platelets ordered.     1230 - IR at the bedside. Patient transported to IR.     1745 - patient arrived back to the unit. Patient is ventilated and sedated with PROP running @ 100 mcs, LEVO @ 7.5, OCTREOTIDE @ 10 mcs. See MAR for continued titrations. Patient not following commands -5 RASS. Patient received with a R cephalic CVC and x4 PIV's, batres, ETT. Patient bathed and vitals taken per protocol. ABG and bedside xray ordered.     1900 - report given to NATALY Andrews.  
0700- Bedside shift change report given to Dolores (oncoming nurse) by Radha (offgoing nurse). Report included the following information Nurse Handoff Report, Index, Adult Overview, Intake/Output, MAR, and Recent Results.     0830- Dr. Avila advancing pt to a regular diet. Will start to wean TPN.     1000- Report given to Nubia INGRAM on PCU. Will remove batres catheter, however pt will continue with central line for TPN.     1050- Batres catheter removed.   
0700- Bedside shift change report given to Dolores (oncoming nurse) by Radha (offgoing nurse). Report included the following information Nurse Handoff Report, Index, Adult Overview, Intake/Output, MAR, and Recent Results.     1400- Lactulose frequency changed to BID.     1700- Pt turned and pivoted with recliner with assist x2.     1900- Bedside shift change report given to Radha (oncoming nurse) by Dolores (offgoing nurse). Report included the following information Nurse Handoff Report, Index, Adult Overview, Intake/Output, MAR, and Recent Results.     
0700: Bedside shift report received from NATALY Arango (offgoing nurse).     0800: Shift assessment completed. Pt is intubated and sedated. Pt is in bilateral soft wrist restraints. Pt is not interactive and does not follow commands. Pt has weak cough/gag. Pt has ETT. Lung sounds are clear bilaterally. Vent settings are: AC/VC, FiO2 100%, R 16,  and PEEP 10. Pt is sinus azeem on the monitor. Bowel sounds are active throughout. Abdomen is distended. Pt has a batres that is patent and draining. Pt has CVC right subclavian in place. Precedex infusing @ 1.5 mcg/kg/hr. Fentanyl infusing @ 175 mcg/hr.     0809: Vent placed on CPAP/PS, FiO2 50%, PEEP 5 for SBT.     0812: Fentanyl infusion rate changed to 100 mcg/hr per verbal order from Dr. Vilchis for SAT.     0929: Fentanyl infusion stopped per Dr. Vilchis.     0930: Interdisciplinary rounds completed. Goals for today include:            - extubation           - diuresis     1009: Pt extubated to 8 L/min Hi-Flow O2. Bilateral soft wrist restraints removed and order discontinued.     1010: Precedex infusion held per Dr. Vilchis.     1024: O2 rate @ 12 L/min.     1200: Shift reassessment completed, changes documented on Flowsheet. Pt off all sedation.     1600: Shift reassessment completed, no changes to previous assessment. O2 rate changed to 10 L/min Hi-Flow NC.     1900: Bedside shift report given to NATALY Shepherd (oncoming nurse).     
0700: Bedside shift report received from NATALY Shepherd.     0800: Shift assessment completed. Pt Oriented to person and time. Disoriented to situation and place. Follows commands, On 10L oxygen midflow. Palpable pulses in all extremities, active bowel sounds. Abdomen  distended, +1 edema in all extremities. Blisters upper extremities, wound L. Thumb. See flow sheet/MAR for details.    0930: IDR completed: Pt goals  -Keep Pt ICU one more day  -Continue TPN  -Adverse diet as tolerated. Clear liquid diet.   -Give suppository for BM.    1000: Speech at bedside. Passed swallow screen.     1050: GI at bedside. Possible CT today.    1140: PT/OT at bedside. Pt on bedside commode, small gelatinous stool. Pt up in chair.    1200: Reassessment completed. Unable to assess orientation. Pt too sleepy, responds to voice. No changes to other previous assessments. See flow sheet/MAR for details.     1300: Pt back in bed.     1600: Reassessment completed. Pt AXOX3(to person, time), disoriented to place and situation. No changes to other previous assessment. See flow sheet/MAR for details.    1900: Bedside shift report given to NATALY Garcia.     
0730 Bedside verbal report from NATALY May  0830 Notified Huseyin of chronic back pain,   0900 one time order for fent 12.5mcg iv  0930 Huseyin rounded at bedside, verbal to wean estuardo to a MAP of 65, trend hbg q6 today and daily starting tomorrow if stable, order for 12.5mcg q4 fent for severe pain.   1000 Rylee Gonzalez PA, rounded, order for paraceentesis. Monitor BM's and notify if bright red stools or significant drop in hbg.  1250 Notified PA of hbg of 7.2. Order to continue to monitor and transfusion not needed at this time.   1320 Estuardo stopped at 1320 per protocol  1600 Reassessment completed, no significant changes.    1515 IV team at bedside, PIV placed right forearm. Ammonia lab received and sent to lab.   1530 q6hr CBC sent  1805 updated Huseyin about significant drop in hbg and hematocrit. Order for STAT redraw and to prepare 2 RBC units.    1848 critical hbg of 5.2. Notified MD Charles and on call GI specialist.   Charles order for 2 units of blood. Charles at bedside to educate patient on blood transfusion and received consent. Will continue to monitor for bright red blood stools and or active bleeding.   1900 called lab to release blood for blood transfusion.   1915 bedside verbal report given to NATAYL Dempsey    
1200- Pt being transferred from CVICU to CCU for routine progression of care. Bedside SBAR report given to Dena RN (receiving nurse) by NATALY Jaimes (offgoing nurse).     1225- Patient transported to CCU 2501 with cardiac monitor; pt tolerated transport well. Care assumed. Patient is currently intubated and sedated with propofol & fentanyl gtt. Levophed gtt infusing at 5mcg/min for MAP goal >65.     1330- Assessment complete; see flowsheet & MAR for details.     GI MD at bedside to assess patient; verbal updates given.     1645- Weaning levophed gtt as tolerated; currently at 3mcg/min.     1735- Right upper extremity midline site red, ecchymotic and draining fluid. Line removed and manual pressure held for 5 minutes; transparent dressing applied to site.     1900- Bedside change of shift report given to Suri/NATALY Pozo (oncoming nurse).   
1215: IR team at bedside for paracentesis.     1218: Vascular access team called for endurance catheter as pt has frequent labs and is a hard stick. Ammonia lab needs redraw as lab originally sent was insufficient.       
1240 - patient off the floor with Blue RN and this rn. Patient given benadryl for contrast allergy    1300 - Anesthesia placing A-line    1320 - MD connors consented patient    1420 - timeout, TIPS started    1700 - Transport ot Ct with this RN, CRNA, MD Sotelo (anes.), and MD Connors    1730 - Patient return to ICU, report given bedside to NATALY gaona. MD Caraballo at bedside as well.       
1300 Procedure start room 1    1310 patient transported from Endo room 1 to endo room 4, to convert to general case due to blood in stomach. This was done under the direct supervision of the Anesthesia staff.     1325 Procedure start, in room 4    1333 PACU informed procedure is complete.    1336 Dr. Martines informed patient would like his ex-wife Lady Mathis to be called post procedure. Number given that was provided by Lady [phone] 1340 Patient transferred to PACU with oxygen and Anesthesia staff.   
1315: Patient off unit for paracentesis    1400: Patient back in room, 4 liters pulled off. Patient wanting to leave against medical advice. Dr talking with patient but patient wanting to leave.     1600: Patient got dressed and telemetry removed, MD signed AMA paperwork and Med Rec filled out to give to patient    1635: Patient left against medical advice, paperwork signed. Medication information given to patient and prescriptions sent to patient's pharmacy    1645: Patient left via wheelchair to front entrance for discharge home    
1855:  Patient arrived from PACU.  Patient transferred to bed.  Estuardo, Protonix, Sandostatin and NS infusing.  Will titrate Estuardo as needed.     1915:  Bedside report given to NATALY Corrales.   
1900 - Bedside shift change report given to Radha RN (oncoming nurse) by Ashwini RN (offgoing nurse). Report included the following information Nurse Handoff Report, Intake/Output, MAR, Recent Results, Cardiac Rhythm SR, and Neuro Assessment.      1930 - Shift assessment complete, see flowsheet.    0000 - Reassessment complete, see flowsheet.    0400 - Reassessment complete, see flowsheet.    0555 - K 2.9. Brianna Cm NP notified. Will order replacement.    0700 - Bedside shift change report given to Dolores RN (oncoming nurse) by Radha RN (offgoing nurse). Report included the following information Nurse Handoff Report, Intake/Output, MAR, Recent Results, Cardiac Rhythm SR, and Neuro Assessment.    
1900 - Bedside shift change report given to Radha RN (oncoming nurse) by Dolores RN (offgoing nurse). Report included the following information Nurse Handoff Report, Intake/Output, MAR, Recent Results, Cardiac Rhythm SR, and Neuro Assessment.     1930 - Shift assessment complete, see flowsheet.    2030 - Brianna Cm NP rounding on pt. Discussed previous K of 2.9 and lasix doses given. Orders to recheck BMP, Mg, Phos.    2100 - Assist x2 back to bed. Tolerated well.    2115 - K 3.1. Brianna NP notified, will order replacement.    0000 - Reassessment complete, see flowsheet.    0400 - Reassessment complete, see flowsheet. AM labs drawn.    0605 - K 3.2. Brianna NP notified, will order replacement.    0700 - Bedside shift change report given to Dolores RN (oncoming nurse) by Radha RN (offgoing nurse). Report included the following information Nurse Handoff Report, Intake/Output, MAR, Recent Results, Cardiac Rhythm SR, and Neuro Assessment.     
1910- Bedside and Verbal shift change report given to NATALY Shepherd (oncoming nurse) by NATALY Tobin (offgoing nurse). Report included the following information Nurse Handoff Report, MAR, Recent Results, Cardiac Rhythm NSR, and Alarm Parameters.      1942- Shift assessment completed. Patient in bed, resting comfortably. No complaints of pain. Ice chips provided at patients request. See flowsheets for details.    2043- Wife Lady called and updated. All questions answered at this time.    2210- Per CARINA Cm, patient is to be placed on bipap overnight. RT made aware.    2225- Per RT, patient keeps pulling bipap mask off and is not redirectable. CARINA Cm made aware. Per NP, okay to keep bipap off for now and monitor patients neuro status frequently.    0000- Reassessment completed. See flowsheets for details.    0400- Reassessment completed. Patient continually yelling, stating that he needs help as well as pulling off oxygen and pulse oximeter. When questioned what is wrong, patient states that nothing is wrong, he just wants ice chips.This RN educated patient on keeping oxygen on and provided ice chips. Patient continually yelling out despite being given ice chips multiple times throughout the night.    0710- Bedside and Verbal shift change report given to NATALY Maradiaga (oncoming nurse) by NATALY Shepherd (offgoing nurse). Report included the following information Nurse Handoff Report, Intake/Output, MAR, Recent Results, and Cardiac Rhythm ST .    
1915-0000  BP still soft > Estuardo titrated up to 185 mcg/min, NP updated > CBC BMP & Lactic acid sent > WBC 51.8, K 5.2, Lactic acid 2.6 from 4 > NP ordered 1 L NS over 2 hours, NS kept at 75 ml/hr,     1634-1900  NP updated about his back pain, legs and arms 8/10, sore aching, and he said he vomits every time takes PO medication  ( when I tried to give him Tylenol)> NP ordered one time Fentanyl 12.5 mcg IV Once, Lidocaine Patch ordered>  patient slept after Fentanyl > did not apply Lidocaine,     0319  Na 148, , K 4.8, KF better & Lactic acid 2.5 > NP updated NS changed to NS 0.45% at 75 ml/hr, L/R 500 ml bolus.    [id number]  Patient has multiple myrtle loose blackish to maroon Stool, but this in am larhe and more bright > NP at bedside > advised to send Lab now instead of 8 am, Lab sent,   Bedside and Verbal shift change report given to Jhonatan Gary RN (oncoming nurse) by NATALY Corrales (offgoing nurse). Report included the following information SBAR, Kardex, Intake/Output, MAR, Accordion, Recent Results, Med Rec Status and Cardiac Rhythm SR.     
1920 Bedside shift change report given to NATALY Andrews (oncoming nurse) by NATALY Cuevas (offgoing nurse). Report included the following information Nurse Handoff Report, Adult Overview, Intake/Output, MAR, Recent Results, and Cardiac Rhythm SR .    2000 Shift assessment completed, see flowsheets for details. Patient remains intubated and restrained. Patient arousable to voice, following commands and moving all extremities spontaneously.     ~2210 Patient extremely agitated, fighting the vent, pulling at restraints, wiggling down in the bed to try and get hands closer to his ETT. See MAR for med titrations, PRN fentanyl push given.    2315 Patient restless, reaching for ETT.     0000 Reassessment completed, no significant changes. Patient increasingly agitated and reaching for his ETT. PRN fentanyl push given.    ~0040 Spoke with CARINA Hernandez regarding patient agitation- maxed out on all sedation but still yanking on restraints reaching towards his ET tube. Orders to add precedex.    0400 Reassessment completed, no significant changes.    ~0640 Patient extremely agitated after repositioning- fighting the vent, pulling at restraints, attempting to reach for his ETT. See MAR for med administration. PRN fentanyl push given.    0710 Patient still extremely agitated, thrashing in the bed and reaching for ETT. Spoke with Dr. Vilchis, med orders received.    0720 Bedside shift change report given to NATALY Mcarthur (oncoming nurse) by NATALY Andrews (offgoing nurse). Report included the following information Nurse Handoff Report, Adult Overview, Intake/Output, MAR, Recent Results, and Cardiac Rhythm SR .     
1930: Received bedside shift report from NATALY Fernandes.  2000: Assessment complete, see flowsheets. Patient is obtunded and does not withdraw from pain. Propofol titrated to RASS of 0 to -1.  2034: Patient is more awake, does not follow commands but withdraws from pain.  2048: Patient is in atrial fib, 's-120's, EKG obtained, NP notified, Lasix ordered, Levophed stopped.  2215: Metoprolol given. FiO2 titrated up to 100% by NP due to sustained 88% O2 sats.  2222: Patient turned on his right side, O2 sats up to 92%.  2228: BP 80/60, MAP 67, Levophed restarted at 2 mcg/min.  0000: Reassessment complete, see flowsheets.  0400: Reassessment complete, see flowsheets.  0450: No SAT due to cardiac instability, not qualified for SBT due to vent settings.  0552: Patient is in sinus rhythm.  0720: End of Shift Note    Bedside shift change report given to Kristy Cazares RN (oncoming nurse) by Suri Enrique RN (offgoing nurse).  Report included the following information SBAR, ED Summary, OR Summary, Procedure Summary, Intake/Output, and MAR    Shift worked:  7p-7a     Shift summary and any significant changes:     See note above     Concerns for physician to address:  See note above     Zone phone for oncoming shift:   N/A       Activity:     Number times ambulated in hallways past shift: 0  Number of times OOB to chair past shift: 0    Cardiac:   Cardiac Monitoring: Yes           Access:  Current line(s): PIV and central line     Genitourinary:   Urinary status: batres    Respiratory:      Chronic home O2 use?: NO  Incentive spirometer at bedside: NO       GI:     Current diet:  Diet NPO Exceptions are: Sips of Water with Meds, Sips of Clear Liquids  Passing flatus: YES  Tolerating current diet: YES       Pain Management:   Patient states pain is manageable on current regimen: YES    Skin:     Interventions: specialty bed, float heels, and internal/external urinary devices    Patient Safety:  Fall Score:    Interventions: 
1945 Bedside shift change report given to NATALY Andrews (oncoming nurse) by NATALY Mcarthur (offgoing nurse). Report included the following information Nurse Handoff Report, Adult Overview, Intake/Output, MAR, Recent Results, and Cardiac Rhythm SB .    2000 Shift assessment completed, see flowsheets for details. Patient responding to pain, no command following. Pupils sluggish bilaterally. Abdomen severely distended, hyperactive bowel sounds. Lungs clear, diminished in bases. ETT in-line suction is removing bright red bloody secretions.  Unable to complete admission database at this time due to patient's intubated/sedated status.    ~2245 Discussed plan of care with CARINA Hernandez. Expressed concerns over liver metabolized sedation- HR too low for precedex, keep precedex doses low. No OG tube for now due to varices- address with day team. No SAT/SBT in AM- keeping intubated until procedure Monday, but continue to wean sedation as able. Confirmed xray visualization of CVC placement- okay to use.    0030 Reassessment completed, see flowsheets for details. Patient following commands at this time- moving all extremities to command, nodding appropriately.      0400 Reassessment completed, no significant changes.    0655 CVC dressing changed at this time.    0720 Bedside shift change report given to NATALY Cuevas (oncoming nurse) by NATALY Andrews (offgoing nurse). Report included the following information Nurse Handoff Report, Adult Overview, Intake/Output, MAR, Recent Results, and Cardiac Rhythm SR .     
2100 - Bedside shift change report given to Radha RN (oncoming nurse) by Ke RN (offgoing nurse). Report included the following information Nurse Handoff Report, Surgery Report, Intake/Output, MAR, Recent Results, Cardiac Rhythm SR, and Neuro Assessment.      2130 - Shift assessment complete, see flowsheets.    2200 - Transported to CT. In CT pt stated he has an anaphylactic reaction to contrast dye. Notified Ferdinand Hernandez NP. CT canceled at this time. Returned to room 2403. Completed transfusion of 1st unit PRBCs.    2315 - Started transfusion of 2nd unit PRBCs.    0000 - Reassessment complete, see flowsheet.    0310 - Ali NP notified of Hgb 5.4 and Plt 42. Will come evaluate pt.    0336 - Started transfusion 3rd unit  PRBCs. Give via bolus per Ali NP.    0400 - Reassessment complete, see flowsheet.    0405 - Started transfusion of 4th unit PRBCs. Give via bolus per Ali NP.    0510 - Started transfusion of 1 unit Cryoprecipitate.    0535 - Ali NP at bedside. R upper arm midline placed.    0710 - Bedside shift change report given to Salvador INGRAM (oncoming nurse) by Radha RN (offgoing nurse). Report included the following information Nurse Handoff Report, Surgery Report, Intake/Output, MAR, Recent Results, Cardiac Rhythm SR, and Neuro Assessment.        
4 Eyes Skin Assessment     NAME:  Sveta Eduardo Jr.  YOB: 1960  MEDICAL RECORD NUMBER:  [id number]    The patient is being assessed for  transfer    I agree that at least one RN has performed a thorough Head to Toe Skin Assessment on the patient. ALL assessment sites listed below have been assessed.      Areas assessed by both nurses:    Head, Face, Ears, Shoulders, Back, Chest, Arms, Elbows, Hands, Sacrum. Buttock, Coccyx, Ischium, and Legs. Feet and Heels        Does the Patient have a Wound? Yes wound(s) were present on assessment. LDA wound assessment was Initiated and completed by RN       Jose Prevention initiated by RN: No  Wound Care Orders initiated by RN: No, wound care orders initiated prior to transfer    Pressure Injury (Stage 3,4, Unstageable, DTI, NWPT, and Complex wounds) if present, place Wound referral order by RN under : Yes    New Ostomies, if present place, Ostomy referral order under : No     Nurse 1 eSignature: Electronically signed by AVERY SKELTON RN on 6/23/24 at 8:48 PM EDT    **SHARE this note so that the co-signing nurse can place an eSignature**    Nurse 2 eSignature: So ROACH    
4 Eyes Skin Assessment     NAME:  Sveta Eduardo Jr.  YOB: 1960  MEDICAL RECORD NUMBER:  [id number]    The patient is being assessed for  transfer    I agree that at least one RN has performed a thorough Head to Toe Skin Assessment on the patient. ALL assessment sites listed below have been assessed.      Areas assessed by both nurses:    Head, Face, Ears, Shoulders, Back, Chest, Arms, Elbows, Hands, Sacrum. Buttock, Coccyx, Ischium, and Legs. Feet and Heels        Does the Patient have a Wound? Yes wound(s) were present on assessment. LDA wound assessment was Initiated and completed by RN       Jose Prevention initiated by RN: No  Wound Care Orders initiated by RN: No, wound care orders initiated prior to transfer    Pressure Injury (Stage 3,4, Unstageable, DTI, NWPT, and Complex wounds) if present, place Wound referral order by RN under : Yes    New Ostomies, if present place, Ostomy referral order under : No     Nurse 1 eSignature: Electronically signed by AVERY SKELTON RN on 6/23/24 at 8:48 PM EDT    **SHARE this note so that the co-signing nurse can place an eSignature**    Nurse 2 eSignature:     
ARRIVAL INFORMATION:  Verified patient name and date of birth, scheduled procedure, and informed consent.     Belongings with patient include:  Clothing, hat, glasses    GI FOCUSED ASSESSMENT:  Neuro: Awake, alert, oriented x4  Respiratory: even and unlabored   GI: soft and distended  EKG Rhythm: sinus tachycardia    Education:Reviewed general discharge instructions and  information.     
Bedside and Verbal shift change report given to rudolph (oncoming nurse) by Nohelia/Suri (offgoing nurse). Report included the following information Nurse Handoff Report, Adult Overview, Surgery Report, Intake/Output, Recent Results, and Quality Measures.     IDR rounds. Intensivists wanted to ween off propofol and start dex. Ask GI about NG tube for tube feedings    1300 Called GI to see if we can put in NG for tube feedings. Decided on TPN      1458 while trying to ween prop down and dex up. Turned patient and he became restless without being redirected. He ended up pulling out FMS and turned dex up to 1 and gave a 25mcg bolus of fentanyl    1800 The family agreed to meet on 6-20 at 1pm with Dr. Vilchis to all here the same information. Dr. Vilchis aware  
Bedside shift change report given to Fiona RN (oncoming nurse) by Dorota RN (offgoing nurse). Report included the following information Nurse Handoff Report, Index, Adult Overview, Surgery Report, Intake/Output, MAR, Recent Results, and Cardiac Rhythm Sinus bradycardia to NSR .      0800 Shift assessment completed - see flowsheets     0805 GI at bedside assessing patient. Plan for patient to continue octreotide gtt     0900 Giacomo TYLER at bedside assessing patient. Plan to continue to keep patient intubated and wean FiO2     1120 Wound care RN at bedside assessing patient - see wound care note for updated wound care orders   
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 Radiology consulted for TIPS eval.     Patient off the floor for TIPS. Will follow-up tomorrow.     PERRY Larson D.O.  [facility]  (Non-billable note).     
Comprehensive Nutrition Assessment    Type and Reason for Visit:  Initial, NPO/Clear Liquid    Nutrition Recommendations/Plan:   In next 24h would start nutrition support  Consider asking GI to endoscopically guide NGT/OGT safely down to stomach for enteral feeds/meds (pt is NPO/clears x 5 today)  If unable to obtain enteral access would recommend TPN initiation by Thursday at the latest      Malnutrition Assessment:  Malnutrition Status:  At risk for malnutrition (Comment) (06/18/24 1601)    Context:  Acute Illness     Findings of the 6 clinical characteristics of malnutrition:  Energy Intake:  50% or less of estimated energy requirements for 5 or more days  Weight Loss:  Unable to assess     Body Fat Loss:        Muscle Mass Loss:       Fluid Accumulation:  Mild Extremities, Generalized   Strength:  Not Performed    Nutrition Assessment:  Pt admitted with GI bleed.  PMH: COPD, cirrhosis, GERD, hepatitis C, failed TIPS.  Chart reviewed for NPO/clears x 5, case discussed during CCU rounds.  Pt intubated and sedated with propofol @ 37.4mL/h, which provides 987 kcals daily.  He is on levo at 5.  No family in the room to speak with.  Not safe for RN placement of NGT/OGT given recent banding of varices.  GI is following, would consider asking them to endoscopically place gastric tube to obtain access for feeds and meds.  Otherwise recommend starting TPN in the next 24h, discussed with Intensivist during IDR's who reports he would like to wait another day or so in hopes of extubation.  MST not filled out, no family in the room to speak with re: nutritional hx.  Phos 1.8, being repleted.  He is on lactulose for elevated NH3 level (78).    Wt Readings from Last 10 Encounters:   06/18/24 127.8 kg (281 lb 12 oz)            Nutrition Related Findings:    Meds: cefepime, lasix, lispro, protonix, Kphos, vancomycin;   Drips: levo, fentanyl, propofol.    Edema: +2 nonpitting-generalized, RLE, +1 nonpitting-LLE.    BM: FMS 
Comprehensive Nutrition Assessment    Type and Reason for Visit:  Reassess    Nutrition Recommendations/Plan:   Continue current TPN for now (consider increasing free water in order to correct Na)   Replete phos in TPN bag tonight  If unable to advance to a PO diet tomorrow, would add 250mL 20% SMOF lipids daily (provides 500kcals)   Tomorrow if lytes WNL and BG <200mg/dL, would advance Dextrose to 200g and continue AA at 115g      Malnutrition Assessment:  Malnutrition Status:  At risk for malnutrition (Comment) (06/18/24 7458)    Context:  Acute Illness     Findings of the 6 clinical characteristics of malnutrition:  Energy Intake:  50% or less of estimated energy requirements for 5 or more days  Weight Loss:  Unable to assess     Body Fat Loss:        Muscle Mass Loss:       Fluid Accumulation:  Mild Extremities, Generalized   Strength:  Not Performed    Nutrition Assessment:  Chart reviewed, case discussed during CCU rounds.  Pt extubated today, needs re-estimated.  He is somnolent and unable to answer questions.  Noted plans for family conference today.  TPN started last night, phos down to 2.1 this morning.  K and Mag WNL.  -178 and Na 146.  Discussed increasing phos in TPN tonight and increasing free water to correct Na.  Will keep macronutrients the same.  TG level only 76, so will add lipids tomorrow and potentially sneak up dextrose if it appears he will remain NPO going into the weekend.        Nutrition Related Findings:    Meds: lasix, lispro, protonix, rocephin.    Edema: +2-generalized, all extremities.    BM: pulled out FMS   Wound Type: None       Current Nutrition Intake & Therapies:    Average Meal Intake: NPO     Current Parenteral Nutrition Orders:  Type and Formula: 2-in-1 Custom   Lipids: None  Duration: Continuous  Rate/Volume: 150g Dextrose, 115g AA  Current PN Order Provides: 970kcals  Goal PN Orders Provides: TBD    Anthropometric Measures:  Height: 170.2 cm (5' 7\")  Ideal Body 
Comprehensive Nutrition Assessment    Type and Reason for Visit:  Reassess    Nutrition Recommendations/Plan:   Regular/CRISTA diet   Add ensure compact and magic cups daily      Malnutrition Assessment:  Malnutrition Status:  At risk for malnutrition (Comment) (06/18/24 2624)      Nutrition Assessment:    Chart reviewed; diet advanced to regular over the weekend. TPN weaned off yesterday. Patient sitting up in the chair at time of visit. Eyes closed but woke up to name being called. Admits to being very tired but states his appetite is pretty good. He is unsure of UBW but states he isn't usually as heavy as he is now. He drinks protein shakes occasionally but not consistently. GI entered room so visit ended. Patient NPO the first week of his admission. Will add ensure compact and magic cups daily for patient to try. Added CRISTA restriction. Encourage intake of meals/supplements.     Patient Vitals for the past 120 hrs:   PO Meals Eaten (%)   06/24/24 0900 26 - 50%   06/23/24 1756 51 - 75%     Nutrition Related Findings:    Phos 2.3, Mg 2.5, K+ 3.5, BG 96   BM 6/23   1-2+ edema/anasarca   Coreg, Lasix, Lactulose, Protonix, Phos-nak, Aldactone   Wound Type: Skin Tears, Deep Tissue Injury       Current Nutrition Intake & Therapies:    Average Meal Intake: 51-75%  Average Supplements Intake: None Ordered  ADULT DIET; Regular    Anthropometric Measures:  Height: 170.2 cm (5' 7\")  Ideal Body Weight (IBW): 148 lbs (67 kg)       Current Body Weight: 124.1 kg (273 lb 9.5 oz), 190.4 % IBW. Weight Source: Bed Scale  Current BMI (kg/m2): 42.8                          BMI Categories: Obese Class 3 (BMI 40.0 or greater)    Estimated Daily Nutrient Needs:  Energy Requirements Based On: Formula  Weight Used for Energy Requirements: Current  Energy (kcal/day): 2082 kcals (BMR x 1.2AF -300kcals)  Weight Used for Protein Requirements: Current  Protein (g/day): 99-124g (0.8-1.0 g/kg bw)  Method Used for Fluid Requirements: Other 
Critical care interdisciplinary rounds today.  Following members present: Case Management, Nursing, Nutrition, Pharmacy, and Physician. Case Mgt to speak to patient regarding medical decisions and family Central Line used for TPN and Brian in for I/O.    
End of Shift Note    Bedside shift change report given to Jamie INGRAM (oncoming nurse) by Juliana Naik RN (offgoing nurse).  Report included the following information SBAR, Intake/Output, MAR, Recent Results, and Cardiac Rhythm NSR    Shift worked:  7p-7a     Shift summary and any significant changes:    1 unmeasurable void while on BSC    NPO since 0000 for paracentesis    Sat up in recliner all night, encouraged pt to try to sleep in bed/gave melatonin       Concerns for physician to address:  K 3.4 this AM     Zone phone for oncoming shift:   [phone]       Activity:     Number times ambulated in hallways past shift: 0  Number of times OOB to chair past shift: 1    Cardiac:   Cardiac Monitoring: Yes           Access:  Current line(s): PIV     Genitourinary:   Urinary status: voiding and incontinent    Respiratory:      Chronic home O2 use?: YES  Incentive spirometer at bedside: YES       GI:     Current diet:  Diet NPO Exceptions are: Ice Chips  Passing flatus: YES  Tolerating current diet: YES       Pain Management:   Patient states pain is manageable on current regimen: N/A    Skin:     Interventions: float heels, increase time out of bed, PT/OT consult, and nutritional support    Patient Safety:  Fall Score:    Interventions: bed/chair alarm, gripper socks, pt to call before getting OOB, and stay with me (per policy)       Length of Stay:  Expected LOS: 12  Actual LOS: 12      Juliana Naik RN                            
End of Shift Note    Bedside shift change report given to Jamie INGRAM (oncoming nurse) by Juliana Naik RN (offgoing nurse).  Report included the following information SBAR, Intake/Output, MAR, Recent Results, and Cardiac Rhythm sinus tach    Shift worked:  7p-7a     Shift summary and any significant changes:     Difficult to catch urine d/t pt on BSC having BMs while voiding    Weaned to 3L O2 overnight (baseline)    A&O throughout shift, occasionally disoriented to time   Concerns for physician to address:  Since TPN is finished, may we remove CVC?     Zone phone for oncoming shift:   [phone]       Activity:     Number times ambulated in hallways past shift: 0  Number of times OOB to chair past shift: 1    Cardiac:   Cardiac Monitoring: Yes           Access:  Current line(s): PIV and central line     Genitourinary:   Urinary status: voiding    Respiratory:      Chronic home O2 use?: YES  Incentive spirometer at bedside: YES       GI:     Current diet:  ADULT DIET; Regular  Passing flatus: YES  Tolerating current diet: YES       Pain Management:   Patient states pain is manageable on current regimen: N/A    Skin:     Interventions: float heels, increase time out of bed, PT/OT consult, limit briefs, and nutritional support    Patient Safety:  Fall Score:    Interventions: bed/chair alarm, gripper socks, pt to call before getting OOB, and stay with me (per policy)       Length of Stay:  Expected LOS: 16  Actual LOS: 11      Juliana Naik RN                            
End of Shift Note    Bedside shift change report given to Juliana (oncoming nurse) by AVERY SKELTON RN (offgoing nurse).  Report included the following information SBAR, Kardex, and MAR    Shift worked:  7a-7p     Shift summary and any significant changes:     Ccu downgrade, 10 plus bowel movements within 8 hour time frame.     Concerns for physician to address:       Zone phone for oncoming shift:          Activity:     Number times ambulated in hallways past shift: 0  Number of times OOB to chair past shift: 3    Cardiac:   Cardiac Monitoring: Yes           Access:  Current line(s): PIV     Genitourinary:   Urinary status: voiding    Respiratory:      Chronic home O2 use?: YES  Incentive spirometer at bedside: NO       GI:     Current diet:  ADULT DIET; Regular  Passing flatus: YES  Tolerating current diet: NO       Pain Management:   Patient states pain is manageable on current regimen: N/A    Skin:     Interventions: increase time out of bed    Patient Safety:  Fall Score:    Interventions: bed/chair alarm, assistive device (walker, cane. etc), gripper socks, and pt to call before getting OOB       Length of Stay:  Expected LOS: 16  Actual LOS: 10      AVERY SKELTON RN                            
End of Shift Note    Bedside shift change report given to Natali (oncoming nurse) by DOLLY BROWN RN (offgoing nurse).  Report included the following information SBAR, Kardex, Procedure Summary, Intake/Output, MAR, and Recent Results    Shift worked:  7a-7p     Shift summary and any significant changes:     Right IJ removed  Patient scheduled for paracentesis tomorrow   Weaned down to 2 liters nasal cannula  Probiotic ordered for loose stool associated with antibiotics; parameters placed of when to give lactulose   Concerns for physician to address:       Zone phone for oncoming shift:   [phone]       Activity:     Number times ambulated in hallways past shift: 0  Number of times OOB to chair past shift: 2    Cardiac:   Cardiac Monitoring: Yes           Access:  Current line(s): PIV     Genitourinary:   Urinary status: voiding and incontinent    Respiratory:      Chronic home O2 use?: YES  Incentive spirometer at bedside: YES       GI:     Current diet:  ADULT ORAL NUTRITION SUPPLEMENT; Breakfast; Standard 4 oz Oral Supplement  ADULT ORAL NUTRITION SUPPLEMENT; Dinner; Frozen Oral Supplement  ADULT DIET; Regular; No Added Salt (3-4 gm)  Passing flatus: YES  Tolerating current diet: NO       Pain Management:   Patient states pain is manageable on current regimen: YES    Skin:     Interventions: turn team, float heels, increase time out of bed, foam dressing, and PT/OT consult    Patient Safety:  Fall Score:    Interventions: bed/chair alarm, assistive device (walker, cane. etc), gripper socks, pt to call before getting OOB, and stay with me (per policy)       Length of Stay:  Expected LOS: 12  Actual LOS: 11      DOLLY BROWN RN                           
Name of Procedure: image guided paracentesis     Sedation medications given: none     Fluids Removed: 700 ml clear yellow ascitic fluid     Samples sent to lab: hold sample, lavender top, red top, fluid culture      Any complications related to procedure: none identified at this time      
Name of procedure: Paracentesis    Sedation medications given: NO    Vital Signs: Stable    Fluids removed: 3900 ml clear brook fluid    Samples sent to lab: YES    Any complications related to procedure: NO    Post Procedure Care Needed/order sets placed in [facility] care.       
Pharmacy Antimicrobial Kinetic Dosing    Indication for Antimicrobials: VAP    Current Regimen of Each Antimicrobial:  Vancomycin Pharmacy to Dose; Start Date ; Day 1  Cefepime 2g IV q8h; Start Date ; Day 1    Previous Antimicrobial Therapy:  Ceftriaxone -  Cefepime   Metronidazole      Goal Level: Vancomycin -600    Date Dose & Interval Measured (mcg/mL) Predicted AUC                       Significant Cultures:    Blood, paired: ngtd, prelim   Body fluid, ascitic: negative, final   Respiratory, sputum: pending    Labs:  Recent Labs     Units 24  1156 24  0316 24  1653 24  0242 24  0517 24  0348   CREATININE MG/DL  --  0.69*  --  0.88  --  0.86   BUN MG/DL  --  25*  --  37*  --  34*   PROCAL ng/mL  --  <0.05  --   --   --   --    WBC K/uL 11.7* 10.9 14.6* 4.7   < >  --     < > = values in this interval not displayed.     Temp (24hrs), Av.4 °F (36.9 °C), Min:97.6 °F (36.4 °C), Max:98.8 °F (37.1 °C)    Conditions for Dosing Consideration: None    Creatinine Clearance (mL/min): Estimated Creatinine Clearance: 137 mL/min (A) (based on SCr of 0.69 mg/dL (L)).     Impression/Plan:   Adjusted vancomycin to 1250mg IV q12h for a projected   Level scheduled for  1200  BMP ordered daily  Antimicrobial stop date pending     Pharmacy will follow daily and adjust medications as appropriate for renal function and/or serum levels.    Thank you,  WILLIE CARDOZA [facility]  
Pharmacy Antimicrobial Kinetic Dosing    Indication for Antimicrobials: VAP    Current Regimen of Each Antimicrobial:  Vancomycin Pharmacy to Dose; Start Date ; Day 2  Cefepime 2g IV q8h; Start Date ; Day 2    Previous Antimicrobial Therapy:  Ceftriaxone -  Cefepime   Metronidazole      Goal Level: Vancomycin -600    Date Dose & Interval Measured (mcg/mL) Predicted AUC    1250mg q12h 12.6 384                 Significant Cultures:    Blood, paired: negative, final   Body fluid, ascitic: ngtd, prelim   Respiratory, sputum: ngtd, prelim    Labs:  Recent Labs     Units 24  1156 24  0316 24  1653 24  0242 24  0517 24  0348   CREATININE MG/DL  --  0.69*  --  0.88  --  0.86   BUN MG/DL  --  25*  --  37*  --  34*   PROCAL ng/mL  --  <0.05  --   --   --   --    WBC K/uL 11.7* 10.9 14.6* 4.7   < >  --     < > = values in this interval not displayed.     Temp (24hrs), Av.4 °F (36.9 °C), Min:97.6 °F (36.4 °C), Max:98.8 °F (37.1 °C)    Conditions for Dosing Consideration: None    Creatinine Clearance (mL/min): Estimated Creatinine Clearance: 137 mL/min (A) (based on SCr of 0.69 mg/dL (L)).     Impression/Plan:   Adjusted vancomycin to 1500mg IV q12h for a projected   PCT ordered - will most likely de-escalate tomorrow   BMP ordered daily  Antimicrobial stop date pending     Pharmacy will follow daily and adjust medications as appropriate for renal function and/or serum levels.    Thank you,  WILLIE CARDOZA [facility]  
Pharmacy TPN Management Note    TPN indication: NPO    TPN type: Central     Macronutrients:  Protein: 115g  Dextrose: 150g  Lipids: None     Rate: 66.7 mL/hr    Labs:  Recent Labs     Units 24  0548 24  0302 24  0316   NA mmol/L 146* 146* 145   K mmol/L 4.2 3.4* 3.7   CL mmol/L 113* 112* 116*   CO2 mmol/L 33* 31 27   MG mg/dL 2.3 2.1 2.5*   PHOS MG/DL 2.1* 2.4* 1.8*   BUN MG/DL 27* 18 25*     Recent Labs     Units 24  0548 24  0302   AST U/L 20 30   ALT U/L 38 59     Recent Labs     Units 24  0548 24  1156 24  0316   WBC K/uL 4.0*   < > 10.9   HGB g/dL 8.4*   < > 8.7*   HCT % 26.5*   < > 28.5*   APTT sec  --   --  25.9   INR   1.2*   < > 1.2*    < > = values in this interval not displayed.     Electrolytes (dosed per LITER):  Na: 0 meq/L; K: 29 meq/L; Phos: 20 mmol/L; M meq/L; Ca 4.5 meq/L    Other additives in TPN: multivitamins  and trace elements    Impression/Plan:   TPN macronutrient/rate changes: no change today  TPN electrolyte changes: increase K phos to 20mmol, total vol increased for hypernatremia  TPN insulin changes: none, BG < 200  Extubation planned today; enteral plan pending tomorrow     Pharmacy will continue to monitor enteral nutrition plan, electrolytes, renal function, and dietician recommendations and adjust parenteral nutrition as needed.    Thank you,  Chinyere Avenda[facility]    
Pharmacy TPN Management Note    TPN indication: NPO    TPN type: Central     Macronutrients:  Protein: 115g  Dextrose: 150g  Lipids: None (on propofol)    Rate: 62.5 mL/hr    Labs:  Recent Labs     Units 24  0302 24  0316 24  0242   NA mmol/L 146* 145 147*   K mmol/L 3.4* 3.7 3.8   CL mmol/L 112* 116* 117*   CO2 mmol/L 31 27 26   MG mg/dL 2.1 2.5* 2.8*   PHOS MG/DL 2.4* 1.8* 2.3*   BUN MG/DL 18 25* 37*     Recent Labs     Units 24  0302 24  0316   AST U/L 30 39*   ALT U/L 59 73     Recent Labs     Units 24  0302 24  1156 24  0316   WBC K/uL 13.6*   < > 10.9   HGB g/dL 9.6*   < > 8.7*   HCT % 30.8*   < > 28.5*   APTT sec  --   --  25.9   INR   1.2*  --  1.2*    < > = values in this interval not displayed.     Electrolytes (dosed per LITER):  Na: 0 meq/L; K: 32 meq/L; Phos: 15 mmol/L; M meq/L; Ca 4.5 meq/L    Other additives in TPN: multivitamins  and trace elements    Impression/Plan:   TPN macronutrient/rate changes: new start  TPN electrolyte changes: new start (KPhos 30 mmol outside TPN)  TPN insulin changes: none     Pharmacy will continue to monitor enteral nutrition plan, electrolytes, renal function, and dietician recommendations and adjust parenteral nutrition as needed.    Thank you,  WILLIE CARDOZA [facility]  
Pharmacy TPN Management Note    TPN indication: NPO    TPN type: Central     Macronutrients:  Protein: 115g  Dextrose: 150g  Lipids: SMOF 250 mL daily    Rate: 75 mL/hr    Labs:  Recent Labs     Units 24  0428 24  0548 24  0302   NA mmol/L 146* 146* 146*   K mmol/L 3.1* 4.2 3.4*   CL mmol/L 111* 113* 112*   CO2 mmol/L 32 33* 31   MG mg/dL 2.4 2.3 2.1   PHOS MG/DL 1.7* 2.1* 2.4*   BUN MG/DL 22* 27* 18     Recent Labs     Units 24  0428 24  0548   AST U/L 29 20   ALT U/L 35 38     Recent Labs     Units 248 24  0548   WBC K/uL 4.8 4.0*   HGB g/dL 8.3* 8.4*   HCT % 26.8* 26.5*   INR    --  1.2*   TRIG MG/DL  --  76     Electrolytes (dosed per LITER):  Na: 0 meq/L; K: 44 meq/L; Phos: 30 mmol/L; M meq/L; Ca 4.5 meq/L    Other additives in TPN: multivitamins , trace elements, and thiamine    Impression/Plan:   TPN macronutrient/rate changes: add SMOF 250mL daily  TPN electrolyte changes: increase K phos to 30 mmol  Thiamine 100 mg added for refeeding risk  TPN insulin changes: none, BG < 200  Cleared by speech for CLD today     Pharmacy will continue to monitor enteral nutrition plan, electrolytes, renal function, and dietician recommendations and adjust parenteral nutrition as needed.    Thank you,  Chinyere Avendaño [facility]  
Pharmacy TPN Management Note    TPN indication: NPO    TPN type: Central     Macronutrients:  Protein: 115g  Dextrose: 150g  Lipids: SMOF 250 mL daily    Rate: 75 mL/hr    Labs:  Recent Labs     Units 24  0430 248 24  0548   NA mmol/L 142 146* 146*   K mmol/L 2.9* 3.1* 4.2   CL mmol/L 108 111* 113*   CO2 mmol/L 32 32 33*   MG mg/dL 2.5* 2.4 2.3   PHOS MG/DL 2.3* 1.7* 2.1*   BUN MG/DL 20 22* 27*     Recent Labs     Units 24  0428 24  0548   AST U/L 29 20   ALT U/L 35 38     Recent Labs     Units 24  0428 24  0548   WBC K/uL 5.0   < > 4.0*   HGB g/dL 8.4*   < > 8.4*   HCT % 26.8*   < > 26.5*   INR   1.2*   < > 1.2*   TRIG MG/DL  --   --  76    < > = values in this interval not displayed.     Electrolytes (dosed per LITER):  Na: 0 meq/L; K: 59 meq/L; Phos: 40 mmol/L; M meq/L; Ca 4.5 meq/L    Other additives in TPN: multivitamins , trace elements, and thiamine    Impression/Plan:   TPN macronutrient/rate changes: none  TPN electrolyte changes: increase K phos to 40 mmol, decrease Magnesium to 4meq/L  Thiamine 100 mg continued for refeeding risk  TPN insulin changes: none, BG < 200  Cleared by speech for CLD today     Pharmacy will continue to monitor enteral nutrition plan, electrolytes, renal function, and dietician recommendations and adjust parenteral nutrition as needed.    Thank you,  Rom Cordoba RPH    
Shift report received from Sania INGRAM     Shift assessment complete, see flow sheet    1915: Sats <90%, FIO2 increased to 80%, respiratory called to the bedside    1930: NP at the bedside, Peep increased to 10, patient to be sedated more per NP     See MAR     Shift report given to Fiona INGRAM   
Speech Pathology Note     Initial SLP evaluation complete. Full note to follow. Recommend initiation of Clear Liquid diet once cleared by GI. Note plan for CTA to evaluate for ileus/obstruction and worsening of PVT given increasing Bili. Patient will benefit from 1:1 assistance/supervision and general aspiration precautions (i.e. upright for all PO intake, small bites/sips, single sips 1 at a time, holding PO intake if patient SOB, etc.). Thank you.     Gal Bobby M.S., CCC-SLP  
Spiritual Care Assessment/Progress Note  [facility]    Name: Sveta Eduardo Jr. MRN: [id number]    Age: 64 y.o.     Sex: male   Language: English     Date: 6/20/2024            Total Time Calculated: 10 min              Spiritual Assessment begun in [facility] 2 CRITICAL CARE  Service Provided For: Patient not available  Referral/Consult From: Rounding  Encounter Overview/Reason: Attempted Encounter    Spiritual beliefs:      [] Involved in a mila tradition/spiritual practice:      [] Supported by a mila community:      [] Claims no spiritual orientation:      [] Seeking spiritual identity:           [] Adheres to an individual form of spirituality:      [x] Not able to assess:                Identified resources for coping and support system:   Support System: Unknown       [] Prayer                  [] Devotional reading               [] Music                  [] Guided Imagery     [] Pet visits                                        [] Other: (COMMENT)     Specific area/focus of visit   Encounter:    Crisis:    Spiritual/Emotional needs: Type: Spiritual Support  Ritual, Rites and Sacraments:    Grief, Loss, and Adjustments: Type: Adjustment to illness  Ethics/Mediation:    Behavioral Health:    Palliative Care:    Advance Care Planning:      Plan/Referrals: Continue to visit, (comment), Continue Support (comment)    Narrative:  on the floor rounding and to visit with Mr. Eduardo. He is intubated and receiving medical care.  prayed silently.    Spiritual Health Services are available 24 hours a day as requested.     Rev. TRAE Cheema  [facility]   Paging Service [phone]  
Spiritual Care Assessment/Progress Note  [facility]    Name: Sveta Eduardo Jr. MRN: [id number]    Age: 64 y.o.     Sex: male   Language: English     Date: 6/14/2024            Total Time Calculated: 12 min              Spiritual Assessment begun in [facility] CV[facility]  Service Provided For: Patient  Referral/Consult From: Rounding  Encounter Overview/Reason: Initial Encounter    Spiritual beliefs:      [x] Involved in a kimberli tradition/spiritual practice:      [] Supported by a kimberli community:      [] Claims no spiritual orientation:      [] Seeking spiritual identity:           [] Adheres to an individual form of spirituality:      [] Not able to assess:                Identified resources for coping and support system:   Support System: Family members       [x] Prayer                  [] Devotional reading               [] Music                  [] Guided Imagery     [] Pet visits                                        [] Other: (COMMENT)     Specific area/focus of visit   Encounter:    Crisis:    Spiritual/Emotional needs: Type: Spiritual Support  Ritual, Rites and Sacraments:    Grief, Loss, and Adjustments: Type: Adjustment to illness  Ethics/Mediation:    Behavioral Health:    Palliative Care:    Advance Care Planning:      Plan/Referrals: Continue Support (comment)    Narrative:   Visit was in 2403 for critical care initial spiritual assessment. Patient appeared in pain in bed and he affirmed he was, when  asked. He shared about medical condition that led to hospitalization, stating it was really bad. His surgery went well and he is recovering slowly. Kimberli is important for coping as well as family, no need expressed. Supportive listening presence with affirmation offered, patient assured of prayer. He was also advised of  availability upon request, he thanked  for the visit.     Visited by: Chaplain Jose Manuel Mancini M.Div., [facility].   Paging Service: [phone] 
Spiritual Care Assessment/Progress Note  [facility]    Name: Sveta Eduardo Jr. MRN: [id number]    Age: 64 y.o.     Sex: male   Language: English     Date: 6/21/2024            Total Time Calculated: 10 min              Spiritual Assessment begun in [facility] CRITICAL CARE  Service Provided For: Patient not available  Referral/Consult From: Other (comment) (  referral)  Encounter Overview/Reason: Advance Care Planning    Spiritual beliefs:      [] Involved in a mila tradition/spiritual practice:      [] Supported by a mila community:      [] Claims no spiritual orientation:      [] Seeking spiritual identity:           [] Adheres to an individual form of spirituality:      [x] Not able to assess:                Identified resources for coping and support system:   Support System: Family members       [] Prayer                  [] Devotional reading               [] Music                  [] Guided Imagery     [] Pet visits                                        [] Other: (COMMENT)     Specific area/focus of visit   Encounter:    Crisis:    Spiritual/Emotional needs: Type: Spiritual Support  Ritual, Rites and Sacraments:    Grief, Loss, and Adjustments: Type: Adjustment to illness  Ethics/Mediation:    Behavioral Health:    Palliative Care:    Advance Care Planning:      Plan/Referrals: Continue to visit, (comment)    Narrative:  Janina asked the  to provide an Advance Medical Directive for Mr. Eduardo.  reviewed the patient's chart prior to the visit.  talked with his nurse Ashwini RN in reference his cognitive ability to participate in the Advance Medical Directive process. Mr. Eduardo's nurse stated he is not fully alert at this time.  advised to page us when he is ready to start the Advance Medical Directive process.     Spiritual Health Services are available 24 hours a day as requested.     Rev. TRAE Cheema  [facility] 
TRANSFER - IN REPORT:    Verbal report received from ROBYN Guerrero RN on [facility].  being received from  for ordered procedure      Report consisted of patient's Situation, Background, Assessment and   Recommendations(SBAR).     Information from the following report(s) Nurse Handoff Report, Surgery Report, and MAR was reviewed with the receiving nurse.    Opportunity for questions and clarification was provided.      Patient educated on safety with using the bed side commode. There is a delay with transferring patient to [facility] due to family wanting to talk to \"the charge nurse\" of the ER. Family wants patient to walk to the bathroom. ROBYN Guerrero informed the charge nurse.      
Fresh blood in the stomach    Notes from 6/16/2024:  Sveta Eduardo is a 65yo with decompensated HCV cirrhosis s/p SFR MELDNa 11 c/b ascites now with SBP (leukocytic ascites, not PMNs), variceal hemorrhage s/p banding c/b re-hemorrhage s/p unsuccessful TIPS attempt due to complete acute portal & superior mesenteric & splenic vein thromboses with visualized esophageal & gastric varices CTA 6/15, with spontaneous bacterial peritonitis (while on ABX para wbcs >500). Recommend ceftriaxone 2g daily.      Recommend 1g/kg on Sunday to complete SBP protocol but defer to primary team.     Continue antibiotics for 5d AFTER hemostasis achieved. Will need life-long cipro 500mg daily for SBP Ppx.     Octreotide gtt indefinitely until TIPS. If he decompensates further, recommend surgical consult for surgical distal-splenorenal shunt or preferably a splenectomy.     He is at high risk of decompensation if not death, recommend palliative care be involved to help his family through this.     No further bleeding or instability per RN    6/17/2024: Was to have portal vein recanalization and TIPS today but IR (Dr. Grimes) notified me that the thrombus appears to be chronic and is not occlusive. He has flow to the liver and it is likely the clot did not contribute to the variceal bleed. They also state he has only a small number of collaterals. IR would prefer to hold off on the TIPS due to procedural risk and assess need for anticoagulation a week or so after discharge. They recommended he be stabilized and discharged to follow up with hepatology ASAP and then proceed with staging for TIPS and need for anticoagulation (all of the preceding information was obtained from a phone conversation with Dr. Hair Grimes in IR).  He is still intubated, sedated, and on ventilator. Still on octreotide gtt and PPI. Still on abx. No bowel movement in last 2 days but BM was bloody at that time. Hgb down only slightly. MELD 9. Ammonia 77 this 
q12    Subjective:     Chief Complaint / Reason for Physician Visit  \"Im doing OK, I think I worry too much\". Pt voiced concern regarding finances, food and home situation. He denies pain, suicidal thoughts. Plan of care discussed with clinical RN.       Objective:   The patient is a 64/M with a PMH of cirrhosis of the liver, HCV who was previously followed by [facility], but did not follow up as planned. He presented to the ER with episodes of hematemesis and melana for 2 days. His hgb on admission was 11.6, CTAP showed mild to moderate ascites and diffuse mesenteric edema. His lactate level was 2.6 , Cr 1.5 wbc 33K, urine was normal CXR was normal. He was started of IV anbx, GI was consulted for LVAP and EGD. He was admitted to the ICU events noted, per above. Today he was transferred to medicine services to resume medical care.     VITALS:   Last 24hrs VS reviewed since prior progress note. Most recent are:  Patient Vitals for the past 24 hrs:   BP Temp Temp src Pulse Resp SpO2 Weight   06/23/24 1123 108/67 98.5 °F (36.9 °C) Oral (!) 104 28 97 % --   06/23/24 1000 124/67 -- -- (!) 103 16 94 % --   06/23/24 0900 124/76 -- -- (!) 104 22 92 % --   06/23/24 0800 113/64 98.8 °F (37.1 °C) Oral (!) 101 22 93 % --   06/23/24 0723 -- -- -- (!) 103 22 93 % --   06/23/24 0600 120/74 -- -- (!) 102 18 90 % 127.8 kg (281 lb 12 oz)   06/23/24 0500 109/70 -- -- (!) 104 23 91 % --   06/23/24 0400 121/78 98.5 °F (36.9 °C) Oral (!) 103 24 94 % --   06/23/24 0327 -- -- -- (!) 102 20 92 % --   06/23/24 0300 121/72 -- -- (!) 102 17 91 % --   06/23/24 0200 114/64 -- -- 100 21 92 % --   06/23/24 0100 105/68 -- -- (!) 101 23 92 % --   06/23/24 0000 131/75 98.7 °F (37.1 °C) Oral (!) 103 15 92 % --   06/22/24 2300 118/65 -- -- (!) 101 21 91 % --   06/22/24 2241 -- -- -- (!) 102 20 91 % --   06/22/24 2200 129/72 -- -- (!) 101 20 92 % --   06/22/24 2100 129/70 -- -- 100 21 -- --   06/22/24 2000 128/70 98.7 °F (37.1 °C) Oral 99 
start of morning, patient was on FiO2 100% with PEEP 10 cm H2O with SpO2 95%. CXR did not reveal any findings to explain this degree of hypoxemia. FiO2 and PEEP rapidly weaned to 50% and 5 cm H2O without change in SpO2. SBT performed successfully and patient extubated to NC O2 12 lpm. Tolerating well. Remains somnolent due to sedatives that were infusion for sedation. Family conference planned for today.      Vital Signs:    /62   Pulse 65   Temp 98.9 °F (37.2 °C) (Axillary)   Resp 12   Ht 1.702 m (5' 7\")   Wt 128 kg (282 lb 3 oz)   SpO2 91%   BMI 44.20 kg/m²             Temp (24hrs), Av.6 °F (37 °C), Min:97.3 °F (36.3 °C), Max:99.1 °F (37.3 °C)       Intake/Output:   Last shift:       07 - 1900  In: 2312.6 [I.V.:1058.4]  Out: 1965 [Urine:1965]  Last 3 shifts: 1901 -  0700  In: 3620.9 [I.V.:1882]  Out: 5125 [Urine:5025]    Intake/Output Summary (Last 24 hours) at 2024 1241  Last data filed at 2024 1200  Gross per 24 hour   Intake 2723.53 ml   Output 2865 ml   Net -141.47 ml         Physical Exam:    General: RASS -2, tolerating extubation well  HEENT:  NCAT, sclerae white  Neck: no JVD  Resp:  Clear anteriorly  CV:  Sinus, reg, no M  GI:  Obese, soft, + BS  Extremities:  Warm, + symmetric LE edema  Skin:  Warm; no rashes/ lesions noted  Neurologic:  PERRL, EOMI, Tyesha      DATA:   Current meds: reviewed 2024  Current lab results: reviewed 2024     CXR: NNF        Assessment:  Hepatic cirrhosis due to HCV (?remote alcohol)  Acute UGIB due to esophageal varices   S/P EGD with banding   Attempted TIPS unsuccessful 06/15 due to portal vein thrombosis  Ventilator dependent respiratory failure - intubated for procedure 06/15   Extubated   Hypoxic respiratory failure - appears to be relatively unresponsive to supplemental O2 and PEEP  Possible hepatopulmonary syndrome with R>L shunt   Pulm edema pattern with effusions on CXR - improving  Hemorrhagic 
vasopressors. Furosemide X 1 dose  06/18 Intubated, sedated on propofol and fentanyl. Increased O2 req o/n. More edema on CXR. Net negative just over 500mL w/ diuresis.   06/19 Remains intubated and sedated requiring high vent support with PEEP 12 cm H2O and FiO2 90%. Norepinephrine stopped this morning. Remains on octreotide infusion. Gastroenterology to address whether NGT/OGT can be placed for nutritional support - otherwise will need to start TPN  06/20 At start of morning, patient was on FiO2 100% with PEEP 10 cm H2O with SpO2 95%. CXR did not reveal any findings to explain this degree of hypoxemia. FiO2 and PEEP rapidly weaned to 50% and 5 cm H2O without change in SpO2. SBT performed successfully and patient extubated to NC O2 12 lpm. Tolerating well. Remains somnolent due to sedatives that were infusion for sedation. Family conference: 3 sisters and pt's wife updated in detail  06/21 Tolerating extubation well. Requiring HFNC. CXR without acute findings. Repeat CTAP: \"Acute appearing portal vein thrombus involving the entire splenic vein, upper super mesenteric vein, and main/right/left portal veins. Small volume ascites and small gastric and esophageal varices. No evidence of bowel ischemia.\" Gastroenterology to address. Increased gaseous abdominal distention. Cognition improving. PT/OT consults    6/22/24 - has been seen by GI, ok to feed and advance diet as tolerated. Able to wean down oxygen, does not look in distress. Hemodynamically stable. Awake and alert, getting lactulose, rifaximin. Having Bms.     Assessment & Plan     In summary, patient has been admitted for UGIB 06/13. EGD revealed 3 varices which were banded. Patient admitted to ICU post-procedure on low dose vasopressors. Admitting diagnoses of UGIB, esophageal varices, ABL, hemorrhagic shock. Treated empirically with ceftriaxone for SBP. He has failed TIPS attempts x 2 and CT has shown extensive portal vein thrombus and 06/17 Imaging reviewed 
Hold    Collection Time: 06/14/24 12:20 PM   Result Value Ref Range    Specimen HOld ASCITIC FLUID     Comment:        Add-on orders for these samples will be processed based on acceptable specimen integrity and analyte stability, which may vary by analyte.       Imaging:    CT ABDOMEN PELVIS WO CONTRAST Additional Contrast? None   Final Result   Cirrhotic liver morphology with new small to moderate ascites throughout the   abdomen and pelvis. Stable other incidental findings.      Electronically signed by LATASHA WYNNE      XR CHEST PORTABLE   Final Result   No acute cardiopulmonary disease.         Electronically signed by Bharath Schmidt MD      US GUIDED PARACENTESIS    (Results Pending)       Assessment and Plan:    The patient is a 64/M with liver cirrhosis from Hep C who we are seeing in consultation at the request of Dr. Ramirez for admission to ICU for acute GI bleeding.     Acute GI bleeding - due to esophageal varices in the setting of liver cirrhosis from Hep C. S/p EGD on 06/13 with variceal banding. Stomach was full of blood per report. Cont. To monitor H/H every 6  hours. Transfuse if Hb < 7 gm/dl. Cont. Octreotide, he is also on PPI gtt.     Acute blood loss anemia - due to above.     Shock - Hemorrhagic shock, now with vasoplegia. Cont. Phenylephrine 30 mcg/min, keep MAP >65.     Leucocytosis - likely due to acute stress, significantly improved, ascites noted. GI has already consulted IR for paracentesis. Cont. Ceftriaxone.     Unable to reach family.     Full code.     CCM time - 35 minutes.     Elmer Fontanez MD,AYANA, FCCM, FCCP, ATSF, FACP, DAABIP  Interventional Pulmonology/Critical Care Medicine  [facility]  [facility]          
CONTRAST    INDICATION: portal vein patency    COMPARISON: CT abdomen/pelvis 6/13/2024.    IV CONTRAST: 100 mL of Isovue-370.    ORAL CONTRAST: None    TECHNIQUE: Helical CT of the abdomen and pelvis to evaluate for GI bleed.  Helical CT abdomen/pelvis before and following the uneventful rapid bolus  intravenous contrast administration. Post contrast imaging in the arterial and  delayed venous phases. Contiguous axial images were reconstructed in lung and  soft tissue windows. Coronal and sagittal reformats were generated.  CT dose  reduction was achieved through use of a standardized protocol tailored for this  examination and automatic exposure control for dose modulation.    FINDINGS:  LOWER THORAX: There are small bilateral pleural effusions and bilateral lower  lobe segmental atelectasis. Coronary artery calcifications are present.  LIVER: The liver is cirrhotic. There is heterogeneous enhancement in the right  hepatic lobe, likely related to recent instrumentation during recent attempted  TIPS procedure. There is no discrete hepatoma. There is acute appearing  expansile thrombus in the splenic vein from the splenic hilum to the confluence  of the portal vein. The thrombus extends into the main, right, and left portal  veins. Thrombus extends into the upper superior mesenteric vein. The inferior  mesenteric vein appears patent. There are signs of portal hypertension including  splenomegaly and small gastric and esophageal varices.  BILIARY TREE: Contrast within the gallbladder and biliary tree from attempted  TIPS earlier the same day. CBD is not dilated.  SPLEEN: Portal vein thrombus, as described above. Splenomegaly to 17.5 cm.  PANCREAS: No mass or ductal dilatation.  ADRENALS: Unremarkable.  KIDNEYS: No mass, calculus, or hydronephrosis. Contrast in the renal collecting  systems.  STOMACH: Unremarkable. No evidence of active hemorrhage.  SMALL BOWEL: No dilatation or wall thickening. No evidence of active 
Helical CT of the abdomen and pelvis to evaluate for GI bleed.  Helical CT abdomen/pelvis before and following the uneventful rapid bolus  intravenous contrast administration. Post contrast imaging in the arterial and  delayed venous phases. Contiguous axial images were reconstructed in lung and  soft tissue windows. Coronal and sagittal reformats were generated.  CT dose  reduction was achieved through use of a standardized protocol tailored for this  examination and automatic exposure control for dose modulation.    FINDINGS:  LOWER THORAX: There are small bilateral pleural effusions and bilateral lower  lobe segmental atelectasis. Coronary artery calcifications are present.  LIVER: The liver is cirrhotic. There is heterogeneous enhancement in the right  hepatic lobe, likely related to recent instrumentation during recent attempted  TIPS procedure. There is no discrete hepatoma. There is acute appearing  expansile thrombus in the splenic vein from the splenic hilum to the confluence  of the portal vein. The thrombus extends into the main, right, and left portal  veins. Thrombus extends into the upper superior mesenteric vein. The inferior  mesenteric vein appears patent. There are signs of portal hypertension including  splenomegaly and small gastric and esophageal varices.  BILIARY TREE: Contrast within the gallbladder and biliary tree from attempted  TIPS earlier the same day. CBD is not dilated.  SPLEEN: Portal vein thrombus, as described above. Splenomegaly to 17.5 cm.  PANCREAS: No mass or ductal dilatation.  ADRENALS: Unremarkable.  KIDNEYS: No mass, calculus, or hydronephrosis. Contrast in the renal collecting  systems.  STOMACH: Unremarkable. No evidence of active hemorrhage.  SMALL BOWEL: No dilatation or wall thickening. No evidence of active hemorrhage.  COLON: No dilatation or wall thickening. No evidence of active hemorrhage.  Diverticulosis.  APPENDIX: Unremarkable.  PERITONEUM: Trace ascites. No 
Unremarkable prostate.  URINARY BLADDER: Decompressed by a Brian catheter. Excreted contrast within the  renal collecting systems and urinary bladder.  BONES: No destructive bone lesion.  ABDOMINAL WALL: No mass or hernia.  ADDITIONAL COMMENTS: N/A    Impression  Acute appearing portal vein thrombus involving the entire splenic vein, upper  super mesenteric vein, and main/right/left portal veins. Small volume ascites  and small gastric and esophageal varices. No evidence of bowel ischemia.    Findings discussed with Dr. Vilchis at 5:47 p.m. on 6/13/2024    Electronically signed by Patricia Connors          PROCEDURE:  ULTRASOUND GUIDED PARACENTESIS 6/14/2024     HISTORY: KAYLI JONES is a 64 years old Male with ascites.     :  Evie Chavez NP     ATTENDING:  Lloyd Sears MD     CONSENT:  After full discussion of the procedure, including risks, benefits and  alternatives, both verbal and written consent were obtained.     TECHNIQUE: A timeout was called to verify the correct patient, procedure, site  and allergies.     Preliminary ultrasound imaging of the abdomen demonstrated ascites amenable to  paracentesis.  An appropriate site in the right lower quadrant was marked.   Images were archived to PACS.  The skin was prepped and draped in sterile  fashion.  1% lidocaine was utilized for local anesthesia.  A small dermatotomy  was made.  A centesis catheter needle was then inserted into the peritoneal  space using ultrasound guidance.  There was return of clear yellow fluid through  the needle.  The needle was removed and the catheter was placed to suction.  A  total of 700 ml of fluid was evacuated.  The catheter was removed.  Pressure was  applied locally at the puncture site.  A dry sterile dressing was applied.   There were no immediate complications.  The patient tolerated the procedure  without difficulty.     COMPLICATIONS:  None     ESTIMATED BLOOD LOSS:  < 1 ml     SPECIMENS:  Fluid sent for 
of active hemorrhage.  SMALL BOWEL: No dilatation or wall thickening. No evidence of active hemorrhage.  COLON: No dilatation or wall thickening. No evidence of active hemorrhage.  Diverticulosis.  APPENDIX: Unremarkable.  PERITONEUM: Trace ascites. No pneumoperitoneum  AORTA: No aneurysm or dissection. No mesenteric arterial occlusion.  RETROPERITONEUM: No lymphadenopathy or hemorrhage.  REPRODUCTIVE ORGANS: Unremarkable prostate.  URINARY BLADDER: Decompressed by a Brian catheter. Excreted contrast within the  renal collecting systems and urinary bladder.  BONES: No destructive bone lesion.  ABDOMINAL WALL: No mass or hernia.  ADDITIONAL COMMENTS: N/A    Impression  Acute appearing portal vein thrombus involving the entire splenic vein, upper  super mesenteric vein, and main/right/left portal veins. Small volume ascites  and small gastric and esophageal varices. No evidence of bowel ischemia.    Findings discussed with Dr. Vilchis at 5:47 p.m. on 6/13/2024    Electronically signed by Patricia Connors          PROCEDURE:  ULTRASOUND GUIDED PARACENTESIS 6/14/2024     HISTORY: KAYLI JONES is a 64 years old Male with ascites.     :  Evie Chavez NP     ATTENDING:  Lloyd Sears MD     CONSENT:  After full discussion of the procedure, including risks, benefits and  alternatives, both verbal and written consent were obtained.     TECHNIQUE: A timeout was called to verify the correct patient, procedure, site  and allergies.     Preliminary ultrasound imaging of the abdomen demonstrated ascites amenable to  paracentesis.  An appropriate site in the right lower quadrant was marked.   Images were archived to PACS.  The skin was prepped and draped in sterile  fashion.  1% lidocaine was utilized for local anesthesia.  A small dermatotomy  was made.  A centesis catheter needle was then inserted into the peritoneal  space using ultrasound guidance.  There was return of clear yellow fluid through  the needle.  The 
was then inserted into the peritoneal  space using ultrasound guidance.  There was return of clear yellow fluid through  the needle.  The needle was removed and the catheter was placed to suction.  A  total of 700 ml of fluid was evacuated.  The catheter was removed.  Pressure was  applied locally at the puncture site.  A dry sterile dressing was applied.   There were no immediate complications.  The patient tolerated the procedure  without difficulty.     COMPLICATIONS:  None     ESTIMATED BLOOD LOSS:  < 1 ml     SPECIMENS:  Fluid sent for analysis     IMPRESSION:  Technically successful ultrasound guided paracentesis with evacuation of 700 ml  of fluid.     EXAM: CT ABDOMEN PELVIS WO CONTRAST 6/13/2024     INDICATION: leukocytosis, abd pain, distention, cirrhosis of liver, active GI  bleed     COMPARISON: 6/30/2019     IV CONTRAST: None.     ORAL CONTRAST: None     TECHNIQUE:   Thin axial images were obtained through the abdomen and pelvis. Coronal and  sagittal reformats were generated. CT dose reduction was achieved through use of  a standardized protocol tailored for this examination and automatic exposure  control for dose modulation.      The absence of intravenous contrast material reduces the sensitivity for  evaluation of the vasculature and solid organs.     FINDINGS:   LOWER THORAX: Stable 5 mm right lower lobe nodule. Minimal bibasilar atelectasis  or scar.  LIVER: Cirrhotic morphology stable. No obvious focal mass.  BILIARY TREE: Gallbladder is within normal limits. CBD is not dilated.  SPLEEN: within normal limits.  PANCREAS: No focal abnormality.  ADRENALS: Unremarkable.  KIDNEYS/URETERS: Nonobstructing intrarenal calculi on the left.  STOMACH: Unremarkable.  SMALL BOWEL: No dilatation or wall thickening.  COLON: Diverticula without associated acute inflammatory change.  APPENDIX: Normal  PERITONEUM: Ascites, small to moderate but new since prior study. Diffuse  mesenteric edema.   RETROPERITONEUM:

## 2024-06-25 NOTE — PLAN OF CARE
Problem: Occupational Therapy - Adult  Goal: By Discharge: Performs self-care activities at highest level of function for planned discharge setting.  See evaluation for individualized goals.  Description: FUNCTIONAL STATUS PRIOR TO ADMISSION:  Patient was independent in ADLs and mod I for functional mobility using a cane.  Receives Help From: Family, ADL Assistance: Independent,  ,  ,  ,  ,  , Homemaking Assistance: Independent, Ambulation Assistance: Independent, Transfer Assistance: Independent, Active : Yes     HOME SUPPORT: Patient lived with his ex-wife but did not require assist.    Occupational Therapy Goals:  Initiated 6/21/2024  1.  Patient will perform grooming standing at sink with Supervision within 7 day(s).  2.  Patient will perform upper body dressing with Supervision within 7 day(s).  3.  Patient will perform lower body dressing with Supervision within 7 day(s).  4.  Patient will perform toilet transfers with Supervision  within 7 day(s).  5.  Patient will perform all aspects of toileting with Supervision within 7 day(s).  6.  Patient will participate in upper extremity therapeutic exercise/activities with Supervision for 5 minutes within 7 day(s).    Outcome: Progressing   OCCUPATIONAL THERAPY EVALUATION    Patient: Sveta Eduardo Jr. (64 y.o. male)  Date: 6/21/2024  Primary Diagnosis: Gastrointestinal bleed [K92.2]  UGI bleed [K92.2]  GI bleed [K92.2]  Procedure(s) (LRB):  ESOPHAGOGASTRODUODENOSCOPY (N/A) 8 Days Post-Op     Precautions:                    ASSESSMENT :  The patient is limited by decreased functional mobility, independence in ADLs, high-level IADLs, ROM, strength, body mechanics, activity tolerance, endurance, safety awareness, cognition, attention/concentration, coordination, balance, posture, fine-motor control.    Based on the impairments listed above patient is functioning below his baseline for ADLs and functional mobility. He is now completing ADLs with supervision to 
  Problem: Respiratory - Adult  Goal: Achieves optimal ventilation and oxygenation  6/19/2024 2102 by Snehal Gomez RCP  Outcome: Progressing  6/19/2024 1528 by Kristy Cazares RN  Outcome: Progressing  6/19/2024 0808 by Juliana Douglass, RT  Outcome: Progressing     
  Problem: Respiratory - Adult  Goal: Achieves optimal ventilation and oxygenation  6/21/2024 1954 by Snehal Gomez RCP  Outcome: Progressing  6/21/2024 0729 by Estela Courtney RCP  Outcome: Progressing     
  Problem: Respiratory - Adult  Goal: Achieves optimal ventilation and oxygenation  6/22/2024 1939 by Snehal Gomez RCP  Outcome: Progressing  6/22/2024 0946 by Taylor Velez, RT  Outcome: Progressing     
  Problem: Respiratory - Adult  Goal: Achieves optimal ventilation and oxygenation  Outcome: Progressing     
  Problem: Safety - Adult  Goal: Free from fall injury  Outcome: Progressing     Problem: Pain  Goal: Verbalizes/displays adequate comfort level or baseline comfort level  Outcome: Progressing     Problem: Chronic Conditions and Co-morbidities  Goal: Patient's chronic conditions and co-morbidity symptoms are monitored and maintained or improved  Outcome: Progressing     Problem: Cardiovascular - Adult  Goal: Maintains optimal cardiac output and hemodynamic stability  Outcome: Progressing  Goal: Absence of cardiac dysrhythmias or at baseline  Outcome: Progressing     Problem: Hematologic - Adult  Goal: Maintains hematologic stability  Outcome: Progressing     
  Problem: Safety - Adult  Goal: Free from fall injury  Outcome: Progressing     Problem: Pain  Goal: Verbalizes/displays adequate comfort level or baseline comfort level  Outcome: Progressing     Problem: Chronic Conditions and Co-morbidities  Goal: Patient's chronic conditions and co-morbidity symptoms are monitored and maintained or improved  Outcome: Progressing     Problem: Cardiovascular - Adult  Goal: Maintains optimal cardiac output and hemodynamic stability  Outcome: Progressing  Goal: Absence of cardiac dysrhythmias or at baseline  Outcome: Progressing     Problem: Hematologic - Adult  Goal: Maintains hematologic stability  Outcome: Progressing     Problem: Respiratory - Adult  Goal: Achieves optimal ventilation and oxygenation  6/21/2024 2052 by Radha Erazo RN  Outcome: Progressing  6/21/2024 1954 by Snehal Gomez RCP  Outcome: Progressing  6/21/2024 0729 by Estela Courtney RCP  Outcome: Progressing     Problem: Skin/Tissue Integrity  Goal: Absence of new skin breakdown  Description: 1.  Monitor for areas of redness and/or skin breakdown  2.  Assess vascular access sites hourly  3.  Every 4-6 hours minimum:  Change oxygen saturation probe site  4.  Every 4-6 hours:  If on nasal continuous positive airway pressure, respiratory therapy assess nares and determine need for appliance change or resting period.  Outcome: Progressing     Problem: Discharge Planning  Goal: Discharge to home or other facility with appropriate resources  Outcome: Progressing     Problem: Nutrition Deficit:  Goal: Optimize nutritional status  Outcome: Progressing     Problem: ABCDS Injury Assessment  Goal: Absence of physical injury  Outcome: Progressing     Problem: SLP Adult - Impaired Swallowing  Goal: By Discharge: Advance to least restrictive diet without signs or symptoms of aspiration for planned discharge setting.  See evaluation for individualized goals.  Description: Speech Pathology Goals  Initiated 6/21/2024     1. 
  Problem: Safety - Adult  Goal: Free from fall injury  Outcome: Progressing     Problem: Pain  Goal: Verbalizes/displays adequate comfort level or baseline comfort level  Outcome: Progressing     Problem: Chronic Conditions and Co-morbidities  Goal: Patient's chronic conditions and co-morbidity symptoms are monitored and maintained or improved  Outcome: Progressing     Problem: Cardiovascular - Adult  Goal: Maintains optimal cardiac output and hemodynamic stability  Outcome: Progressing  Goal: Absence of cardiac dysrhythmias or at baseline  Outcome: Progressing     Problem: Hematologic - Adult  Goal: Maintains hematologic stability  Outcome: Progressing     Problem: Respiratory - Adult  Goal: Achieves optimal ventilation and oxygenation  6/22/2024 2103 by Radha Erazo, RN  Outcome: Progressing  6/22/2024 1939 by Snehal Gomez RCP  Outcome: Progressing  6/22/2024 0946 by Taylor Velez, RT  Outcome: Progressing     Problem: Skin/Tissue Integrity  Goal: Absence of new skin breakdown  Description: 1.  Monitor for areas of redness and/or skin breakdown  2.  Assess vascular access sites hourly  3.  Every 4-6 hours minimum:  Change oxygen saturation probe site  4.  Every 4-6 hours:  If on nasal continuous positive airway pressure, respiratory therapy assess nares and determine need for appliance change or resting period.  6/22/2024 2103 by Radha Erazo RN  Outcome: Progressing  6/22/2024 1208 by Serene Monk RN  Outcome: Progressing     Problem: Discharge Planning  Goal: Discharge to home or other facility with appropriate resources  Outcome: Progressing     Problem: Nutrition Deficit:  Goal: Optimize nutritional status  Outcome: Progressing     Problem: ABCDS Injury Assessment  Goal: Absence of physical injury  Outcome: Progressing     
  Problem: Safety - Medical Restraint  Goal: Remains free of injury from restraints (Restraint for Interference with Medical Device)  Description: INTERVENTIONS:  1. Determine that other, less restrictive measures have been tried or would not be effective before applying the restraint  2. Evaluate the patient's condition at the time of restraint application  3. Inform patient/family regarding the reason for restraint  4. Q2H: Monitor safety, psychosocial status, comfort, nutrition and hydration  6/16/2024 1744 by Mason Ramires RN  Outcome: Progressing  6/16/2024 0834 by Estela Courtney, RCP  Outcome: Progressing     
  Problem: Safety - Medical Restraint  Goal: Remains free of injury from restraints (Restraint for Interference with Medical Device)  Description: INTERVENTIONS:  1. Determine that other, less restrictive measures have been tried or would not be effective before applying the restraint  2. Evaluate the patient's condition at the time of restraint application  3. Inform patient/family regarding the reason for restraint  4. Q2H: Monitor safety, psychosocial status, comfort, nutrition and hydration  6/19/2024 1528 by Kristy Cazares, RN  Outcome: Progressing  Flowsheets (Taken 6/19/2024 0932 by Shanika Romero, RN)  Remains free of injury from restraints (restraint for interference with medical device):   Determine that other, less restrictive measures have been tried or would not be effective before applying the restraint   Evaluate the patient's condition at the time of restraint application   Inform patient/family regarding the reason for restraint   Every 2 hours: Monitor safety, psychosocial status, comfort, nutrition and hydration  6/19/2024 0616 by Yumi Gould, RN  Outcome: Progressing     
BRIEF IR CONSULT    Mr. Eduardo is a 64 y.o. 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 This afternoon, Hgb dropped 7.2 --> 5.2 without change in hemodynamics. GI consulted, but do not feel further endoscopic management would be beneficial. IR consulted for TIPS. No recent echo or contrast-enhanced cross sectional imaging.    - Agree that this patient is likely to need TIPS. As he remains hemodynamically stable, would complete the TIPS workup:  - Echocardiogram to evaluate the right heart.   - Multiphase CTA abdomen/pelvis to evaluate for other sources of bleeding, the patency of the portal vein, and for any hepatoma.   - Can plan for TIPS semi-electively next week once we have the above data, if he remains stable. However, if he continues to require multiple transfusions or ever becomes unstable, please reach out to me and we can do it more urgently.   - IR following.     Patricia Connors MD  [facility]  8:28 PM, 6/14/2024        
BRIEF IR NOTE    Mr. Eduardo is a 64 y.o. 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 Yesterday afternoon, Hgb dropped 7.2 --> 5.2 without change in hemodynamics. Overnight, he was not responsive to blood transfusions.     - Will perform TIPS today.   - Had intended to schedule procedure this morning, but Anesthesia can only support case early afternoon. As he is currently stable, okay to wait until this afternoon. If there are any clinical changes please let me know.   - For his stated contrast allergy, Methylprednisolone 40 mg IV should be redosed every 4 hours until contrast medium, so please give another dose around 8am today. The diphenhydramine 50 mg IV should be held until 1 hour before contrast medium administration, so please give this around 12 noon.     Patricia Connors MD  [facility]  6:54 AM, 6/15/2024    
Speech LAnguage Pathology EVALUATION    Patient: Sveta Eduardo Jr. (64 y.o. male)  Date: 6/21/2024  Primary Diagnosis: Gastrointestinal bleed [K92.2]  UGI bleed [K92.2]  GI bleed [K92.2]  Procedure(s) (LRB):  ESOPHAGOGASTRODUODENOSCOPY (N/A) 8 Days Post-Op   Precautions: Aspiration precautions                    ASSESSMENT :  Patient cleared for SLP evaluation following IDR including MD and RN present. The patient presents with suspect esophageal dysphagia and increased risk for prandial aspiration, likely acute-on-chronic related to patient's acute intubation history 6/15-6/20, acute mentation, as well as history of COPD and GERD. Swallow prognosis is fair given no reported history of dysphagia prior to admission. Patient's vocal quality observed to be mildly rough. Patient reports voice is currently at baseline although no family/caregiver present at bedside to corraborate history. Oral phase revealed mildly slow oral manipulation. No overt signs of aspiration observed. Intermittent belching present following PO trials, suspect esophageal in nature.    Discussed results of SLP evaluation with MD via [place], who cleared patient for diet initiation. At the time of documentation, note plan for CTA to evaluate for ileus/obstruction. SLP will continue to follow acutely as patient is cleared from GI standpoint for diet initiation.    Patient will benefit from skilled intervention to address the above impairments.     PLAN :  Recommendations and Planned Interventions:  Diet: Clear liquid diet once cleared from GI standpoint; Note CTA to evaluate for ileus/obstruction pending  -- Strict oral hygiene 2-3x/day with use of suction toothbrush  -- 1:1 assistance/supervision with PO intake  -- Please hold PO intake if patient with any change in vital signs or decline in respiratory status and consider Flexible Endoscopic Evaluation of Swallow (FEES) for further evaluation  -- Delirium precautions/environmental 
Speech LAnguage Pathology TREATMENT    Patient: Sveta Eduardo Jr. (64 y.o. male)  Date: 6/24/2024  Primary Diagnosis: Gastrointestinal bleed [K92.2]  UGI bleed [K92.2]  GI bleed [K92.2]  Procedure(s) (LRB):  ESOPHAGOGASTRODUODENOSCOPY (N/A) 11 Days Post-Op   Precautions: Fall Risk, Bed Alarm                  ASSESSMENT :  Patient seen in follow-up for dysphagia treatment on this date. Patient only agreeable to limited thin liquid trials on this date, which he tolerated without overt signs of aspiration. Patient declined all other attempts at PO on this date. Significantly delayed cough observed, suspect esophageal in nature.     Patient will benefit from skilled intervention to address the above impairments.     PLAN :  Recommendations and Planned Interventions:  Diet: Regular and thin liquids  -- Medication as tolerated  -- Assistance with meal set-up  -- General aspiration precautions: upright for all PO, small bites/sips, slow rate of intake, alternate liquids and solids as needed  -- Delirium precautions/environmental modifications: Lights on and blinds open during the day, Frequent reorientation of patient to location, date, and time, Have date updated on whiteboard and clock visible to patient, etc.     Recommend next SLP session: diet check    Acute SLP Services: Yes, SLP will continue to follow per plan of care.    Discharge Recommendations: Continue to assess pending progress     SUBJECTIVE:   Patient stated, “I'm not try to be rude.” Patient expressed concerns with antibiotics being added to his food. Note patient's diet advanced to regular on 6/23/24.    OBJECTIVE:     Past Medical History:   Diagnosis Date    Aneurysm (HCC)     cerebral    Arthritis     Asthma     Chronic obstructive pulmonary disease (HCC)     lung nodule being monitored    Chronic pain     headaches/arthritis    GERD (gastroesophageal reflux disease)     Hypertension     Ill-defined condition     Chronic back pain    Ill-defined 
Recommend HHPT with family assist pending progress. Pt positioned to comfort semi fowlers in bed with all needs within reach and in NAD.          PLAN:  Patient continues to benefit from skilled intervention to address the above impairments.  Continue treatment per established plan of care.    Recommend with staff: therapy recommendations for staff: Recommend mobility with staff assist x1 using gait belt and rolling walker. and Recommend toileting using  recommended toilet device: a bedside commode.      Recommendation for discharge: (in order for the patient to meet his/her long term goals): Therapy 2x a week in the home, however, may require supervision, cognitive and/or physical assistance due to the following concerns listed below:    Other factors to consider for discharge: high risk for falls and not safe to be alone    IF patient discharges home will need the following DME: rolling walker       SUBJECTIVE:   Patient stated, \"Yeah lets walk.\"    OBJECTIVE DATA SUMMARY:   Critical Behavior:  Orientation  Overall Orientation Status: Impaired  Orientation Level: Oriented to person;Oriented to place;Oriented to time;Disoriented to situation  Cognition  Overall Cognitive Status: Exceptions  Arousal/Alertness: Delayed responses to stimuli  Following Commands: Follows one step commands with increased time    Functional Mobility Training:  Bed Mobility:  Bed Mobility Training  Bed Mobility Training: Yes  Interventions: Safety awareness training;Verbal cues  Supine to Sit: Contact-guard assistance  Scooting: Contact-guard assistance  Transfers:  Transfer Training  Transfer Training: Yes  Interventions: Safety awareness training;Verbal cues  Sit to Stand: Contact-guard assistance  Stand to Sit: Contact-guard assistance  Toilet Transfer: Contact-guard assistance;Other (comment);Minimum assistance (BSC)  Balance:  Balance  Sitting: Intact  Standing: Impaired  Standing - Static: Good  Standing - Dynamic: 
Pace decreased (< 100 feet/min)  Step Length: Right shortened;Left shortened  Gait Abnormalities: Decreased step clearance;Trunk sway increased        Neuro Re-Education:                    Pain Rating:    Pain Intervention(s):       Activity Tolerance:   Fair     After treatment:   Patient left in no apparent distress sitting up in chair, Call bell within reach, Bed/ chair alarm activated, and Updated patient's board on functional status and mobility recommendations      COMMUNICATION/EDUCATION:   The patient's plan of care was discussed with: occupational therapist and registered nurse    Patient Education  Education Given To: Patient  Education Provided: Role of Therapy;Plan of Care;Transfer Training  Education Method: Verbal  Barriers to Learning: None  Education Outcome: Verbalized understanding;Demonstrated understanding;Continued education needed      Liudmila Avendaño PTA  Minutes: 23    
Static: Good  Standing - Dynamic: Fair;Occasional      ADL Intervention:    LE Dressing Skilled Clinical Factors: using toes to doff socks seated at bedside chair, able to perform crossed leg but decreased reach to toes due to abdominal girth, able to pull up socks once therapist threaded them to mid foot        Activity Tolerance:   Fair  and requires rest breaks  Please refer to the flowsheet for vital signs taken during this treatment.    After treatment:   Patient left in no apparent distress sitting up in chair, Call bell within reach, Bed/ chair alarm activated, Updated patient's board on functional status and mobility recommendations, and reclined in recliner    COMMUNICATION/EDUCATION:   The patient's plan of care was discussed with: physical therapy assistant, registered nurse, and patient    Patient Education  Education Given To: Patient;Family  Education Provided: ADL Adaptive Strategies;Fall Prevention Strategies  Education Method: Verbal  Barriers to Learning: None  Education Outcome: Verbalized understanding;Continued education needed    Thank you for this referral.  Concha Amaya OTR/L  Minutes: 26    
comfort measures as appropriate   Advance diet as tolerated, if ordered   Nutrition consult to assist patient with adequate nutrition and appropriate food choices  Goal: Maintains or returns to baseline bowel function  Recent Flowsheet Documentation  Taken 6/24/2024 0800 by Jamie Recinos RN  Maintains or returns to baseline bowel function:   Assess bowel function   Administer IV fluids as ordered to ensure adequate hydration   Encourage oral fluids to ensure adequate hydration     Problem: Infection - Adult  Goal: Absence of infection at discharge  Recent Flowsheet Documentation  Taken 6/24/2024 0800 by Jamie Recinos RN  Absence of infection at discharge:   Assess and monitor for signs and symptoms of infection   Identify and instruct in appropriate isolation precautions for identified infection/condition   Instruct and encourage patient and family to use good hand hygiene technique   Administer medications as ordered  Goal: Absence of infection during hospitalization  Recent Flowsheet Documentation  Taken 6/24/2024 0800 by Jamie Recinos RN  Absence of infection during hospitalization: Assess and monitor for signs and symptoms of infection     
Determination   History Examination Presentation Decision-Making   HIGH Complexity :3+ comorbidities / personal factors will impact the outcome/ POC  MEDIUM Complexity : 3 Standardized tests and measures addressin body structure, function, activity limitation and / or participation in recreation  MEDIUM Complexity : Evolving with changing characteristics  AM-PAC  MEDIUM   Based on the above components, the patient evaluation is determined to be of the following complexity level: Medium

## 2024-06-26 LAB
AFP-TM SERPL-MCNC: 33.9 NG/ML (ref 0–8.4)
CYTOLOGY-NON GYN: NORMAL

## 2024-06-29 LAB
BACTERIA SPEC CULT: NORMAL
GRAM STN SPEC: NORMAL
GRAM STN SPEC: NORMAL
SERVICE CMNT-IMP: NORMAL

## 2024-07-10 ENCOUNTER — HOSPITAL ENCOUNTER (EMERGENCY)
Facility: HOSPITAL | Age: 64
Discharge: HOME | End: 2024-07-10

## 2024-07-10 VITALS
TEMPERATURE: 97.5 F | HEART RATE: 96 BPM | RESPIRATION RATE: 17 BRPM | DIASTOLIC BLOOD PRESSURE: 77 MMHG | BODY MASS INDEX: 40.37 KG/M2 | SYSTOLIC BLOOD PRESSURE: 147 MMHG | WEIGHT: 281.97 LBS | HEIGHT: 70 IN | OXYGEN SATURATION: 96 %

## 2024-07-10 DIAGNOSIS — Z48.02 VISIT FOR SUTURE REMOVAL: Primary | ICD-10-CM

## 2024-07-10 ASSESSMENT — PAIN SCALES - GENERAL: PAINLEVEL_OUTOF10: 0

## 2024-07-11 NOTE — ED NOTES
Patient discharged from the ED by NATALY Yip. Diagnosis, medications, precautions and follow-ups were reviewed with the patient/family. Questions were asked and answered prior to departure. Patient departed the ED via walk-out and was accompanied by wife.

## 2024-07-11 NOTE — ED PROVIDER NOTES
Chronically ill-appearing 64-year-old  male sitting up in bed in no acute distress.  1 stitch noted to right side of neck.  See image below.  No surrounding erythema, warmth, tenderness.   HENT:      Head: Normocephalic and atraumatic.   Eyes:      Extraocular Movements: Extraocular movements intact.      Pupils: Pupils are equal, round, and reactive to light.   Cardiovascular:      Rate and Rhythm: Normal rate.   Pulmonary:      Effort: Pulmonary effort is normal. No respiratory distress.   Musculoskeletal:         General: Normal range of motion.      Cervical back: Normal range of motion.   Skin:     General: Skin is warm and dry.   Neurological:      General: No focal deficit present.      Mental Status: He is alert and oriented to person, place, and time.   Psychiatric:         Mood and Affect: Mood normal.         Behavior: Behavior normal.                   EMERGENCY DEPARTMENT COURSE and DIFFERENTIAL DIAGNOSIS/MDM   Vitals:    Vitals:    07/10/24 1830 07/10/24 1845   BP:  (!) 147/77   Pulse:  96   Resp:  17   Temp:  97.5 °F (36.4 °C)   TempSrc:  Oral   SpO2:  96%   Weight: 127.9 kg (281 lb 15.5 oz)    Height: 1.778 m (5' 10\")        Patient was given the following medications:  Medications - No data to display    Social Determinants affecting Dx or Tx: None    Records Reviewed (source and summary of external records): Nursing Notes and Old Medical Records reviewed records from 6/13/2024 to 6/25/2024, when patient was admitted to this hospital with a GI bleed.    MDM: CC/HPI Summary, Chronic Conditions, DDx, ED Course, and Reassessment. Disposition Considerations (Tests not done, Shared Decision Making, Pt Expectation of Test or Tx.):       Sveta Eduardo Jr. is a 64 y.o. male with with PMHx as noted above who presents to the ED today to have sutures removed from the right side of his neck.  Patient states he was hospitalized for couple weeks in the ICU and had an IV line there and the removed it,  diagnosis, and discharge instructions have been discussed, understanding conveyed, and agreed upon. The patient is to follow up as recommended or return to ER should their symptoms worsen.      PATIENT REFERRED TO:  Gonzalez Manzo MD  [address] [address] [phone]          [facility] EMERGENCY DEPT  [address] [facility] [address]  [phone]    If symptoms worsen         DISCHARGE MEDICATIONS:     Medication List        ASK your doctor about these medications      carvedilol 3.125 MG tablet  Commonly known as: COREG  Take 1 tablet by mouth 2 times daily (with meals)     ciprofloxacin 500 MG tablet  Commonly known as: CIPRO  Take 1 tablet by mouth 2 times daily for 7 days, THEN 1 tablet daily.  Start taking on: June 25, 2024     fluticasone furoate-vilanterol 100-25 MCG/ACT inhaler  Commonly known as: BREO ELLIPTA     furosemide 40 MG tablet  Commonly known as: LASIX  Take 1 tablet by mouth 2 times daily     hydrOXYzine pamoate 25 MG capsule  Commonly known as: VISTARIL     lactobacillus capsule  Take 1 capsule by mouth daily     lactulose 10 GM/15ML solution  Commonly known as: CHRONULAC  Take 15 mLs by mouth daily Hold if having Bms > 3 times a day     morphine 60 MG extended release tablet  Commonly known as: MS CONTIN     oxyCODONE-acetaminophen  MG per tablet  Commonly known as: PERCOCET     pantoprazole 40 MG tablet  Commonly known as: PROTONIX  Take 1 tablet by mouth 2 times daily (before meals) for 30 days, THEN 1 tablet daily.  Start taking on: June 25, 2024     QUEtiapine 50 MG tablet  Commonly known as: SEROQUEL     rifAXIMin 550 MG tablet  Commonly known as: XIFAXAN  Take 1 tablet by mouth 2 times daily     spironolactone 100 MG tablet  Commonly known as: ALDACTONE  Take 1 tablet by mouth in the morning and 1 tablet in the evening.     Ventolin  (90 Base) MCG/ACT inhaler  Generic drug: albuterol sulfate HFA                DISCONTINUED

## 2024-07-12 LAB
EKG ATRIAL RATE: 50 BPM
EKG DIAGNOSIS: NORMAL
EKG P AXIS: 23 DEGREES
EKG P-R INTERVAL: 178 MS
EKG Q-T INTERVAL: 394 MS
EKG QRS DURATION: 98 MS
EKG QTC CALCULATION (BAZETT): 396 MS
EKG R AXIS: 38 DEGREES
EKG T AXIS: 27 DEGREES
EKG VENTRICULAR RATE: 61 BPM

## 2024-09-09 ENCOUNTER — HOSPITAL ENCOUNTER (OUTPATIENT)
Facility: HOSPITAL | Age: 64
Discharge: HOME | End: 2024-09-12
Payer: MEDICARE

## 2024-09-09 VITALS
HEART RATE: 82 BPM | RESPIRATION RATE: 16 BRPM | SYSTOLIC BLOOD PRESSURE: 111 MMHG | DIASTOLIC BLOOD PRESSURE: 50 MMHG | HEIGHT: 70 IN | OXYGEN SATURATION: 96 % | WEIGHT: 250 LBS | BODY MASS INDEX: 35.79 KG/M2

## 2024-09-09 DIAGNOSIS — K70.31 ALCOHOLIC CIRRHOSIS OF LIVER WITH ASCITES (HCC): ICD-10-CM

## 2024-09-09 LAB
APPEARANCE FLD: ABNORMAL
COLOR FLD: ABNORMAL
COMMENT:: NORMAL
LYMPHOCYTES NFR FLD: 6 %
MESOTHL CELL NFR FLD: 3 %
MONOS+MACROS NFR FLD: 8 %
NEUTROPHILS NFR FLD: 83 %
NUC CELL # FLD: 8298 /CU MM
RBC # FLD: >100 /CU MM
SPECIMEN HOLD: NORMAL
SPECIMEN SOURCE FLD: ABNORMAL

## 2024-09-09 PROCEDURE — 2709999900 US GUIDED PARACENTESIS

## 2024-09-09 PROCEDURE — 6360000002 HC RX W HCPCS: Performed by: PHYSICIAN ASSISTANT

## 2024-09-09 PROCEDURE — 89050 BODY FLUID CELL COUNT: CPT

## 2024-09-09 PROCEDURE — 0W9G3ZZ DRAINAGE OF PERITONEAL CAVITY, PERCUTANEOUS APPROACH: ICD-10-PCS | Performed by: RADIOLOGY

## 2024-09-09 PROCEDURE — P9047 ALBUMIN (HUMAN), 25%, 50ML: HCPCS | Performed by: PHYSICIAN ASSISTANT

## 2024-09-09 RX ORDER — ALBUMIN (HUMAN) 12.5 G/50ML
25 SOLUTION INTRAVENOUS ONCE
Status: COMPLETED | OUTPATIENT
Start: 2024-09-09 | End: 2024-09-09

## 2024-09-09 RX ADMIN — ALBUMIN (HUMAN) 25 G: 0.25 INJECTION, SOLUTION INTRAVENOUS at 15:01

## 2024-09-10 ENCOUNTER — APPOINTMENT (OUTPATIENT)
Facility: HOSPITAL | Age: 64
DRG: 372 | End: 2024-09-10
Payer: MEDICARE

## 2024-09-10 ENCOUNTER — HOSPITAL ENCOUNTER (INPATIENT)
Facility: HOSPITAL | Age: 64
LOS: 6 days | Discharge: HOME HEALTH | DRG: 372 | End: 2024-09-16
Attending: STUDENT IN AN ORGANIZED HEALTH CARE EDUCATION/TRAINING PROGRAM | Admitting: STUDENT IN AN ORGANIZED HEALTH CARE EDUCATION/TRAINING PROGRAM
Payer: MEDICARE

## 2024-09-10 DIAGNOSIS — K65.2 SPONTANEOUS BACTERIAL PERITONITIS (HCC): Primary | ICD-10-CM

## 2024-09-10 DIAGNOSIS — K70.31 ALCOHOLIC CIRRHOSIS OF LIVER WITH ASCITES (HCC): ICD-10-CM

## 2024-09-10 LAB
ALBUMIN SERPL-MCNC: 2.3 G/DL (ref 3.5–5)
ALBUMIN SERPL-MCNC: 2.3 G/DL (ref 3.5–5)
ALBUMIN/GLOB SERPL: 0.5 (ref 1.1–2.2)
ALBUMIN/GLOB SERPL: 0.6 (ref 1.1–2.2)
ALP SERPL-CCNC: 608 U/L (ref 45–117)
ALP SERPL-CCNC: 635 U/L (ref 45–117)
ALT SERPL-CCNC: 57 U/L (ref 12–78)
ALT SERPL-CCNC: 57 U/L (ref 12–78)
AMMONIA PLAS-SCNC: <10 UMOL/L
ANION GAP SERPL CALC-SCNC: 5 MMOL/L (ref 2–12)
ANION GAP SERPL CALC-SCNC: 8 MMOL/L (ref 2–12)
AST SERPL-CCNC: 201 U/L (ref 15–37)
AST SERPL-CCNC: 204 U/L (ref 15–37)
BASOPHILS # BLD: 0 K/UL (ref 0–0.1)
BASOPHILS NFR BLD: 0 % (ref 0–1)
BILIRUB DIRECT SERPL-MCNC: 14.5 MG/DL (ref 0–0.2)
BILIRUB SERPL-MCNC: 18.6 MG/DL (ref 0.2–1)
BILIRUB SERPL-MCNC: 19.1 MG/DL (ref 0.2–1)
BUN SERPL-MCNC: 50 MG/DL (ref 6–20)
BUN SERPL-MCNC: 53 MG/DL (ref 6–20)
BUN/CREAT SERPL: 47 (ref 12–20)
BUN/CREAT SERPL: 48 (ref 12–20)
CALCIUM SERPL-MCNC: 9.3 MG/DL (ref 8.5–10.1)
CALCIUM SERPL-MCNC: 9.7 MG/DL (ref 8.5–10.1)
CHLORIDE SERPL-SCNC: 94 MMOL/L (ref 97–108)
CHLORIDE SERPL-SCNC: 97 MMOL/L (ref 97–108)
CO2 SERPL-SCNC: 27 MMOL/L (ref 21–32)
CO2 SERPL-SCNC: 29 MMOL/L (ref 21–32)
CREAT SERPL-MCNC: 1.04 MG/DL (ref 0.7–1.3)
CREAT SERPL-MCNC: 1.13 MG/DL (ref 0.7–1.3)
DIFFERENTIAL METHOD BLD: ABNORMAL
EOSINOPHIL # BLD: 0 K/UL (ref 0–0.4)
EOSINOPHIL NFR BLD: 0 % (ref 0–7)
ERYTHROCYTE [DISTWIDTH] IN BLOOD BY AUTOMATED COUNT: 18.8 % (ref 11.5–14.5)
GLOBULIN SER CALC-MCNC: 3.8 G/DL (ref 2–4)
GLOBULIN SER CALC-MCNC: 4.2 G/DL (ref 2–4)
GLUCOSE SERPL-MCNC: 108 MG/DL (ref 65–100)
GLUCOSE SERPL-MCNC: 116 MG/DL (ref 65–100)
HCT VFR BLD AUTO: 32.8 % (ref 36.6–50.3)
HGB BLD-MCNC: 11 G/DL (ref 12.1–17)
IMM GRANULOCYTES # BLD AUTO: 0.1 K/UL (ref 0–0.04)
IMM GRANULOCYTES NFR BLD AUTO: 1 % (ref 0–0.5)
INR PPP: 1.6 (ref 0.9–1.1)
LYMPHOCYTES # BLD: 4.5 K/UL (ref 0.8–3.5)
LYMPHOCYTES NFR BLD: 33 % (ref 12–49)
MCH RBC QN AUTO: 27.2 PG (ref 26–34)
MCHC RBC AUTO-ENTMCNC: 33.5 G/DL (ref 30–36.5)
MCV RBC AUTO: 81.2 FL (ref 80–99)
MONOCYTES # BLD: 1.3 K/UL (ref 0–1)
MONOCYTES NFR BLD: 10 % (ref 5–13)
NEUTS SEG # BLD: 7.7 K/UL (ref 1.8–8)
NEUTS SEG NFR BLD: 56 % (ref 32–75)
NRBC # BLD: 0 K/UL (ref 0–0.01)
NRBC BLD-RTO: 0 PER 100 WBC
PLATELET # BLD AUTO: 100 K/UL (ref 150–400)
PMV BLD AUTO: 10.6 FL (ref 8.9–12.9)
POTASSIUM SERPL-SCNC: 4.2 MMOL/L (ref 3.5–5.1)
POTASSIUM SERPL-SCNC: 4.4 MMOL/L (ref 3.5–5.1)
PROT SERPL-MCNC: 6.1 G/DL (ref 6.4–8.2)
PROT SERPL-MCNC: 6.5 G/DL (ref 6.4–8.2)
PROTHROMBIN TIME: 15.8 SEC (ref 9–11.1)
RBC # BLD AUTO: 4.04 M/UL (ref 4.1–5.7)
SODIUM SERPL-SCNC: 129 MMOL/L (ref 136–145)
SODIUM SERPL-SCNC: 131 MMOL/L (ref 136–145)
WBC # BLD AUTO: 13.6 K/UL (ref 4.1–11.1)

## 2024-09-10 PROCEDURE — 6370000000 HC RX 637 (ALT 250 FOR IP): Performed by: STUDENT IN AN ORGANIZED HEALTH CARE EDUCATION/TRAINING PROGRAM

## 2024-09-10 PROCEDURE — 36415 COLL VENOUS BLD VENIPUNCTURE: CPT

## 2024-09-10 PROCEDURE — 74176 CT ABD & PELVIS W/O CONTRAST: CPT

## 2024-09-10 PROCEDURE — 96365 THER/PROPH/DIAG IV INF INIT: CPT

## 2024-09-10 PROCEDURE — P9047 ALBUMIN (HUMAN), 25%, 50ML: HCPCS | Performed by: STUDENT IN AN ORGANIZED HEALTH CARE EDUCATION/TRAINING PROGRAM

## 2024-09-10 PROCEDURE — 85610 PROTHROMBIN TIME: CPT

## 2024-09-10 PROCEDURE — 1100000000 HC RM PRIVATE

## 2024-09-10 PROCEDURE — 80076 HEPATIC FUNCTION PANEL: CPT

## 2024-09-10 PROCEDURE — 96375 TX/PRO/DX INJ NEW DRUG ADDON: CPT

## 2024-09-10 PROCEDURE — 80053 COMPREHEN METABOLIC PANEL: CPT

## 2024-09-10 PROCEDURE — 2700000000 HC OXYGEN THERAPY PER DAY

## 2024-09-10 PROCEDURE — 6360000002 HC RX W HCPCS: Performed by: STUDENT IN AN ORGANIZED HEALTH CARE EDUCATION/TRAINING PROGRAM

## 2024-09-10 PROCEDURE — 85025 COMPLETE CBC W/AUTO DIFF WBC: CPT

## 2024-09-10 PROCEDURE — 93005 ELECTROCARDIOGRAM TRACING: CPT | Performed by: STUDENT IN AN ORGANIZED HEALTH CARE EDUCATION/TRAINING PROGRAM

## 2024-09-10 PROCEDURE — 82140 ASSAY OF AMMONIA: CPT

## 2024-09-10 PROCEDURE — 2580000003 HC RX 258: Performed by: STUDENT IN AN ORGANIZED HEALTH CARE EDUCATION/TRAINING PROGRAM

## 2024-09-10 PROCEDURE — 99285 EMERGENCY DEPT VISIT HI MDM: CPT

## 2024-09-10 RX ORDER — ONDANSETRON 2 MG/ML
4 INJECTION INTRAMUSCULAR; INTRAVENOUS EVERY 6 HOURS PRN
Status: DISCONTINUED | OUTPATIENT
Start: 2024-09-10 | End: 2024-09-16 | Stop reason: HOSPADM

## 2024-09-10 RX ORDER — IPRATROPIUM BROMIDE AND ALBUTEROL SULFATE 2.5; .5 MG/3ML; MG/3ML
1 SOLUTION RESPIRATORY (INHALATION) EVERY 4 HOURS PRN
Status: DISCONTINUED | OUTPATIENT
Start: 2024-09-10 | End: 2024-09-16 | Stop reason: HOSPADM

## 2024-09-10 RX ORDER — ACETAMINOPHEN 500 MG
500 TABLET ORAL EVERY 8 HOURS PRN
Status: DISCONTINUED | OUTPATIENT
Start: 2024-09-10 | End: 2024-09-16 | Stop reason: HOSPADM

## 2024-09-10 RX ORDER — OXYCODONE HYDROCHLORIDE 5 MG/1
5 TABLET ORAL EVERY 4 HOURS PRN
Status: DISCONTINUED | OUTPATIENT
Start: 2024-09-10 | End: 2024-09-15

## 2024-09-10 RX ORDER — ENOXAPARIN SODIUM 100 MG/ML
30 INJECTION SUBCUTANEOUS 2 TIMES DAILY
Status: DISCONTINUED | OUTPATIENT
Start: 2024-09-10 | End: 2024-09-16 | Stop reason: HOSPADM

## 2024-09-10 RX ORDER — ALBUMIN (HUMAN) 12.5 G/50ML
70 SOLUTION INTRAVENOUS ONCE
Status: DISCONTINUED | OUTPATIENT
Start: 2024-09-10 | End: 2024-09-16 | Stop reason: HOSPADM

## 2024-09-10 RX ORDER — SODIUM CHLORIDE 0.9 % (FLUSH) 0.9 %
5-40 SYRINGE (ML) INJECTION EVERY 12 HOURS SCHEDULED
Status: DISCONTINUED | OUTPATIENT
Start: 2024-09-10 | End: 2024-09-16 | Stop reason: HOSPADM

## 2024-09-10 RX ORDER — IPRATROPIUM BROMIDE AND ALBUTEROL SULFATE 2.5; .5 MG/3ML; MG/3ML
1 SOLUTION RESPIRATORY (INHALATION)
Status: DISCONTINUED | OUTPATIENT
Start: 2024-09-10 | End: 2024-09-10

## 2024-09-10 RX ORDER — SPIRONOLACTONE 25 MG/1
100 TABLET ORAL 2 TIMES DAILY
Status: DISCONTINUED | OUTPATIENT
Start: 2024-09-10 | End: 2024-09-16 | Stop reason: HOSPADM

## 2024-09-10 RX ORDER — ONDANSETRON 4 MG/1
4 TABLET, ORALLY DISINTEGRATING ORAL EVERY 8 HOURS PRN
Status: DISCONTINUED | OUTPATIENT
Start: 2024-09-10 | End: 2024-09-16 | Stop reason: HOSPADM

## 2024-09-10 RX ORDER — SODIUM CHLORIDE 0.9 % (FLUSH) 0.9 %
5-40 SYRINGE (ML) INJECTION PRN
Status: DISCONTINUED | OUTPATIENT
Start: 2024-09-10 | End: 2024-09-16 | Stop reason: HOSPADM

## 2024-09-10 RX ORDER — HYDROMORPHONE HYDROCHLORIDE 1 MG/ML
0.5 INJECTION, SOLUTION INTRAMUSCULAR; INTRAVENOUS; SUBCUTANEOUS
Status: COMPLETED | OUTPATIENT
Start: 2024-09-10 | End: 2024-09-10

## 2024-09-10 RX ORDER — OXYCODONE HYDROCHLORIDE 10 MG/1
10 TABLET ORAL EVERY 4 HOURS PRN
COMMUNITY

## 2024-09-10 RX ORDER — ARFORMOTEROL TARTRATE 15 UG/2ML
15 SOLUTION RESPIRATORY (INHALATION)
Status: DISCONTINUED | OUTPATIENT
Start: 2024-09-10 | End: 2024-09-12

## 2024-09-10 RX ORDER — LACTULOSE 10 G/15ML
15 SOLUTION ORAL DAILY
COMMUNITY

## 2024-09-10 RX ORDER — CARVEDILOL 6.25 MG/1
3.12 TABLET ORAL 2 TIMES DAILY WITH MEALS
Status: DISCONTINUED | OUTPATIENT
Start: 2024-09-10 | End: 2024-09-10

## 2024-09-10 RX ORDER — FORMOTEROL FUMARATE DIHYDRATE 20 UG/2ML
20 SOLUTION RESPIRATORY (INHALATION)
COMMUNITY

## 2024-09-10 RX ORDER — SODIUM CHLORIDE 9 MG/ML
INJECTION, SOLUTION INTRAVENOUS PRN
Status: DISCONTINUED | OUTPATIENT
Start: 2024-09-10 | End: 2024-09-16 | Stop reason: HOSPADM

## 2024-09-10 RX ORDER — POLYETHYLENE GLYCOL 3350 17 G/17G
17 POWDER, FOR SOLUTION ORAL DAILY PRN
Status: DISCONTINUED | OUTPATIENT
Start: 2024-09-10 | End: 2024-09-16 | Stop reason: HOSPADM

## 2024-09-10 RX ORDER — PANTOPRAZOLE SODIUM 40 MG/1
40 TABLET, DELAYED RELEASE ORAL DAILY
Status: DISCONTINUED | OUTPATIENT
Start: 2024-09-10 | End: 2024-09-16 | Stop reason: HOSPADM

## 2024-09-10 RX ORDER — ALBUMIN (HUMAN) 12.5 G/50ML
100 SOLUTION INTRAVENOUS ONCE
Status: COMPLETED | OUTPATIENT
Start: 2024-09-10 | End: 2024-09-10

## 2024-09-10 RX ORDER — ALBUMIN (HUMAN) 12.5 G/50ML
113 SOLUTION INTRAVENOUS ONCE
Status: DISCONTINUED | OUTPATIENT
Start: 2024-09-13 | End: 2024-09-13

## 2024-09-10 RX ORDER — LACTULOSE 10 G/15ML
15 SOLUTION ORAL DAILY
Status: DISCONTINUED | OUTPATIENT
Start: 2024-09-10 | End: 2024-09-16 | Stop reason: HOSPADM

## 2024-09-10 RX ORDER — CETIRIZINE HYDROCHLORIDE 10 MG/1
10 TABLET ORAL DAILY PRN
COMMUNITY

## 2024-09-10 RX ORDER — CANDESARTAN 16 MG/1
16 TABLET ORAL DAILY
Status: ON HOLD | COMMUNITY
End: 2024-09-16 | Stop reason: HOSPADM

## 2024-09-10 RX ADMIN — SPIRONOLACTONE 100 MG: 25 TABLET ORAL at 18:06

## 2024-09-10 RX ADMIN — OXYCODONE HYDROCHLORIDE 5 MG: 5 TABLET ORAL at 22:08

## 2024-09-10 RX ADMIN — CARVEDILOL 3.12 MG: 3.12 TABLET, FILM COATED ORAL at 18:08

## 2024-09-10 RX ADMIN — LACTULOSE 10 G: 10 SOLUTION ORAL at 18:07

## 2024-09-10 RX ADMIN — SODIUM CHLORIDE, PRESERVATIVE FREE 10 ML: 5 INJECTION INTRAVENOUS at 20:50

## 2024-09-10 RX ADMIN — SODIUM CHLORIDE 1000 MG: 900 INJECTION INTRAVENOUS at 14:38

## 2024-09-10 RX ADMIN — ALBUMIN (HUMAN) 100 G: 0.25 INJECTION, SOLUTION INTRAVENOUS at 14:41

## 2024-09-10 RX ADMIN — PANTOPRAZOLE SODIUM 40 MG: 40 TABLET, DELAYED RELEASE ORAL at 18:07

## 2024-09-10 RX ADMIN — OXYCODONE HYDROCHLORIDE 5 MG: 5 TABLET ORAL at 18:07

## 2024-09-10 RX ADMIN — HYDROMORPHONE HYDROCHLORIDE 0.5 MG: 1 INJECTION, SOLUTION INTRAMUSCULAR; INTRAVENOUS; SUBCUTANEOUS at 14:38

## 2024-09-10 ASSESSMENT — PAIN DESCRIPTION - DESCRIPTORS
DESCRIPTORS: ACHING
DESCRIPTORS: ACHING;GNAWING
DESCRIPTORS: ACHING

## 2024-09-10 ASSESSMENT — PAIN DESCRIPTION - LOCATION
LOCATION: ABDOMEN;SHOULDER
LOCATION: ABDOMEN;BACK
LOCATION: ABDOMEN;BACK

## 2024-09-10 ASSESSMENT — PAIN DESCRIPTION - ORIENTATION
ORIENTATION: LEFT
ORIENTATION: INNER
ORIENTATION: INNER;LOWER

## 2024-09-10 ASSESSMENT — PAIN SCALES - GENERAL
PAINLEVEL_OUTOF10: 10
PAINLEVEL_OUTOF10: 3

## 2024-09-11 LAB
ALBUMIN SERPL-MCNC: 3 G/DL (ref 3.5–5)
ALBUMIN/GLOB SERPL: 0.9 (ref 1.1–2.2)
ALP SERPL-CCNC: 550 U/L (ref 45–117)
ALT SERPL-CCNC: 51 U/L (ref 12–78)
ANION GAP SERPL CALC-SCNC: 4 MMOL/L (ref 2–12)
AST SERPL-CCNC: 166 U/L (ref 15–37)
BASOPHILS # BLD: 0 K/UL (ref 0–0.1)
BASOPHILS NFR BLD: 0 % (ref 0–1)
BILIRUB DIRECT SERPL-MCNC: 14.1 MG/DL (ref 0–0.2)
BILIRUB SERPL-MCNC: 19.4 MG/DL (ref 0.2–1)
BUN SERPL-MCNC: 45 MG/DL (ref 6–20)
BUN/CREAT SERPL: 47 (ref 12–20)
CALCIUM SERPL-MCNC: 10 MG/DL (ref 8.5–10.1)
CHLORIDE SERPL-SCNC: 97 MMOL/L (ref 97–108)
CO2 SERPL-SCNC: 30 MMOL/L (ref 21–32)
CREAT SERPL-MCNC: 0.95 MG/DL (ref 0.7–1.3)
DIFFERENTIAL METHOD BLD: ABNORMAL
EKG ATRIAL RATE: 75 BPM
EKG DIAGNOSIS: NORMAL
EKG P AXIS: 11 DEGREES
EKG P-R INTERVAL: 166 MS
EKG Q-T INTERVAL: 390 MS
EKG QRS DURATION: 88 MS
EKG QTC CALCULATION (BAZETT): 435 MS
EKG R AXIS: 18 DEGREES
EKG T AXIS: 36 DEGREES
EKG VENTRICULAR RATE: 75 BPM
EOSINOPHIL # BLD: 0 K/UL (ref 0–0.4)
EOSINOPHIL NFR BLD: 0 % (ref 0–7)
ERYTHROCYTE [DISTWIDTH] IN BLOOD BY AUTOMATED COUNT: 18.7 % (ref 11.5–14.5)
GLOBULIN SER CALC-MCNC: 3.4 G/DL (ref 2–4)
GLUCOSE SERPL-MCNC: 102 MG/DL (ref 65–100)
HCT VFR BLD AUTO: 30 % (ref 36.6–50.3)
HGB BLD-MCNC: 9.9 G/DL (ref 12.1–17)
IMM GRANULOCYTES # BLD AUTO: 0.1 K/UL (ref 0–0.04)
IMM GRANULOCYTES NFR BLD AUTO: 1 % (ref 0–0.5)
INR PPP: 1.6 (ref 0.9–1.1)
LYMPHOCYTES # BLD: 3.6 K/UL (ref 0.8–3.5)
LYMPHOCYTES NFR BLD: 32 % (ref 12–49)
MCH RBC QN AUTO: 27 PG (ref 26–34)
MCHC RBC AUTO-ENTMCNC: 33 G/DL (ref 30–36.5)
MCV RBC AUTO: 81.7 FL (ref 80–99)
MONOCYTES # BLD: 1.1 K/UL (ref 0–1)
MONOCYTES NFR BLD: 10 % (ref 5–13)
NEUTS SEG # BLD: 6.4 K/UL (ref 1.8–8)
NEUTS SEG NFR BLD: 57 % (ref 32–75)
NRBC # BLD: 0 K/UL (ref 0–0.01)
NRBC BLD-RTO: 0 PER 100 WBC
PLATELET # BLD AUTO: 94 K/UL (ref 150–400)
PMV BLD AUTO: 11.2 FL (ref 8.9–12.9)
POTASSIUM SERPL-SCNC: 4.1 MMOL/L (ref 3.5–5.1)
PROT SERPL-MCNC: 6.4 G/DL (ref 6.4–8.2)
PROTHROMBIN TIME: 16.1 SEC (ref 9–11.1)
RBC # BLD AUTO: 3.67 M/UL (ref 4.1–5.7)
RBC MORPH BLD: ABNORMAL
SODIUM SERPL-SCNC: 131 MMOL/L (ref 136–145)
WBC # BLD AUTO: 11.2 K/UL (ref 4.1–11.1)

## 2024-09-11 PROCEDURE — 2700000000 HC OXYGEN THERAPY PER DAY

## 2024-09-11 PROCEDURE — 94640 AIRWAY INHALATION TREATMENT: CPT

## 2024-09-11 PROCEDURE — 80076 HEPATIC FUNCTION PANEL: CPT

## 2024-09-11 PROCEDURE — 1100000000 HC RM PRIVATE

## 2024-09-11 PROCEDURE — 6360000002 HC RX W HCPCS: Performed by: NURSE PRACTITIONER

## 2024-09-11 PROCEDURE — 2580000003 HC RX 258: Performed by: NURSE PRACTITIONER

## 2024-09-11 PROCEDURE — 85025 COMPLETE CBC W/AUTO DIFF WBC: CPT

## 2024-09-11 PROCEDURE — 36415 COLL VENOUS BLD VENIPUNCTURE: CPT

## 2024-09-11 PROCEDURE — 2580000003 HC RX 258: Performed by: STUDENT IN AN ORGANIZED HEALTH CARE EDUCATION/TRAINING PROGRAM

## 2024-09-11 PROCEDURE — 85610 PROTHROMBIN TIME: CPT

## 2024-09-11 PROCEDURE — 6370000000 HC RX 637 (ALT 250 FOR IP): Performed by: STUDENT IN AN ORGANIZED HEALTH CARE EDUCATION/TRAINING PROGRAM

## 2024-09-11 PROCEDURE — 80048 BASIC METABOLIC PNL TOTAL CA: CPT

## 2024-09-11 PROCEDURE — 94760 N-INVAS EAR/PLS OXIMETRY 1: CPT

## 2024-09-11 RX ORDER — FUROSEMIDE 40 MG
40 TABLET ORAL 2 TIMES DAILY
Status: DISCONTINUED | OUTPATIENT
Start: 2024-09-11 | End: 2024-09-16 | Stop reason: HOSPADM

## 2024-09-11 RX ORDER — MORPHINE SULFATE 2 MG/ML
2 INJECTION, SOLUTION INTRAMUSCULAR; INTRAVENOUS ONCE
Status: COMPLETED | OUTPATIENT
Start: 2024-09-11 | End: 2024-09-11

## 2024-09-11 RX ADMIN — SPIRONOLACTONE 100 MG: 25 TABLET ORAL at 09:23

## 2024-09-11 RX ADMIN — OXYCODONE HYDROCHLORIDE 5 MG: 5 TABLET ORAL at 12:10

## 2024-09-11 RX ADMIN — RIFAXIMIN 550 MG: 550 TABLET ORAL at 09:24

## 2024-09-11 RX ADMIN — RIFAXIMIN 550 MG: 550 TABLET ORAL at 20:47

## 2024-09-11 RX ADMIN — OXYCODONE HYDROCHLORIDE 5 MG: 5 TABLET ORAL at 06:26

## 2024-09-11 RX ADMIN — SODIUM CHLORIDE: 9 INJECTION, SOLUTION INTRAVENOUS at 02:43

## 2024-09-11 RX ADMIN — OXYCODONE HYDROCHLORIDE 5 MG: 5 TABLET ORAL at 02:27

## 2024-09-11 RX ADMIN — SODIUM CHLORIDE, PRESERVATIVE FREE 10 ML: 5 INJECTION INTRAVENOUS at 20:48

## 2024-09-11 RX ADMIN — PANTOPRAZOLE SODIUM 40 MG: 40 TABLET, DELAYED RELEASE ORAL at 09:24

## 2024-09-11 RX ADMIN — SPIRONOLACTONE 100 MG: 25 TABLET ORAL at 18:19

## 2024-09-11 RX ADMIN — FUROSEMIDE 40 MG: 40 TABLET ORAL at 18:20

## 2024-09-11 RX ADMIN — CEFTRIAXONE SODIUM 2000 MG: 2 INJECTION, POWDER, FOR SOLUTION INTRAMUSCULAR; INTRAVENOUS at 02:46

## 2024-09-11 RX ADMIN — SODIUM CHLORIDE, PRESERVATIVE FREE 10 ML: 5 INJECTION INTRAVENOUS at 09:25

## 2024-09-11 RX ADMIN — MORPHINE SULFATE 2 MG: 2 INJECTION, SOLUTION INTRAMUSCULAR; INTRAVENOUS at 03:03

## 2024-09-11 RX ADMIN — OXYCODONE HYDROCHLORIDE 5 MG: 5 TABLET ORAL at 20:47

## 2024-09-11 ASSESSMENT — PAIN DESCRIPTION - LOCATION
LOCATION: ABDOMEN;BACK
LOCATION: ABDOMEN
LOCATION: ABDOMEN
LOCATION: ABDOMEN;BACK

## 2024-09-11 ASSESSMENT — PAIN SCALES - GENERAL
PAINLEVEL_OUTOF10: 10
PAINLEVEL_OUTOF10: 10
PAINLEVEL_OUTOF10: 8
PAINLEVEL_OUTOF10: 10
PAINLEVEL_OUTOF10: 10
PAINLEVEL_OUTOF10: 7
PAINLEVEL_OUTOF10: 10

## 2024-09-11 ASSESSMENT — PAIN DESCRIPTION - DESCRIPTORS
DESCRIPTORS: DISCOMFORT;ACHING;PRESSURE
DESCRIPTORS: ACHING
DESCRIPTORS: ACHING
DESCRIPTORS: SHARP
DESCRIPTORS: ACHING

## 2024-09-11 ASSESSMENT — PAIN DESCRIPTION - ORIENTATION
ORIENTATION: INNER

## 2024-09-11 ASSESSMENT — PAIN - FUNCTIONAL ASSESSMENT: PAIN_FUNCTIONAL_ASSESSMENT: PREVENTS OR INTERFERES SOME ACTIVE ACTIVITIES AND ADLS

## 2024-09-12 PROBLEM — K72.90 DECOMPENSATION OF CIRRHOSIS OF LIVER (HCC): Status: ACTIVE | Noted: 2024-09-12

## 2024-09-12 PROBLEM — Z71.89 GOALS OF CARE, COUNSELING/DISCUSSION: Status: ACTIVE | Noted: 2024-09-12

## 2024-09-12 PROBLEM — E80.6 HYPERBILIRUBINEMIA: Status: ACTIVE | Noted: 2024-09-12

## 2024-09-12 PROBLEM — K74.60 DECOMPENSATION OF CIRRHOSIS OF LIVER (HCC): Status: ACTIVE | Noted: 2024-09-12

## 2024-09-12 PROBLEM — K70.31 ASCITES DUE TO ALCOHOLIC CIRRHOSIS (HCC): Status: ACTIVE | Noted: 2024-09-12

## 2024-09-12 PROBLEM — Z71.89 DNR (DO NOT RESUSCITATE) DISCUSSION: Status: ACTIVE | Noted: 2024-09-12

## 2024-09-12 LAB
ALBUMIN SERPL-MCNC: 2.6 G/DL (ref 3.5–5)
ALBUMIN/GLOB SERPL: 0.7 (ref 1.1–2.2)
ALP SERPL-CCNC: 613 U/L (ref 45–117)
ALT SERPL-CCNC: 46 U/L (ref 12–78)
ANION GAP SERPL CALC-SCNC: 7 MMOL/L (ref 2–12)
AST SERPL-CCNC: 150 U/L (ref 15–37)
BILIRUB SERPL-MCNC: 23.6 MG/DL (ref 0.2–1)
BUN SERPL-MCNC: 48 MG/DL (ref 6–20)
BUN/CREAT SERPL: ABNORMAL (ref 12–20)
CALCIUM SERPL-MCNC: 9.9 MG/DL (ref 8.5–10.1)
CHLORIDE SERPL-SCNC: 95 MMOL/L (ref 97–108)
CO2 SERPL-SCNC: 27 MMOL/L (ref 21–32)
CREAT SERPL-MCNC: ABNORMAL MG/DL (ref 0.7–1.3)
GLOBULIN SER CALC-MCNC: 3.7 G/DL (ref 2–4)
GLUCOSE SERPL-MCNC: 109 MG/DL (ref 65–100)
INR PPP: 1.6 (ref 0.9–1.1)
POTASSIUM SERPL-SCNC: 4.5 MMOL/L (ref 3.5–5.1)
PROT SERPL-MCNC: 6.3 G/DL (ref 6.4–8.2)
PROTHROMBIN TIME: 16.4 SEC (ref 9–11.1)
SODIUM SERPL-SCNC: 129 MMOL/L (ref 136–145)

## 2024-09-12 PROCEDURE — 2580000003 HC RX 258: Performed by: STUDENT IN AN ORGANIZED HEALTH CARE EDUCATION/TRAINING PROGRAM

## 2024-09-12 PROCEDURE — 36415 COLL VENOUS BLD VENIPUNCTURE: CPT

## 2024-09-12 PROCEDURE — 85610 PROTHROMBIN TIME: CPT

## 2024-09-12 PROCEDURE — 2580000003 HC RX 258: Performed by: NURSE PRACTITIONER

## 2024-09-12 PROCEDURE — 94761 N-INVAS EAR/PLS OXIMETRY MLT: CPT

## 2024-09-12 PROCEDURE — 99223 1ST HOSP IP/OBS HIGH 75: CPT | Performed by: NURSE PRACTITIONER

## 2024-09-12 PROCEDURE — 2700000000 HC OXYGEN THERAPY PER DAY

## 2024-09-12 PROCEDURE — 1100000000 HC RM PRIVATE

## 2024-09-12 PROCEDURE — 6370000000 HC RX 637 (ALT 250 FOR IP): Performed by: STUDENT IN AN ORGANIZED HEALTH CARE EDUCATION/TRAINING PROGRAM

## 2024-09-12 PROCEDURE — 6360000002 HC RX W HCPCS: Performed by: NURSE PRACTITIONER

## 2024-09-12 PROCEDURE — 94760 N-INVAS EAR/PLS OXIMETRY 1: CPT

## 2024-09-12 PROCEDURE — 80053 COMPREHEN METABOLIC PANEL: CPT

## 2024-09-12 RX ORDER — ARFORMOTEROL TARTRATE 15 UG/2ML
15 SOLUTION RESPIRATORY (INHALATION) AS NEEDED
Status: DISCONTINUED | OUTPATIENT
Start: 2024-09-12 | End: 2024-09-16 | Stop reason: HOSPADM

## 2024-09-12 RX ADMIN — RIFAXIMIN 550 MG: 550 TABLET ORAL at 09:57

## 2024-09-12 RX ADMIN — CEFTRIAXONE SODIUM 2000 MG: 2 INJECTION, POWDER, FOR SOLUTION INTRAMUSCULAR; INTRAVENOUS at 02:53

## 2024-09-12 RX ADMIN — SODIUM CHLORIDE, PRESERVATIVE FREE 10 ML: 5 INJECTION INTRAVENOUS at 09:57

## 2024-09-12 RX ADMIN — FUROSEMIDE 40 MG: 40 TABLET ORAL at 18:40

## 2024-09-12 RX ADMIN — OXYCODONE HYDROCHLORIDE 5 MG: 5 TABLET ORAL at 05:39

## 2024-09-12 RX ADMIN — LACTULOSE 10 G: 10 SOLUTION ORAL at 09:56

## 2024-09-12 RX ADMIN — OXYCODONE HYDROCHLORIDE 5 MG: 5 TABLET ORAL at 01:19

## 2024-09-12 RX ADMIN — PANTOPRAZOLE SODIUM 40 MG: 40 TABLET, DELAYED RELEASE ORAL at 09:57

## 2024-09-12 RX ADMIN — SPIRONOLACTONE 100 MG: 25 TABLET ORAL at 18:40

## 2024-09-12 RX ADMIN — RIFAXIMIN 550 MG: 550 TABLET ORAL at 20:23

## 2024-09-12 RX ADMIN — SODIUM CHLORIDE: 9 INJECTION, SOLUTION INTRAVENOUS at 02:52

## 2024-09-12 RX ADMIN — OXYCODONE HYDROCHLORIDE 5 MG: 5 TABLET ORAL at 20:22

## 2024-09-12 RX ADMIN — SODIUM CHLORIDE, PRESERVATIVE FREE 10 ML: 5 INJECTION INTRAVENOUS at 21:22

## 2024-09-12 ASSESSMENT — PAIN DESCRIPTION - DESCRIPTORS
DESCRIPTORS: ACHING
DESCRIPTORS: ACHING;STABBING;THROBBING
DESCRIPTORS: ACHING
DESCRIPTORS: ACHING;STABBING;THROBBING

## 2024-09-12 ASSESSMENT — PAIN DESCRIPTION - LOCATION
LOCATION: ABDOMEN;BACK

## 2024-09-12 ASSESSMENT — PAIN SCALES - GENERAL
PAINLEVEL_OUTOF10: 8
PAINLEVEL_OUTOF10: 9
PAINLEVEL_OUTOF10: 7
PAINLEVEL_OUTOF10: 5
PAINLEVEL_OUTOF10: 10
PAINLEVEL_OUTOF10: 6
PAINLEVEL_OUTOF10: 3

## 2024-09-12 ASSESSMENT — PAIN DESCRIPTION - ORIENTATION
ORIENTATION: INNER

## 2024-09-12 ASSESSMENT — PAIN DESCRIPTION - FREQUENCY
FREQUENCY: CONTINUOUS
FREQUENCY: CONTINUOUS

## 2024-09-12 ASSESSMENT — PAIN - FUNCTIONAL ASSESSMENT
PAIN_FUNCTIONAL_ASSESSMENT: ACTIVITIES ARE NOT PREVENTED
PAIN_FUNCTIONAL_ASSESSMENT: ACTIVITIES ARE NOT PREVENTED

## 2024-09-12 ASSESSMENT — PAIN DESCRIPTION - PAIN TYPE
TYPE: CHRONIC PAIN
TYPE: CHRONIC PAIN

## 2024-09-12 ASSESSMENT — PAIN DESCRIPTION - ONSET
ONSET: ON-GOING
ONSET: ON-GOING

## 2024-09-13 ENCOUNTER — APPOINTMENT (OUTPATIENT)
Facility: HOSPITAL | Age: 64
DRG: 372 | End: 2024-09-13
Payer: MEDICARE

## 2024-09-13 LAB
ALBUMIN FLD-MCNC: 1 G/DL
ALBUMIN SERPL-MCNC: 2.4 G/DL (ref 3.5–5)
ALBUMIN/GLOB SERPL: 0.6 (ref 1.1–2.2)
ALP SERPL-CCNC: 700 U/L (ref 45–117)
ALT SERPL-CCNC: 51 U/L (ref 12–78)
ANION GAP SERPL CALC-SCNC: 7 MMOL/L (ref 2–12)
APPEARANCE FLD: ABNORMAL
AST SERPL-CCNC: 186 U/L (ref 15–37)
BILIRUB SERPL-MCNC: 24.3 MG/DL (ref 0.2–1)
BUN SERPL-MCNC: 56 MG/DL (ref 6–20)
BUN/CREAT SERPL: ABNORMAL (ref 12–20)
CALCIUM SERPL-MCNC: 9.8 MG/DL (ref 8.5–10.1)
CHLORIDE SERPL-SCNC: 94 MMOL/L (ref 97–108)
CO2 SERPL-SCNC: 27 MMOL/L (ref 21–32)
COLOR FLD: YELLOW
CREAT SERPL-MCNC: ABNORMAL MG/DL (ref 0.7–1.3)
GLOBULIN SER CALC-MCNC: 3.9 G/DL (ref 2–4)
GLUCOSE SERPL-MCNC: 115 MG/DL (ref 65–100)
INR PPP: 1.5 (ref 0.9–1.1)
LYMPHOCYTES NFR FLD: 8 %
MESOTHL CELL NFR FLD: 1 %
MONOS+MACROS NFR FLD: 15 %
NEUTROPHILS NFR FLD: 76 %
NUC CELL # FLD: 5521 /CU MM
POTASSIUM SERPL-SCNC: 4.7 MMOL/L (ref 3.5–5.1)
PROT FLD-MCNC: 1.7 G/DL
PROT SERPL-MCNC: 6.3 G/DL (ref 6.4–8.2)
PROTHROMBIN TIME: 15.6 SEC (ref 9–11.1)
RBC # FLD: >100 /CU MM
SODIUM SERPL-SCNC: 128 MMOL/L (ref 136–145)
SPECIMEN SOURCE FLD: ABNORMAL
SPECIMEN SOURCE FLD: NORMAL
SPECIMEN SOURCE FLD: NORMAL

## 2024-09-13 PROCEDURE — 2709999900 US GUIDED PARACENTESIS

## 2024-09-13 PROCEDURE — 80053 COMPREHEN METABOLIC PANEL: CPT

## 2024-09-13 PROCEDURE — 88305 TISSUE EXAM BY PATHOLOGIST: CPT

## 2024-09-13 PROCEDURE — 6370000000 HC RX 637 (ALT 250 FOR IP): Performed by: STUDENT IN AN ORGANIZED HEALTH CARE EDUCATION/TRAINING PROGRAM

## 2024-09-13 PROCEDURE — 36415 COLL VENOUS BLD VENIPUNCTURE: CPT

## 2024-09-13 PROCEDURE — 84157 ASSAY OF PROTEIN OTHER: CPT

## 2024-09-13 PROCEDURE — 6360000002 HC RX W HCPCS: Performed by: NURSE PRACTITIONER

## 2024-09-13 PROCEDURE — 82042 OTHER SOURCE ALBUMIN QUAN EA: CPT

## 2024-09-13 PROCEDURE — 87205 SMEAR GRAM STAIN: CPT

## 2024-09-13 PROCEDURE — 94761 N-INVAS EAR/PLS OXIMETRY MLT: CPT

## 2024-09-13 PROCEDURE — 2500000003 HC RX 250 WO HCPCS: Performed by: STUDENT IN AN ORGANIZED HEALTH CARE EDUCATION/TRAINING PROGRAM

## 2024-09-13 PROCEDURE — 2580000003 HC RX 258: Performed by: NURSE PRACTITIONER

## 2024-09-13 PROCEDURE — 6360000002 HC RX W HCPCS: Performed by: STUDENT IN AN ORGANIZED HEALTH CARE EDUCATION/TRAINING PROGRAM

## 2024-09-13 PROCEDURE — 2700000000 HC OXYGEN THERAPY PER DAY

## 2024-09-13 PROCEDURE — 0W9G3ZZ DRAINAGE OF PERITONEAL CAVITY, PERCUTANEOUS APPROACH: ICD-10-PCS | Performed by: STUDENT IN AN ORGANIZED HEALTH CARE EDUCATION/TRAINING PROGRAM

## 2024-09-13 PROCEDURE — 87070 CULTURE OTHR SPECIMN AEROBIC: CPT

## 2024-09-13 PROCEDURE — 2580000003 HC RX 258: Performed by: STUDENT IN AN ORGANIZED HEALTH CARE EDUCATION/TRAINING PROGRAM

## 2024-09-13 PROCEDURE — 88112 CYTOPATH CELL ENHANCE TECH: CPT

## 2024-09-13 PROCEDURE — 85610 PROTHROMBIN TIME: CPT

## 2024-09-13 PROCEDURE — 1100000000 HC RM PRIVATE

## 2024-09-13 PROCEDURE — P9047 ALBUMIN (HUMAN), 25%, 50ML: HCPCS | Performed by: STUDENT IN AN ORGANIZED HEALTH CARE EDUCATION/TRAINING PROGRAM

## 2024-09-13 PROCEDURE — 89050 BODY FLUID CELL COUNT: CPT

## 2024-09-13 RX ORDER — LIDOCAINE HYDROCHLORIDE 10 MG/ML
10 INJECTION, SOLUTION EPIDURAL; INFILTRATION; INTRACAUDAL; PERINEURAL ONCE
Status: COMPLETED | OUTPATIENT
Start: 2024-09-13 | End: 2024-09-13

## 2024-09-13 RX ORDER — ALBUMIN (HUMAN) 12.5 G/50ML
113 SOLUTION INTRAVENOUS ONCE
Status: COMPLETED | OUTPATIENT
Start: 2024-09-13 | End: 2024-09-13

## 2024-09-13 RX ORDER — CASTOR OIL AND BALSAM, PERU 788; 87 MG/G; MG/G
OINTMENT TOPICAL 2 TIMES DAILY
Status: DISCONTINUED | OUTPATIENT
Start: 2024-09-13 | End: 2024-09-16 | Stop reason: HOSPADM

## 2024-09-13 RX ADMIN — ALBUMIN (HUMAN) 113 G: 0.25 INJECTION, SOLUTION INTRAVENOUS at 18:44

## 2024-09-13 RX ADMIN — SODIUM CHLORIDE, PRESERVATIVE FREE 10 ML: 5 INJECTION INTRAVENOUS at 09:17

## 2024-09-13 RX ADMIN — Medication: at 09:00

## 2024-09-13 RX ADMIN — OXYCODONE HYDROCHLORIDE 5 MG: 5 TABLET ORAL at 17:07

## 2024-09-13 RX ADMIN — RIFAXIMIN 550 MG: 550 TABLET ORAL at 20:49

## 2024-09-13 RX ADMIN — OXYCODONE HYDROCHLORIDE 5 MG: 5 TABLET ORAL at 08:29

## 2024-09-13 RX ADMIN — CEFTRIAXONE SODIUM 2000 MG: 2 INJECTION, POWDER, FOR SOLUTION INTRAMUSCULAR; INTRAVENOUS at 03:48

## 2024-09-13 RX ADMIN — FUROSEMIDE 40 MG: 40 TABLET ORAL at 17:08

## 2024-09-13 RX ADMIN — PANTOPRAZOLE SODIUM 40 MG: 40 TABLET, DELAYED RELEASE ORAL at 09:10

## 2024-09-13 RX ADMIN — SODIUM CHLORIDE, PRESERVATIVE FREE 10 ML: 5 INJECTION INTRAVENOUS at 20:50

## 2024-09-13 RX ADMIN — FUROSEMIDE 40 MG: 40 TABLET ORAL at 09:09

## 2024-09-13 RX ADMIN — SPIRONOLACTONE 100 MG: 25 TABLET ORAL at 17:08

## 2024-09-13 RX ADMIN — RIFAXIMIN 550 MG: 550 TABLET ORAL at 09:11

## 2024-09-13 RX ADMIN — LIDOCAINE HYDROCHLORIDE 10 ML: 10 INJECTION, SOLUTION EPIDURAL; INFILTRATION; INTRACAUDAL; PERINEURAL at 12:54

## 2024-09-13 RX ADMIN — SPIRONOLACTONE 100 MG: 25 TABLET ORAL at 09:10

## 2024-09-13 ASSESSMENT — PAIN DESCRIPTION - PAIN TYPE
TYPE: CHRONIC PAIN
TYPE: CHRONIC PAIN

## 2024-09-13 ASSESSMENT — PAIN SCALES - GENERAL
PAINLEVEL_OUTOF10: 10
PAINLEVEL_OUTOF10: 9
PAINLEVEL_OUTOF10: 9

## 2024-09-13 ASSESSMENT — PAIN DESCRIPTION - DESCRIPTORS
DESCRIPTORS: ACHING;SORE;THROBBING
DESCRIPTORS: ACHING
DESCRIPTORS: ACHING

## 2024-09-13 ASSESSMENT — PAIN DESCRIPTION - ORIENTATION
ORIENTATION: OTHER (COMMENT)
ORIENTATION: RIGHT;LEFT
ORIENTATION: LOWER;MID;UPPER
ORIENTATION: RIGHT;LEFT
ORIENTATION: LOWER;MID;UPPER

## 2024-09-13 ASSESSMENT — PAIN DESCRIPTION - LOCATION
LOCATION: GENERALIZED;FOOT;SACRUM
LOCATION: BACK;HEAD
LOCATION: BACK;ABDOMEN

## 2024-09-13 ASSESSMENT — PAIN DESCRIPTION - ONSET: ONSET: ON-GOING

## 2024-09-13 ASSESSMENT — PAIN DESCRIPTION - FREQUENCY: FREQUENCY: CONTINUOUS

## 2024-09-14 LAB
ANION GAP SERPL CALC-SCNC: 7 MMOL/L (ref 2–12)
BASOPHILS # BLD: 0 K/UL (ref 0–0.1)
BASOPHILS NFR BLD: 0 % (ref 0–1)
BUN SERPL-MCNC: 50 MG/DL (ref 6–20)
BUN/CREAT SERPL: 42 (ref 12–20)
CALCIUM SERPL-MCNC: 9.4 MG/DL (ref 8.5–10.1)
CHLORIDE SERPL-SCNC: 96 MMOL/L (ref 97–108)
CO2 SERPL-SCNC: 27 MMOL/L (ref 21–32)
CREAT SERPL-MCNC: 1.2 MG/DL (ref 0.7–1.3)
DIFFERENTIAL METHOD BLD: ABNORMAL
EOSINOPHIL # BLD: 0.3 K/UL (ref 0–0.4)
EOSINOPHIL NFR BLD: 1 % (ref 0–7)
ERYTHROCYTE [DISTWIDTH] IN BLOOD BY AUTOMATED COUNT: 20 % (ref 11.5–14.5)
GLUCOSE SERPL-MCNC: 108 MG/DL (ref 65–100)
HCT VFR BLD AUTO: 31.4 % (ref 36.6–50.3)
HGB BLD-MCNC: 10.6 G/DL (ref 12.1–17)
IMM GRANULOCYTES # BLD AUTO: 0 K/UL (ref 0–0.04)
IMM GRANULOCYTES NFR BLD AUTO: 0 % (ref 0–0.5)
INR PPP: 1.5 (ref 0.9–1.1)
LYMPHOCYTES # BLD: 17.4 K/UL (ref 0.8–3.5)
LYMPHOCYTES NFR BLD: 64 % (ref 12–49)
MAGNESIUM SERPL-MCNC: 2.7 MG/DL (ref 1.6–2.4)
MCH RBC QN AUTO: 27 PG (ref 26–34)
MCHC RBC AUTO-ENTMCNC: 33.8 G/DL (ref 30–36.5)
MCV RBC AUTO: 79.9 FL (ref 80–99)
MONOCYTES # BLD: 0.3 K/UL (ref 0–1)
MONOCYTES NFR BLD: 1 % (ref 5–13)
NEUTS BAND NFR BLD MANUAL: 2 %
NEUTS SEG # BLD: 9.2 K/UL (ref 1.8–8)
NEUTS SEG NFR BLD: 32 % (ref 32–75)
NRBC # BLD: 0.02 K/UL (ref 0–0.01)
NRBC BLD-RTO: 0.1 PER 100 WBC
PHOSPHATE SERPL-MCNC: 2.5 MG/DL (ref 2.6–4.7)
PLATELET # BLD AUTO: 107 K/UL (ref 150–400)
PMV BLD AUTO: 9.4 FL (ref 8.9–12.9)
POTASSIUM SERPL-SCNC: 4.4 MMOL/L (ref 3.5–5.1)
PROTHROMBIN TIME: 15 SEC (ref 9–11.1)
RBC # BLD AUTO: 3.93 M/UL (ref 4.1–5.7)
RBC MORPH BLD: ABNORMAL
SODIUM SERPL-SCNC: 130 MMOL/L (ref 136–145)
WBC # BLD AUTO: 27.2 K/UL (ref 4.1–11.1)
WBC MORPH BLD: ABNORMAL

## 2024-09-14 PROCEDURE — 1100000000 HC RM PRIVATE

## 2024-09-14 PROCEDURE — 85025 COMPLETE CBC W/AUTO DIFF WBC: CPT

## 2024-09-14 PROCEDURE — 83735 ASSAY OF MAGNESIUM: CPT

## 2024-09-14 PROCEDURE — 2580000003 HC RX 258: Performed by: NURSE PRACTITIONER

## 2024-09-14 PROCEDURE — 36415 COLL VENOUS BLD VENIPUNCTURE: CPT

## 2024-09-14 PROCEDURE — 80048 BASIC METABOLIC PNL TOTAL CA: CPT

## 2024-09-14 PROCEDURE — 84100 ASSAY OF PHOSPHORUS: CPT

## 2024-09-14 PROCEDURE — 94760 N-INVAS EAR/PLS OXIMETRY 1: CPT

## 2024-09-14 PROCEDURE — 6360000002 HC RX W HCPCS: Performed by: NURSE PRACTITIONER

## 2024-09-14 PROCEDURE — 6360000002 HC RX W HCPCS: Performed by: STUDENT IN AN ORGANIZED HEALTH CARE EDUCATION/TRAINING PROGRAM

## 2024-09-14 PROCEDURE — 2580000003 HC RX 258: Performed by: STUDENT IN AN ORGANIZED HEALTH CARE EDUCATION/TRAINING PROGRAM

## 2024-09-14 PROCEDURE — 2700000000 HC OXYGEN THERAPY PER DAY

## 2024-09-14 PROCEDURE — 6370000000 HC RX 637 (ALT 250 FOR IP): Performed by: STUDENT IN AN ORGANIZED HEALTH CARE EDUCATION/TRAINING PROGRAM

## 2024-09-14 PROCEDURE — 85610 PROTHROMBIN TIME: CPT

## 2024-09-14 RX ADMIN — ONDANSETRON 4 MG: 2 INJECTION INTRAMUSCULAR; INTRAVENOUS at 09:28

## 2024-09-14 RX ADMIN — RIFAXIMIN 550 MG: 550 TABLET ORAL at 09:32

## 2024-09-14 RX ADMIN — Medication: at 09:45

## 2024-09-14 RX ADMIN — PANTOPRAZOLE SODIUM 40 MG: 40 TABLET, DELAYED RELEASE ORAL at 09:32

## 2024-09-14 RX ADMIN — FUROSEMIDE 40 MG: 40 TABLET ORAL at 09:32

## 2024-09-14 RX ADMIN — CEFTRIAXONE SODIUM 2000 MG: 2 INJECTION, POWDER, FOR SOLUTION INTRAMUSCULAR; INTRAVENOUS at 03:22

## 2024-09-14 RX ADMIN — SODIUM CHLORIDE, PRESERVATIVE FREE 10 ML: 5 INJECTION INTRAVENOUS at 09:33

## 2024-09-14 RX ADMIN — SODIUM CHLORIDE, PRESERVATIVE FREE 10 ML: 5 INJECTION INTRAVENOUS at 20:40

## 2024-09-14 RX ADMIN — OXYCODONE HYDROCHLORIDE 5 MG: 5 TABLET ORAL at 04:07

## 2024-09-14 RX ADMIN — Medication: at 20:39

## 2024-09-14 RX ADMIN — SPIRONOLACTONE 100 MG: 25 TABLET ORAL at 09:32

## 2024-09-14 RX ADMIN — OXYCODONE HYDROCHLORIDE 5 MG: 5 TABLET ORAL at 09:45

## 2024-09-14 RX ADMIN — LACTULOSE 10 G: 10 SOLUTION ORAL at 09:32

## 2024-09-14 ASSESSMENT — PAIN - FUNCTIONAL ASSESSMENT: PAIN_FUNCTIONAL_ASSESSMENT: ACTIVITIES ARE NOT PREVENTED

## 2024-09-14 ASSESSMENT — PAIN SCALES - GENERAL
PAINLEVEL_OUTOF10: 6
PAINLEVEL_OUTOF10: 0
PAINLEVEL_OUTOF10: 0
PAINLEVEL_OUTOF10: 9
PAINLEVEL_OUTOF10: 0

## 2024-09-14 ASSESSMENT — PAIN DESCRIPTION - LOCATION
LOCATION: GENERALIZED
LOCATION: GENERALIZED

## 2024-09-14 ASSESSMENT — PAIN DESCRIPTION - ONSET: ONSET: ON-GOING

## 2024-09-14 ASSESSMENT — PAIN DESCRIPTION - DESCRIPTORS
DESCRIPTORS: ACHING
DESCRIPTORS: ACHING

## 2024-09-14 ASSESSMENT — PAIN DESCRIPTION - ORIENTATION: ORIENTATION: OTHER (COMMENT)

## 2024-09-14 ASSESSMENT — PAIN DESCRIPTION - FREQUENCY: FREQUENCY: CONTINUOUS

## 2024-09-14 ASSESSMENT — PAIN DESCRIPTION - PAIN TYPE: TYPE: CHRONIC PAIN

## 2024-09-15 LAB
BACTERIA SPEC CULT: ABNORMAL
GRAM STN SPEC: ABNORMAL
PATH REV BLD -IMP: NORMAL
SERVICE CMNT-IMP: ABNORMAL

## 2024-09-15 PROCEDURE — 2700000000 HC OXYGEN THERAPY PER DAY

## 2024-09-15 PROCEDURE — 1100000000 HC RM PRIVATE

## 2024-09-15 PROCEDURE — 6370000000 HC RX 637 (ALT 250 FOR IP): Performed by: STUDENT IN AN ORGANIZED HEALTH CARE EDUCATION/TRAINING PROGRAM

## 2024-09-15 PROCEDURE — 94760 N-INVAS EAR/PLS OXIMETRY 1: CPT

## 2024-09-15 PROCEDURE — 2580000003 HC RX 258: Performed by: STUDENT IN AN ORGANIZED HEALTH CARE EDUCATION/TRAINING PROGRAM

## 2024-09-15 RX ORDER — OXYCODONE HYDROCHLORIDE 5 MG/1
10 TABLET ORAL EVERY 4 HOURS PRN
Status: DISCONTINUED | OUTPATIENT
Start: 2024-09-15 | End: 2024-09-16 | Stop reason: HOSPADM

## 2024-09-15 RX ADMIN — OXYCODONE HYDROCHLORIDE 10 MG: 5 TABLET ORAL at 21:46

## 2024-09-15 RX ADMIN — Medication: at 08:48

## 2024-09-15 RX ADMIN — SODIUM CHLORIDE, PRESERVATIVE FREE 10 ML: 5 INJECTION INTRAVENOUS at 08:50

## 2024-09-15 RX ADMIN — OXYCODONE HYDROCHLORIDE 10 MG: 5 TABLET ORAL at 17:31

## 2024-09-15 ASSESSMENT — PAIN DESCRIPTION - ONSET
ONSET: PROGRESSIVE

## 2024-09-15 ASSESSMENT — PAIN DESCRIPTION - ORIENTATION
ORIENTATION: RIGHT;LEFT;POSTERIOR
ORIENTATION: POSTERIOR
ORIENTATION: OTHER (COMMENT)

## 2024-09-15 ASSESSMENT — PAIN DESCRIPTION - LOCATION
LOCATION: BACK
LOCATION: BACK;GENERALIZED

## 2024-09-15 ASSESSMENT — PAIN SCALES - GENERAL
PAINLEVEL_OUTOF10: 5
PAINLEVEL_OUTOF10: 9
PAINLEVEL_OUTOF10: 10
PAINLEVEL_OUTOF10: 10
PAINLEVEL_OUTOF10: 3

## 2024-09-15 ASSESSMENT — PAIN DESCRIPTION - DESCRIPTORS
DESCRIPTORS: ACHING
DESCRIPTORS: ACHING;DISCOMFORT
DESCRIPTORS: ACHING;DISCOMFORT;PRESSURE
DESCRIPTORS: ACHING;DISCOMFORT

## 2024-09-15 ASSESSMENT — PAIN - FUNCTIONAL ASSESSMENT
PAIN_FUNCTIONAL_ASSESSMENT: PREVENTS OR INTERFERES WITH ALL ACTIVE AND SOME PASSIVE ACTIVITIES
PAIN_FUNCTIONAL_ASSESSMENT: PREVENTS OR INTERFERES WITH ALL ACTIVE AND SOME PASSIVE ACTIVITIES
PAIN_FUNCTIONAL_ASSESSMENT: PREVENTS OR INTERFERES WITH MANY ACTIVE NOT PASSIVE ACTIVITIES

## 2024-09-15 ASSESSMENT — PAIN DESCRIPTION - PAIN TYPE
TYPE: CHRONIC PAIN

## 2024-09-15 ASSESSMENT — PAIN DESCRIPTION - FREQUENCY
FREQUENCY: CONTINUOUS

## 2024-09-16 VITALS
HEIGHT: 70 IN | DIASTOLIC BLOOD PRESSURE: 62 MMHG | RESPIRATION RATE: 16 BRPM | TEMPERATURE: 97.9 F | WEIGHT: 238.54 LBS | BODY MASS INDEX: 34.15 KG/M2 | OXYGEN SATURATION: 93 % | SYSTOLIC BLOOD PRESSURE: 108 MMHG | HEART RATE: 77 BPM

## 2024-09-16 PROCEDURE — 6370000000 HC RX 637 (ALT 250 FOR IP): Performed by: STUDENT IN AN ORGANIZED HEALTH CARE EDUCATION/TRAINING PROGRAM

## 2024-09-16 PROCEDURE — 2700000000 HC OXYGEN THERAPY PER DAY

## 2024-09-16 PROCEDURE — 94760 N-INVAS EAR/PLS OXIMETRY 1: CPT

## 2024-09-16 RX ORDER — METRONIDAZOLE 250 MG/1
500 TABLET ORAL EVERY 8 HOURS SCHEDULED
Status: DISCONTINUED | OUTPATIENT
Start: 2024-09-16 | End: 2024-09-16 | Stop reason: HOSPADM

## 2024-09-16 RX ORDER — METRONIDAZOLE 500 MG/1
500 TABLET ORAL EVERY 8 HOURS SCHEDULED
Qty: 21 TABLET | Refills: 0 | Status: SHIPPED | OUTPATIENT
Start: 2024-09-16 | End: 2024-09-23

## 2024-09-16 RX ADMIN — OXYCODONE HYDROCHLORIDE 10 MG: 5 TABLET ORAL at 13:37

## 2024-09-16 ASSESSMENT — PAIN SCALES - GENERAL: PAINLEVEL_OUTOF10: 6

## 2024-09-16 ASSESSMENT — PAIN DESCRIPTION - LOCATION: LOCATION: ABDOMEN;CHEST

## 2024-09-16 ASSESSMENT — PAIN DESCRIPTION - ORIENTATION: ORIENTATION: LEFT;RIGHT

## 2024-09-16 ASSESSMENT — PAIN DESCRIPTION - DESCRIPTORS: DESCRIPTORS: STABBING

## 2024-09-17 LAB
BACTERIA SPEC CULT: NORMAL
BACTERIA SPEC CULT: NORMAL
GRAM STN SPEC: NORMAL
GRAM STN SPEC: NORMAL
SERVICE CMNT-IMP: NORMAL

## 2024-09-18 LAB — CYTOLOGY-NON GYN: NORMAL

## (undated) DEVICE — TUBING IRRIG L77IN DIA0.241IN L BOR FOR CYSTO W/ NVENT

## (undated) DEVICE — OPEN-END URETERAL CATHETER: Brand: COOK

## (undated) DEVICE — JELLY,LUBE,STERILE,FLIP TOP,TUBE,4-OZ: Brand: MEDLINE

## (undated) DEVICE — SOLUTION IRRIGATION H2O 0797305] ICU MEDICAL INC]

## (undated) DEVICE — STERILE LATEX POWDER-FREE SURGICAL GLOVESWITH NITRILE COATING: Brand: PROTEXIS

## (undated) DEVICE — GAUZE SPONGES,12 PLY: Brand: CURITY

## (undated) DEVICE — GDWIRE UROL STR 150CM FLX TP -- BX/5 SENSOR

## (undated) DEVICE — INFECTION CONTROL KIT SYS

## (undated) DEVICE — TOWEL SURG W17XL27IN STD BLU COT NONFENESTRATED PREWASHED

## (undated) DEVICE — GOWN,PLEAT,SPECIALTY,XL,STRL: Brand: MEDLINE

## (undated) DEVICE — CYSTOSCOPY PACK: Brand: CONVERTORS

## (undated) DEVICE — BAG COLLECTION FLD OR-TBL NS --

## (undated) DEVICE — HANDLE LT SNAP ON ULT DURABLE LENS FOR TRUMPF ALC DISPOSABLE

## (undated) DEVICE — GUIDEWIRE ENDOSCP L150CM DIA0.035IN TIP L15CM BENT PTFE

## (undated) DEVICE — SOLUTION IRRIG 3000ML 0.9% SOD CHL FLX CONT 0797208] ICU MEDICAL INC]

## (undated) DEVICE — SOLUTION IRRIG 1000ML H2O STRL BLT

## (undated) DEVICE — SKIN MARKER,REGULAR TIP WITH RULER AND LABELS: Brand: DEVON

## (undated) DEVICE — DEVON™ KNEE AND BODY STRAP 60" X 3" (1.5 M X 7.6 CM): Brand: DEVON

## (undated) DEVICE — SYR 10ML LUER LOK 1/5ML GRAD --

## (undated) DEVICE — 3000CC GUARDIAN II: Brand: GUARDIAN

## (undated) DEVICE — MEDI-VAC NON-CONDUCTIVE SUCTION TUBING: Brand: CARDINAL HEALTH

## (undated) DEVICE — Z DISCONTINUEDSOLUTION PREP 2OZ 10% POVIDONE IOD SCR CAP BTL